# Patient Record
Sex: FEMALE | Race: WHITE | NOT HISPANIC OR LATINO | Employment: OTHER | URBAN - METROPOLITAN AREA
[De-identification: names, ages, dates, MRNs, and addresses within clinical notes are randomized per-mention and may not be internally consistent; named-entity substitution may affect disease eponyms.]

---

## 2017-01-06 ENCOUNTER — HOSPITAL ENCOUNTER (OUTPATIENT)
Dept: OTHER | Facility: HOSPITAL | Age: 72
Discharge: HOME | End: 2017-01-06
Attending: NURSE PRACTITIONER | Admitting: NURSE PRACTITIONER

## 2017-01-12 ENCOUNTER — HOSPITAL ENCOUNTER (OUTPATIENT)
Dept: OTHER | Facility: HOSPITAL | Age: 72
Discharge: HOME | End: 2017-01-12
Attending: NURSE PRACTITIONER | Admitting: NURSE PRACTITIONER

## 2017-03-30 ENCOUNTER — CONVERSION ENCOUNTER (OUTPATIENT)
Dept: FAMILY MEDICINE CLINIC | Facility: CLINIC | Age: 72
End: 2017-03-30

## 2017-06-12 ENCOUNTER — HOSPITAL ENCOUNTER (OUTPATIENT)
Dept: OTHER | Facility: HOSPITAL | Age: 72
Discharge: HOME | End: 2017-06-12
Attending: NURSE PRACTITIONER | Admitting: NURSE PRACTITIONER

## 2017-06-22 ENCOUNTER — HOSPITAL ENCOUNTER (OUTPATIENT)
Dept: MAMMOGRAPHY | Facility: HOSPITAL | Age: 72
Discharge: HOME | End: 2017-06-22
Attending: NURSE PRACTITIONER | Admitting: NURSE PRACTITIONER

## 2017-07-20 ENCOUNTER — ON CAMPUS - OUTPATIENT (AMBULATORY)
Dept: URBAN - METROPOLITAN AREA HOSPITAL 2 | Facility: HOSPITAL | Age: 72
End: 2017-07-20
Payer: COMMERCIAL

## 2017-07-20 VITALS
SYSTOLIC BLOOD PRESSURE: 117 MMHG | HEART RATE: 71 BPM | HEART RATE: 73 BPM | HEART RATE: 78 BPM | HEART RATE: 70 BPM | DIASTOLIC BLOOD PRESSURE: 86 MMHG | RESPIRATION RATE: 16 BRPM | RESPIRATION RATE: 18 BRPM | SYSTOLIC BLOOD PRESSURE: 125 MMHG | SYSTOLIC BLOOD PRESSURE: 134 MMHG | HEIGHT: 64 IN | SYSTOLIC BLOOD PRESSURE: 113 MMHG | SYSTOLIC BLOOD PRESSURE: 132 MMHG | DIASTOLIC BLOOD PRESSURE: 90 MMHG | DIASTOLIC BLOOD PRESSURE: 76 MMHG | OXYGEN SATURATION: 100 % | HEART RATE: 76 BPM | SYSTOLIC BLOOD PRESSURE: 127 MMHG | DIASTOLIC BLOOD PRESSURE: 69 MMHG | DIASTOLIC BLOOD PRESSURE: 65 MMHG | TEMPERATURE: 96.9 F | DIASTOLIC BLOOD PRESSURE: 62 MMHG | HEART RATE: 77 BPM | DIASTOLIC BLOOD PRESSURE: 68 MMHG | WEIGHT: 160 LBS | SYSTOLIC BLOOD PRESSURE: 173 MMHG | SYSTOLIC BLOOD PRESSURE: 171 MMHG | SYSTOLIC BLOOD PRESSURE: 133 MMHG

## 2017-07-20 DIAGNOSIS — D64.9 ANEMIA, UNSPECIFIED: ICD-10-CM

## 2017-07-20 DIAGNOSIS — K44.9 DIAPHRAGMATIC HERNIA WITHOUT OBSTRUCTION OR GANGRENE: ICD-10-CM

## 2017-07-20 DIAGNOSIS — K57.30 DIVERTICULOSIS OF LARGE INTESTINE WITHOUT PERFORATION OR ABS: ICD-10-CM

## 2017-07-20 PROCEDURE — 43235 EGD DIAGNOSTIC BRUSH WASH: CPT | Performed by: INTERNAL MEDICINE

## 2017-07-20 PROCEDURE — 45378 DIAGNOSTIC COLONOSCOPY: CPT | Performed by: INTERNAL MEDICINE

## 2017-07-20 RX ADMIN — PROPOFOL: 10 INJECTION, EMULSION INTRAVENOUS at 14:05

## 2017-07-20 NOTE — SERVICEHPINOTES
Jocelyn Erickson  is a    72  female   who is being seen  for anemia c hgb 11 and fe sat 23%BR\2008 colonoscopy AJ diverticulosis

## 2017-08-07 ENCOUNTER — HOSPITAL ENCOUNTER (OUTPATIENT)
Dept: OTHER | Facility: HOSPITAL | Age: 72
Discharge: HOME | End: 2017-08-07
Attending: NURSE PRACTITIONER | Admitting: NURSE PRACTITIONER

## 2017-12-12 ENCOUNTER — HOSPITAL ENCOUNTER (OUTPATIENT)
Dept: OTHER | Facility: HOSPITAL | Age: 72
Discharge: HOME | End: 2017-12-12
Attending: NURSE PRACTITIONER | Admitting: NURSE PRACTITIONER

## 2018-06-05 ENCOUNTER — HOSPITAL ENCOUNTER (OUTPATIENT)
Dept: GENERAL RADIOLOGY | Facility: HOSPITAL | Age: 73
Discharge: HOME | End: 2018-06-05
Attending: INTERNAL MEDICINE | Admitting: INTERNAL MEDICINE

## 2018-06-14 ENCOUNTER — HOSPITAL ENCOUNTER (OUTPATIENT)
Dept: GENERAL RADIOLOGY | Facility: HOSPITAL | Age: 73
Discharge: HOME | End: 2018-06-14
Attending: NURSE PRACTITIONER | Admitting: NURSE PRACTITIONER

## 2018-11-06 ENCOUNTER — HOSPITAL ENCOUNTER (OUTPATIENT)
Dept: OTHER | Facility: HOSPITAL | Age: 73
Discharge: HOME | End: 2018-11-06
Attending: NURSE PRACTITIONER | Admitting: NURSE PRACTITIONER

## 2019-01-08 ENCOUNTER — HOSPITAL ENCOUNTER (OUTPATIENT)
Dept: CT IMAGING | Facility: HOSPITAL | Age: 74
Discharge: HOME | End: 2019-01-08
Attending: NURSE PRACTITIONER | Admitting: NURSE PRACTITIONER

## 2019-06-04 VITALS — SYSTOLIC BLOOD PRESSURE: 130 MMHG | DIASTOLIC BLOOD PRESSURE: 76 MMHG

## 2019-06-24 RX ORDER — FAMCICLOVIR 500 MG/1
TABLET ORAL
Qty: 3 TABLET | Refills: 0 | Status: SHIPPED | OUTPATIENT
Start: 2019-06-24 | End: 2020-02-05

## 2019-06-27 RX ORDER — LOSARTAN POTASSIUM 50 MG/1
TABLET ORAL
Qty: 90 TABLET | Refills: 0 | Status: SHIPPED | OUTPATIENT
Start: 2019-06-27 | End: 2019-07-09 | Stop reason: SDUPTHER

## 2019-07-07 RX ORDER — LOSARTAN POTASSIUM 50 MG/1
TABLET ORAL
Qty: 90 TABLET | Refills: 0 | OUTPATIENT
Start: 2019-07-07

## 2019-07-09 RX ORDER — LOSARTAN POTASSIUM 50 MG/1
50 TABLET ORAL DAILY
Qty: 90 TABLET | Refills: 0 | Status: SHIPPED | OUTPATIENT
Start: 2019-07-09 | End: 2019-08-08

## 2019-07-09 NOTE — TELEPHONE ENCOUNTER
Patient is scheduled for 08/15/2019, wanting to know if you can refill medication until appointment.

## 2019-07-17 ENCOUNTER — OFFICE VISIT (OUTPATIENT)
Dept: FAMILY MEDICINE CLINIC | Facility: CLINIC | Age: 74
End: 2019-07-17

## 2019-07-17 ENCOUNTER — HOSPITAL ENCOUNTER (OUTPATIENT)
Dept: GENERAL RADIOLOGY | Facility: HOSPITAL | Age: 74
Discharge: HOME | End: 2019-07-17
Admitting: NURSE PRACTITIONER

## 2019-07-17 ENCOUNTER — HOSPITAL ENCOUNTER (OUTPATIENT)
Dept: GENERAL RADIOLOGY | Facility: HOSPITAL | Age: 74
Discharge: HOME | End: 2019-07-17

## 2019-07-17 VITALS
OXYGEN SATURATION: 99 % | SYSTOLIC BLOOD PRESSURE: 157 MMHG | BODY MASS INDEX: 30.88 KG/M2 | WEIGHT: 167.8 LBS | HEART RATE: 85 BPM | DIASTOLIC BLOOD PRESSURE: 78 MMHG | HEIGHT: 62 IN | RESPIRATION RATE: 16 BRPM | TEMPERATURE: 97.6 F

## 2019-07-17 DIAGNOSIS — M79.605 LEFT LEG PAIN: Primary | ICD-10-CM

## 2019-07-17 DIAGNOSIS — S80.02XA CONTUSION, KNEE AND LOWER LEG, LEFT, INITIAL ENCOUNTER: ICD-10-CM

## 2019-07-17 DIAGNOSIS — M79.605 LEFT LEG PAIN: ICD-10-CM

## 2019-07-17 DIAGNOSIS — S80.12XA CONTUSION, KNEE AND LOWER LEG, LEFT, INITIAL ENCOUNTER: ICD-10-CM

## 2019-07-17 PROBLEM — I87.2 VENOUS INSUFFICIENCY: Status: ACTIVE | Noted: 2018-09-12

## 2019-07-17 PROBLEM — M48.02 SPINAL STENOSIS OF CERVICAL REGION: Status: ACTIVE | Noted: 2017-08-07

## 2019-07-17 PROBLEM — M19.90 OSTEOARTHRITIS: Status: ACTIVE | Noted: 2019-07-17

## 2019-07-17 PROBLEM — R89.9 ABNORMAL LABORATORY TEST RESULT: Status: ACTIVE | Noted: 2017-01-06

## 2019-07-17 PROBLEM — I34.0 MITRAL VALVE INSUFFICIENCY: Status: ACTIVE | Noted: 2017-01-16

## 2019-07-17 PROBLEM — H69.91 EUSTACHIAN TUBE DISORDER, RIGHT: Status: ACTIVE | Noted: 2019-02-18

## 2019-07-17 PROBLEM — J98.4 DISORDER OF LUNG: Status: ACTIVE | Noted: 2017-05-04

## 2019-07-17 PROCEDURE — 99214 OFFICE O/P EST MOD 30 MIN: CPT | Performed by: NURSE PRACTITIONER

## 2019-07-17 PROCEDURE — 73590 X-RAY EXAM OF LOWER LEG: CPT

## 2019-07-17 PROCEDURE — 73560 X-RAY EXAM OF KNEE 1 OR 2: CPT

## 2019-07-17 NOTE — PROGRESS NOTES
Chief Complaint   Patient presents with   • Knee Pain     left, fell a week ago. Swelling.         Subjective     Yancy Mcdonnell  has a past medical history of Acute right otitis media (02/18/2019), Allergic rhinitis (06/02/2015), Body mass index 29.0-29.9, adult (09/12/2018), Bronchitis (11/06/2018), Cervical spinal stenosis (08/07/2017), Cholelithiasis, Cough (01/05/2017), DDD (degenerative disc disease), cervical, DDD (degenerative disc disease), lumbar, DDD (degenerative disc disease), thoracic, Diverticulosis, Dyslipidemia (12/05/2012), Elevated brain natriuretic peptide (BNP) level (01/06/2017), Eustachian tube disorder, right (02/18/2019), Fatigue (06/02/2015), Female cystocele (06/02/2015), Hiatal hernia with gastroesophageal reflux (01/06/2017), History of DVT (deep vein thrombosis) (06/02/2015), Hyperlipidemia, Hypertension (06/10/2016), Left rib fracture (05/2017), Migraine headache (06/02/2015), Mitral insufficiency (01/16/2017), Osteoarthritis (05/20/2014), Osteopenia (12/05/2012), Over weight (06/14/2018), Peripheral edema (01/05/2017), PFO (patent foramen ovale) (01/16/2017), Pulmonary nodule (05/04/2017), RHA (rheumatoid arthritis) (CMS/[facility]) (12/05/2012), Scleritis (06/02/2015), Scoliosis, Tricuspid insufficiency, Varicose veins of other specified sites, and Venous insufficiency (09/12/2018).    HPI   Tripped a week ago. Landed on left knee. Left leg is swollen. Pain radiates down into lower leg. Has scab over the left knee. She has a history of DVT in the right leg when she had fractured that leg several years ago. No fever or chills. No warmth in the leg.      Allergies   Allergen Reactions   • Demerol [Meperidine] Unknown (See Comments)     Abstracted from [facility].   • Other Unknown (See Comments)     Sulfa (sulfadiazine)    • Lisinopril Cough       Current Outpatient Medications:   •  albuterol sulfate HFA (VENTOLIN HFA) 108 (90 Base) MCG/ACT inhaler, Two inhalations every 4-6 hours as needed  for SOA., Disp: , Rfl:   •  alendronate (FOSAMAX) 70 MG tablet, Take 70 mg by mouth Every 7 (Seven) Days. TAKE 1 TABLET BY MOUTH ONCE PER WEEK, FIRST THING IN AM ON AN EMPTY STOMACH WITH 8 OUNCES OF WATER STAY UPRIGHT, Disp: , Rfl:   •  Calcium Carbonate-Vitamin D (CALCIUM 500 + D PO), Take 2 tablets by mouth Daily., Disp: , Rfl:   •  cefdinir (OMNICEF) 300 MG capsule, Take 300 mg by mouth 2 (Two) Times a Day. one capsule twice for 10 days, Disp: , Rfl:   •  CHELATED MAGNESIUM PO, Take 1 tablet by mouth Daily., Disp: , Rfl:   •  famciclovir (FAMVIR) 500 MG tablet, TAKE 3 TABLETS BY MOUTH AS A SINGLE DOSE AT FIRST SIGN OF COLD SORE, Disp: 3 tablet, Rfl: 0  •  fluticasone (FLONASE) 50 MCG/ACT nasal spray, 2 sprays into the nostril(s) as directed by provider Daily. INTO EACH NOSTRIL, Disp: , Rfl:   •  Glucosamine-Chondroitin (CIDAFLEX PO), Take 2 tablets by mouth Daily., Disp: , Rfl:   •  LORATADINE PO, Take 1 tablet by mouth Daily., Disp: , Rfl:   •  losartan (COZAAR) 50 MG tablet, Take 1 tablet by mouth Daily for 30 days., Disp: 90 tablet, Rfl: 0  •  lysine ([employer] LYSINE) 500 MG tablet, Take 1 tablet by mouth Daily As Needed., Disp: , Rfl:   •  Multiple Vitamin (MULTI-VITAMIN DAILY) tablet, Take 1 tablet by mouth Daily., Disp: , Rfl:   •  Naproxen Sodium (ALEVE) 220 MG capsule, Take 2 capsules by mouth Daily., Disp: , Rfl:   •  omeprazole (priLOSEC) 20 MG capsule, Take 20 mg by mouth Daily. TAKE ONE CAPSULE BY MOUTH EVERY AM ON EMPTY STOMACH, 30 MINUTES BEFORE EATING OR  TAKING OTHER MEDICAITON, Disp: , Rfl:   •  Vitamin D, Cholecalciferol, 1000 units capsule, Take 1 capsule by mouth Daily., Disp: , Rfl:   •  Cranberry (AZO CRANBERRY GUMMIES) 500 MG chewable tablet, Chew 1 tablet Daily., Disp: , Rfl:   Past Medical History:   Diagnosis Date   • Acute right otitis media 02/18/2019   • Allergic rhinitis 06/02/2015   • Body mass index 29.0-29.9, adult 09/12/2018   • Bronchitis 11/06/2018   • Cervical spinal stenosis  08/07/2017   • Cholelithiasis    • Cough 01/05/2017   • DDD (degenerative disc disease), cervical    • DDD (degenerative disc disease), lumbar    • DDD (degenerative disc disease), thoracic    • Diverticulosis    • Dyslipidemia 12/05/2012   • Elevated brain natriuretic peptide (BNP) level 01/06/2017   • Eustachian tube disorder, right 02/18/2019   • Fatigue 06/02/2015   • Female cystocele 06/02/2015   • Hiatal hernia with gastroesophageal reflux 01/06/2017   • History of DVT (deep vein thrombosis) 06/02/2015   • Hyperlipidemia    • Hypertension 06/10/2016   • Left rib fracture 05/2017    11TH RIB    • Migraine headache 06/02/2015   • Mitral insufficiency 01/16/2017    MILD   • Osteoarthritis 05/20/2014   • Osteopenia 12/05/2012   • Over weight 06/14/2018   • Peripheral edema 01/05/2017   • PFO (patent foramen ovale) 01/16/2017   • Pulmonary nodule 05/04/2017    DR CHUN FOLLOWS   • RHA (rheumatoid arthritis) (CMS/HCC) 12/05/2012   • Scleritis 06/02/2015   • Scoliosis    • Tricuspid insufficiency     MILD   • Varicose veins of other specified sites    • Venous insufficiency 09/12/2018     Past Surgical History:   Procedure Laterality Date   • COLONOSCOPY  07/20/2017    EGD/ COLONSCOPY LARGE HIATAL HERNIA. MILD DIVERTICULOSIS. OTHERWISE NORMAL.   • INGUINAL HERNIA REPAIR Right    • TOTAL ABDOMINAL HYSTERECTOMY  1985    W/BSO WITH A/P REPAIR 1985 BENIGN REASONS DR. DRAKE     Social History     Socioeconomic History   • Marital status:      Spouse name: Not on file   • Number of children: Not on file   • Years of education: Not on file   • Highest education level: Not on file   Tobacco Use   • Smoking status: Never Smoker   • Smokeless tobacco: Never Used   Substance and Sexual Activity   • Alcohol use: No     Frequency: Never   • Drug use: No     Family History   Problem Relation Age of Onset   • Osteoporosis Mother    • Stroke Mother 80   • Dementia Mother    • Heart disease Father    • Hypertension Father    •  "Heart disease Sister    • Cancer Daughter         BREAST AND MELANOMA   • Cancer Son         MELANOMA   • Cancer Grandson         MELANOMA       Family history, surgical history, past medical history, Allergies and med's reviewed with patient today and updated in [facility] EMR.     ROS:  Review of Systems   Constitutional: Negative for activity change, appetite change, diaphoresis, fatigue, unexpected weight gain and unexpected weight loss.   Eyes: Negative for blurred vision and visual disturbance.   Respiratory: Negative for apnea, cough, shortness of breath and wheezing.    Cardiovascular: Positive for leg swelling. Negative for chest pain and palpitations.   Gastrointestinal: Negative for abdominal pain, constipation, diarrhea, nausea and vomiting.   Endocrine: Negative for cold intolerance and heat intolerance.   Genitourinary: Negative for frequency.   Musculoskeletal: Positive for arthralgias and gait problem.   Skin: Negative for color change.   Neurological: Negative for dizziness, syncope and headache.   Psychiatric/Behavioral: Negative for depressed mood.       OBJECTIVE:  Vitals:    07/17/19 1541   BP: 157/78   BP Location: Right arm   Patient Position: Sitting   Cuff Size: Adult   Pulse: 85   Resp: 16   Temp: 97.6 °F (36.4 °C)   TempSrc: Oral   SpO2: 99%   Weight: 76.1 kg (167 lb 12.8 oz)   Height: 157.5 cm (62\")         07/17/19  1541   Weight: 76.1 kg (167 lb 12.8 oz)     Body mass index is 30.69 kg/m².    Physical Exam   Constitutional: She is oriented to person, place, and time. She appears well-developed and well-nourished.   Neck: Trachea normal and normal range of motion. Neck supple. Carotid bruit is not present. No thyromegaly present.   Cardiovascular: Normal rate, regular rhythm, normal heart sounds and intact distal pulses. Exam reveals no gallop and no friction rub.   No murmur heard.  Pulses:       Dorsalis pedis pulses are 2+ on the right side, and 2+ on the left side.        Posterior tibial " pulses are 2+ on the right side, and 2+ on the left side.   Pulmonary/Chest: Effort normal and breath sounds normal. She has no wheezes. She has no rales.   Abdominal: Soft. Bowel sounds are normal. There is no hepatosplenomegaly. No hernia.   Musculoskeletal: She exhibits no edema.        Left knee: She exhibits decreased range of motion and swelling. She exhibits no ecchymosis. Tenderness found.        Legs:  Lymphadenopathy:     She has no cervical adenopathy.   Neurological: She is alert and oriented to person, place, and time.   Skin: Skin is warm and dry.   Psychiatric: She has a normal mood and affect. Her behavior is normal. Judgment and thought content normal.       No visits with results within 30 Day(s) from this visit.   Latest known visit with results is:   Abstract on 04/22/2019   Component Date Value Ref Range Status   •  Mammogram 01/15/2014 normal screening mammogram. recommend routine mammographic follow-up in one year   Final   •  Colonoscopy 07/20/2017 mild diverticulosis of the sigmoid colon otherwise normal colonoscopy to the terminal ileum    Final   •  Dexa Scan 06/14/2018 osteopenia    Final       ASSESSMENT/ PLAN:    Diagnoses and all orders for this visit:    1. Left leg pain (Primary)  Comments:  Given her swelling and hx of DVT will get u/s to rule out another DVT.   Orders:  -     XR Knee 1 or 2 View Left; Future  -     XR Tibia Fibula 2 View Left (In Office)  -     US Venous Doppler Lower Extremity Left (duplex); Future    2. Contusion, knee and lower leg, left, initial encounter  Comments:  X-ray without obvious fracture - awaiting Radiologist's official report.   Ice, ACE, elevate.         Orders Placed Today:     No orders of the defined types were placed in this encounter.       Management Plan:     An After Visit Summary was printed and given to the patient at discharge.    Follow-up: No Follow-up on file.    FELIPE Vázquez 7/17/2019 5:17 PM  This note was  electronically signed.

## 2019-07-22 ENCOUNTER — HOSPITAL ENCOUNTER (OUTPATIENT)
Dept: CARDIOLOGY | Facility: HOSPITAL | Age: 74
Discharge: HOME | End: 2019-07-22
Admitting: NURSE PRACTITIONER

## 2019-07-22 DIAGNOSIS — S80.02XA CONTUSION, KNEE AND LOWER LEG, LEFT, INITIAL ENCOUNTER: ICD-10-CM

## 2019-07-22 DIAGNOSIS — M79.605 LEFT LEG PAIN: ICD-10-CM

## 2019-07-22 DIAGNOSIS — S80.12XA CONTUSION, KNEE AND LOWER LEG, LEFT, INITIAL ENCOUNTER: ICD-10-CM

## 2019-07-22 LAB
BH CV LOW VAS LEFT GREATER SAPH BK VESSEL: 1
BH CV LOWER VASCULAR LEFT COMMON FEMORAL AUGMENT: NORMAL
BH CV LOWER VASCULAR LEFT COMMON FEMORAL COMPETENT: NORMAL
BH CV LOWER VASCULAR LEFT COMMON FEMORAL COMPRESS: NORMAL
BH CV LOWER VASCULAR LEFT COMMON FEMORAL PHASIC: NORMAL
BH CV LOWER VASCULAR LEFT COMMON FEMORAL SPONT: NORMAL
BH CV LOWER VASCULAR LEFT DISTAL FEMORAL COMPRESS: NORMAL
BH CV LOWER VASCULAR LEFT GASTRONEMIUS COMPRESS: NORMAL
BH CV LOWER VASCULAR LEFT GREATER SAPH AK COMPRESS: NORMAL
BH CV LOWER VASCULAR LEFT GREATER SAPH BK COMPRESS: NORMAL
BH CV LOWER VASCULAR LEFT GREATER SAPH BK THROMBUS: NORMAL
BH CV LOWER VASCULAR LEFT LESSER SAPH COMPRESS: NORMAL
BH CV LOWER VASCULAR LEFT MID FEMORAL AUGMENT: NORMAL
BH CV LOWER VASCULAR LEFT MID FEMORAL COMPETENT: NORMAL
BH CV LOWER VASCULAR LEFT MID FEMORAL COMPRESS: NORMAL
BH CV LOWER VASCULAR LEFT MID FEMORAL PHASIC: NORMAL
BH CV LOWER VASCULAR LEFT MID FEMORAL SPONT: NORMAL
BH CV LOWER VASCULAR LEFT PERONEAL COMPRESS: NORMAL
BH CV LOWER VASCULAR LEFT POPLITEAL AUGMENT: NORMAL
BH CV LOWER VASCULAR LEFT POPLITEAL COMPETENT: NORMAL
BH CV LOWER VASCULAR LEFT POPLITEAL COMPRESS: NORMAL
BH CV LOWER VASCULAR LEFT POPLITEAL PHASIC: NORMAL
BH CV LOWER VASCULAR LEFT POPLITEAL SPONT: NORMAL
BH CV LOWER VASCULAR LEFT POSTERIOR TIBIAL COMPRESS: NORMAL
BH CV LOWER VASCULAR LEFT PROXIMAL FEMORAL COMPRESS: NORMAL
BH CV LOWER VASCULAR LEFT SAPHENOFEMORAL JUNCTION COMPRESS: NORMAL
BH CV LOWER VASCULAR RIGHT COMMON FEMORAL AUGMENT: NORMAL
BH CV LOWER VASCULAR RIGHT COMMON FEMORAL COMPETENT: NORMAL
BH CV LOWER VASCULAR RIGHT COMMON FEMORAL COMPRESS: NORMAL
BH CV LOWER VASCULAR RIGHT COMMON FEMORAL PHASIC: NORMAL
BH CV LOWER VASCULAR RIGHT COMMON FEMORAL SPONT: NORMAL

## 2019-07-22 PROCEDURE — 93971 EXTREMITY STUDY: CPT

## 2019-07-24 RX ORDER — ALENDRONATE SODIUM 70 MG/1
TABLET ORAL
Qty: 4 TABLET | Refills: 0 | Status: SHIPPED | OUTPATIENT
Start: 2019-07-24 | End: 2019-07-24 | Stop reason: SDUPTHER

## 2019-07-24 RX ORDER — ALENDRONATE SODIUM 70 MG/1
TABLET ORAL
Qty: 12 TABLET | Refills: 0 | Status: SHIPPED | OUTPATIENT
Start: 2019-07-24 | End: 2019-10-08 | Stop reason: SDUPTHER

## 2019-08-15 ENCOUNTER — OFFICE VISIT (OUTPATIENT)
Dept: FAMILY MEDICINE CLINIC | Facility: CLINIC | Age: 74
End: 2019-08-15

## 2019-08-15 VITALS
RESPIRATION RATE: 16 BRPM | WEIGHT: 166 LBS | DIASTOLIC BLOOD PRESSURE: 77 MMHG | HEIGHT: 63 IN | HEART RATE: 69 BPM | SYSTOLIC BLOOD PRESSURE: 155 MMHG | TEMPERATURE: 98 F | BODY MASS INDEX: 29.41 KG/M2 | OXYGEN SATURATION: 99 %

## 2019-08-15 DIAGNOSIS — Z00.01 ENCOUNTER FOR GENERAL ADULT MEDICAL EXAMINATION WITH ABNORMAL FINDINGS: Primary | ICD-10-CM

## 2019-08-15 DIAGNOSIS — Z12.39 SCREENING FOR BREAST CANCER: ICD-10-CM

## 2019-08-15 DIAGNOSIS — I34.0 NON-RHEUMATIC MITRAL REGURGITATION: ICD-10-CM

## 2019-08-15 DIAGNOSIS — E78.5 DYSLIPIDEMIA: ICD-10-CM

## 2019-08-15 DIAGNOSIS — M06.9 RHEUMATOID ARTHRITIS, INVOLVING UNSPECIFIED SITE, UNSPECIFIED RHEUMATOID FACTOR PRESENCE: ICD-10-CM

## 2019-08-15 DIAGNOSIS — K21.9 GASTROESOPHAGEAL REFLUX DISEASE WITHOUT ESOPHAGITIS: ICD-10-CM

## 2019-08-15 DIAGNOSIS — J30.1 SEASONAL ALLERGIC RHINITIS DUE TO POLLEN: ICD-10-CM

## 2019-08-15 DIAGNOSIS — R91.1 LUNG NODULE: ICD-10-CM

## 2019-08-15 DIAGNOSIS — M85.89 OSTEOPENIA OF MULTIPLE SITES: ICD-10-CM

## 2019-08-15 DIAGNOSIS — M25.562 ACUTE PAIN OF LEFT KNEE: ICD-10-CM

## 2019-08-15 DIAGNOSIS — I87.2 VENOUS INSUFFICIENCY: ICD-10-CM

## 2019-08-15 DIAGNOSIS — Z86.718 HISTORY OF THROMBOSIS: ICD-10-CM

## 2019-08-15 DIAGNOSIS — I80.3 THROMBOPHLEBITIS LEG: ICD-10-CM

## 2019-08-15 DIAGNOSIS — I10 HYPERTENSION, UNSPECIFIED TYPE: ICD-10-CM

## 2019-08-15 DIAGNOSIS — Z12.72 SCREENING FOR VAGINAL CANCER: ICD-10-CM

## 2019-08-15 LAB
BILIRUB BLD-MCNC: NEGATIVE MG/DL
CLARITY, POC: CLEAR
COLOR UR: YELLOW
GLUCOSE UR STRIP-MCNC: NEGATIVE MG/DL
KETONES UR QL: NEGATIVE
LEUKOCYTE EST, POC: ABNORMAL
NITRITE UR-MCNC: NEGATIVE MG/ML
PH UR: 7 [PH] (ref 5–8)
PROT UR STRIP-MCNC: NEGATIVE MG/DL
RBC # UR STRIP: NEGATIVE /UL
SP GR UR: 1.01 (ref 1–1.03)
UROBILINOGEN UR QL: NORMAL

## 2019-08-15 PROCEDURE — G0101 CA SCREEN;PELVIC/BREAST EXAM: HCPCS | Performed by: NURSE PRACTITIONER

## 2019-08-15 PROCEDURE — 81003 URINALYSIS AUTO W/O SCOPE: CPT | Performed by: NURSE PRACTITIONER

## 2019-08-15 PROCEDURE — G0439 PPPS, SUBSEQ VISIT: HCPCS | Performed by: NURSE PRACTITIONER

## 2019-08-15 PROCEDURE — 99214 OFFICE O/P EST MOD 30 MIN: CPT | Performed by: NURSE PRACTITIONER

## 2019-08-15 RX ORDER — LOSARTAN POTASSIUM 50 MG/1
50 TABLET ORAL 2 TIMES DAILY
COMMUNITY
End: 2019-09-24 | Stop reason: DRUGHIGH

## 2019-08-16 LAB
ALBUMIN SERPL-MCNC: 4 G/DL (ref 3.5–4.8)
ALBUMIN/GLOB SERPL: 1.5 {RATIO} (ref 1.2–2.2)
ALP SERPL-CCNC: 110 IU/L (ref 39–117)
ALT SERPL-CCNC: 16 IU/L (ref 0–32)
AST SERPL-CCNC: 17 IU/L (ref 0–40)
BASOPHILS # BLD AUTO: 0 X10E3/UL (ref 0–0.2)
BASOPHILS NFR BLD AUTO: 0 %
BILIRUB SERPL-MCNC: 0.6 MG/DL (ref 0–1.2)
BUN SERPL-MCNC: 15 MG/DL (ref 8–27)
BUN/CREAT SERPL: 19 (ref 12–28)
CALCIUM SERPL-MCNC: 9.2 MG/DL (ref 8.7–10.3)
CHLORIDE SERPL-SCNC: 107 MMOL/L (ref 96–106)
CO2 SERPL-SCNC: 23 MMOL/L (ref 20–29)
CREAT SERPL-MCNC: 0.79 MG/DL (ref 0.57–1)
EOSINOPHIL # BLD AUTO: 0.1 X10E3/UL (ref 0–0.4)
EOSINOPHIL NFR BLD AUTO: 2 %
ERYTHROCYTE [DISTWIDTH] IN BLOOD BY AUTOMATED COUNT: 15.1 % (ref 12.3–15.4)
GLOBULIN SER CALC-MCNC: 2.6 G/DL (ref 1.5–4.5)
GLUCOSE SERPL-MCNC: 86 MG/DL (ref 65–99)
HCT VFR BLD AUTO: 35.5 % (ref 34–46.6)
HGB BLD-MCNC: 11 G/DL (ref 11.1–15.9)
IMM GRANULOCYTES # BLD AUTO: 0 X10E3/UL (ref 0–0.1)
IMM GRANULOCYTES NFR BLD AUTO: 0 %
LYMPHOCYTES # BLD AUTO: 1.5 X10E3/UL (ref 0.7–3.1)
LYMPHOCYTES NFR BLD AUTO: 29 %
MCH RBC QN AUTO: 27.6 PG (ref 26.6–33)
MCHC RBC AUTO-ENTMCNC: 31 G/DL (ref 31.5–35.7)
MCV RBC AUTO: 89 FL (ref 79–97)
MONOCYTES # BLD AUTO: 0.5 X10E3/UL (ref 0.1–0.9)
MONOCYTES NFR BLD AUTO: 10 %
NEUTROPHILS # BLD AUTO: 2.9 X10E3/UL (ref 1.4–7)
NEUTROPHILS NFR BLD AUTO: 59 %
PLATELET # BLD AUTO: 402 X10E3/UL (ref 150–450)
POTASSIUM SERPL-SCNC: 5 MMOL/L (ref 3.5–5.2)
PROT SERPL-MCNC: 6.6 G/DL (ref 6–8.5)
RBC # BLD AUTO: 3.99 X10E6/UL (ref 3.77–5.28)
SODIUM SERPL-SCNC: 143 MMOL/L (ref 134–144)
WBC # BLD AUTO: 5 X10E3/UL (ref 3.4–10.8)

## 2019-08-18 ENCOUNTER — TELEPHONE (OUTPATIENT)
Dept: FAMILY MEDICINE CLINIC | Facility: CLINIC | Age: 74
End: 2019-08-18

## 2019-08-18 PROBLEM — R89.9 ABNORMAL LABORATORY TEST RESULT: Status: RESOLVED | Noted: 2017-01-06 | Resolved: 2019-08-18

## 2019-08-18 PROBLEM — H69.91 EUSTACHIAN TUBE DISORDER, RIGHT: Status: RESOLVED | Noted: 2019-02-18 | Resolved: 2019-08-18

## 2019-08-18 PROBLEM — K21.9 GASTROESOPHAGEAL REFLUX DISEASE WITHOUT ESOPHAGITIS: Status: ACTIVE | Noted: 2019-08-18

## 2019-08-18 PROBLEM — R91.1 LUNG NODULE: Status: ACTIVE | Noted: 2017-05-04

## 2019-08-18 NOTE — TELEPHONE ENCOUNTER
Please have patient go downstairs and get an x-ray of her left knee. I've already placed an order in the chart. I want to be sure she did fracture her knee cap when she fell last month.

## 2019-08-21 ENCOUNTER — HOSPITAL ENCOUNTER (OUTPATIENT)
Dept: GENERAL RADIOLOGY | Facility: HOSPITAL | Age: 74
Discharge: HOME | End: 2019-08-21
Admitting: NURSE PRACTITIONER

## 2019-08-21 DIAGNOSIS — M25.562 ACUTE PAIN OF LEFT KNEE: ICD-10-CM

## 2019-08-21 PROCEDURE — 73560 X-RAY EXAM OF KNEE 1 OR 2: CPT

## 2019-08-28 ENCOUNTER — HOSPITAL ENCOUNTER (OUTPATIENT)
Dept: MAMMOGRAPHY | Facility: HOSPITAL | Age: 74
Discharge: HOME | End: 2019-08-28
Admitting: NURSE PRACTITIONER

## 2019-08-28 DIAGNOSIS — Z12.39 SCREENING FOR BREAST CANCER: ICD-10-CM

## 2019-08-28 PROCEDURE — 77067 SCR MAMMO BI INCL CAD: CPT

## 2019-08-28 PROCEDURE — 77063 BREAST TOMOSYNTHESIS BI: CPT

## 2019-09-16 ENCOUNTER — FLU SHOT (OUTPATIENT)
Dept: FAMILY MEDICINE CLINIC | Facility: CLINIC | Age: 74
End: 2019-09-16

## 2019-09-16 DIAGNOSIS — Z23 IMMUNIZATION, PNEUMOCOCCUS AND INFLUENZA: ICD-10-CM

## 2019-09-16 PROCEDURE — G0008 ADMIN INFLUENZA VIRUS VAC: HCPCS | Performed by: NURSE PRACTITIONER

## 2019-09-16 PROCEDURE — 90653 IIV ADJUVANT VACCINE IM: CPT | Performed by: NURSE PRACTITIONER

## 2019-09-24 ENCOUNTER — OFFICE VISIT (OUTPATIENT)
Dept: FAMILY MEDICINE CLINIC | Facility: CLINIC | Age: 74
End: 2019-09-24

## 2019-09-24 VITALS
HEIGHT: 63 IN | HEART RATE: 82 BPM | OXYGEN SATURATION: 98 % | SYSTOLIC BLOOD PRESSURE: 156 MMHG | TEMPERATURE: 97.7 F | DIASTOLIC BLOOD PRESSURE: 86 MMHG | RESPIRATION RATE: 18 BRPM | WEIGHT: 164.6 LBS | BODY MASS INDEX: 29.16 KG/M2

## 2019-09-24 DIAGNOSIS — I10 ESSENTIAL HYPERTENSION: Primary | ICD-10-CM

## 2019-09-24 PROCEDURE — 99212 OFFICE O/P EST SF 10 MIN: CPT | Performed by: NURSE PRACTITIONER

## 2019-09-24 RX ORDER — LOSARTAN POTASSIUM 100 MG/1
100 TABLET ORAL DAILY
Start: 2019-09-24 | End: 2019-10-08 | Stop reason: SDUPTHER

## 2019-09-24 NOTE — PROGRESS NOTES
Chief Complaint   Patient presents with   • Hypertension     BP was running high, she increased her Losartan to two tablets daily about a week ago.         Caitie Mcdonnell  has a past medical history of Acute right otitis media (02/18/2019), Allergic rhinitis (06/02/2015), Body mass index 29.0-29.9, adult (09/12/2018), Bronchitis (11/06/2018), Cervical spinal stenosis (08/07/2017), Cholelithiasis, Cough (01/05/2017), DDD (degenerative disc disease), cervical, DDD (degenerative disc disease), lumbar, DDD (degenerative disc disease), thoracic, Diverticulosis, Dyslipidemia (12/05/2012), Elevated brain natriuretic peptide (BNP) level (01/06/2017), Eustachian tube disorder, right (02/18/2019), Fatigue (06/02/2015), Female cystocele (06/02/2015), Hiatal hernia with gastroesophageal reflux (01/06/2017), History of DVT (deep vein thrombosis) (06/02/2015), Hyperlipidemia, Hypertension (06/10/2016), Left rib fracture (05/2017), Migraine headache (06/02/2015), Mitral insufficiency (01/16/2017), Osteoarthritis (05/20/2014), Osteopenia (12/05/2012), Over weight (06/14/2018), Peripheral edema (01/05/2017), PFO (patent foramen ovale) (01/16/2017), Pulmonary nodule (05/04/2017), RHA (rheumatoid arthritis) (CMS/[facility]) (12/05/2012), Scleritis (06/02/2015), Scoliosis, Tricuspid insufficiency, Varicose veins of other specified sites, and Venous insufficiency (09/12/2018).    Hypertension   This is a chronic problem. The current episode started more than 1 year ago. The problem has been gradually worsening since onset. The problem is uncontrolled. Associated symptoms include peripheral edema. Pertinent negatives include no blurred vision, chest pain, headaches, orthopnea, palpitations, PND or shortness of breath. Current antihypertension treatment includes angiotensin blockers. The current treatment provides moderate improvement. There are no compliance problems.    BP has been running higher at her other doctor's  appointments. She increased her Losartan on her home from 50 mg to 75 mg to 100 mg daily. Has been taking 100 mg daily for about a week.       Allergies   Allergen Reactions   • Demerol [Meperidine] Unknown (See Comments)     [facility]   • Other Unknown (See Comments)     Sulfa (sulfadiazine)    • Lisinopril Cough         Current Outpatient Medications:   •  albuterol sulfate HFA (VENTOLIN HFA) 108 (90 Base) MCG/ACT inhaler, Two inhalations every 4-6 hours as needed for SOA., Disp: , Rfl:   •  alendronate (FOSAMAX) 70 MG tablet, TAKE 1 TABLET BY MOUTH ONCE A WEEK. TAKE IN THE MORNING ON AN EMPTY STOMACH WITH 8 OUNCES OF WATER. STAY UPRIGHT, Disp: 12 tablet, Rfl: 0  •  Calcium Carbonate-Vitamin D (CALCIUM 500 + D PO), Take 2 tablets by mouth Daily., Disp: , Rfl:   •  CHELATED MAGNESIUM PO, Take 1 tablet by mouth Daily., Disp: , Rfl:   •  famciclovir (FAMVIR) 500 MG tablet, TAKE 3 TABLETS BY MOUTH AS A SINGLE DOSE AT FIRST SIGN OF COLD SORE, Disp: 3 tablet, Rfl: 0  •  fluticasone (FLONASE) 50 MCG/ACT nasal spray, 2 sprays into the nostril(s) as directed by provider Daily. INTO EACH NOSTRIL, Disp: , Rfl:   •  Glucosamine-Chondroitin (CIDAFLEX PO), Take 2 tablets by mouth Daily., Disp: , Rfl:   •  LORATADINE PO, Take 1 tablet by mouth Daily., Disp: , Rfl:   •  lysine ([employer] LYSINE) 500 MG tablet, Take 1 tablet by mouth Daily As Needed., Disp: , Rfl:   •  Multiple Vitamin (MULTI-VITAMIN DAILY) tablet, Take 1 tablet by mouth Daily., Disp: , Rfl:   •  Naproxen Sodium (ALEVE) 220 MG capsule, Take 2 capsules by mouth Daily., Disp: , Rfl:   •  omeprazole (priLOSEC) 20 MG capsule, Take 20 mg by mouth Daily. TAKE ONE CAPSULE BY MOUTH EVERY AM ON EMPTY STOMACH, 30 MINUTES BEFORE EATING OR  TAKING OTHER MEDICAITON, Disp: , Rfl:   •  Vitamin D, Cholecalciferol, 1000 units capsule, Take 1 capsule by mouth Daily., Disp: , Rfl:   •  losartan (COZAAR) 100 MG tablet, Take 1 tablet by mouth Daily., Disp: , Rfl:     Past  Medical History:   Diagnosis Date   • Acute right otitis media 02/18/2019   • Allergic rhinitis 06/02/2015   • Body mass index 29.0-29.9, adult 09/12/2018   • Bronchitis 11/06/2018   • Cervical spinal stenosis 08/07/2017   • Cholelithiasis    • Cough 01/05/2017   • DDD (degenerative disc disease), cervical    • DDD (degenerative disc disease), lumbar    • DDD (degenerative disc disease), thoracic    • Diverticulosis    • Dyslipidemia 12/05/2012   • Elevated brain natriuretic peptide (BNP) level 01/06/2017   • Eustachian tube disorder, right 02/18/2019   • Fatigue 06/02/2015   • Female cystocele 06/02/2015   • Hiatal hernia with gastroesophageal reflux 01/06/2017   • History of DVT (deep vein thrombosis) 06/02/2015   • Hyperlipidemia    • Hypertension 06/10/2016   • Left rib fracture 05/2017    11TH RIB    • Migraine headache 06/02/2015   • Mitral insufficiency 01/16/2017    MILD   • Osteoarthritis 05/20/2014   • Osteopenia 12/05/2012   • Over weight 06/14/2018   • Peripheral edema 01/05/2017   • PFO (patent foramen ovale) 01/16/2017   • Pulmonary nodule 05/04/2017    DR CHUN FOLLOWS   • RHA (rheumatoid arthritis) (CMS/[facility]) 12/05/2012   • Scleritis 06/02/2015   • Scoliosis    • Tricuspid insufficiency     MILD   • Varicose veins of other specified sites    • Venous insufficiency 09/12/2018       Past Surgical History:   Procedure Laterality Date   • COLONOSCOPY  07/20/2017    EGD/ COLONSCOPY LARGE HIATAL HERNIA. MILD DIVERTICULOSIS. OTHERWISE NORMAL.   • INGUINAL HERNIA REPAIR Right    • TOTAL ABDOMINAL HYSTERECTOMY  1985    W/BSO WITH A/P REPAIR 1985 BENIGN REASONS DR. DRAKE       Social History     Socioeconomic History   • Marital status:      Spouse name: Not on file   • Number of children: Not on file   • Years of education: Not on file   • Highest education level: Not on file   Tobacco Use   • Smoking status: Never Smoker   • Smokeless tobacco: Never Used   Substance and Sexual Activity   • Alcohol use:  "No     Frequency: Never   • Drug use: No       Family History   Problem Relation Age of Onset   • Osteoporosis Mother    • Stroke Mother 80   • Dementia Mother    • Heart disease Father    • Hypertension Father    • Heart disease Sister    • Cancer Daughter         BREAST AND MELANOMA   • Breast cancer Daughter 52   • Cancer Son         MELANOMA   • Cancer Grandson         MELANOMA       Family history, surgical history, past medical history, Allergies and med's reviewed with patient today and updated in [facility] EMR.     ROS:  Review of Systems   Constitutional: Negative for activity change, appetite change, diaphoresis, fatigue, unexpected weight gain and unexpected weight loss.   Eyes: Negative for blurred vision and visual disturbance.   Respiratory: Negative for apnea, cough, shortness of breath and wheezing.    Cardiovascular: Positive for leg swelling. Negative for chest pain, palpitations, orthopnea and PND.        Getting better   Gastrointestinal: Negative for abdominal pain, constipation, diarrhea, nausea and vomiting.   Endocrine: Negative for cold intolerance and heat intolerance.   Genitourinary: Negative for frequency.   Musculoskeletal: Negative for myalgias.   Neurological: Negative for dizziness, syncope and headache.   Psychiatric/Behavioral: Negative for depressed mood.       OBJECTIVE:  Vitals:    09/24/19 0755 09/24/19 0828   BP: 163/88 156/86   BP Location: Right arm    Patient Position: Sitting    Cuff Size: Adult    Pulse: 82    Resp: 18    Temp: 97.7 °F (36.5 °C)    TempSrc: Oral    SpO2: 98%    Weight: 74.7 kg (164 lb 9.6 oz)    Height: 160 cm (63\")      Body mass index is 29.16 kg/m².    Physical Exam   Constitutional: She is oriented to person, place, and time. She appears well-developed and well-nourished.   Neck: Trachea normal and normal range of motion. Neck supple. Carotid bruit is not present. No thyromegaly present.   Cardiovascular: Normal rate, regular rhythm, normal heart sounds " and intact distal pulses. Exam reveals no gallop and no friction rub.   No murmur heard.  Pulmonary/Chest: Effort normal and breath sounds normal. She has no wheezes. She has no rales.   Abdominal: Soft. Bowel sounds are normal. There is no hepatosplenomegaly. No hernia.   Musculoskeletal: Normal range of motion. She exhibits no edema.   Lymphadenopathy:     She has no cervical adenopathy.   Neurological: She is alert and oriented to person, place, and time.   Skin: Skin is warm and dry.   Psychiatric: She has a normal mood and affect. Her behavior is normal. Judgment and thought content normal.       No visits with results within 30 Day(s) from this visit.   Latest known visit with results is:   Office Visit on 08/15/2019   Component Date Value Ref Range Status   • Color 08/15/2019 Yellow  Yellow, Straw, Dark Yellow, Pallavi Final   • Clarity, UA 08/15/2019 Clear  Clear Final   • Specific Gravity  08/15/2019 1.015  1.005 - 1.030 Final   • pH, Urine 08/15/2019 7.0  5.0 - 8.0 Final   • Leukocytes 08/15/2019 Trace* Negative Final   • Nitrite, UA 08/15/2019 Negative  Negative Final   • Protein, POC 08/15/2019 Negative  Negative mg/dL Final   • Glucose, UA 08/15/2019 Negative  Negative, 1000 mg/dL (3+) mg/dL Final   • Ketones, UA 08/15/2019 Negative  Negative Final   • Urobilinogen, UA 08/15/2019 Normal  Normal Final   • Bilirubin 08/15/2019 Negative  Negative Final   • Blood, UA 08/15/2019 Negative  Negative Final   • WBC 08/15/2019 5.0  3.4 - 10.8 x10E3/uL Final   • RBC 08/15/2019 3.99  3.77 - 5.28 x10E6/uL Final   • Hemoglobin 08/15/2019 11.0* 11.1 - 15.9 g/dL Final   • Hematocrit 08/15/2019 35.5  34.0 - 46.6 % Final   • MCV 08/15/2019 89  79 - 97 fL Final   • MCH 08/15/2019 27.6  26.6 - 33.0 pg Final   • MCHC 08/15/2019 31.0* 31.5 - 35.7 g/dL Final   • RDW 08/15/2019 15.1  12.3 - 15.4 % Final   • Platelets 08/15/2019 402  150 - 450 x10E3/uL Final   • Neutrophil Rel % 08/15/2019 59  Not Estab. % Final   • Lymphocyte  Rel % 08/15/2019 29  Not Estab. % Final   • Monocyte Rel % 08/15/2019 10  Not Estab. % Final   • Eosinophil Rel % 08/15/2019 2  Not Estab. % Final   • Basophil Rel % 08/15/2019 0  Not Estab. % Final   • Neutrophils Absolute 08/15/2019 2.9  1.4 - 7.0 x10E3/uL Final   • Lymphocytes Absolute 08/15/2019 1.5  0.7 - 3.1 x10E3/uL Final   • Monocytes Absolute 08/15/2019 0.5  0.1 - 0.9 x10E3/uL Final   • Eosinophils Absolute 08/15/2019 0.1  0.0 - 0.4 x10E3/uL Final   • Basophils Absolute 08/15/2019 0.0  0.0 - 0.2 x10E3/uL Final   • Immature Granulocyte Rel % 08/15/2019 0  Not Estab. % Final   • Immature Grans Absolute 08/15/2019 0.0  0.0 - 0.1 x10E3/uL Final   • Glucose 08/15/2019 86  65 - 99 mg/dL Final   • BUN 08/15/2019 15  8 - 27 mg/dL Final   • Creatinine 08/15/2019 0.79  0.57 - 1.00 mg/dL Final   • eGFR Non  Am 08/15/2019 73  >59 mL/min/1.73 Final   • eGFR African Am 08/15/2019 85  >59 mL/min/1.73 Final   • BUN/Creatinine Ratio 08/15/2019 19  12 - 28 Final   • Sodium 08/15/2019 143  134 - 144 mmol/L Final   • Potassium 08/15/2019 5.0  3.5 - 5.2 mmol/L Final   • Chloride 08/15/2019 107* 96 - 106 mmol/L Final   • Total CO2 08/15/2019 23  20 - 29 mmol/L Final   • Calcium 08/15/2019 9.2  8.7 - 10.3 mg/dL Final   • Total Protein 08/15/2019 6.6  6.0 - 8.5 g/dL Final   • Albumin 08/15/2019 4.0  3.5 - 4.8 g/dL Final   • Globulin 08/15/2019 2.6  1.5 - 4.5 g/dL Final   • A/G Ratio 08/15/2019 1.5  1.2 - 2.2 Final   • Total Bilirubin 08/15/2019 0.6  0.0 - 1.2 mg/dL Final   • Alkaline Phosphatase 08/15/2019 110  39 - 117 IU/L Final   • AST (SGOT) 08/15/2019 17  0 - 40 IU/L Final   • ALT (SGPT) 08/15/2019 16  0 - 32 IU/L Final       ASSESSMENT/ PLAN:    Diagnoses and all orders for this visit:    1. Essential hypertension (Primary)  Comments:  Continue 100 mg Losartan daily.   Follow-up in 2 weeks.   If still elevated will add on another med.    Other orders  -     losartan (COZAAR) 100 MG tablet; Take 1 tablet by mouth  Daily.        Orders Placed Today:     New Medications Ordered This Visit   Medications   • losartan (COZAAR) 100 MG tablet     Sig: Take 1 tablet by mouth Daily.        Management Plan:     An After Visit Summary was printed and given to the patient at discharge.    Follow-up: Return in about 2 weeks (around 10/8/2019) for Recheck hypertension.    FELIPE Vázquez 9/24/2019 8:35 AM  This note was electronically signed.

## 2019-10-06 RX ORDER — OMEPRAZOLE 20 MG/1
CAPSULE, DELAYED RELEASE ORAL
Qty: 90 CAPSULE | Refills: 1 | Status: SHIPPED | OUTPATIENT
Start: 2019-10-06 | End: 2020-05-01

## 2019-10-08 ENCOUNTER — OFFICE VISIT (OUTPATIENT)
Dept: FAMILY MEDICINE CLINIC | Facility: CLINIC | Age: 74
End: 2019-10-08

## 2019-10-08 VITALS
SYSTOLIC BLOOD PRESSURE: 148 MMHG | WEIGHT: 163 LBS | HEART RATE: 77 BPM | HEIGHT: 63 IN | TEMPERATURE: 97.6 F | RESPIRATION RATE: 18 BRPM | OXYGEN SATURATION: 98 % | DIASTOLIC BLOOD PRESSURE: 80 MMHG | BODY MASS INDEX: 28.88 KG/M2

## 2019-10-08 DIAGNOSIS — I10 ESSENTIAL HYPERTENSION: ICD-10-CM

## 2019-10-08 DIAGNOSIS — I10 ESSENTIAL HYPERTENSION: Primary | ICD-10-CM

## 2019-10-08 PROCEDURE — 99213 OFFICE O/P EST LOW 20 MIN: CPT | Performed by: NURSE PRACTITIONER

## 2019-10-08 RX ORDER — LOSARTAN POTASSIUM 100 MG/1
100 TABLET ORAL DAILY
Qty: 90 TABLET | Refills: 0
Start: 2019-10-08 | End: 2019-10-08 | Stop reason: SDUPTHER

## 2019-10-08 RX ORDER — HYDROCHLOROTHIAZIDE 12.5 MG/1
12.5 TABLET ORAL DAILY
Qty: 90 TABLET | Refills: 0 | Status: SHIPPED | OUTPATIENT
Start: 2019-10-08 | End: 2019-11-26 | Stop reason: SDUPTHER

## 2019-10-08 RX ORDER — ALENDRONATE SODIUM 70 MG/1
TABLET ORAL
Qty: 12 TABLET | Refills: 0 | Status: SHIPPED | OUTPATIENT
Start: 2019-10-08 | End: 2019-11-18 | Stop reason: SDUPTHER

## 2019-10-08 RX ORDER — LOSARTAN POTASSIUM 100 MG/1
100 TABLET ORAL DAILY
Qty: 90 TABLET | Refills: 0
Start: 2019-10-08 | End: 2019-11-18 | Stop reason: SDUPTHER

## 2019-10-08 NOTE — PROGRESS NOTES
Chief Complaint   Patient presents with   • Hypertension        Subjective     Yancy Mcdonnell  has a past medical history of Acute right otitis media (02/18/2019), Allergic rhinitis (06/02/2015), Body mass index 29.0-29.9, adult (09/12/2018), Bronchitis (11/06/2018), Cervical spinal stenosis (08/07/2017), Cholelithiasis, Cough (01/05/2017), DDD (degenerative disc disease), cervical, DDD (degenerative disc disease), lumbar, DDD (degenerative disc disease), thoracic, Diverticulosis, Dyslipidemia (12/05/2012), Elevated brain natriuretic peptide (BNP) level (01/06/2017), Eustachian tube disorder, right (02/18/2019), Fatigue (06/02/2015), Female cystocele (06/02/2015), Hiatal hernia with gastroesophageal reflux (01/06/2017), History of DVT (deep vein thrombosis) (06/02/2015), Hyperlipidemia, Hypertension (06/10/2016), Left rib fracture (05/2017), Migraine headache (06/02/2015), Mitral insufficiency (01/16/2017), Osteoarthritis (05/20/2014), Osteopenia (12/05/2012), Over weight (06/14/2018), Peripheral edema (01/05/2017), PFO (patent foramen ovale) (01/16/2017), Pulmonary nodule (05/04/2017), RHA (rheumatoid arthritis) (CMS/[facility]) (12/05/2012), Scleritis (06/02/2015), Scoliosis, Tricuspid insufficiency, Varicose veins of other specified sites, and Venous insufficiency (09/12/2018).    Hypertension   This is a chronic problem. The current episode started more than 1 year ago. The problem is unchanged. The problem is uncontrolled. Pertinent negatives include no blurred vision, chest pain, palpitations or shortness of breath. Past treatments include ACE inhibitors and alpha 1 blockers. Current antihypertension treatment includes angiotensin blockers. The current treatment provides moderate improvement. There are no compliance problems.            Allergies   Allergen Reactions   • Demerol [Meperidine] Unknown (See Comments)     Abstracted from centricity.   • Other Unknown (See Comments)     Sulfa (sulfadiazine)    •  Lisinopril Cough         Current Outpatient Medications:   •  albuterol sulfate HFA (VENTOLIN HFA) 108 (90 Base) MCG/ACT inhaler, Two inhalations every 4-6 hours as needed for SOA., Disp: , Rfl:   •  alendronate (FOSAMAX) 70 MG tablet, TAKE 1 TABLET BY MOUTH ONCE A WEEK. TAKE IN THE MORNING ON AN EMPTY STOMACH WITH 8 OUNCES OF WATER. STAY UPRIGHT, Disp: 12 tablet, Rfl: 0  •  Calcium Carbonate-Vitamin D (CALCIUM 500 + D PO), Take 2 tablets by mouth Daily., Disp: , Rfl:   •  CHELATED MAGNESIUM PO, Take 1 tablet by mouth Daily., Disp: , Rfl:   •  famciclovir (FAMVIR) 500 MG tablet, TAKE 3 TABLETS BY MOUTH AS A SINGLE DOSE AT FIRST SIGN OF COLD SORE, Disp: 3 tablet, Rfl: 0  •  fluticasone (FLONASE) 50 MCG/ACT nasal spray, 2 sprays into the nostril(s) as directed by provider Daily. INTO EACH NOSTRIL, Disp: , Rfl:   •  Glucosamine-Chondroitin (CIDAFLEX PO), Take 2 tablets by mouth Daily., Disp: , Rfl:   •  LORATADINE PO, Take 1 tablet by mouth Daily., Disp: , Rfl:   •  losartan (COZAAR) 100 MG tablet, Take 1 tablet by mouth Daily., Disp: 90 tablet, Rfl: 0  •  lysine ([facility] LYSINE) 500 MG tablet, Take 1 tablet by mouth Daily As Needed., Disp: , Rfl:   •  Multiple Vitamin (MULTI-VITAMIN DAILY) tablet, Take 1 tablet by mouth Daily., Disp: , Rfl:   •  Naproxen Sodium (ALEVE) 220 MG capsule, Take 2 capsules by mouth Daily., Disp: , Rfl:   •  omeprazole (priLOSEC) 20 MG capsule, TAKE ONE CAPSULE BY MOUTH EVERY MORNING ON AN EMPTY STOMACH, 30 MINUTES BEFORE EATING OR OTHER MEDICATION, Disp: 90 capsule, Rfl: 1  •  Vitamin D, Cholecalciferol, 1000 units capsule, Take 1 capsule by mouth Daily., Disp: , Rfl:   •  hydroCHLOROthiazide (HYDRODIURIL) 12.5 MG tablet, Take 1 tablet by mouth Daily., Disp: 90 tablet, Rfl: 0    Past Medical History:   Diagnosis Date   • Acute right otitis media 02/18/2019   • Allergic rhinitis 06/02/2015   • Body mass index 29.0-29.9, adult 09/12/2018   • Bronchitis 11/06/2018   • Cervical spinal stenosis  08/07/2017   • Cholelithiasis    • Cough 01/05/2017   • DDD (degenerative disc disease), cervical    • DDD (degenerative disc disease), lumbar    • DDD (degenerative disc disease), thoracic    • Diverticulosis    • Dyslipidemia 12/05/2012   • Elevated brain natriuretic peptide (BNP) level 01/06/2017   • Eustachian tube disorder, right 02/18/2019   • Fatigue 06/02/2015   • Female cystocele 06/02/2015   • Hiatal hernia with gastroesophageal reflux 01/06/2017   • History of DVT (deep vein thrombosis) 06/02/2015   • Hyperlipidemia    • Hypertension 06/10/2016   • Left rib fracture 05/2017    11TH RIB    • Migraine headache 06/02/2015   • Mitral insufficiency 01/16/2017    MILD   • Osteoarthritis 05/20/2014   • Osteopenia 12/05/2012   • Over weight 06/14/2018   • Peripheral edema 01/05/2017   • PFO (patent foramen ovale) 01/16/2017   • Pulmonary nodule 05/04/2017    DR CHUN FOLLOWS   • RHA (rheumatoid arthritis) (CMS/HCC) 12/05/2012   • Scleritis 06/02/2015   • Scoliosis    • Tricuspid insufficiency     MILD   • Varicose veins of other specified sites    • Venous insufficiency 09/12/2018       Past Surgical History:   Procedure Laterality Date   • COLONOSCOPY  07/20/2017    EGD/ COLONSCOPY LARGE HIATAL HERNIA. MILD DIVERTICULOSIS. OTHERWISE NORMAL.   • INGUINAL HERNIA REPAIR Right    • TOTAL ABDOMINAL HYSTERECTOMY  1985    W/BSO WITH A/P REPAIR 1985 BENIGN REASONS DR. DRAKE       Social History     Socioeconomic History   • Marital status:      Spouse name: Not on file   • Number of children: Not on file   • Years of education: Not on file   • Highest education level: Not on file   Tobacco Use   • Smoking status: Never Smoker   • Smokeless tobacco: Never Used   Substance and Sexual Activity   • Alcohol use: No     Frequency: Never   • Drug use: No       Family History   Problem Relation Age of Onset   • Osteoporosis Mother    • Stroke Mother 80   • Dementia Mother    • Heart disease Father    • Hypertension  "Father    • Heart disease Sister    • Cancer Daughter         BREAST AND MELANOMA   • Breast cancer Daughter 52   • Cancer Son         MELANOMA   • Cancer Grandson         MELANOMA       Family history, surgical history, past medical history, Allergies and med's reviewed with patient today and updated in [facility] EMR.     ROS:  Review of Systems   Constitutional: Negative for activity change, appetite change, diaphoresis, fatigue, unexpected weight gain and unexpected weight loss.   Eyes: Negative for blurred vision and visual disturbance.   Respiratory: Negative for apnea, cough, shortness of breath and wheezing.    Cardiovascular: Negative for chest pain, palpitations and leg swelling.   Gastrointestinal: Negative for abdominal pain, constipation, diarrhea, nausea and vomiting.   Endocrine: Negative for cold intolerance and heat intolerance.   Genitourinary: Negative for frequency.   Musculoskeletal: Negative for myalgias.   Neurological: Negative for dizziness, syncope and headache.   Psychiatric/Behavioral: Negative for depressed mood.       OBJECTIVE:  Vitals:    10/08/19 0931   BP: 148/80   BP Location: Left arm   Patient Position: Sitting   Cuff Size: Adult   Pulse: 77   Resp: 18   Temp: 97.6 °F (36.4 °C)   TempSrc: Oral   SpO2: 98%   Weight: 73.9 kg (163 lb)   Height: 160 cm (63\")     Body mass index is 28.87 kg/m².    Physical Exam   Constitutional: She is oriented to person, place, and time. She appears well-developed and well-nourished.   Neck: Trachea normal and normal range of motion. Neck supple. Carotid bruit is not present. No thyromegaly present.   Cardiovascular: Normal rate, regular rhythm, normal heart sounds and intact distal pulses. Exam reveals no gallop and no friction rub.   No murmur heard.  Pulmonary/Chest: Effort normal and breath sounds normal. She has no wheezes. She has no rales.   Abdominal: Soft. Bowel sounds are normal. There is no hepatosplenomegaly. No hernia.   Musculoskeletal: " Normal range of motion. She exhibits no edema.   Lymphadenopathy:     She has no cervical adenopathy.   Neurological: She is alert and oriented to person, place, and time.   Skin: Skin is warm and dry.   Psychiatric: She has a normal mood and affect. Her behavior is normal. Judgment and thought content normal.       No visits with results within 30 Day(s) from this visit.   Latest known visit with results is:   Office Visit on 08/15/2019   Component Date Value Ref Range Status   • Color 08/15/2019 Yellow  Yellow, Straw, Dark Yellow, Pallavi Final   • Clarity, UA 08/15/2019 Clear  Clear Final   • Specific Gravity  08/15/2019 1.015  1.005 - 1.030 Final   • pH, Urine 08/15/2019 7.0  5.0 - 8.0 Final   • Leukocytes 08/15/2019 Trace* Negative Final   • Nitrite, UA 08/15/2019 Negative  Negative Final   • Protein, POC 08/15/2019 Negative  Negative mg/dL Final   • Glucose, UA 08/15/2019 Negative  Negative, 1000 mg/dL (3+) mg/dL Final   • Ketones, UA 08/15/2019 Negative  Negative Final   • Urobilinogen, UA 08/15/2019 Normal  Normal Final   • Bilirubin 08/15/2019 Negative  Negative Final   • Blood, UA 08/15/2019 Negative  Negative Final   • WBC 08/15/2019 5.0  3.4 - 10.8 x10E3/uL Final   • RBC 08/15/2019 3.99  3.77 - 5.28 x10E6/uL Final   • Hemoglobin 08/15/2019 11.0* 11.1 - 15.9 g/dL Final   • Hematocrit 08/15/2019 35.5  34.0 - 46.6 % Final   • MCV 08/15/2019 89  79 - 97 fL Final   • MCH 08/15/2019 27.6  26.6 - 33.0 pg Final   • MCHC 08/15/2019 31.0* 31.5 - 35.7 g/dL Final   • RDW 08/15/2019 15.1  12.3 - 15.4 % Final   • Platelets 08/15/2019 402  150 - 450 x10E3/uL Final   • Neutrophil Rel % 08/15/2019 59  Not Estab. % Final   • Lymphocyte Rel % 08/15/2019 29  Not Estab. % Final   • Monocyte Rel % 08/15/2019 10  Not Estab. % Final   • Eosinophil Rel % 08/15/2019 2  Not Estab. % Final   • Basophil Rel % 08/15/2019 0  Not Estab. % Final   • Neutrophils Absolute 08/15/2019 2.9  1.4 - 7.0 x10E3/uL Final   • Lymphocytes Absolute  08/15/2019 1.5  0.7 - 3.1 x10E3/uL Final   • Monocytes Absolute 08/15/2019 0.5  0.1 - 0.9 x10E3/uL Final   • Eosinophils Absolute 08/15/2019 0.1  0.0 - 0.4 x10E3/uL Final   • Basophils Absolute 08/15/2019 0.0  0.0 - 0.2 x10E3/uL Final   • Immature Granulocyte Rel % 08/15/2019 0  Not Estab. % Final   • Immature Grans Absolute 08/15/2019 0.0  0.0 - 0.1 x10E3/uL Final   • Glucose 08/15/2019 86  65 - 99 mg/dL Final   • BUN 08/15/2019 15  8 - 27 mg/dL Final   • Creatinine 08/15/2019 0.79  0.57 - 1.00 mg/dL Final   • eGFR Non  Am 08/15/2019 73  >59 mL/min/1.73 Final   • eGFR African Am 08/15/2019 85  >59 mL/min/1.73 Final   • BUN/Creatinine Ratio 08/15/2019 19  12 - 28 Final   • Sodium 08/15/2019 143  134 - 144 mmol/L Final   • Potassium 08/15/2019 5.0  3.5 - 5.2 mmol/L Final   • Chloride 08/15/2019 107* 96 - 106 mmol/L Final   • Total CO2 08/15/2019 23  20 - 29 mmol/L Final   • Calcium 08/15/2019 9.2  8.7 - 10.3 mg/dL Final   • Total Protein 08/15/2019 6.6  6.0 - 8.5 g/dL Final   • Albumin 08/15/2019 4.0  3.5 - 4.8 g/dL Final   • Globulin 08/15/2019 2.6  1.5 - 4.5 g/dL Final   • A/G Ratio 08/15/2019 1.5  1.2 - 2.2 Final   • Total Bilirubin 08/15/2019 0.6  0.0 - 1.2 mg/dL Final   • Alkaline Phosphatase 08/15/2019 110  39 - 117 IU/L Final   • AST (SGOT) 08/15/2019 17  0 - 40 IU/L Final   • ALT (SGPT) 08/15/2019 16  0 - 32 IU/L Final       ASSESSMENT/ PLAN:    Diagnoses and all orders for this visit:    1. Essential hypertension (Primary)  Comments:  still mildly elevated. Restart HCTZ.  Follow-up in 4-6 weeks, sooner for problems.   Orders:  -     hydroCHLOROthiazide (HYDRODIURIL) 12.5 MG tablet; Take 1 tablet by mouth Daily.  Dispense: 90 tablet; Refill: 0  -     losartan (COZAAR) 100 MG tablet; Take 1 tablet by mouth Daily.  Dispense: 90 tablet; Refill: 0        Orders Placed Today:     New Medications Ordered This Visit   Medications   • hydroCHLOROthiazide (HYDRODIURIL) 12.5 MG tablet     Sig: Take 1 tablet by  mouth Daily.     Dispense:  90 tablet     Refill:  0   • losartan (COZAAR) 100 MG tablet     Sig: Take 1 tablet by mouth Daily.     Dispense:  90 tablet     Refill:  0        Management Plan:     An After Visit Summary was printed and given to the patient at discharge.    Follow-up: Return in about 1 month (around 11/8/2019) for Recheck hypertension.    FELIPE Vázquez 10/8/2019 9:57 AM  This note was electronically signed.

## 2019-10-17 DIAGNOSIS — D64.9 ANEMIA, UNSPECIFIED TYPE: Primary | ICD-10-CM

## 2019-10-22 LAB
BASOPHILS # BLD AUTO: 0 X10E3/UL (ref 0–0.2)
BASOPHILS NFR BLD AUTO: 0 %
EOSINOPHIL # BLD AUTO: 0.2 X10E3/UL (ref 0–0.4)
EOSINOPHIL NFR BLD AUTO: 2 %
ERYTHROCYTE [DISTWIDTH] IN BLOOD BY AUTOMATED COUNT: 14.2 % (ref 12.3–15.4)
HCT VFR BLD AUTO: 38 % (ref 34–46.6)
HGB BLD-MCNC: 12.2 G/DL (ref 11.1–15.9)
IMM GRANULOCYTES # BLD AUTO: 0 X10E3/UL (ref 0–0.1)
IMM GRANULOCYTES NFR BLD AUTO: 0 %
LYMPHOCYTES # BLD AUTO: 1.4 X10E3/UL (ref 0.7–3.1)
LYMPHOCYTES NFR BLD AUTO: 17 %
MCH RBC QN AUTO: 27.8 PG (ref 26.6–33)
MCHC RBC AUTO-ENTMCNC: 32.1 G/DL (ref 31.5–35.7)
MCV RBC AUTO: 87 FL (ref 79–97)
MONOCYTES # BLD AUTO: 0.9 X10E3/UL (ref 0.1–0.9)
MONOCYTES NFR BLD AUTO: 11 %
NEUTROPHILS # BLD AUTO: 5.8 X10E3/UL (ref 1.4–7)
NEUTROPHILS NFR BLD AUTO: 70 %
PLATELET # BLD AUTO: 403 X10E3/UL (ref 150–450)
RBC # BLD AUTO: 4.39 X10E6/UL (ref 3.77–5.28)
WBC # BLD AUTO: 8.3 X10E3/UL (ref 3.4–10.8)

## 2019-11-18 DIAGNOSIS — I10 ESSENTIAL HYPERTENSION: ICD-10-CM

## 2019-11-18 RX ORDER — LOSARTAN POTASSIUM 50 MG/1
TABLET ORAL
Qty: 90 TABLET | Refills: 0 | OUTPATIENT
Start: 2019-11-18

## 2019-11-18 RX ORDER — LOSARTAN POTASSIUM 100 MG/1
100 TABLET ORAL DAILY
Qty: 90 TABLET | Refills: 0
Start: 2019-11-18 | End: 2019-11-26 | Stop reason: SDUPTHER

## 2019-11-18 RX ORDER — ALENDRONATE SODIUM 70 MG/1
70 TABLET ORAL
Qty: 12 TABLET | Refills: 0 | Status: SHIPPED | OUTPATIENT
Start: 2019-11-18 | End: 2020-03-15

## 2019-11-18 RX ORDER — ALENDRONATE SODIUM 70 MG/1
TABLET ORAL
Qty: 12 TABLET | Refills: 0 | OUTPATIENT
Start: 2019-11-18

## 2019-11-26 ENCOUNTER — OFFICE VISIT (OUTPATIENT)
Dept: FAMILY MEDICINE CLINIC | Facility: CLINIC | Age: 74
End: 2019-11-26

## 2019-11-26 VITALS
HEIGHT: 63 IN | TEMPERATURE: 97.9 F | SYSTOLIC BLOOD PRESSURE: 126 MMHG | WEIGHT: 163.8 LBS | BODY MASS INDEX: 29.02 KG/M2 | RESPIRATION RATE: 18 BRPM | HEART RATE: 92 BPM | DIASTOLIC BLOOD PRESSURE: 72 MMHG | OXYGEN SATURATION: 98 %

## 2019-11-26 DIAGNOSIS — I10 ESSENTIAL HYPERTENSION: ICD-10-CM

## 2019-11-26 DIAGNOSIS — I10 ESSENTIAL HYPERTENSION: Primary | ICD-10-CM

## 2019-11-26 PROCEDURE — 99213 OFFICE O/P EST LOW 20 MIN: CPT | Performed by: NURSE PRACTITIONER

## 2019-11-26 RX ORDER — LOSARTAN POTASSIUM 100 MG/1
100 TABLET ORAL DAILY
Qty: 90 TABLET | Refills: 0 | Status: SHIPPED | OUTPATIENT
Start: 2019-11-26 | End: 2020-02-18

## 2019-11-26 RX ORDER — HYDROCHLOROTHIAZIDE 12.5 MG/1
12.5 TABLET ORAL DAILY
Qty: 90 TABLET | Refills: 0 | Status: SHIPPED | OUTPATIENT
Start: 2019-11-26 | End: 2020-03-15

## 2019-11-26 RX ORDER — LOSARTAN POTASSIUM 100 MG/1
100 TABLET ORAL DAILY
Qty: 90 TABLET | Refills: 0
Start: 2019-11-26 | End: 2019-11-26 | Stop reason: SDUPTHER

## 2019-11-26 NOTE — TELEPHONE ENCOUNTER
"Please resent this. It was set to \"No print\" instead of \"normal\" this is why the pharmacy isnt getting it.   "

## 2019-11-26 NOTE — PROGRESS NOTES
Chief Complaint   Patient presents with   • Hypertension        Subjective     Yancy Mcdonnell  has a past medical history of Allergic rhinitis (06/02/2015), Body mass index 29.0-29.9, adult (09/12/2018), Cervical spinal stenosis (08/07/2017), Cholelithiasis, DDD (degenerative disc disease), cervical, DDD (degenerative disc disease), lumbar, DDD (degenerative disc disease), thoracic, Diverticulosis, Dyslipidemia (12/05/2012), Elevated brain natriuretic peptide (BNP) level (01/06/2017), Eustachian tube disorder, right (02/18/2019), Fatigue (06/02/2015), Female cystocele (06/02/2015), Hiatal hernia with gastroesophageal reflux (01/06/2017), History of DVT (deep vein thrombosis) (06/02/2015), Hyperlipidemia, Hypertension (06/10/2016), Left rib fracture (05/2017), Migraine headache (06/02/2015), Mitral insufficiency (01/16/2017), Osteoarthritis (05/20/2014), Osteopenia (12/05/2012), Over weight (06/14/2018), Peripheral edema (01/05/2017), PFO (patent foramen ovale) (01/16/2017), Pulmonary nodule (05/04/2017), RHA (rheumatoid arthritis) (CMS/[facility]) (12/05/2012), Scleritis (06/02/2015), Scoliosis, Tricuspid insufficiency, Varicose veins of other specified sites, and Venous insufficiency (09/12/2018).    Hypertension   This is a chronic problem. The current episode started more than 1 year ago. The problem is unchanged. The problem is uncontrolled. Pertinent negatives include no blurred vision, chest pain, palpitations or shortness of breath. Past treatments include ACE inhibitors and alpha 1 blockers. Current antihypertension treatment includes angiotensin blockers. The current treatment provides moderate improvement. There are no compliance problems.    HCTZ was restarted on 10/8/19.      Allergies   Allergen Reactions   • Demerol [Meperidine] Unknown (See Comments)     Abstracted from centricity.   • Other Unknown (See Comments)     Sulfa (sulfadiazine)    • Sulfa Antibiotics Unknown - High Severity   • Lisinopril Cough          Current Outpatient Medications:   •  alendronate (FOSAMAX) 70 MG tablet, Take 1 tablet by mouth Every 7 (Seven) Days., Disp: 12 tablet, Rfl: 0  •  Calcium Carbonate-Vitamin D (CALCIUM 500 + D PO), Take 2 tablets by mouth Daily., Disp: , Rfl:   •  CHELATED MAGNESIUM PO, Take 1 tablet by mouth Daily., Disp: , Rfl:   •  famciclovir (FAMVIR) 500 MG tablet, TAKE 3 TABLETS BY MOUTH AS A SINGLE DOSE AT FIRST SIGN OF COLD SORE, Disp: 3 tablet, Rfl: 0  •  fluticasone (FLONASE) 50 MCG/ACT nasal spray, 2 sprays into the nostril(s) as directed by provider Daily. INTO EACH NOSTRIL, Disp: , Rfl:   •  Glucosamine-Chondroitin (CIDAFLEX PO), Take 2 tablets by mouth Daily., Disp: , Rfl:   •  hydroCHLOROthiazide (HYDRODIURIL) 12.5 MG tablet, Take 1 tablet by mouth Daily., Disp: 90 tablet, Rfl: 0  •  losartan (COZAAR) 100 MG tablet, Take 1 tablet by mouth Daily., Disp: 90 tablet, Rfl: 0  •  lysine ([facility] LYSINE) 500 MG tablet, Take 1 tablet by mouth Daily As Needed., Disp: , Rfl:   •  Multiple Vitamin (MULTI-VITAMIN DAILY) tablet, Take 1 tablet by mouth Daily., Disp: , Rfl:   •  Naproxen Sodium (ALEVE) 220 MG capsule, Take 2 capsules by mouth Daily., Disp: , Rfl:   •  albuterol sulfate HFA (VENTOLIN HFA) 108 (90 Base) MCG/ACT inhaler, Two inhalations every 4-6 hours as needed for SOA., Disp: , Rfl:   •  LORATADINE PO, Take 1 tablet by mouth Daily., Disp: , Rfl:   •  omeprazole (priLOSEC) 20 MG capsule, TAKE ONE CAPSULE BY MOUTH EVERY MORNING ON AN EMPTY STOMACH, 30 MINUTES BEFORE EATING OR OTHER MEDICATION, Disp: 90 capsule, Rfl: 1  •  Vitamin D, Cholecalciferol, 1000 units capsule, Take 1 capsule by mouth Daily., Disp: , Rfl:     Past Medical History:   Diagnosis Date   • Allergic rhinitis 06/02/2015   • Body mass index 29.0-29.9, adult 09/12/2018   • Cervical spinal stenosis 08/07/2017   • Cholelithiasis    • DDD (degenerative disc disease), cervical    • DDD (degenerative disc disease), lumbar    • DDD (degenerative disc  disease), thoracic    • Diverticulosis    • Dyslipidemia 12/05/2012   • Elevated brain natriuretic peptide (BNP) level 01/06/2017   • Eustachian tube disorder, right 02/18/2019   • Fatigue 06/02/2015   • Female cystocele 06/02/2015   • Hiatal hernia with gastroesophageal reflux 01/06/2017   • History of DVT (deep vein thrombosis) 06/02/2015   • Hyperlipidemia    • Hypertension 06/10/2016   • Left rib fracture 05/2017    11TH RIB    • Migraine headache 06/02/2015   • Mitral insufficiency 01/16/2017    MILD   • Osteoarthritis 05/20/2014   • Osteopenia 12/05/2012   • Over weight 06/14/2018   • Peripheral edema 01/05/2017   • PFO (patent foramen ovale) 01/16/2017   • Pulmonary nodule 05/04/2017    DR CHUN FOLLOWS   • RHA (rheumatoid arthritis) (CMS/HCC) 12/05/2012   • Scleritis 06/02/2015   • Scoliosis    • Tricuspid insufficiency     MILD   • Varicose veins of other specified sites    • Venous insufficiency 09/12/2018       Past Surgical History:   Procedure Laterality Date   • COLONOSCOPY  07/20/2017    EGD/ COLONSCOPY LARGE HIATAL HERNIA. MILD DIVERTICULOSIS. OTHERWISE NORMAL.   • INGUINAL HERNIA REPAIR Right    • TOTAL ABDOMINAL HYSTERECTOMY  1985    W/BSO WITH A/P REPAIR 1985 BENIGN REASONS DR. DRAKE       Social History     Socioeconomic History   • Marital status:      Spouse name: Not on file   • Number of children: Not on file   • Years of education: Not on file   • Highest education level: Not on file   Tobacco Use   • Smoking status: Never Smoker   • Smokeless tobacco: Never Used   Substance and Sexual Activity   • Alcohol use: No     Frequency: Never   • Drug use: No       Family History   Problem Relation Age of Onset   • Osteoporosis Mother    • Stroke Mother 80   • Dementia Mother    • Heart disease Father    • Hypertension Father    • Heart disease Sister    • Cancer Daughter         BREAST AND MELANOMA   • Breast cancer Daughter 52   • Cancer Son         MELANOMA   • Cancer Grandson          "MELANOMA       Family history, surgical history, past medical history, Allergies and med's reviewed with patient today and updated in [facility] EMR.     ROS:  Review of Systems   Constitutional: Negative for activity change, appetite change, diaphoresis, fatigue, unexpected weight gain and unexpected weight loss.   Eyes: Negative for blurred vision and visual disturbance.   Respiratory: Negative for apnea, cough, shortness of breath and wheezing.    Cardiovascular: Negative for chest pain, palpitations and leg swelling.   Gastrointestinal: Negative for abdominal pain, constipation, diarrhea, nausea and vomiting.   Endocrine: Negative for cold intolerance and heat intolerance.   Genitourinary: Negative for frequency.   Musculoskeletal: Negative for myalgias.   Neurological: Negative for dizziness, syncope and headache.   Psychiatric/Behavioral: Negative for depressed mood.       OBJECTIVE:  Vitals:    11/26/19 1104 11/26/19 1113   BP: 153/81 126/72   BP Location: Right arm    Patient Position: Sitting    Cuff Size: Adult    Pulse: 92    Resp: 18    Temp: 97.9 °F (36.6 °C)    TempSrc: Oral    SpO2: 98%    Weight: 74.3 kg (163 lb 12.8 oz)    Height: 160 cm (63\")      Body mass index is 29.02 kg/m².    Physical Exam   Constitutional: She is oriented to person, place, and time. She appears well-developed and well-nourished.   Neck: Trachea normal and normal range of motion. Neck supple. Carotid bruit is not present. No thyromegaly present.   Cardiovascular: Normal rate, regular rhythm, normal heart sounds and intact distal pulses. Exam reveals no gallop and no friction rub.   No murmur heard.  Pulmonary/Chest: Effort normal and breath sounds normal. She has no wheezes. She has no rales.   Abdominal: Soft. Bowel sounds are normal. There is no hepatosplenomegaly. There is no tenderness. No hernia.   Musculoskeletal: Normal range of motion. She exhibits no edema.   Lymphadenopathy:     She has no cervical adenopathy. "   Neurological: She is alert and oriented to person, place, and time.   Skin: Skin is warm and dry.   Psychiatric: She has a normal mood and affect. Her behavior is normal. Judgment and thought content normal.       ASSESSMENT/ PLAN:    Diagnoses and all orders for this visit:    1. Essential hypertension (Primary)  Assessment & Plan:  Blood pressure is much better controlled with the addition of hydrochlorothiazide.  Follow-up in 3 months.  We will check BMP at that visit.    Orders:  -     losartan (COZAAR) 100 MG tablet; Take 1 tablet by mouth Daily.  Dispense: 90 tablet; Refill: 0  -     hydroCHLOROthiazide (HYDRODIURIL) 12.5 MG tablet; Take 1 tablet by mouth Daily.  Dispense: 90 tablet; Refill: 0      Orders Placed Today:     New Medications Ordered This Visit   Medications   • losartan (COZAAR) 100 MG tablet     Sig: Take 1 tablet by mouth Daily.     Dispense:  90 tablet     Refill:  0   • hydroCHLOROthiazide (HYDRODIURIL) 12.5 MG tablet     Sig: Take 1 tablet by mouth Daily.     Dispense:  90 tablet     Refill:  0        Management Plan:     An After Visit Summary was printed and given to the patient at discharge.    Follow-up: Return in about 3 months (around 2/26/2020) for Follow-Up hypertension.    FELIPE Vázquez 11/26/2019 11:18 AM  This note was electronically signed.

## 2019-11-26 NOTE — ASSESSMENT & PLAN NOTE
Blood pressure is much better controlled with the addition of hydrochlorothiazide.  Follow-up in 3 months.  We will check BMP at that visit.

## 2020-01-27 ENCOUNTER — OFFICE VISIT (OUTPATIENT)
Dept: FAMILY MEDICINE CLINIC | Facility: CLINIC | Age: 75
End: 2020-01-27

## 2020-01-27 VITALS
BODY MASS INDEX: 29.23 KG/M2 | DIASTOLIC BLOOD PRESSURE: 81 MMHG | WEIGHT: 165 LBS | RESPIRATION RATE: 18 BRPM | TEMPERATURE: 97.3 F | OXYGEN SATURATION: 99 % | SYSTOLIC BLOOD PRESSURE: 153 MMHG | HEART RATE: 84 BPM | HEIGHT: 63 IN

## 2020-01-27 DIAGNOSIS — R30.0 DYSURIA: Primary | ICD-10-CM

## 2020-01-27 DIAGNOSIS — R82.90 ABNORMAL URINALYSIS: ICD-10-CM

## 2020-01-27 LAB
BILIRUB BLD-MCNC: NEGATIVE MG/DL
CLARITY, POC: CLEAR
COLOR UR: YELLOW
GLUCOSE UR STRIP-MCNC: NEGATIVE MG/DL
KETONES UR QL: NEGATIVE
LEUKOCYTE EST, POC: ABNORMAL
NITRITE UR-MCNC: NEGATIVE MG/ML
PH UR: 6 [PH] (ref 5–8)
PROT UR STRIP-MCNC: NEGATIVE MG/DL
RBC # UR STRIP: ABNORMAL /UL
SP GR UR: 1.02 (ref 1–1.03)
UROBILINOGEN UR QL: NORMAL

## 2020-01-27 PROCEDURE — 81003 URINALYSIS AUTO W/O SCOPE: CPT | Performed by: NURSE PRACTITIONER

## 2020-01-27 PROCEDURE — 99213 OFFICE O/P EST LOW 20 MIN: CPT | Performed by: NURSE PRACTITIONER

## 2020-01-27 RX ORDER — CIPROFLOXACIN 500 MG/1
500 TABLET, FILM COATED ORAL 2 TIMES DAILY
Qty: 14 TABLET | Refills: 0 | Status: SHIPPED | OUTPATIENT
Start: 2020-01-27 | End: 2020-02-03

## 2020-01-27 NOTE — PROGRESS NOTES
Chief Complaint   Patient presents with   • Difficulty Urinating        Subjective   Yancy Mcdonnell is a 74 y.o. female who presents today for dysuria and frequency.    HPI: Patient has had a week and a half of frequency and dysuria.  She has been taking Azo.  She is finished an entire box.  She has no fever nausea vomiting or abdominal pain.  She just has a symptoms of constant pressure and frequency.  Is been several years since she is had a UTI.    Yancy Mcdonnell  has a past medical history of Allergic rhinitis (06/02/2015), Body mass index 29.0-29.9, adult (09/12/2018), Cervical spinal stenosis (08/07/2017), Cholelithiasis, DDD (degenerative disc disease), cervical, DDD (degenerative disc disease), lumbar, DDD (degenerative disc disease), thoracic, Diverticulosis, Dyslipidemia (12/05/2012), Elevated brain natriuretic peptide (BNP) level (01/06/2017), Eustachian tube disorder, right (02/18/2019), Fatigue (06/02/2015), Female cystocele (06/02/2015), Hiatal hernia with gastroesophageal reflux (01/06/2017), History of DVT (deep vein thrombosis) (06/02/2015), Hyperlipidemia, Hypertension (06/10/2016), Left rib fracture (05/2017), Migraine headache (06/02/2015), Mitral insufficiency (01/16/2017), Osteoarthritis (05/20/2014), Osteopenia (12/05/2012), Over weight (06/14/2018), Peripheral edema (01/05/2017), PFO (patent foramen ovale) (01/16/2017), Pulmonary nodule (05/04/2017), RHA (rheumatoid arthritis) (CMS/[facility]) (12/05/2012), Scleritis (06/02/2015), Scoliosis, Tricuspid insufficiency, Varicose veins of other specified sites, and Venous insufficiency (09/12/2018).    Allergies   Allergen Reactions   • Demerol [Meperidine] Unknown (See Comments)     Abstracted from [facility].   • Other Unknown (See Comments)     Sulfa (sulfadiazine)    • Sulfa Antibiotics Unknown - High Severity   • Lisinopril Cough       Current Outpatient Medications:   •  alendronate (FOSAMAX) 70 MG tablet, Take 1 tablet by mouth Every 7 (Seven)  Days., Disp: 12 tablet, Rfl: 0  •  Calcium Carbonate-Vitamin D (CALCIUM 500 + D PO), Take 2 tablets by mouth Daily., Disp: , Rfl:   •  CHELATED MAGNESIUM PO, Take 1 tablet by mouth Daily., Disp: , Rfl:   •  Glucosamine-Chondroitin (CIDAFLEX PO), Take 2 tablets by mouth Daily., Disp: , Rfl:   •  hydroCHLOROthiazide (HYDRODIURIL) 12.5 MG tablet, Take 1 tablet by mouth Daily., Disp: 90 tablet, Rfl: 0  •  losartan (COZAAR) 100 MG tablet, Take 1 tablet by mouth Daily., Disp: 90 tablet, Rfl: 0  •  lysine ([facility] LYSINE) 500 MG tablet, Take 1 tablet by mouth Daily As Needed., Disp: , Rfl:   •  Multiple Vitamin (MULTI-VITAMIN DAILY) tablet, Take 1 tablet by mouth Daily., Disp: , Rfl:   •  Naproxen Sodium (ALEVE) 220 MG capsule, Take 2 capsules by mouth Daily., Disp: , Rfl:   •  omeprazole (priLOSEC) 20 MG capsule, TAKE ONE CAPSULE BY MOUTH EVERY MORNING ON AN EMPTY STOMACH, 30 MINUTES BEFORE EATING OR OTHER MEDICATION, Disp: 90 capsule, Rfl: 1  •  albuterol sulfate HFA (VENTOLIN HFA) 108 (90 Base) MCG/ACT inhaler, Two inhalations every 4-6 hours as needed for SOA., Disp: , Rfl:   •  ciprofloxacin (CIPRO) 500 MG tablet, Take 1 tablet by mouth 2 (Two) Times a Day for 7 days., Disp: 14 tablet, Rfl: 0  •  famciclovir (FAMVIR) 500 MG tablet, TAKE 3 TABLETS BY MOUTH AS A SINGLE DOSE AT FIRST SIGN OF COLD SORE, Disp: 3 tablet, Rfl: 0  •  fluticasone (FLONASE) 50 MCG/ACT nasal spray, 2 sprays into the nostril(s) as directed by provider Daily. INTO EACH NOSTRIL, Disp: , Rfl:   •  LORATADINE PO, Take 1 tablet by mouth Daily., Disp: , Rfl:   •  Vitamin D, Cholecalciferol, 1000 units capsule, Take 1 capsule by mouth Daily., Disp: , Rfl:   Past Medical History:   Diagnosis Date   • Allergic rhinitis 06/02/2015   • Body mass index 29.0-29.9, adult 09/12/2018   • Cervical spinal stenosis 08/07/2017   • Cholelithiasis    • DDD (degenerative disc disease), cervical    • DDD (degenerative disc disease), lumbar    • DDD (degenerative disc  disease), thoracic    • Diverticulosis    • Dyslipidemia 12/05/2012   • Elevated brain natriuretic peptide (BNP) level 01/06/2017   • Eustachian tube disorder, right 02/18/2019   • Fatigue 06/02/2015   • Female cystocele 06/02/2015   • Hiatal hernia with gastroesophageal reflux 01/06/2017   • History of DVT (deep vein thrombosis) 06/02/2015   • Hyperlipidemia    • Hypertension 06/10/2016   • Left rib fracture 05/2017    11TH RIB    • Migraine headache 06/02/2015   • Mitral insufficiency 01/16/2017    MILD   • Osteoarthritis 05/20/2014   • Osteopenia 12/05/2012   • Over weight 06/14/2018   • Peripheral edema 01/05/2017   • PFO (patent foramen ovale) 01/16/2017   • Pulmonary nodule 05/04/2017    DR CHUN FOLLOWS   • RHA (rheumatoid arthritis) (CMS/HCC) 12/05/2012   • Scleritis 06/02/2015   • Scoliosis    • Tricuspid insufficiency     MILD   • Varicose veins of other specified sites    • Venous insufficiency 09/12/2018     Past Surgical History:   Procedure Laterality Date   • COLONOSCOPY  07/20/2017    EGD/ COLONSCOPY LARGE HIATAL HERNIA. MILD DIVERTICULOSIS. OTHERWISE NORMAL.   • INGUINAL HERNIA REPAIR Right    • TOTAL ABDOMINAL HYSTERECTOMY  1985    W/BSO WITH A/P REPAIR 1985 BENIGN REASONS DR. DRAKE     Social History     Socioeconomic History   • Marital status:      Spouse name: Not on file   • Number of children: Not on file   • Years of education: Not on file   • Highest education level: Not on file   Tobacco Use   • Smoking status: Never Smoker   • Smokeless tobacco: Never Used   Substance and Sexual Activity   • Alcohol use: No     Frequency: Never   • Drug use: No     Family History   Problem Relation Age of Onset   • Osteoporosis Mother    • Stroke Mother 80   • Dementia Mother    • Heart disease Father    • Hypertension Father    • Heart disease Sister    • Cancer Daughter         BREAST AND MELANOMA   • Breast cancer Daughter 52   • Cancer Son         MELANOMA   • Cancer Grandson         MELANOMA  "      Family history, surgical history, past medical history, Allergies and med's reviewed with patient today and updated in [employer] EMR.   PHQ-2 Depression Screening  Little interest or pleasure in doing things?     Feeling down, depressed, or hopeless?     PHQ-2 Total Score     PHQ-9 Depression Screening  Little interest or pleasure in doing things?     Feeling down, depressed, or hopeless?     Trouble falling or staying asleep, or sleeping too much?     Feeling tired or having little energy?     Poor appetite or overeating?     Feeling bad about yourself - or that you are a failure or have let yourself or your family down?     Trouble concentrating on things, such as reading the newspaper or watching television?     Moving or speaking so slowly that other people could have noticed? Or the opposite - being so fidgety or restless that you have been moving around a lot more than usual?     Thoughts that you would be better off dead, or of hurting yourself in some way?     PHQ-9 Total Score     If you checked off any problems, how difficult have these problems made it for you to do your work, take care of things at home, or get along with other people?       ROS:  Review of Systems   Genitourinary: Positive for dysuria and frequency.       OBJECTIVE:  Vitals:    01/27/20 1458   BP: 153/81   BP Location: Left arm   Patient Position: Sitting   Cuff Size: Adult   Pulse: 84   Resp: 18   Temp: 97.3 °F (36.3 °C)   TempSrc: Oral   SpO2: 99%   Weight: 74.8 kg (165 lb)   Height: 160 cm (63\")     Physical Exam   Constitutional: She appears well-developed and well-nourished.   Cardiovascular: Normal rate, regular rhythm and normal heart sounds. Exam reveals no gallop and no friction rub.   No murmur heard.  Pulmonary/Chest: Effort normal and breath sounds normal. She has no wheezes. She has no rales.   Abdominal: Soft. Bowel sounds are normal. There is no tenderness.   Neurological: She is alert.   Skin: Skin is warm and dry. "   Nursing note and vitals reviewed.      ASSESSMENT/ PLAN:    Yancy was seen today for difficulty urinating.    Diagnoses and all orders for this visit:    Dysuria  Comments:  Started Cipro today.  Will call patient with culture.  If recurrent to return.  Orders:  -     POC Urinalysis Dipstick, Automated  -     Urine Culture - Urine, Urine, Clean Catch    Abnormal urinalysis  -     Urine Culture - Urine, Urine, Clean Catch    Other orders  -     ciprofloxacin (CIPRO) 500 MG tablet; Take 1 tablet by mouth 2 (Two) Times a Day for 7 days.        Orders Placed Today:     New Medications Ordered This Visit   Medications   • ciprofloxacin (CIPRO) 500 MG tablet     Sig: Take 1 tablet by mouth 2 (Two) Times a Day for 7 days.     Dispense:  14 tablet     Refill:  0        Management Plan:     An After Visit Summary was printed and given to the patient at discharge.    Follow-up: Return if symptoms worsen or fail to improve.    Ratna Phillip, FELIPE 1/27/2020 4:26 PM  This note was electronically signed.

## 2020-01-30 LAB
BACTERIA UR CULT: ABNORMAL
BACTERIA UR CULT: ABNORMAL
OTHER ANTIBIOTIC SUSC ISLT: ABNORMAL

## 2020-02-05 RX ORDER — LORATADINE 10 MG/1
TABLET ORAL
Qty: 90 TABLET | Refills: 1 | Status: SHIPPED | OUTPATIENT
Start: 2020-02-05 | End: 2020-07-30

## 2020-02-05 RX ORDER — FAMCICLOVIR 500 MG/1
TABLET ORAL
Qty: 3 TABLET | Refills: 5 | Status: SHIPPED | OUTPATIENT
Start: 2020-02-05 | End: 2021-05-13

## 2020-02-18 DIAGNOSIS — I10 ESSENTIAL HYPERTENSION: ICD-10-CM

## 2020-02-18 RX ORDER — LOSARTAN POTASSIUM 100 MG/1
100 TABLET ORAL DAILY
Qty: 90 TABLET | Refills: 0 | Status: SHIPPED | OUTPATIENT
Start: 2020-02-18 | End: 2020-05-01

## 2020-02-26 ENCOUNTER — OFFICE VISIT (OUTPATIENT)
Dept: FAMILY MEDICINE CLINIC | Facility: CLINIC | Age: 75
End: 2020-02-26

## 2020-02-26 VITALS
BODY MASS INDEX: 30.12 KG/M2 | HEIGHT: 63 IN | WEIGHT: 170 LBS | RESPIRATION RATE: 18 BRPM | DIASTOLIC BLOOD PRESSURE: 92 MMHG | OXYGEN SATURATION: 99 % | TEMPERATURE: 97.7 F | HEART RATE: 76 BPM | SYSTOLIC BLOOD PRESSURE: 162 MMHG

## 2020-02-26 DIAGNOSIS — I10 ESSENTIAL HYPERTENSION: Primary | ICD-10-CM

## 2020-02-26 DIAGNOSIS — M25.562 ACUTE PAIN OF LEFT KNEE: ICD-10-CM

## 2020-02-26 DIAGNOSIS — R31.9 URINARY TRACT INFECTION WITH HEMATURIA, SITE UNSPECIFIED: ICD-10-CM

## 2020-02-26 DIAGNOSIS — N39.0 URINARY TRACT INFECTION WITH HEMATURIA, SITE UNSPECIFIED: ICD-10-CM

## 2020-02-26 LAB
BILIRUB BLD-MCNC: NEGATIVE MG/DL
CLARITY, POC: CLEAR
COLOR UR: YELLOW
GLUCOSE UR STRIP-MCNC: NEGATIVE MG/DL
KETONES UR QL: NEGATIVE
LEUKOCYTE EST, POC: NEGATIVE
NITRITE UR-MCNC: NEGATIVE MG/ML
PH UR: 7 [PH] (ref 5–8)
PROT UR STRIP-MCNC: NEGATIVE MG/DL
RBC # UR STRIP: NEGATIVE /UL
SP GR UR: 1.02 (ref 1–1.03)
UROBILINOGEN UR QL: NORMAL

## 2020-02-26 PROCEDURE — 99214 OFFICE O/P EST MOD 30 MIN: CPT | Performed by: NURSE PRACTITIONER

## 2020-02-26 PROCEDURE — 81003 URINALYSIS AUTO W/O SCOPE: CPT | Performed by: NURSE PRACTITIONER

## 2020-02-26 NOTE — PROGRESS NOTES
Chief Complaint   Patient presents with   • Hypertension   • Urinary Tract Infection     f/u   • Knee Pain        Subjective     Yancy Mcdonnell  has a past medical history of Allergic rhinitis (06/02/2015), Body mass index 29.0-29.9, adult (09/12/2018), Cervical spinal stenosis (08/07/2017), Cholelithiasis, DDD (degenerative disc disease), cervical, DDD (degenerative disc disease), lumbar, DDD (degenerative disc disease), thoracic, Diverticulosis, Dyslipidemia (12/05/2012), Elevated brain natriuretic peptide (BNP) level (01/06/2017), Eustachian tube disorder, right (02/18/2019), Fatigue (06/02/2015), Female cystocele (06/02/2015), Hiatal hernia with gastroesophageal reflux (01/06/2017), History of DVT (deep vein thrombosis) (06/02/2015), Hyperlipidemia, Hypertension (06/10/2016), Left rib fracture (05/2017), Migraine headache (06/02/2015), Mitral insufficiency (01/16/2017), Osteoarthritis (05/20/2014), Osteopenia (12/05/2012), Over weight (06/14/2018), Peripheral edema (01/05/2017), PFO (patent foramen ovale) (01/16/2017), Pulmonary nodule (05/04/2017), RHA (rheumatoid arthritis) (CMS/[facility]) (12/05/2012), Scleritis (06/02/2015), Scoliosis, Tricuspid insufficiency, Varicose veins of other specified sites, and Venous insufficiency (09/12/2018).    Hypertension   This is a chronic problem. The current episode started more than 1 year ago. The problem is unchanged. The problem is uncontrolled. Pertinent negatives include no blurred vision, chest pain, palpitations or shortness of breath. Past treatments include ACE inhibitors and alpha 1 blockers. Current antihypertension treatment includes angiotensin blockers and diuretics. The current treatment provides moderate improvement. There are no compliance problems.    Urinary Tract Infection    This is a new problem. The current episode started more than 1 month ago. The problem has been resolved. The patient is experiencing no pain. There has been no fever. There is no  history of pyelonephritis. Pertinent negatives include no frequency, nausea or vomiting. Treatments tried: antibiotic. The treatment provided significant relief.   Knee Pain    The incident occurred 6 to 12 hours ago. There was no injury mechanism. The pain is present in the left knee. The quality of the pain is described as aching. The pain is at a severity of 5/10. The pain is moderate. The pain has been constant since onset. Pertinent negatives include no inability to bear weight, loss of motion, loss of sensation, muscle weakness, numbness or tingling. The symptoms are aggravated by weight bearing. Treatments tried: horse linament. The treatment provided mild relief.       Allergies   Allergen Reactions   • Demerol [Meperidine] Unknown (See Comments)     [facility]   • Other Unknown (See Comments)     Sulfa (sulfadiazine)    • Sulfa Antibiotics Unknown - High Severity   • Lisinopril Cough         Current Outpatient Medications:   •  albuterol sulfate HFA (VENTOLIN HFA) 108 (90 Base) MCG/ACT inhaler, Two inhalations every 4-6 hours as needed for SOA., Disp: , Rfl:   •  alendronate (FOSAMAX) 70 MG tablet, Take 1 tablet by mouth Every 7 (Seven) Days., Disp: 12 tablet, Rfl: 0  •  Calcium Carbonate-Vitamin D (CALCIUM 500 + D PO), Take 2 tablets by mouth Daily., Disp: , Rfl:   •  CHELATED MAGNESIUM PO, Take 1 tablet by mouth Daily., Disp: , Rfl:   •  famciclovir (FAMVIR) 500 MG tablet, TAKE 3 TABLETS BY MOUTH AS A SINGLE DOSE AT FIRST SIGN OF COLD SORE, Disp: 3 tablet, Rfl: 5  •  Glucosamine-Chondroitin (CIDAFLEX PO), Take 2 tablets by mouth Daily., Disp: , Rfl:   •  hydroCHLOROthiazide (HYDRODIURIL) 12.5 MG tablet, Take 1 tablet by mouth Daily., Disp: 90 tablet, Rfl: 0  •  losartan (COZAAR) 100 MG tablet, TAKE 1 TABLET BY MOUTH DAILY, Disp: 90 tablet, Rfl: 0  •  lysine ([facility] LYSINE) 500 MG tablet, Take 1 tablet by mouth Daily As Needed., Disp: , Rfl:   •  Multiple Vitamin (MULTI-VITAMIN DAILY) tablet,  Take 1 tablet by mouth Daily., Disp: , Rfl:   •  omeprazole (priLOSEC) 20 MG capsule, TAKE ONE CAPSULE BY MOUTH EVERY MORNING ON AN EMPTY STOMACH, 30 MINUTES BEFORE EATING OR OTHER MEDICATION, Disp: 90 capsule, Rfl: 1  •  Diclofenac Sodium (PENNSAID) 2 % solution, Place 40 mg on the skin as directed by provider 2 (Two) Times a Day., Disp: 112 g, Rfl: 0  •  fluticasone (FLONASE) 50 MCG/ACT nasal spray, 2 sprays into the nostril(s) as directed by provider Daily. INTO EACH NOSTRIL, Disp: , Rfl:   •  loratadine (CLARITIN) 10 MG tablet, TAKE 1 TABLET BY MOUTH EVERY DAY, Disp: 90 tablet, Rfl: 1  •  Vitamin D, Cholecalciferol, 1000 units capsule, Take 1 capsule by mouth Daily., Disp: , Rfl:     Patient Active Problem List   Diagnosis   • Allergic rhinitis   • Lung nodule   • Dyslipidemia   • History of thrombosis   • Hypertension   • Migraine headache   • Mitral valve insufficiency   • Osteoarthritis   • Osteopenia of multiple sites   • Rheumatoid arthritis (CMS/HCC)   • Spinal stenosis of cervical region   • Venous insufficiency   • Gastroesophageal reflux disease without esophagitis       Past Surgical History:   Procedure Laterality Date   • COLONOSCOPY  07/20/2017    EGD/ COLONSCOPY LARGE HIATAL HERNIA. MILD DIVERTICULOSIS. OTHERWISE NORMAL.   • INGUINAL HERNIA REPAIR Right    • TOTAL ABDOMINAL HYSTERECTOMY  1985    W/BSO WITH A/P REPAIR 1985 BENIGN REASONS DR. DRAKE       Social History     Socioeconomic History   • Marital status:      Spouse name: Not on file   • Number of children: Not on file   • Years of education: Not on file   • Highest education level: Not on file   Tobacco Use   • Smoking status: Never Smoker   • Smokeless tobacco: Never Used   Substance and Sexual Activity   • Alcohol use: No     Frequency: Never   • Drug use: No       Family History   Problem Relation Age of Onset   • Osteoporosis Mother    • Stroke Mother 80   • Dementia Mother    • Heart disease Father    • Hypertension Father    •  "Heart disease Sister    • Cancer Daughter         BREAST AND MELANOMA   • Breast cancer Daughter 52   • Cancer Son         MELANOMA   • Cancer Grandson         MELANOMA       Family history, surgical history, past medical history, Allergies and med's reviewed with patient today and updated in [facility] EMR.     ROS:  Review of Systems   Constitutional: Negative for activity change, appetite change, diaphoresis, fatigue, unexpected weight gain and unexpected weight loss.   Eyes: Negative for blurred vision and visual disturbance.   Respiratory: Negative for apnea, cough, shortness of breath and wheezing.    Cardiovascular: Negative for chest pain, palpitations and leg swelling.   Gastrointestinal: Negative for abdominal pain, constipation, diarrhea, nausea and vomiting.   Endocrine: Negative for cold intolerance and heat intolerance.   Genitourinary: Negative for frequency.   Musculoskeletal: Positive for arthralgias. Negative for joint swelling and myalgias.        Left knee pain   Neurological: Negative for dizziness, tingling, syncope, numbness and headache.   Psychiatric/Behavioral: Negative for depressed mood.        OBJECTIVE:  Vitals:    02/26/20 1445 02/26/20 1513   BP: 150/94 162/92   BP Location: Right arm    Patient Position: Sitting    Cuff Size: Large Adult    Pulse: 76    Resp: 18    Temp: 97.7 °F (36.5 °C)    TempSrc: Oral    SpO2: 99%    Weight: 77.1 kg (170 lb)    Height: 160 cm (63\")      Body mass index is 30.11 kg/m².    Physical Exam   Constitutional: She is oriented to person, place, and time. She appears well-developed and well-nourished.   Neck: Trachea normal and normal range of motion. Neck supple. Carotid bruit is not present. No thyromegaly present.   Cardiovascular: Normal rate, regular rhythm, normal heart sounds and intact distal pulses. Exam reveals no gallop and no friction rub.   No murmur heard.  Pulmonary/Chest: Effort normal and breath sounds normal. She has no wheezes. She has no " rales.   Abdominal: Soft. Bowel sounds are normal. There is no hepatosplenomegaly. There is no tenderness. No hernia.   Musculoskeletal: Normal range of motion. She exhibits no edema.        Left knee: She exhibits normal range of motion, no swelling, no deformity and no erythema. Tenderness found. Lateral joint line tenderness noted.   Lymphadenopathy:     She has no cervical adenopathy.   Neurological: She is alert and oriented to person, place, and time.   Skin: Skin is warm and dry.   Psychiatric: She has a normal mood and affect. Her behavior is normal. Judgment and thought content normal.       Office Visit on 02/26/2020   Component Date Value Ref Range Status   • Color 02/26/2020 Yellow  Yellow, Straw, Dark Yellow, Pallavi Final   • Clarity, UA 02/26/2020 Clear  Clear Final   • Specific Gravity  02/26/2020 1.020  1.005 - 1.030 Final   • pH, Urine 02/26/2020 7.0  5.0 - 8.0 Final   • Leukocytes 02/26/2020 Negative  Negative Final   • Nitrite, UA 02/26/2020 Negative  Negative Final   • Protein, POC 02/26/2020 Negative  Negative mg/dL Final   • Glucose, UA 02/26/2020 Negative  Negative, 1000 mg/dL (3+) mg/dL Final   • Ketones, UA 02/26/2020 Negative  Negative Final   • Urobilinogen, UA 02/26/2020 Normal  Normal Final   • Bilirubin 02/26/2020 Negative  Negative Final   • Blood, UA 02/26/2020 Negative  Negative Final       ASSESSMENT/ PLAN:    Diagnoses and all orders for this visit:    1. Essential hypertension (Primary)  Assessment & Plan:  Elevated today.  Her knee pain may be playing a role.  Checking BMP today, if creatinine is normal will increase hydrochlorothiazide to 25 mg daily.  Follow-up in 1 month.    Orders:  -     Basic Metabolic Panel    2. Acute pain of left knee  Comments:  Discussed possible causes with patient.  Trial of topical anti-inflammatory.  Return if not improving over the next 1 to 2 weeks.    3. Urinary tract infection with hematuria, site unspecified  Comments:  UTI symptoms have  resolved and UA is normal today.  Orders:  -     POC Urinalysis Dipstick, Automated    Other orders  -     Diclofenac Sodium (PENNSAID) 2 % solution; Place 40 mg on the skin as directed by provider 2 (Two) Times a Day.  Dispense: 112 g; Refill: 0      Orders Placed Today:     New Medications Ordered This Visit   Medications   • Diclofenac Sodium (PENNSAID) 2 % solution     Sig: Place 40 mg on the skin as directed by provider 2 (Two) Times a Day.     Dispense:  112 g     Refill:  0        Management Plan:     An After Visit Summary was printed and given to the patient at discharge.    Follow-up: Return in about 1 month (around 3/26/2020) for Follow-Up hypertension.    FELIPE Vázquez 2/26/2020 3:25 PM  This note was electronically signed.

## 2020-02-26 NOTE — ASSESSMENT & PLAN NOTE
Elevated today.  Her knee pain may be playing a role.  Checking BMP today, if creatinine is normal will increase hydrochlorothiazide to 25 mg daily.  Follow-up in 1 month.

## 2020-02-27 LAB
BUN SERPL-MCNC: 16 MG/DL (ref 8–27)
BUN/CREAT SERPL: 20 (ref 12–28)
CALCIUM SERPL-MCNC: 9.7 MG/DL (ref 8.7–10.3)
CHLORIDE SERPL-SCNC: 102 MMOL/L (ref 96–106)
CO2 SERPL-SCNC: 24 MMOL/L (ref 20–29)
CREAT SERPL-MCNC: 0.8 MG/DL (ref 0.57–1)
GLUCOSE SERPL-MCNC: 86 MG/DL (ref 65–99)
POTASSIUM SERPL-SCNC: 4.2 MMOL/L (ref 3.5–5.2)
SODIUM SERPL-SCNC: 141 MMOL/L (ref 134–144)

## 2020-03-14 DIAGNOSIS — I10 ESSENTIAL HYPERTENSION: ICD-10-CM

## 2020-03-15 RX ORDER — ALENDRONATE SODIUM 70 MG/1
TABLET ORAL
Qty: 12 TABLET | Refills: 1 | Status: SHIPPED | OUTPATIENT
Start: 2020-03-15 | End: 2020-10-14

## 2020-03-15 RX ORDER — HYDROCHLOROTHIAZIDE 12.5 MG/1
12.5 TABLET ORAL DAILY
Qty: 90 TABLET | Refills: 1 | Status: SHIPPED | OUTPATIENT
Start: 2020-03-15 | End: 2020-04-28 | Stop reason: SDUPTHER

## 2020-03-30 ENCOUNTER — TELEPHONE (OUTPATIENT)
Dept: FAMILY MEDICINE CLINIC | Facility: CLINIC | Age: 75
End: 2020-03-30

## 2020-04-27 RX ORDER — FLUTICASONE PROPIONATE 50 MCG
SPRAY, SUSPENSION (ML) NASAL
Qty: 16 G | Refills: 11 | Status: SHIPPED | OUTPATIENT
Start: 2020-04-27 | End: 2020-04-28

## 2020-04-28 ENCOUNTER — TELEMEDICINE (OUTPATIENT)
Dept: FAMILY MEDICINE CLINIC | Facility: CLINIC | Age: 75
End: 2020-04-28

## 2020-04-28 VITALS — DIASTOLIC BLOOD PRESSURE: 85 MMHG | SYSTOLIC BLOOD PRESSURE: 145 MMHG

## 2020-04-28 DIAGNOSIS — I10 ESSENTIAL HYPERTENSION: ICD-10-CM

## 2020-04-28 PROCEDURE — 99213 OFFICE O/P EST LOW 20 MIN: CPT | Performed by: NURSE PRACTITIONER

## 2020-04-28 RX ORDER — HYDROCHLOROTHIAZIDE 25 MG/1
25 TABLET ORAL DAILY
Qty: 90 TABLET | Refills: 1 | Status: SHIPPED | OUTPATIENT
Start: 2020-04-28 | End: 2020-11-03

## 2020-04-28 RX ORDER — CIPROFLOXACIN 500 MG/1
500 TABLET, FILM COATED ORAL 2 TIMES DAILY
Qty: 14 TABLET | Refills: 0 | Status: SHIPPED | OUTPATIENT
Start: 2020-04-28 | End: 2020-05-05

## 2020-04-28 RX ORDER — FLUTICASONE PROPIONATE 50 MCG
SPRAY, SUSPENSION (ML) NASAL
Qty: 48 G | Refills: 11 | Status: SHIPPED | OUTPATIENT
Start: 2020-04-28 | End: 2021-07-26

## 2020-04-28 NOTE — PROGRESS NOTES
Chief Complaint   Patient presents with   • Hypertension   • Urinary Frequency   You have chosen to receive care through a telehealth visit.  Do you consent to use a video/audio connection for your medical care today? Yes     Subjective   Yancy Mcdonnell is a 74 y.o. female who presents today for urinary frequency and refill on hydrochlorothiazide for blood pressure.    HPI: Patient is in a video visit today secondary to pandemic conditions.  She has a increase in urinary frequency over the last couple of days.  She feels like she had a UTI like she did back in January.  She is had no fever no vomiting no nausea are no hematuria.  She has use a home dip strip for urine and detected nitrites.  Previously she was given Cipro and had no problems with that.  And the UTI resolved without problem.    Patient had an increase in blood pressure medicine of hydrochlorothiazide 12.5  2 tablets daily.  She was to recheck but secondary to cancellations she was not able to be seen.  She is now out of that medication and would like to go ahead and switch to the 25 mg tablets.  She is recording blood pressures in the low 140s over 80s on a regular basis as is recorded in her chart today.  She has no other symptoms or complaints.    Yancy Mcdonnell  has a past medical history of Allergic rhinitis (06/02/2015), Body mass index 29.0-29.9, adult (09/12/2018), Cervical spinal stenosis (08/07/2017), Cholelithiasis, DDD (degenerative disc disease), cervical, DDD (degenerative disc disease), lumbar, DDD (degenerative disc disease), thoracic, Diverticulosis, Dyslipidemia (12/05/2012), Elevated brain natriuretic peptide (BNP) level (01/06/2017), Eustachian tube disorder, right (02/18/2019), Fatigue (06/02/2015), Female cystocele (06/02/2015), Hiatal hernia with gastroesophageal reflux (01/06/2017), History of DVT (deep vein thrombosis) (06/02/2015), Hyperlipidemia, Hypertension (06/10/2016), Left rib fracture (05/2017), Migraine headache  (06/02/2015), Mitral insufficiency (01/16/2017), Osteoarthritis (05/20/2014), Osteopenia (12/05/2012), Over weight (06/14/2018), Peripheral edema (01/05/2017), PFO (patent foramen ovale) (01/16/2017), Pulmonary nodule (05/04/2017), RHA (rheumatoid arthritis) (CMS/HCC) (12/05/2012), Scleritis (06/02/2015), Scoliosis, Tricuspid insufficiency, Varicose veins of other specified sites, and Venous insufficiency (09/12/2018).    Allergies   Allergen Reactions   • Demerol [Meperidine] Unknown (See Comments)     Abstracted from [facility].   • Other Unknown (See Comments)     Sulfa (sulfadiazine)    • Sulfa Antibiotics Unknown - High Severity   • Lisinopril Cough       Current Outpatient Medications:   •  alendronate (FOSAMAX) 70 MG tablet, TAKE 1 TABLET BY MOUTH EVERY 7 DAYS, Disp: 12 tablet, Rfl: 1  •  famciclovir (FAMVIR) 500 MG tablet, TAKE 3 TABLETS BY MOUTH AS A SINGLE DOSE AT FIRST SIGN OF COLD SORE, Disp: 3 tablet, Rfl: 5  •  fluticasone (FLONASE) 50 MCG/ACT nasal spray, INSTILL 2 SPRAYS INTO EACH NOSTIL EVERY DAY, Disp: 16 g, Rfl: 11  •  hydroCHLOROthiazide (HYDRODIURIL) 25 MG tablet, Take 1 tablet by mouth Daily., Disp: 90 tablet, Rfl: 1  •  loratadine (CLARITIN) 10 MG tablet, TAKE 1 TABLET BY MOUTH EVERY DAY, Disp: 90 tablet, Rfl: 1  •  losartan (COZAAR) 100 MG tablet, TAKE 1 TABLET BY MOUTH DAILY, Disp: 90 tablet, Rfl: 0  •  lysine ([facility] LYSINE) 500 MG tablet, Take 1 tablet by mouth Daily As Needed., Disp: , Rfl:   •  Multiple Vitamin (MULTI-VITAMIN DAILY) tablet, Take 1 tablet by mouth Daily., Disp: , Rfl:   •  omeprazole (priLOSEC) 20 MG capsule, TAKE ONE CAPSULE BY MOUTH EVERY MORNING ON AN EMPTY STOMACH, 30 MINUTES BEFORE EATING OR OTHER MEDICATION, Disp: 90 capsule, Rfl: 1  •  albuterol sulfate HFA (VENTOLIN HFA) 108 (90 Base) MCG/ACT inhaler, Two inhalations every 4-6 hours as needed for SOA., Disp: , Rfl:   •  Calcium Carbonate-Vitamin D (CALCIUM 500 + D PO), Take 2 tablets by mouth Daily., Disp: , Rfl:   •   CHELATED MAGNESIUM PO, Take 1 tablet by mouth Daily., Disp: , Rfl:   •  ciprofloxacin (Cipro) 500 MG tablet, Take 1 tablet by mouth 2 (Two) Times a Day for 7 days., Disp: 14 tablet, Rfl: 0  •  Diclofenac Sodium 1.5 % solution, 40 drops to each knee up to QID PRN. Apply 10 drops at a time; spread the 10 drops around the knee, and repeat this until the entire 40 drops have been applied, Disp: 150 mL, Rfl: 1  •  Glucosamine-Chondroitin (CIDAFLEX PO), Take 2 tablets by mouth Daily., Disp: , Rfl:   •  Vitamin D, Cholecalciferol, 1000 units capsule, Take 1 capsule by mouth Daily., Disp: , Rfl:   Past Medical History:   Diagnosis Date   • Allergic rhinitis 06/02/2015   • Body mass index 29.0-29.9, adult 09/12/2018   • Cervical spinal stenosis 08/07/2017   • Cholelithiasis    • DDD (degenerative disc disease), cervical    • DDD (degenerative disc disease), lumbar    • DDD (degenerative disc disease), thoracic    • Diverticulosis    • Dyslipidemia 12/05/2012   • Elevated brain natriuretic peptide (BNP) level 01/06/2017   • Eustachian tube disorder, right 02/18/2019   • Fatigue 06/02/2015   • Female cystocele 06/02/2015   • Hiatal hernia with gastroesophageal reflux 01/06/2017   • History of DVT (deep vein thrombosis) 06/02/2015   • Hyperlipidemia    • Hypertension 06/10/2016   • Left rib fracture 05/2017    11TH RIB    • Migraine headache 06/02/2015   • Mitral insufficiency 01/16/2017    MILD   • Osteoarthritis 05/20/2014   • Osteopenia 12/05/2012   • Over weight 06/14/2018   • Peripheral edema 01/05/2017   • PFO (patent foramen ovale) 01/16/2017   • Pulmonary nodule 05/04/2017    DR CHUN FOLLOWS   • RHA (rheumatoid arthritis) (CMS/HCC) 12/05/2012   • Scleritis 06/02/2015   • Scoliosis    • Tricuspid insufficiency     MILD   • Varicose veins of other specified sites    • Venous insufficiency 09/12/2018     Past Surgical History:   Procedure Laterality Date   • COLONOSCOPY  07/20/2017    EGD/ COLONSCOPY LARGE HIATAL HERNIA.  MILD DIVERTICULOSIS. OTHERWISE NORMAL.   • INGUINAL HERNIA REPAIR Right    • TOTAL ABDOMINAL HYSTERECTOMY  1985    W/BSO WITH A/P REPAIR 1985 BENIGN REASONS DR. DRAKE     Social History     Socioeconomic History   • Marital status:      Spouse name: Not on file   • Number of children: Not on file   • Years of education: Not on file   • Highest education level: Not on file   Tobacco Use   • Smoking status: Never Smoker   • Smokeless tobacco: Never Used   Substance and Sexual Activity   • Alcohol use: No     Frequency: Never   • Drug use: No     Family History   Problem Relation Age of Onset   • Osteoporosis Mother    • Stroke Mother 80   • Dementia Mother    • Heart disease Father    • Hypertension Father    • Heart disease Sister    • Cancer Daughter         BREAST AND MELANOMA   • Breast cancer Daughter 52   • Cancer Son         MELANOMA   • Cancer Grandson         MELANOMA       Family history, surgical history, past medical history, Allergies and med's reviewed with patient today and updated in [employer] EMR.   PHQ-2 Depression Screening  Little interest or pleasure in doing things?     Feeling down, depressed, or hopeless?     PHQ-2 Total Score     PHQ-9 Depression Screening  Little interest or pleasure in doing things?     Feeling down, depressed, or hopeless?     Trouble falling or staying asleep, or sleeping too much?     Feeling tired or having little energy?     Poor appetite or overeating?     Feeling bad about yourself - or that you are a failure or have let yourself or your family down?     Trouble concentrating on things, such as reading the newspaper or watching television?     Moving or speaking so slowly that other people could have noticed? Or the opposite - being so fidgety or restless that you have been moving around a lot more than usual?     Thoughts that you would be better off dead, or of hurting yourself in some way?     PHQ-9 Total Score     If you checked off any problems, how difficult  have these problems made it for you to do your work, take care of things at home, or get along with other people?       ROS:  Review of Systems   Constitutional: Negative for fever.   Cardiovascular: Negative for chest pain.   Gastrointestinal: Negative for abdominal pain, diarrhea and nausea.   Genitourinary: Positive for dysuria and frequency.       OBJECTIVE:  Vitals:    04/28/20 0917 04/28/20 0918   BP: 145/83 145/85     Physical Exam   Constitutional: She appears well-developed and well-nourished.   Eyes: Conjunctivae are normal.   Pulmonary/Chest: Effort normal.   Skin: No pallor.     Total time with patient face-to-face addressing UTI and blood pressure approximately 8 minutes  ASSESSMENT/ PLAN:    Yancy was seen today for hypertension and urinary frequency.    Diagnoses and all orders for this visit:    Essential hypertension  -     hydroCHLOROthiazide (HYDRODIURIL) 25 MG tablet; Take 1 tablet by mouth Daily.    Other orders  -     ciprofloxacin (Cipro) 500 MG tablet; Take 1 tablet by mouth 2 (Two) Times a Day for 7 days.        Orders Placed Today:     New Medications Ordered This Visit   Medications   • ciprofloxacin (Cipro) 500 MG tablet     Sig: Take 1 tablet by mouth 2 (Two) Times a Day for 7 days.     Dispense:  14 tablet     Refill:  0   • hydroCHLOROthiazide (HYDRODIURIL) 25 MG tablet     Sig: Take 1 tablet by mouth Daily.     Dispense:  90 tablet     Refill:  1        Management Plan:     An After Visit Summary was printed and given to the patient at discharge.    Follow-up: No follow-ups on file.    FELIPE Mays 4/28/2020 09:24  This note was electronically signed.

## 2020-05-01 DIAGNOSIS — I10 ESSENTIAL HYPERTENSION: ICD-10-CM

## 2020-05-01 RX ORDER — LOSARTAN POTASSIUM 100 MG/1
100 TABLET ORAL DAILY
Qty: 90 TABLET | Refills: 0 | Status: SHIPPED | OUTPATIENT
Start: 2020-05-01 | End: 2020-07-30

## 2020-05-01 RX ORDER — OMEPRAZOLE 20 MG/1
CAPSULE, DELAYED RELEASE ORAL
Qty: 90 CAPSULE | Refills: 1 | Status: SHIPPED | OUTPATIENT
Start: 2020-05-01 | End: 2021-01-15

## 2020-07-23 ENCOUNTER — HOSPITAL ENCOUNTER (EMERGENCY)
Facility: HOSPITAL | Age: 75
Discharge: HOME | End: 2020-07-23
Attending: EMERGENCY MEDICINE | Admitting: EMERGENCY MEDICINE

## 2020-07-23 ENCOUNTER — APPOINTMENT (OUTPATIENT)
Dept: GENERAL RADIOLOGY | Facility: HOSPITAL | Age: 75
End: 2020-07-23

## 2020-07-23 VITALS
BODY MASS INDEX: 29.84 KG/M2 | WEIGHT: 168.43 LBS | SYSTOLIC BLOOD PRESSURE: 140 MMHG | HEART RATE: 81 BPM | DIASTOLIC BLOOD PRESSURE: 71 MMHG | TEMPERATURE: 98.3 F | HEIGHT: 63 IN | RESPIRATION RATE: 15 BRPM | OXYGEN SATURATION: 99 %

## 2020-07-23 DIAGNOSIS — K59.00 CONSTIPATION, UNSPECIFIED CONSTIPATION TYPE: Primary | ICD-10-CM

## 2020-07-23 LAB
ANION GAP SERPL CALCULATED.3IONS-SCNC: 12 MMOL/L (ref 5–15)
BASOPHILS # BLD AUTO: 0 10*3/MM3 (ref 0–0.2)
BASOPHILS NFR BLD AUTO: 0.5 % (ref 0–1.5)
BUN SERPL-MCNC: 5 MG/DL (ref 8–23)
BUN SERPL-MCNC: ABNORMAL MG/DL
BUN/CREAT SERPL: ABNORMAL
CALCIUM SPEC-SCNC: 8.8 MG/DL (ref 8.6–10.5)
CHLORIDE SERPL-SCNC: 103 MMOL/L (ref 98–107)
CO2 SERPL-SCNC: 27 MMOL/L (ref 22–29)
CREAT SERPL-MCNC: 0.64 MG/DL (ref 0.57–1)
DEPRECATED RDW RBC AUTO: 42.4 FL (ref 37–54)
EOSINOPHIL # BLD AUTO: 0.1 10*3/MM3 (ref 0–0.4)
EOSINOPHIL NFR BLD AUTO: 1 % (ref 0.3–6.2)
ERYTHROCYTE [DISTWIDTH] IN BLOOD BY AUTOMATED COUNT: 14.1 % (ref 12.3–15.4)
GFR SERPL CREATININE-BSD FRML MDRD: 90 ML/MIN/1.73
GLUCOSE SERPL-MCNC: 101 MG/DL (ref 65–99)
HCT VFR BLD AUTO: 33.2 % (ref 34–46.6)
HGB BLD-MCNC: 11 G/DL (ref 12–15.9)
LYMPHOCYTES # BLD AUTO: 1.2 10*3/MM3 (ref 0.7–3.1)
LYMPHOCYTES NFR BLD AUTO: 14.4 % (ref 19.6–45.3)
MCH RBC QN AUTO: 28 PG (ref 26.6–33)
MCHC RBC AUTO-ENTMCNC: 33.1 G/DL (ref 31.5–35.7)
MCV RBC AUTO: 84.7 FL (ref 79–97)
MONOCYTES # BLD AUTO: 0.9 10*3/MM3 (ref 0.1–0.9)
MONOCYTES NFR BLD AUTO: 11 % (ref 5–12)
NEUTROPHILS NFR BLD AUTO: 6 10*3/MM3 (ref 1.7–7)
NEUTROPHILS NFR BLD AUTO: 73.1 % (ref 42.7–76)
NRBC BLD AUTO-RTO: 0 /100 WBC (ref 0–0.2)
PLATELET # BLD AUTO: 439 10*3/MM3 (ref 140–450)
PMV BLD AUTO: 6.7 FL (ref 6–12)
POTASSIUM SERPL-SCNC: 3.2 MMOL/L (ref 3.5–5.2)
RBC # BLD AUTO: 3.92 10*6/MM3 (ref 3.77–5.28)
SODIUM SERPL-SCNC: 142 MMOL/L (ref 136–145)
WBC # BLD AUTO: 8.2 10*3/MM3 (ref 3.4–10.8)

## 2020-07-23 PROCEDURE — 85025 COMPLETE CBC W/AUTO DIFF WBC: CPT | Performed by: EMERGENCY MEDICINE

## 2020-07-23 PROCEDURE — 74022 RADEX COMPL AQT ABD SERIES: CPT

## 2020-07-23 PROCEDURE — 99283 EMERGENCY DEPT VISIT LOW MDM: CPT

## 2020-07-23 PROCEDURE — 80048 BASIC METABOLIC PNL TOTAL CA: CPT | Performed by: EMERGENCY MEDICINE

## 2020-07-23 RX ORDER — SODIUM CHLORIDE 0.9 % (FLUSH) 0.9 %
10 SYRINGE (ML) INJECTION AS NEEDED
Status: DISCONTINUED | OUTPATIENT
Start: 2020-07-23 | End: 2020-07-23 | Stop reason: HOSPADM

## 2020-07-23 NOTE — ED NOTES
Admitted Saturday at [facility] dx with colitis, states they discharged her Monday after she had a BM after given suppository. Pt states that since she has been unable to pass stool, states that she has been unable to eat, pt has taken multiple otc medication and no BM.      Carol Thorpe RN  07/23/20 [id number]

## 2020-07-23 NOTE — ED NOTES
Spoke to Arina at [facility] medical records, I asked her to send us a DC summary, labs, and imaging.      Cyndy Scott  07/23/20 [id number]

## 2020-07-23 NOTE — ED PROVIDER NOTES
Subjective   Patient is a 75-year-old female complaint of constipation for the past several days.  She states she was admitted to an outlying hospital possibly 5 days ago for colitis.  She states that she is got home 3 days ago she has had no bowel movement and has had intermittent urge to go.  She denies cough fever vomiting diarrhea dysuria or other complaint          Review of Systems  For cough fever chest pain shortness of breath abdominal pain vomiting diarrhea dysuria achiness weight loss or other complaint  Past Medical History:   Diagnosis Date   • Allergic rhinitis 06/02/2015   • Body mass index 29.0-29.9, adult 09/12/2018   • Cervical spinal stenosis 08/07/2017   • Cholelithiasis    • DDD (degenerative disc disease), cervical    • DDD (degenerative disc disease), lumbar    • DDD (degenerative disc disease), thoracic    • Diverticulosis    • Dyslipidemia 12/05/2012   • Elevated brain natriuretic peptide (BNP) level 01/06/2017   • Eustachian tube disorder, right 02/18/2019   • Fatigue 06/02/2015   • Female cystocele 06/02/2015   • Hiatal hernia with gastroesophageal reflux 01/06/2017   • History of DVT (deep vein thrombosis) 06/02/2015   • Hyperlipidemia    • Hypertension 06/10/2016   • Left rib fracture 05/2017    11TH RIB    • Migraine headache 06/02/2015   • Mitral insufficiency 01/16/2017    MILD   • Osteoarthritis 05/20/2014   • Osteopenia 12/05/2012   • Over weight 06/14/2018   • Peripheral edema 01/05/2017   • PFO (patent foramen ovale) 01/16/2017   • Pulmonary nodule 05/04/2017    DR CHUN FOLLOWS   • RHA (rheumatoid arthritis) (CMS/[facility]) 12/05/2012   • Scleritis 06/02/2015   • Scoliosis    • Tricuspid insufficiency     MILD   • Varicose veins of other specified sites    • Venous insufficiency 09/12/2018       Allergies   Allergen Reactions   • Demerol [Meperidine] Unknown (See Comments)     Abstracted from [employer].   • Other Unknown (See Comments)     Sulfa (sulfadiazine)    • Sulfa Antibiotics  Unknown - High Severity   • Lisinopril Cough       Past Surgical History:   Procedure Laterality Date   • COLONOSCOPY  07/20/2017    EGD/ COLONSCOPY LARGE HIATAL HERNIA. MILD DIVERTICULOSIS. OTHERWISE NORMAL.   • INGUINAL HERNIA REPAIR Right    • TOTAL ABDOMINAL HYSTERECTOMY  1985    W/BSO WITH A/P REPAIR 1985 BENIGN REASONS DR. DRAKE       Family History   Problem Relation Age of Onset   • Osteoporosis Mother    • Stroke Mother 80   • Dementia Mother    • Heart disease Father    • Hypertension Father    • Heart disease Sister    • Cancer Daughter         BREAST AND MELANOMA   • Breast cancer Daughter 52   • Cancer Son         MELANOMA   • Cancer Grandson         MELANOMA       Social History     Socioeconomic History   • Marital status:      Spouse name: Not on file   • Number of children: Not on file   • Years of education: Not on file   • Highest education level: Not on file   Tobacco Use   • Smoking status: Never Smoker   • Smokeless tobacco: Never Used   Substance and Sexual Activity   • Alcohol use: No     Frequency: Never   • Drug use: No           Objective   Physical Exam  HEENT exam shows TMs to be clear but oropharynx comers.  Neck has no adenopathy.  Lungs are clear.  Heart has regular rhythm.  Chest is nontender.  Abdomen is soft with no tenderness.  Patient has normal bowel sounds without rebound or guarding.  Back has no CVA tenderness.  Procedures           ED Course      Results for orders placed or performed during the hospital encounter of 07/23/20   Basic Metabolic Panel   Result Value Ref Range    Glucose 101 (H) 65 - 99 mg/dL    BUN      Creatinine 0.64 0.57 - 1.00 mg/dL    Sodium 142 136 - 145 mmol/L    Potassium 3.2 (L) 3.5 - 5.2 mmol/L    Chloride 103 98 - 107 mmol/L    CO2 27.0 22.0 - 29.0 mmol/L    Calcium 8.8 8.6 - 10.5 mg/dL    eGFR Non African Amer 90 >60 mL/min/1.73    BUN/Creatinine Ratio      Anion Gap 12.0 5.0 - 15.0 mmol/L   CBC Auto Differential   Result Value Ref Range     WBC 8.20 3.40 - 10.80 10*3/mm3    RBC 3.92 3.77 - 5.28 10*6/mm3    Hemoglobin 11.0 (L) 12.0 - 15.9 g/dL    Hematocrit 33.2 (L) 34.0 - 46.6 %    MCV 84.7 79.0 - 97.0 fL    MCH 28.0 26.6 - 33.0 pg    MCHC 33.1 31.5 - 35.7 g/dL    RDW 14.1 12.3 - 15.4 %    RDW-SD 42.4 37.0 - 54.0 fl    MPV 6.7 6.0 - 12.0 fL    Platelets 439 140 - 450 10*3/mm3    Neutrophil % 73.1 42.7 - 76.0 %    Lymphocyte % 14.4 (L) 19.6 - 45.3 %    Monocyte % 11.0 5.0 - 12.0 %    Eosinophil % 1.0 0.3 - 6.2 %    Basophil % 0.5 0.0 - 1.5 %    Neutrophils, Absolute 6.00 1.70 - 7.00 10*3/mm3    Lymphocytes, Absolute 1.20 0.70 - 3.10 10*3/mm3    Monocytes, Absolute 0.90 0.10 - 0.90 10*3/mm3    Eosinophils, Absolute 0.10 0.00 - 0.40 10*3/mm3    Basophils, Absolute 0.00 0.00 - 0.20 10*3/mm3    nRBC 0.0 0.0 - 0.2 /100 WBC   BUN   Result Value Ref Range    BUN 5 (L) 8 - 23 mg/dL     Xr Abdomen 2+ Vw With Chest 1 Vw    Result Date: 7/23/2020  Moderate to large colonic stool burden particularly in the transverse colon, in keeping with a history of constipation. No indication of high-grade bowel obstruction or acute abdominal abnormality.  No acute chest findings. Chronic right basilar scarring.  Hiatal hernia.  Electronically Signed By-Dr. Ratna Limon MD On:7/23/2020 9:33 AM This report was finalized on [id number] by Dr. Ratna Limon MD.                                         MDM  Number of Diagnoses or Management Options  Diagnosis management comments: Patient has a benign physical exam.  She has findings consistent with a large amount of colonic stool on CT scan.  I did review her chart from the [facility] which shows admission for diverticulitis.  Patient has no evidence of infectious process at this time patient will be discharged with a prescription for magnesium citrate.  She will follow with MD for recheck.    Risk of Complications, Morbidity, and/or Mortality  Presenting problems: moderate  Diagnostic procedures: moderate  Management  options: moderate    Patient Progress  Patient progress: stable      Final diagnoses:   Constipation, unspecified constipation type            Steve Castaneda MD  07/23/20 [id number]

## 2020-07-25 ENCOUNTER — ON CAMPUS - OUTPATIENT (AMBULATORY)
Dept: URBAN - METROPOLITAN AREA HOSPITAL 85 | Facility: HOSPITAL | Age: 75
End: 2020-07-25
Payer: MEDICARE

## 2020-07-25 ENCOUNTER — HOSPITAL ENCOUNTER (OUTPATIENT)
Facility: HOSPITAL | Age: 75
Setting detail: OBSERVATION
Discharge: HOME | End: 2020-07-26
Attending: EMERGENCY MEDICINE | Admitting: HOSPITALIST

## 2020-07-25 ENCOUNTER — APPOINTMENT (OUTPATIENT)
Dept: CT IMAGING | Facility: HOSPITAL | Age: 75
End: 2020-07-25

## 2020-07-25 DIAGNOSIS — R10.9 ABDOMINAL PAIN, UNSPECIFIED ABDOMINAL LOCATION: Primary | ICD-10-CM

## 2020-07-25 DIAGNOSIS — R93.3 ABNORMAL FINDINGS ON DIAGNOSTIC IMAGING OF OTHER PARTS OF DI: ICD-10-CM

## 2020-07-25 DIAGNOSIS — K52.89 OTHER SPECIFIED NONINFECTIVE GASTROENTERITIS AND COLITIS: ICD-10-CM

## 2020-07-25 DIAGNOSIS — K52.9 COLITIS: ICD-10-CM

## 2020-07-25 PROBLEM — Z86.19 HISTORY OF HERPES SIMPLEX INFECTION: Chronic | Status: ACTIVE | Noted: 2020-07-25

## 2020-07-25 PROBLEM — K21.9 GASTROESOPHAGEAL REFLUX DISEASE WITHOUT ESOPHAGITIS: Chronic | Status: ACTIVE | Noted: 2019-08-18

## 2020-07-25 PROBLEM — E83.51 HYPOCALCEMIA: Status: ACTIVE | Noted: 2020-07-25

## 2020-07-25 LAB
ALBUMIN SERPL-MCNC: 3.5 G/DL (ref 3.5–5.2)
ALBUMIN/GLOB SERPL: 1.3 G/DL
ALP SERPL-CCNC: 99 U/L (ref 39–117)
ALT SERPL W P-5'-P-CCNC: 19 U/L (ref 1–33)
ANION GAP SERPL CALCULATED.3IONS-SCNC: 11 MMOL/L (ref 5–15)
AST SERPL-CCNC: 21 U/L (ref 1–32)
BACTERIA UR QL AUTO: ABNORMAL /HPF
BASOPHILS # BLD AUTO: 0 10*3/MM3 (ref 0–0.2)
BASOPHILS NFR BLD AUTO: 0.5 % (ref 0–1.5)
BILIRUB SERPL-MCNC: 0.4 MG/DL (ref 0–1.2)
BILIRUB UR QL STRIP: ABNORMAL
BUN SERPL-MCNC: 8 MG/DL (ref 8–23)
BUN SERPL-MCNC: ABNORMAL MG/DL
BUN/CREAT SERPL: ABNORMAL
CA-I SERPL ISE-MCNC: 1.13 MMOL/L (ref 1.2–1.3)
CALCIUM SPEC-SCNC: 8.5 MG/DL (ref 8.6–10.5)
CHLORIDE SERPL-SCNC: 103 MMOL/L (ref 98–107)
CLARITY UR: CLEAR
CO2 SERPL-SCNC: 27 MMOL/L (ref 22–29)
COLOR UR: ABNORMAL
CREAT SERPL-MCNC: 0.6 MG/DL (ref 0.57–1)
DEPRECATED RDW RBC AUTO: 42.4 FL (ref 37–54)
EOSINOPHIL # BLD AUTO: 0.1 10*3/MM3 (ref 0–0.4)
EOSINOPHIL NFR BLD AUTO: 1.5 % (ref 0.3–6.2)
ERYTHROCYTE [DISTWIDTH] IN BLOOD BY AUTOMATED COUNT: 14.1 % (ref 12.3–15.4)
GFR SERPL CREATININE-BSD FRML MDRD: 97 ML/MIN/1.73
GLOBULIN UR ELPH-MCNC: 2.7 GM/DL
GLUCOSE SERPL-MCNC: 113 MG/DL (ref 65–99)
GLUCOSE UR STRIP-MCNC: NEGATIVE MG/DL
HCT VFR BLD AUTO: 34.4 % (ref 34–46.6)
HGB BLD-MCNC: 11.1 G/DL (ref 12–15.9)
HGB UR QL STRIP.AUTO: ABNORMAL
HOLD SPECIMEN: NORMAL
HYALINE CASTS UR QL AUTO: ABNORMAL /LPF
KETONES UR QL STRIP: NEGATIVE
LEUKOCYTE ESTERASE UR QL STRIP.AUTO: ABNORMAL
LIPASE SERPL-CCNC: 12 U/L (ref 13–60)
LYMPHOCYTES # BLD AUTO: 1.1 10*3/MM3 (ref 0.7–3.1)
LYMPHOCYTES NFR BLD AUTO: 14 % (ref 19.6–45.3)
MCH RBC QN AUTO: 27.6 PG (ref 26.6–33)
MCHC RBC AUTO-ENTMCNC: 32.3 G/DL (ref 31.5–35.7)
MCV RBC AUTO: 85.4 FL (ref 79–97)
MONOCYTES # BLD AUTO: 1.1 10*3/MM3 (ref 0.1–0.9)
MONOCYTES NFR BLD AUTO: 14.2 % (ref 5–12)
NEUTROPHILS NFR BLD AUTO: 5.5 10*3/MM3 (ref 1.7–7)
NEUTROPHILS NFR BLD AUTO: 69.8 % (ref 42.7–76)
NITRITE UR QL STRIP: NEGATIVE
NRBC BLD AUTO-RTO: 0 /100 WBC (ref 0–0.2)
PH UR STRIP.AUTO: 7 [PH] (ref 5–8)
PLATELET # BLD AUTO: 514 10*3/MM3 (ref 140–450)
PMV BLD AUTO: 6.6 FL (ref 6–12)
POTASSIUM SERPL-SCNC: 3.3 MMOL/L (ref 3.5–5.2)
POTASSIUM SERPL-SCNC: 3.4 MMOL/L (ref 3.5–5.2)
PROT SERPL-MCNC: 6.2 G/DL (ref 6–8.5)
PROT UR QL STRIP: NEGATIVE
RBC # BLD AUTO: 4.02 10*6/MM3 (ref 3.77–5.28)
RBC # UR: ABNORMAL /HPF
REF LAB TEST METHOD: ABNORMAL
SODIUM SERPL-SCNC: 141 MMOL/L (ref 136–145)
SP GR UR STRIP: 1.02 (ref 1–1.03)
SQUAMOUS #/AREA URNS HPF: ABNORMAL /HPF
UROBILINOGEN UR QL STRIP: ABNORMAL
WBC # BLD AUTO: 7.9 10*3/MM3 (ref 3.4–10.8)
WBC UR QL AUTO: ABNORMAL /HPF

## 2020-07-25 PROCEDURE — 25010000002 PIPERACILLIN SOD-TAZOBACTAM PER 1 G: Performed by: EMERGENCY MEDICINE

## 2020-07-25 PROCEDURE — G0378 HOSPITAL OBSERVATION PER HR: HCPCS

## 2020-07-25 PROCEDURE — 96365 THER/PROPH/DIAG IV INF INIT: CPT

## 2020-07-25 PROCEDURE — 96372 THER/PROPH/DIAG INJ SC/IM: CPT

## 2020-07-25 PROCEDURE — 82330 ASSAY OF CALCIUM: CPT | Performed by: STUDENT IN AN ORGANIZED HEALTH CARE EDUCATION/TRAINING PROGRAM

## 2020-07-25 PROCEDURE — 25010000002 PIPERACILLIN SOD-TAZOBACTAM PER 1 G: Performed by: STUDENT IN AN ORGANIZED HEALTH CARE EDUCATION/TRAINING PROGRAM

## 2020-07-25 PROCEDURE — 94799 UNLISTED PULMONARY SVC/PX: CPT

## 2020-07-25 PROCEDURE — 83690 ASSAY OF LIPASE: CPT | Performed by: EMERGENCY MEDICINE

## 2020-07-25 PROCEDURE — 25010000002 METHYLNALTREXONE 12 MG/0.6ML SOLUTION: Performed by: NURSE PRACTITIONER

## 2020-07-25 PROCEDURE — 99212 OFFICE O/P EST SF 10 MIN: CPT | Performed by: NURSE PRACTITIONER

## 2020-07-25 PROCEDURE — 25010000002 ONDANSETRON PER 1 MG: Performed by: EMERGENCY MEDICINE

## 2020-07-25 PROCEDURE — 99222 1ST HOSP IP/OBS MODERATE 55: CPT | Performed by: NURSE PRACTITIONER

## 2020-07-25 PROCEDURE — 96367 TX/PROPH/DG ADDL SEQ IV INF: CPT

## 2020-07-25 PROCEDURE — 94760 N-INVAS EAR/PLS OXIMETRY 1: CPT

## 2020-07-25 PROCEDURE — 99219 PR INITIAL OBSERVATION CARE/DAY 50 MINUTES: CPT | Performed by: STUDENT IN AN ORGANIZED HEALTH CARE EDUCATION/TRAINING PROGRAM

## 2020-07-25 PROCEDURE — 99284 EMERGENCY DEPT VISIT MOD MDM: CPT

## 2020-07-25 PROCEDURE — 25010000002 HYDROMORPHONE PER 4 MG: Performed by: EMERGENCY MEDICINE

## 2020-07-25 PROCEDURE — 74176 CT ABD & PELVIS W/O CONTRAST: CPT

## 2020-07-25 PROCEDURE — 80053 COMPREHEN METABOLIC PANEL: CPT | Performed by: EMERGENCY MEDICINE

## 2020-07-25 PROCEDURE — 96361 HYDRATE IV INFUSION ADD-ON: CPT

## 2020-07-25 PROCEDURE — 25010000002 CALCIUM GLUCONATE-NACL 1-0.675 GM/50ML-% SOLUTION: Performed by: HOSPITALIST

## 2020-07-25 PROCEDURE — 96375 TX/PRO/DX INJ NEW DRUG ADDON: CPT

## 2020-07-25 PROCEDURE — 85025 COMPLETE CBC W/AUTO DIFF WBC: CPT | Performed by: EMERGENCY MEDICINE

## 2020-07-25 PROCEDURE — 96366 THER/PROPH/DIAG IV INF ADDON: CPT

## 2020-07-25 PROCEDURE — 81001 URINALYSIS AUTO W/SCOPE: CPT | Performed by: EMERGENCY MEDICINE

## 2020-07-25 PROCEDURE — 84132 ASSAY OF SERUM POTASSIUM: CPT | Performed by: STUDENT IN AN ORGANIZED HEALTH CARE EDUCATION/TRAINING PROGRAM

## 2020-07-25 RX ORDER — SODIUM CHLORIDE 0.9 % (FLUSH) 0.9 %
10 SYRINGE (ML) INJECTION AS NEEDED
Status: DISCONTINUED | OUTPATIENT
Start: 2020-07-25 | End: 2020-07-26 | Stop reason: HOSPADM

## 2020-07-25 RX ORDER — HYDROCHLOROTHIAZIDE 25 MG/1
25 TABLET ORAL DAILY
Status: DISCONTINUED | OUTPATIENT
Start: 2020-07-25 | End: 2020-07-26 | Stop reason: HOSPADM

## 2020-07-25 RX ORDER — ACETAMINOPHEN 650 MG/1
650 SUPPOSITORY RECTAL EVERY 4 HOURS PRN
Status: DISCONTINUED | OUTPATIENT
Start: 2020-07-25 | End: 2020-07-26 | Stop reason: HOSPADM

## 2020-07-25 RX ORDER — SORBITOL SOLUTION 70 %
50 SOLUTION, ORAL MISCELLANEOUS DAILY
Status: DISCONTINUED | OUTPATIENT
Start: 2020-07-25 | End: 2020-07-26

## 2020-07-25 RX ORDER — LOSARTAN POTASSIUM 50 MG/1
100 TABLET ORAL DAILY
Status: DISCONTINUED | OUTPATIENT
Start: 2020-07-25 | End: 2020-07-26 | Stop reason: HOSPADM

## 2020-07-25 RX ORDER — ONDANSETRON 2 MG/ML
4 INJECTION INTRAMUSCULAR; INTRAVENOUS ONCE
Status: COMPLETED | OUTPATIENT
Start: 2020-07-25 | End: 2020-07-25

## 2020-07-25 RX ORDER — ALBUTEROL SULFATE 2.5 MG/3ML
2.5 SOLUTION RESPIRATORY (INHALATION)
Status: DISCONTINUED | OUTPATIENT
Start: 2020-07-25 | End: 2020-07-26 | Stop reason: HOSPADM

## 2020-07-25 RX ORDER — POTASSIUM CHLORIDE 7.45 MG/ML
10 INJECTION INTRAVENOUS
Status: DISCONTINUED | OUTPATIENT
Start: 2020-07-25 | End: 2020-07-26 | Stop reason: HOSPADM

## 2020-07-25 RX ORDER — MAGNESIUM SULFATE HEPTAHYDRATE 40 MG/ML
2 INJECTION, SOLUTION INTRAVENOUS AS NEEDED
Status: DISCONTINUED | OUTPATIENT
Start: 2020-07-25 | End: 2020-07-26 | Stop reason: HOSPADM

## 2020-07-25 RX ORDER — ONDANSETRON 4 MG/1
4 TABLET, FILM COATED ORAL EVERY 6 HOURS PRN
Status: DISCONTINUED | OUTPATIENT
Start: 2020-07-25 | End: 2020-07-26 | Stop reason: HOSPADM

## 2020-07-25 RX ORDER — DOCUSATE SODIUM 100 MG/1
100 CAPSULE, LIQUID FILLED ORAL 2 TIMES DAILY PRN
Status: DISCONTINUED | OUTPATIENT
Start: 2020-07-25 | End: 2020-07-26 | Stop reason: HOSPADM

## 2020-07-25 RX ORDER — ACETAMINOPHEN 160 MG/5ML
650 SOLUTION ORAL EVERY 4 HOURS PRN
Status: DISCONTINUED | OUTPATIENT
Start: 2020-07-25 | End: 2020-07-26 | Stop reason: HOSPADM

## 2020-07-25 RX ORDER — MAGNESIUM SULFATE 1 G/100ML
1 INJECTION INTRAVENOUS AS NEEDED
Status: DISCONTINUED | OUTPATIENT
Start: 2020-07-25 | End: 2020-07-26 | Stop reason: HOSPADM

## 2020-07-25 RX ORDER — POTASSIUM CHLORIDE 1.5 G/1.77G
40 POWDER, FOR SOLUTION ORAL AS NEEDED
Status: DISCONTINUED | OUTPATIENT
Start: 2020-07-25 | End: 2020-07-26 | Stop reason: HOSPADM

## 2020-07-25 RX ORDER — ACETAMINOPHEN 325 MG/1
650 TABLET ORAL EVERY 4 HOURS PRN
Status: DISCONTINUED | OUTPATIENT
Start: 2020-07-25 | End: 2020-07-26 | Stop reason: HOSPADM

## 2020-07-25 RX ORDER — CHOLECALCIFEROL (VITAMIN D3) 125 MCG
5 CAPSULE ORAL NIGHTLY PRN
Status: DISCONTINUED | OUTPATIENT
Start: 2020-07-25 | End: 2020-07-26 | Stop reason: HOSPADM

## 2020-07-25 RX ORDER — HYDROMORPHONE HCL 110MG/55ML
0.5 PATIENT CONTROLLED ANALGESIA SYRINGE INTRAVENOUS ONCE
Status: COMPLETED | OUTPATIENT
Start: 2020-07-25 | End: 2020-07-25

## 2020-07-25 RX ORDER — BISACODYL 10 MG
10 SUPPOSITORY, RECTAL RECTAL DAILY PRN
Status: DISCONTINUED | OUTPATIENT
Start: 2020-07-25 | End: 2020-07-26 | Stop reason: HOSPADM

## 2020-07-25 RX ORDER — CETIRIZINE HYDROCHLORIDE 10 MG/1
10 TABLET ORAL DAILY
Status: DISCONTINUED | OUTPATIENT
Start: 2020-07-25 | End: 2020-07-26 | Stop reason: HOSPADM

## 2020-07-25 RX ORDER — SODIUM CHLORIDE 9 MG/ML
125 INJECTION, SOLUTION INTRAVENOUS CONTINUOUS
Status: DISCONTINUED | OUTPATIENT
Start: 2020-07-25 | End: 2020-07-26 | Stop reason: HOSPADM

## 2020-07-25 RX ORDER — POTASSIUM CHLORIDE 20 MEQ/1
40 TABLET, EXTENDED RELEASE ORAL AS NEEDED
Status: DISCONTINUED | OUTPATIENT
Start: 2020-07-25 | End: 2020-07-26 | Stop reason: HOSPADM

## 2020-07-25 RX ORDER — ONDANSETRON 2 MG/ML
4 INJECTION INTRAMUSCULAR; INTRAVENOUS EVERY 6 HOURS PRN
Status: DISCONTINUED | OUTPATIENT
Start: 2020-07-25 | End: 2020-07-26 | Stop reason: HOSPADM

## 2020-07-25 RX ORDER — POLYETHYLENE GLYCOL 3350 17 G/17G
17 POWDER, FOR SOLUTION ORAL DAILY
Status: DISCONTINUED | OUTPATIENT
Start: 2020-07-25 | End: 2020-07-26 | Stop reason: HOSPADM

## 2020-07-25 RX ORDER — PANTOPRAZOLE SODIUM 40 MG/1
40 TABLET, DELAYED RELEASE ORAL EVERY MORNING
Status: DISCONTINUED | OUTPATIENT
Start: 2020-07-25 | End: 2020-07-26 | Stop reason: HOSPADM

## 2020-07-25 RX ORDER — CALCIUM GLUCONATE 20 MG/ML
1 INJECTION, SOLUTION INTRAVENOUS ONCE
Status: COMPLETED | OUTPATIENT
Start: 2020-07-25 | End: 2020-07-25

## 2020-07-25 RX ORDER — SODIUM CHLORIDE 0.9 % (FLUSH) 0.9 %
10 SYRINGE (ML) INJECTION EVERY 12 HOURS SCHEDULED
Status: DISCONTINUED | OUTPATIENT
Start: 2020-07-25 | End: 2020-07-26 | Stop reason: HOSPADM

## 2020-07-25 RX ADMIN — METHYLNALTREXONE BROMIDE 12 MG: 12 INJECTION, SOLUTION SUBCUTANEOUS at 11:21

## 2020-07-25 RX ADMIN — ALBUTEROL SULFATE 2.5 MG: 2.5 SOLUTION RESPIRATORY (INHALATION) at 18:44

## 2020-07-25 RX ADMIN — SODIUM CHLORIDE 125 ML/HR: 900 INJECTION, SOLUTION INTRAVENOUS at 17:10

## 2020-07-25 RX ADMIN — SODIUM CHLORIDE 500 ML: 900 INJECTION, SOLUTION INTRAVENOUS at 02:57

## 2020-07-25 RX ADMIN — ALBUTEROL SULFATE 2.5 MG: 2.5 SOLUTION RESPIRATORY (INHALATION) at 23:20

## 2020-07-25 RX ADMIN — ACETAMINOPHEN 650 MG: 325 TABLET, FILM COATED ORAL at 10:17

## 2020-07-25 RX ADMIN — POLYETHYLENE GLYCOL 3350 17 G: 17 POWDER, FOR SOLUTION ORAL at 11:21

## 2020-07-25 RX ADMIN — HYDROCHLOROTHIAZIDE 25 MG: 25 TABLET ORAL at 10:09

## 2020-07-25 RX ADMIN — ONDANSETRON 4 MG: 2 INJECTION INTRAMUSCULAR; INTRAVENOUS at 02:56

## 2020-07-25 RX ADMIN — LOSARTAN POTASSIUM 100 MG: 50 TABLET, FILM COATED ORAL at 10:09

## 2020-07-25 RX ADMIN — CETIRIZINE HYDROCHLORIDE 10 MG: 10 TABLET, FILM COATED ORAL at 10:10

## 2020-07-25 RX ADMIN — SORBITOL SOLUTION (BULK) 50 ML: 70 SOLUTION at 17:05

## 2020-07-25 RX ADMIN — HYDROMORPHONE HYDROCHLORIDE 0.5 MG: 2 INJECTION, SOLUTION INTRAMUSCULAR; INTRAVENOUS; SUBCUTANEOUS at 02:56

## 2020-07-25 RX ADMIN — ALBUTEROL SULFATE 2.5 MG: 2.5 SOLUTION RESPIRATORY (INHALATION) at 11:44

## 2020-07-25 RX ADMIN — ALBUTEROL SULFATE 2.5 MG: 2.5 SOLUTION RESPIRATORY (INHALATION) at 15:12

## 2020-07-25 RX ADMIN — POTASSIUM CHLORIDE 40 MEQ: 1500 TABLET, EXTENDED RELEASE ORAL at 05:46

## 2020-07-25 RX ADMIN — PANTOPRAZOLE SODIUM 40 MG: 40 TABLET, DELAYED RELEASE ORAL at 10:09

## 2020-07-25 RX ADMIN — CALCIUM GLUCONATE 1 G: 20 INJECTION, SOLUTION INTRAVENOUS at 10:17

## 2020-07-25 RX ADMIN — PIPERACILLIN AND TAZOBACTAM 3.38 G: 3; .375 INJECTION, POWDER, FOR SOLUTION INTRAVENOUS at 11:21

## 2020-07-25 RX ADMIN — POTASSIUM CHLORIDE 40 MEQ: 1500 TABLET, EXTENDED RELEASE ORAL at 20:26

## 2020-07-25 RX ADMIN — PIPERACILLIN AND TAZOBACTAM 3.38 G: 3; .375 INJECTION, POWDER, FOR SOLUTION INTRAVENOUS at 18:55

## 2020-07-25 RX ADMIN — Medication 10 ML: at 10:11

## 2020-07-25 RX ADMIN — SODIUM CHLORIDE 100 ML/HR: 900 INJECTION, SOLUTION INTRAVENOUS at 05:46

## 2020-07-25 RX ADMIN — PIPERACILLIN AND TAZOBACTAM 3.38 G: 3; .375 INJECTION, POWDER, FOR SOLUTION INTRAVENOUS at 04:51

## 2020-07-25 NOTE — PROGRESS NOTES
"    Chief complaint abdominal pain    Subjective     Seen and examined pain controlled no significant overnight events no mild nausea no emesis no blood per any orifice  Objective     Vital Signs  Visit Vitals  /70   Pulse 87   Temp 98.7 °F (37.1 °C)   Resp 16   Ht 160 cm (63\")   Wt 75.8 kg (167 lb 1.7 oz)   SpO2 100%   Breastfeeding No   BMI 29.60 kg/m²       Physical Exam:  Physical Exam   Constitutional:  oriented to person, place, and time. No distress.   HENT:   Head: Normocephalic and atraumatic.   Eyes: Conjunctivae and EOM are normal. Pupils are equal, round, and reactive to light.   Neck: No JVD present. No thyromegaly present.   Cardiovascular: Normal rate, regular rhythm, normal heart sounds and intact distal pulses. Exam reveals no gallop and no friction rub.   No murmur heard.  Pulmonary/Chest: Effort normal and breath sounds normal. No stridor. No respiratory distress.  has no wheezes.  has no rales.  exhibits no tenderness.   Abdominal: Soft. Bowel sounds are normal.  no distension and no mass.  Tender left lower quadrant . There is no rebound and no guarding. No hernia.   Musculoskeletal: Normal range of motion.   Lymphadenopathy:     no cervical adenopathy.   Neurological:  alert and oriented to person, place, and time. No cranial nerve deficit or sensory deficit. exhibits normal muscle tone.   Skin: No rash noted.  not diaphoretic.   Psychiatric:  normal mood and affect.   Vitals reviewed.    Physical Exam          Results Review:    CMP:  Lab Results   Component Value Date    GLU 86 02/26/2020    BUN  07/25/2020      Comment:      Testing performed by alternate method    BUN 8 07/25/2020    CREATININE 0.60 07/25/2020    EGFRIFNONA 97 07/25/2020    EGFRIFAFRI 83 02/26/2020     07/25/2020    K 3.4 (L) 07/25/2020     07/25/2020    CALCIUM 8.5 (L) 07/25/2020    PROTENTOTREF 6.6 08/15/2019    ALBUMIN 3.50 07/25/2020    LABGLOBREF 2.6 08/15/2019    BILITOT 0.4 07/25/2020    ALKPHOS 99 " 07/25/2020    AST 21 07/25/2020    ALT 19 07/25/2020     CBC:  Lab Results   Component Value Date    WBC 7.90 07/25/2020    RBC 4.02 07/25/2020    HGB 11.1 (L) 07/25/2020    HCT 34.4 07/25/2020    MCV 85.4 07/25/2020    MCH 27.6 07/25/2020    MCHC 32.3 07/25/2020    RDW 14.1 07/25/2020     (H) 07/25/2020         Medication Review:     Scheduled Meds:  albuterol 2.5 mg Nebulization Q4H - RT   cetirizine 10 mg Oral Daily   hydroCHLOROthiazide 25 mg Oral Daily   losartan 100 mg Oral Daily   pantoprazole 40 mg Oral QAM   piperacillin-tazobactam 3.375 g Intravenous Q8H   sodium chloride 10 mL Intravenous Q12H     Continuous Infusions:  Pharmacy to Dose Zosyn     sodium chloride 100 mL/hr Last Rate: 100 mL/hr (07/25/20 0831)     PRN Meds:.•  acetaminophen **OR** acetaminophen **OR** acetaminophen  •  bisacodyl  •  docusate sodium  •  magnesium sulfate **OR** magnesium sulfate in D5W 1g/100mL (PREMIX)  •  melatonin  •  ondansetron **OR** ondansetron  •  Pharmacy to Dose Zosyn  •  potassium chloride **OR** potassium chloride **OR** potassium chloride  •  [COMPLETED] Insert peripheral IV **AND** sodium chloride  •  sodium chloride    Assessment/Plan   Abdominal pain  Colitis versus diverticulitis, stercoral colitis?-Constipated for past couple weeks rule out malignancy versus other  Continue antibiotics  CT abdomen reviewed  IV fluids  Pain management  Consulted with GI    Hypertension  Continue hydrochlorothiazide and losartan    GERD  Continue PPI    Osteopenia on Fosamax    Herpes simplex 1  On as needed antivirals at home    Hypocalcemia  Replace    Rhinitis seasonal  Flonase and Claritin at home    DVT PUD prophylaxis    Plan as above                Janet Hicks MD  07/25/20  09:29

## 2020-07-25 NOTE — CONSULTS
GI CONSULT  NOTE:    Referring Provider:    Dr Hicks    Chief complaint:   Colitis     Subjective    Left lower quadrant abdominal pain    History of present illness:    Patient is a 75-year-old female with a history of GERD, DVT, hypertension, and iron deficiency anemia who presented to the ER on 7/23/2020 with a complaint of abdominal pain stating that she had been admitted to [facility] for colitis and diverticulitis recently.  Was found to have some constipation and discharge.  Patient was evidently discharged on Cipro and Flagyl on Monday, July 20, 2020.  Patient returned to the ER on 7/25/2020 with a complaint of left lower quadrant abdominal pain and decreased stool output in the previous 1 week.  Patient underwent CT of the abdomen and pelvis without contrast that showed some thickened proximal sigmoid colon with possible luminal narrowing upstream bowel wall thickening inflammation and diverticulosis.  The wall thickening had improved from previous exams.   Patient was placed on IV Zosyn pain medications, nausea medications and admitted to the hospital.  Patient states she is having left lower quadrant abdominal pain that is stabbing.  Does not radiate.  Pain is constant with waxing and waning.  Patient states she normally has a bowel movement every day.  But has recently been experiencing constipation.  Patient states she did get a Dulcolax suppository prior to discharge from [facility] and only had small kaci.  No bowel movement since.  Denies any melena, hematochezia or bright red blood per rectum.  Pain is worsened with activity.  Is having some gas pains also.  No alleviating factors besides narcotics.  Patient states she did drink three fourths of a bottle of magnesium citrate on 7/23/2020 without any results.  Patient states she does have a history of GERD and only takes omeprazole as needed.  Patient states she did have nausea with dry heaves prior to being admitted to  [facility].     Endo History:  7/20/2017 EGD/colonoscopy (Dr. Davila) sliding large hiatal hernia.  Mild sigmoid diverticulosis. No recall    Past Medical History:  Past Medical History:   Diagnosis Date   • Allergic rhinitis 06/02/2015   • Body mass index 29.0-29.9, adult 09/12/2018   • Cervical spinal stenosis 08/07/2017   • Cholelithiasis    • DDD (degenerative disc disease), cervical    • DDD (degenerative disc disease), lumbar    • DDD (degenerative disc disease), thoracic    • Diverticulosis    • Dyslipidemia 12/05/2012   • Elevated brain natriuretic peptide (BNP) level 01/06/2017   • Eustachian tube disorder, right 02/18/2019   • Fatigue 06/02/2015   • Female cystocele 06/02/2015   • Hiatal hernia with gastroesophageal reflux 01/06/2017   • History of DVT (deep vein thrombosis) 06/02/2015   • History of herpes simplex infection 7/25/2020   • Hyperlipidemia    • Hypertension 06/10/2016   • Left rib fracture 05/2017    11TH RIB    • Migraine headache 06/02/2015   • Mitral insufficiency 01/16/2017    MILD   • Osteoarthritis 05/20/2014   • Osteopenia 12/05/2012   • Over weight 06/14/2018   • Peripheral edema 01/05/2017   • PFO (patent foramen ovale) 01/16/2017   • Pulmonary nodule 05/04/2017    DR CHUN FOLLOWS   • RHA (rheumatoid arthritis) (CMS/[facility]ly [facility]) 12/05/2012   • Scleritis 06/02/2015   • Scoliosis    • Tricuspid insufficiency     MILD   • Varicose veins of other specified sites    • Venous insufficiency 09/12/2018       Past Surgical History:  Past Surgical History:   Procedure Laterality Date   • COLONOSCOPY  07/20/2017    EGD/ COLONSCOPY LARGE HIATAL HERNIA. MILD DIVERTICULOSIS. OTHERWISE NORMAL.   • INGUINAL HERNIA REPAIR Right    • TOTAL ABDOMINAL HYSTERECTOMY  1985    W/BSO WITH A/P REPAIR 1985 BENIGN REASONS DR. DRAKE       Social History:  Social History     Tobacco Use   • Smoking status: Never Smoker   • Smokeless tobacco: Never Used   Substance Use Topics   • Alcohol use: No      Frequency: Never   • Drug use: No       Family History:  Family History   Problem Relation Age of Onset   • Osteoporosis Mother    • Stroke Mother 80   • Dementia Mother    • Heart disease Father    • Hypertension Father    • Heart disease Sister    • Cancer Daughter         BREAST AND MELANOMA   • Breast cancer Daughter 52   • Cancer Son         MELANOMA   • Cancer Grandson         MELANOMA       Medications:  Medications Prior to Admission   Medication Sig Dispense Refill Last Dose   • albuterol sulfate HFA (VENTOLIN HFA) 108 (90 Base) MCG/ACT inhaler Two inhalations every 4-6 hours as needed for SOA.   Taking   • alendronate (FOSAMAX) 70 MG tablet TAKE 1 TABLET BY MOUTH EVERY 7 DAYS 12 tablet 1 Taking   • Calcium Carbonate-Vitamin D (CALCIUM 500 + D PO) Take 2 tablets by mouth Daily.   Taking   • CHELATED MAGNESIUM PO Take 1 tablet by mouth Daily.   Taking   • famciclovir (FAMVIR) 500 MG tablet TAKE 3 TABLETS BY MOUTH AS A SINGLE DOSE AT FIRST SIGN OF COLD SORE 3 tablet 5 Taking   • fluticasone (FLONASE) 50 MCG/ACT nasal spray INSTILL 2 SPRAYS IN EACH NOSTRIL DAILY 48 g 11    • Glucosamine-Chondroitin (CIDAFLEX PO) Take 2 tablets by mouth Daily.   Taking   • hydroCHLOROthiazide (HYDRODIURIL) 25 MG tablet Take 1 tablet by mouth Daily. 90 tablet 1    • loratadine (CLARITIN) 10 MG tablet TAKE 1 TABLET BY MOUTH EVERY DAY 90 tablet 1 Taking   • losartan (COZAAR) 100 MG tablet TAKE 1 TABLET BY MOUTH DAILY 90 tablet 0    • lysine ([facility] LYSINE) 500 MG tablet Take 1 tablet by mouth Daily As Needed.   Taking   • Multiple Vitamin (MULTI-VITAMIN DAILY) tablet Take 1 tablet by mouth Daily.   Taking   • omeprazole (priLOSEC) 20 MG capsule TAKE ONE CAPSULE BY MOUTH EVERY MORNING ON AN EMPTY STOMACH, 30 MINUTES BEFORE EATING OR OTHER MEDICATION 90 capsule 1        Scheduled Meds:  albuterol 2.5 mg Nebulization Q4H - RT   cetirizine 10 mg Oral Daily   hydroCHLOROthiazide 25 mg Oral Daily   losartan 100 mg Oral Daily   pantoprazole  40 mg Oral QAM   piperacillin-tazobactam 3.375 g Intravenous Q8H   sodium chloride 10 mL Intravenous Q12H     Continuous Infusions:  Pharmacy to Dose Zosyn     sodium chloride 100 mL/hr Last Rate: 100 mL/hr (07/25/20 0831)     PRN Meds:.•  acetaminophen **OR** acetaminophen **OR** acetaminophen  •  bisacodyl  •  docusate sodium  •  magnesium sulfate **OR** magnesium sulfate in D5W 1g/100mL (PREMIX)  •  melatonin  •  ondansetron **OR** ondansetron  •  Pharmacy to Dose Zosyn  •  potassium chloride **OR** potassium chloride **OR** potassium chloride  •  [COMPLETED] Insert peripheral IV **AND** sodium chloride  •  sodium chloride    ALLERGIES:  Demerol [meperidine]; Other; Sulfa antibiotics; and Lisinopril    ROS:  Review of Systems   Constitutional: Negative for chills, fever and unexpected weight change.   Gastrointestinal: Positive for abdominal pain, constipation and nausea. Negative for anal bleeding, blood in stool, diarrhea, rectal pain and vomiting.       The following systems were reviewed and negative;   Constitution:  No fevers, chills, no unintentional weight loss  Skin: no rash, no jaundice  Eyes:  No blurry vision, no eye pain  HENT:  No change in hearing or smell  Resp:  No dyspnea or cough  CV:  No chest pain or palpitations  :  No dysuria, hematuria  Musculoskeletal:  No leg cramps or arthralgias  Neuro:  No tremor, no numbness  Psych:  No depression or confsuion    Objective  Resting in hospital bed.  Occasionally wincing in pain.    Vital Signs:   Vitals:    07/25/20 0454 07/25/20 0531 07/25/20 0542 07/25/20 0616   BP: 128/60 126/49  136/70   BP Location: Right arm      Patient Position: Lying      Pulse: 87      Resp: 16      Temp:       SpO2: 97%  100%    Weight:       Height:           Physical Exam:   General Appearance:    Awake and alert, in no acute distress   Head:    Normocephalic, without obvious abnormality, atraumatic   Eyes:            Conjunctivae normal, anicteric sclera, pupils equal    Ears:    Ears appear intact with no abnormalities noted   Throat:   No oral lesions, no thrush, oral mucosa moist   Neck:   Supple, no JVD   Lungs:     Clear to auscultation bilaterally, respirations regular, even and unlabored    Heart:    Regular rhythm and normal rate, normal S1 and S2, no            Murmur appreciated   Chest Wall:    No abnormalities observed   Abdomen:     Normal bowel sounds, soft, left lower quadrant tenderness, no rebound or guarding, non-distended, no hepatosplenomegaly   Rectal:     Deferred   Extremities:   Moves all extremities, no edema, no cyanosis   Pulses:   Pulses palpable and equal bilaterally   Skin:   No rash, no jaundice, normal palpaion   Lymph nodes:   No cervical, supraclavicular or submandibular palpable adenopathy   Neurologic:   Cranial nerves 2 - 12 grossly intact, no asterixis       Results Review:   I reviewed the patient's labs and imaging.  Lab Results (last 24 hours)     Procedure Component Value Units Date/Time    Potassium [[id number]]  (Abnormal) Collected:  07/25/20 0730    Specimen:  Blood Updated:  07/25/20 0758     Potassium 3.4 mmol/L     Calcium, Ionized [[id number]]  (Abnormal) Collected:  07/25/20 0730    Specimen:  Blood Updated:  07/25/20 0751     Ionized Calcium 1.13 mmol/L     Extra Tubes [[id number]] Collected:  07/25/20 0254    Specimen:  Blood, Venous Line Updated:  07/25/20 0400    Narrative:       The following orders were created for panel order Extra Tubes.  Procedure                               Abnormality         Status                     ---------                               -----------         ------                     Gold Top - SST[[id number]]                                   Final result                 Please view results for these tests on the individual orders.    Gold Top - SST [[id number]] Collected:  07/25/20 0254    Specimen:  Blood Updated:  07/25/20 0400     Extra Tube Hold for add-ons.     Comment: Auto resulted.       BUN  [[id number]]  (Normal) Collected:  07/25/20 0254    Specimen:  Blood Updated:  07/25/20 0352     BUN 8 mg/dL     Comprehensive Metabolic Panel [[id number]]  (Abnormal) Collected:  07/25/20 0254    Specimen:  Blood Updated:  07/25/20 0333     Glucose 113 mg/dL      BUN --     Comment: Testing performed by alternate method        Creatinine 0.60 mg/dL      Sodium 141 mmol/L      Potassium 3.3 mmol/L      Chloride 103 mmol/L      CO2 27.0 mmol/L      Calcium 8.5 mg/dL      Total Protein 6.2 g/dL      Albumin 3.50 g/dL      ALT (SGPT) 19 U/L      AST (SGOT) 21 U/L      Alkaline Phosphatase 99 U/L      Total Bilirubin 0.4 mg/dL      eGFR Non African Amer 97 mL/min/1.73      Globulin 2.7 gm/dL      A/G Ratio 1.3 g/dL      BUN/Creatinine Ratio --     Comment: Testing not performed        Anion Gap 11.0 mmol/L     Narrative:       GFR Normal >60  Chronic Kidney Disease <60  Kidney Failure <15      Lipase [[id number]]  (Abnormal) Collected:  07/25/20 0254    Specimen:  Blood Updated:  07/25/20 0333     Lipase 12 U/L     Urinalysis With Culture If Indicated - Urine, Clean Catch [[id number]]  (Abnormal) Collected:  07/25/20 0254    Specimen:  Urine, Clean Catch Updated:  07/25/20 0310     Color, UA Red     Comment: Result checked Visual confirmation        Appearance, UA Clear     pH, UA 7.0     Specific Gravity, UA 1.017     Glucose, UA Negative     Ketones, UA Negative     Bilirubin, UA Small (1+)     Comment: Confirmation testing is unavailable.  A serum bilirubin is recommended for further assessment.        Blood, UA Trace     Protein, UA Negative     Leuk Esterase, UA Small (1+)     Nitrite, UA Negative     Urobilinogen, UA 0.2 E.U./dL    Urinalysis, Microscopic Only - Urine, Clean Catch [[id number]]  (Abnormal) Collected:  07/25/20 0254    Specimen:  Urine, Clean Catch Updated:  07/25/20 0310     RBC, UA 13-20 /HPF      WBC, UA 0-2 /HPF      Bacteria, UA None Seen /HPF      Squamous Epithelial Cells, UA 0-2 /HPF      Hyaline  Skylar UA 3-6 /LPF      Methodology Automated Microscopy    CBC & Differential [[id number]] Collected:  07/25/20 0254    Specimen:  Blood Updated:  07/25/20 0304    Narrative:       The following orders were created for panel order CBC & Differential.  Procedure                               Abnormality         Status                     ---------                               -----------         ------                     CBC Auto Differential[[id number]]        Abnormal            Final result                 Please view results for these tests on the individual orders.    CBC Auto Differential [[id number]]  (Abnormal) Collected:  07/25/20 0254    Specimen:  Blood Updated:  07/25/20 0304     WBC 7.90 10*3/mm3      RBC 4.02 10*6/mm3      Hemoglobin 11.1 g/dL      Hematocrit 34.4 %      MCV 85.4 fL      MCH 27.6 pg      MCHC 32.3 g/dL      RDW 14.1 %      RDW-SD 42.4 fl      MPV 6.6 fL      Platelets 514 10*3/mm3      Neutrophil % 69.8 %      Lymphocyte % 14.0 %      Monocyte % 14.2 %      Eosinophil % 1.5 %      Basophil % 0.5 %      Neutrophils, Absolute 5.50 10*3/mm3      Lymphocytes, Absolute 1.10 10*3/mm3      Monocytes, Absolute 1.10 10*3/mm3      Eosinophils, Absolute 0.10 10*3/mm3      Basophils, Absolute 0.00 10*3/mm3      nRBC 0.0 /100 WBC           Imaging Results (Last 24 Hours)     Procedure Component Value Units Date/Time    CT Abdomen Pelvis Without Contrast [[id number]] Collected:  07/25/20 0145     Updated:  07/25/20 0410    Narrative:       EXAM:  CT SCAN OF THE ABDOMEN AND PELVIS WITHOUT INTRAVENOUS CONTRAST  DATE: 7/25/2020 3:27 AM    HISTORY: Left flank pain  TECHNIQUE: CT examination of the abdomen and pelvis with sagittal and coronal reformations was performed without intravenous contrast.  CT dose lowering techniques were used, to include: automated exposure control, adjustment for patient size, and/or use   of iterative reconstruction. Note: The exam is limited because some types of pathology  may not be adequately demonstrated due to lack of contrast enhancement.  COMPARISON: 7/18/2020.       FINDINGS:    ABDOMEN/PELVIS:  Lower Chest: Left lower lung ovoid 4 mm nodule suggestive of a lymph node  Liver: Normal.    Gallbladder/Biliary: Cholelithiasis  Pancreas: Normal.   Spleen: Normal.    Adrenal Glands: Normal.   Kidneys/Ureters/Bladder: Normal kidneys. Bladder extends below the pelvic floor.  GI Tract: Large hiatus hernia. Mild thickening of the proximal sigmoid colon, possibly with luminal stricture (series 3/40).  Upstream colon is distended with stool, with mild wall thickening and adjacent inflammation, diverticulosis. Moderate stool   volume.   Mesentery/Peritoneum: Inflammation near the left colon.  Reproductive: Hysterectomy  Vasculature: Normal.     Lymph Nodes: Normal  Abdominal Wall: Normal.   Musculoskeletal: Multilevel degenerative disc, partial fusion of T12 and L1, with mild spinal curvature.      Impression:         1.  Thickened proximal sigmoid colon possibly with luminal narrowing, upstream bowel wall thickening, inflammation, diverticulosis. Differential includes colitis, diverticulitis, fixed narrowing due to infiltrative process, mass/malignancy is also   possible, with upstream constipation. Wall thickening has improved from prior. Consider eventual colonoscopy.    2.  Question cystocele.    3.  Large hiatus hernia.    Electronically signed by:  Shea Hoskins    7/25/2020 2:09 AM             ASSESSMENT AND PLAN:  Left lower quadrant abdominal pain consider unresolved diverticulitis versus colitis  Abnormal CT showing thickening proximal sigmoid colon questionable related to inflammation that is causing her constipation  Normocytic anemia  GERD  Large hiatal hernia  Hypertension  History of DVT  Constipation    PLAN:  Agree with IV antibiotics.  Continue PPI for history of GERD.  Consider endoscopy in 6 to 8 weeks after colitis/diverticulitis has resolved.  We will start clear  liquid diet.  Give patient a dose of subcu Relistor and start MiraLAX.  Supportive care.    I discussed the patients findings and my recommendations with the patient.  FELIPE Sarkar  07/25/20  09:16    Time:

## 2020-07-25 NOTE — PROGRESS NOTES
Continued Stay Note  MARIA ELENA Ryan     Patient Name: Yancy Mcdonnell  MRN: [id number]  Today's Date: 7/25/2020    Admit Date: 7/25/2020    Discharge Plan     Row Name 07/25/20 1929       Plan    Plan  Anticipate routine home with spouse.                   Leslie Orozco RN

## 2020-07-25 NOTE — ED NOTES
Called [facility] for records. Spoke to Edmund Sood, PCT  07/25/20 0222

## 2020-07-25 NOTE — H&P
[facility] Medicine Services      Patient Name: Yancy Mcdonnell  : 1945  MRN: [id number]  Primary Care Physician: Munira So APRN  Date of admission: 2020    Patient Care Team:  Munira So APRN as PCP - General (Nurse Practitioner)  Dr. AMITA Ross) (Ophthalmology)  Banet, Duane Edward, MD as Consulting Physician (Dermatology)  Edwin Banks MD as Consulting Physician (Otolaryngology)          Subjective   History Present Illness     Chief Complaint:   Chief Complaint   Patient presents with   • Abdominal Pain       Ms. Mcdonnell is a 75 y.o. female who presents to [facility] ED with a history of degenerative disc disease, hyperlipidemia, hypertension, and GERD complaining of abdominal pain.  Patient states for the last 2 weeks she has had left, lower, quadrant, abdominal pain that she describes as intermittent.  She states the pain is sharp and stabbing when she is active but a dull ache when she is still.  Patient was hospitalized at  for 2 days with the same symptoms with a diagnosis of colitis and diverticulitis that she was given ciprofloxacin and Flagyl and discharged home on those.  However patient states there have been no changes to her symptoms.  Patient denies nausea and vomiting but states it has been approximately 2 weeks since her last bowel movement.       In the ED, WBCs 7.90, lipase 12, hemoglobin 11.1, platelets 514, glucose 113, potassium 3.3, calcium 8.5.  UA shows trace blood, negative nitrites, 1+ leukocytes, 1+ bilirubin,, 13-20 red blood cells.  CT abdomen pelvis shows thickened proximal sigmoid colon possibly with luminal narrowing, upstream bowel wall thickening, inflammation, diverticulosis differential includes colitis, diverticulitis, fixed narrowing due to infiltrative process, mass malignancy is also possible with upstream constipation, wall thickening he has improved from prior exam; question  cystocele; large hiatus hernia.  Patient admitted for further evaluation and treatment.      Review of Systems   Constitution: Negative for chills and fever.   Cardiovascular: Positive for leg swelling.   Respiratory: Negative for shortness of breath.    Gastrointestinal: Positive for abdominal pain and constipation. Negative for nausea and vomiting.   Genitourinary: Negative for dysuria, frequency and urgency.   All other systems reviewed and are negative.        Personal History     Past Medical History:   Past Medical History:   Diagnosis Date   • Allergic rhinitis 06/02/2015   • Body mass index 29.0-29.9, adult 09/12/2018   • Cervical spinal stenosis 08/07/2017   • Cholelithiasis    • DDD (degenerative disc disease), cervical    • DDD (degenerative disc disease), lumbar    • DDD (degenerative disc disease), thoracic    • Diverticulosis    • Dyslipidemia 12/05/2012   • Elevated brain natriuretic peptide (BNP) level 01/06/2017   • Eustachian tube disorder, right 02/18/2019   • Fatigue 06/02/2015   • Female cystocele 06/02/2015   • Hiatal hernia with gastroesophageal reflux 01/06/2017   • History of DVT (deep vein thrombosis) 06/02/2015   • Hyperlipidemia    • Hypertension 06/10/2016   • Left rib fracture 05/2017    11TH RIB    • Migraine headache 06/02/2015   • Mitral insufficiency 01/16/2017    MILD   • Osteoarthritis 05/20/2014   • Osteopenia 12/05/2012   • Over weight 06/14/2018   • Peripheral edema 01/05/2017   • PFO (patent foramen ovale) 01/16/2017   • Pulmonary nodule 05/04/2017    DR CHUN FOLLOWS   • RHA (rheumatoid arthritis) (CMS/HCC) 12/05/2012   • Scleritis 06/02/2015   • Scoliosis    • Tricuspid insufficiency     MILD   • Varicose veins of other specified sites    • Venous insufficiency 09/12/2018       Surgical History:      Past Surgical History:   Procedure Laterality Date   • COLONOSCOPY  07/20/2017    EGD/ COLONSCOPY LARGE HIATAL HERNIA. MILD DIVERTICULOSIS. OTHERWISE NORMAL.   • INGUINAL HERNIA  REPAIR Right    • TOTAL ABDOMINAL HYSTERECTOMY  1985    W/BSO WITH A/P REPAIR 1985 BENIGN REASONS DR. DRAKE       Family History: family history includes Breast cancer (age of onset: 52) in her daughter; Cancer in her daughter, grandson, and son; Dementia in her mother; Heart disease in her father and sister; Hypertension in her father; Osteoporosis in her mother; Stroke (age of onset: 80) in her mother. Otherwise pertinent FHx was reviewed and unremarkable.     Social History:  reports that she has never smoked. She has never used smokeless tobacco. She reports that she does not drink alcohol or use drugs.      Medications:  Prior to Admission medications    Medication Sig Start Date End Date Taking? Authorizing Provider   albuterol sulfate HFA (VENTOLIN HFA) 108 (90 Base) MCG/ACT inhaler Two inhalations every 4-6 hours as needed for SOA. 11/6/18  Yes Ratna Phillip APRN   alendronate (FOSAMAX) 70 MG tablet TAKE 1 TABLET BY MOUTH EVERY 7 DAYS 3/15/20  Yes Munira So APRN   Calcium Carbonate-Vitamin D (CALCIUM 500 + D PO) Take 2 tablets by mouth Daily. 5/20/14  Yes Munira So APRN   CHELATED MAGNESIUM PO Take 1 tablet by mouth Daily. 5/20/14  Yes Munira So APRN   famciclovir (FAMVIR) 500 MG tablet TAKE 3 TABLETS BY MOUTH AS A SINGLE DOSE AT FIRST SIGN OF COLD SORE 2/5/20  Yes Munira So APRN   fluticasone (FLONASE) 50 MCG/ACT nasal spray INSTILL 2 SPRAYS IN EACH NOSTRIL DAILY 4/28/20  Yes Munira So APRN   Glucosamine-Chondroitin (CIDAFLEX PO) Take 2 tablets by mouth Daily. 5/20/14  Yes Munira So APRN   hydroCHLOROthiazide (HYDRODIURIL) 25 MG tablet Take 1 tablet by mouth Daily. 4/28/20  Yes Ratna Phillip APRN   loratadine (CLARITIN) 10 MG tablet TAKE 1 TABLET BY MOUTH EVERY DAY 2/5/20  Yes Munira So APRN   losartan (COZAAR) 100 MG tablet TAKE 1 TABLET BY MOUTH DAILY 5/1/20  Yes Lyell, Munira E., APRN   lysine ([facility] LYSINE) 500 MG tablet Take 1 tablet by mouth Daily As  Needed. 5/20/14  Yes Munira So APRN   Multiple Vitamin (MULTI-VITAMIN DAILY) tablet Take 1 tablet by mouth Daily. 6/2/15  Yes Munira So APRN   omeprazole (priLOSEC) 20 MG capsule TAKE ONE CAPSULE BY MOUTH EVERY MORNING ON AN EMPTY STOMACH, 30 MINUTES BEFORE EATING OR OTHER MEDICATION 5/1/20  Yes Munira So APRN   Diclofenac Sodium 1.5 % solution 40 drops to each knee up to QID PRN. Apply 10 drops at a time; spread the 10 drops around the knee, and repeat this until the entire 40 drops have been applied 2/26/20 7/25/20  Munira So APRN   Vitamin D, Cholecalciferol, 1000 units capsule Take 1 capsule by mouth Daily. 6/2/15 7/25/20  Munira So APRN       Allergies:    Allergies   Allergen Reactions   • Demerol [Meperidine] Unknown (See Comments)     [facility] from [facility]   • Other Unknown (See Comments)     Sulfa (sulfadiazine)    • Sulfa Antibiotics Unknown - High Severity   • Lisinopril Cough       Objective   Objective     Vital Signs  Temp:  [98.7 °F (37.1 °C)] 98.7 °F (37.1 °C)  Heart Rate:  [87-88] 87  Resp:  [16-18] 16  BP: (128-153)/(60-82) 128/60  SpO2:  [96 %-98 %] 97 %  on   ;      Body mass index is 29.6 kg/m².    Physical Exam   Constitutional: She is oriented to person, place, and time. She appears well-developed. She appears distressed.   HENT:   Head: Normocephalic and atraumatic.   Mouth/Throat: Oropharynx is clear and moist.   Eyes: Pupils are equal, round, and reactive to light. Conjunctivae and EOM are normal.   Neck: Normal range of motion. Neck supple.   Cardiovascular: Normal rate, regular rhythm, normal heart sounds and intact distal pulses.   Pulmonary/Chest: Effort normal and breath sounds normal. No respiratory distress.   Abdominal: Bowel sounds are normal. She exhibits distension. There is tenderness.   Musculoskeletal: Normal range of motion. She exhibits edema.   Bilateral lower extremities edematous and erythematous   Neurological: She is alert and  oriented to person, place, and time.   Skin: Skin is warm and dry. Capillary refill takes 2 to 3 seconds.   Psychiatric: She has a normal mood and affect. Her behavior is normal. Judgment and thought content normal.   Vitals reviewed.      Results Review:  I have personally reviewed most recent lab results, microbiology results and radiology images and interpretations and agree with findings, most notably: CT results.    Results from last 7 days   Lab Units 07/25/20  0254   WBC 10*3/mm3 7.90   HEMOGLOBIN g/dL 11.1*   HEMATOCRIT % 34.4   PLATELETS 10*3/mm3 514*     Results from last 7 days   Lab Units 07/25/20  0254   SODIUM mmol/L 141   POTASSIUM mmol/L 3.3*   CHLORIDE mmol/L 103   CO2 mmol/L 27.0   BUN  8   CREATININE mg/dL 0.60   GLUCOSE mg/dL 113*   CALCIUM mg/dL 8.5*   ALT (SGPT) U/L 19   AST (SGOT) U/L 21     Estimated Creatinine Clearance: 58.4 mL/min (by C-G formula based on SCr of 0.6 mg/dL).  Brief Urine Lab Results  (Last result in the past 365 days)      Color   Clarity   Blood   Leuk Est   Nitrite   Protein   CREAT   Urine HCG        07/25/20 0254 Red  Comment:  Result checked Visual confirmation Clear Trace Small (1+) Negative Negative               Microbiology Results (last 10 days)     ** No results found for the last 240 hours. **          ECG/EMG Results (most recent)     None          Results for orders placed during the hospital encounter of 07/22/19   Duplex Venous Lower Extremity - Left CAR    Narrative · Acute left lower extremity superficial thrombophlebitis noted in the   greater saphenous (below knee).  · All other left sided veins appeared normal.               Ct Abdomen Pelvis Without Contrast    Result Date: 7/25/2020  1.  Thickened proximal sigmoid colon possibly with luminal narrowing, upstream bowel wall thickening, inflammation, diverticulosis. Differential includes colitis, diverticulitis, fixed narrowing due to infiltrative process, mass/malignancy is also possible, with upstream  constipation. Wall thickening has improved from prior. Consider eventual colonoscopy. 2.  Question cystocele. 3.  Large hiatus hernia. Electronically signed by:  Shea Hoskins  7/25/2020 2:09 AM    Xr Abdomen 2+ Vw With Chest 1 Vw    Result Date: 7/23/2020  Moderate to large colonic stool burden particularly in the transverse colon, in keeping with a history of constipation. No indication of high-grade bowel obstruction or acute abdominal abnormality.  No acute chest findings. Chronic right basilar scarring.  Hiatal hernia.  Electronically Signed By-Dr. Ratna Limon MD On:7/23/2020 9:33 AM This report was finalized on [id number] by Dr. Ratna Limon MD.        Estimated Creatinine Clearance: 58.4 mL/min (by C-G formula based on SCr of 0.6 mg/dL).    Assessment/Plan   Assessment/Plan       Active Hospital Problems    Diagnosis  POA   • **Abdominal pain [R10.9]  Yes     Priority: High   • Hypocalcemia [E83.51]  Yes     Priority: Low   • History of herpes simplex infection [Z86.19]  Yes   • Gastroesophageal reflux disease without esophagitis [K21.9]  Yes   • Hypertension [I10]  Yes   • Allergic rhinitis [J30.9]  Yes   • Osteopenia of multiple sites [M85.89]  Yes      Resolved Hospital Problems   No resolved problems to display.     Abdominal Pain; ?colitis vs Diverticulitis  -WBC 7.90  -Lipase 12, repeat in am  -Ct Abdomen Pelvis reviewed  -UA reviewed  -Continue Zosyn    -tried Cipro and flagyl previously  -NPO  -Tylenol PRN pain  -Zofran PRN nausea  -Normal saline at 100 cc an hour  -GI panel  -GI consult    Hypocalcemia, mild  -Serum Ca 8.5  -Ionized Ca   -BMP in am    Essential Hypertension, Chronic, Controlled  -Continue home hydrochlorothiazide, losartan  - Monitor with routine vital signs     GERD  -Continue omeprazole with hospital sub-    Osteopenia  - Hold Fosamax, calcium daily, magnesium, glucosamine, lysine    Herpes simplex 1, chronic  -Hold Famvir for now    Allergies  - Hold Flonase  -Continue  Claritin with hospital sub    Dietary supplementation  -Hold dietary supplements for now      VTE Prophylaxis -   Mechanical Order History:     SCDs      Pharmalogical Order History:     None          CODE STATUS:    Code Status and Medical Interventions:   Ordered at: 07/25/20 0510     Code Status:    CPR     Medical Interventions (Level of Support Prior to Arrest):    Full       This patient has been examined wearing appropriate Personal Protective Equipment . 07/25/20      I discussed the patient's findings and my recommendations with patient.        Electronically signed by FELIPE Cardenas, 07/25/20, 4:56 AM.  [facility] Hospitalist Team

## 2020-07-25 NOTE — ED PROVIDER NOTES
Subjective   History of Present Illness  Abdominal pain  75-year-old female complains left lower quadrant abdominal pain increasing in intensity and decreased to output over the last 1 week.  She was admitted to [facility] with colitis and diverticulitis.  She reports no nausea or vomiting or fevers or chills or trauma.  Review of Systems   Constitutional: Negative.    HENT: Negative.    Eyes: Negative.    Respiratory: Negative.    Cardiovascular: Negative.    Gastrointestinal: Positive for abdominal pain and constipation.   Genitourinary: Negative.    Musculoskeletal: Negative.    Skin: Negative.    Neurological: Negative.    Hematological: Negative.    Psychiatric/Behavioral: Negative.        Past Medical History:   Diagnosis Date   • Allergic rhinitis 06/02/2015   • Body mass index 29.0-29.9, adult 09/12/2018   • Cervical spinal stenosis 08/07/2017   • Cholelithiasis    • DDD (degenerative disc disease), cervical    • DDD (degenerative disc disease), lumbar    • DDD (degenerative disc disease), thoracic    • Diverticulosis    • Dyslipidemia 12/05/2012   • Elevated brain natriuretic peptide (BNP) level 01/06/2017   • Eustachian tube disorder, right 02/18/2019   • Fatigue 06/02/2015   • Female cystocele 06/02/2015   • Hiatal hernia with gastroesophageal reflux 01/06/2017   • History of DVT (deep vein thrombosis) 06/02/2015   • Hyperlipidemia    • Hypertension 06/10/2016   • Left rib fracture 05/2017    11TH RIB    • Migraine headache 06/02/2015   • Mitral insufficiency 01/16/2017    MILD   • Osteoarthritis 05/20/2014   • Osteopenia 12/05/2012   • Over weight 06/14/2018   • Peripheral edema 01/05/2017   • PFO (patent foramen ovale) 01/16/2017   • Pulmonary nodule 05/04/2017    DR CHUN FOLLOWS   • RHA (rheumatoid arthritis) (CMS/[facility]) 12/05/2012   • Scleritis 06/02/2015   • Scoliosis    • Tricuspid insufficiency     MILD   • Varicose veins of other specified sites    • Venous insufficiency 09/12/2018        Allergies   Allergen Reactions   • Demerol [Meperidine] Unknown (See Comments)     Abstracted from [facility].   • Other Unknown (See Comments)     Sulfa (sulfadiazine)    • Sulfa Antibiotics Unknown - High Severity   • Lisinopril Cough       Past Surgical History:   Procedure Laterality Date   • COLONOSCOPY  07/20/2017    EGD/ COLONSCOPY LARGE HIATAL HERNIA. MILD DIVERTICULOSIS. OTHERWISE NORMAL.   • INGUINAL HERNIA REPAIR Right    • TOTAL ABDOMINAL HYSTERECTOMY  1985    W/BSO WITH A/P REPAIR 1985 BENIGN REASONS DR. DRAKE       Family History   Problem Relation Age of Onset   • Osteoporosis Mother    • Stroke Mother 80   • Dementia Mother    • Heart disease Father    • Hypertension Father    • Heart disease Sister    • Cancer Daughter         BREAST AND MELANOMA   • Breast cancer Daughter 52   • Cancer Son         MELANOMA   • Cancer Grandson         MELANOMA       Social History     Socioeconomic History   • Marital status:      Spouse name: Not on file   • Number of children: Not on file   • Years of education: Not on file   • Highest education level: Not on file   Tobacco Use   • Smoking status: Never Smoker   • Smokeless tobacco: Never Used   Substance and Sexual Activity   • Alcohol use: No     Frequency: Never   • Drug use: No       Prior to Admission medications    Medication Sig Start Date End Date Taking? Authorizing Provider   albuterol sulfate HFA (VENTOLIN HFA) 108 (90 Base) MCG/ACT inhaler Two inhalations every 4-6 hours as needed for SOA. 11/6/18   Ratna Phillip APRN   alendronate (FOSAMAX) 70 MG tablet TAKE 1 TABLET BY MOUTH EVERY 7 DAYS 3/15/20   Munira So APRN   Calcium Carbonate-Vitamin D (CALCIUM 500 + D PO) Take 2 tablets by mouth Daily. 5/20/14   Munira So APRN   CHELATED MAGNESIUM PO Take 1 tablet by mouth Daily. 5/20/14   Munira So APRN   Diclofenac Sodium 1.5 % solution 40 drops to each knee up to QID PRN. Apply 10 drops at a time; spread the 10 drops  "around the knee, and repeat this until the entire 40 drops have been applied 2/26/20   Munira So APRN   famciclovir (FAMVIR) 500 MG tablet TAKE 3 TABLETS BY MOUTH AS A SINGLE DOSE AT FIRST SIGN OF COLD SORE 2/5/20   Munira So APRN   fluticasone (FLONASE) 50 MCG/ACT nasal spray INSTILL 2 SPRAYS IN EACH NOSTRIL DAILY 4/28/20   Munira So APRN   Glucosamine-Chondroitin (CIDAFLEX PO) Take 2 tablets by mouth Daily. 5/20/14   Munira So APRN   hydroCHLOROthiazide (HYDRODIURIL) 25 MG tablet Take 1 tablet by mouth Daily. 4/28/20   Ratna Phillip APRN   loratadine (CLARITIN) 10 MG tablet TAKE 1 TABLET BY MOUTH EVERY DAY 2/5/20   Munira So APRN   losartan (COZAAR) 100 MG tablet TAKE 1 TABLET BY MOUTH DAILY 5/1/20   Munira So APRN   lysine ([facility] LYSINE) 500 MG tablet Take 1 tablet by mouth Daily As Needed. 5/20/14   Munira So APRN   Multiple Vitamin (MULTI-VITAMIN DAILY) tablet Take 1 tablet by mouth Daily. 6/2/15   Munira So APRN   omeprazole (priLOSEC) 20 MG capsule TAKE ONE CAPSULE BY MOUTH EVERY MORNING ON AN EMPTY STOMACH, 30 MINUTES BEFORE EATING OR OTHER MEDICATION 5/1/20   Munira So APRN   Vitamin D, Cholecalciferol, 1000 units capsule Take 1 capsule by mouth Daily. 6/2/15   Munira So APRN     /67   Pulse 88   Temp 98.7 °F (37.1 °C)   Resp 18   Ht 160 cm (63\")   Wt 75.8 kg (167 lb 1.7 oz)   SpO2 98%   BMI 29.60 kg/m²   I examined the patient using the appropriate personal protective equipment.        Objective   Physical Exam  General: Well-developed well-appearing, no acute distress, alert and appropriate  Eyes: Pupils round, sclera nonicteric  HEENT: Mucous membranes moist, no mucosal swelling  Neck: Supple, no nuchal rigidity, no lymphadenopathy  Respirations: Respirations nonlabored, equal breath sounds bilaterally, clear lungs  Heart regular rate and rhythm, no murmurs rubs or gallops,   Abdomen soft tender palpation in the left lower " quadrant, no rebound or guarding, nondistended, no hepatosplenomegaly, no hernia, no mass, normal bowel sounds, no CVA tenderness, rectal exam performed with the nurse in the room reveals no impaction or palpable obstruction  Extremities no clubbing cyanosis or edema, calves are symmetric and nontender  Neuro cranial nerves grossly intact, no focal limb deficits  Psych oriented, pleasant affect  Skin no rash, brisk cap refill  Procedures           ED Course      Results for orders placed or performed during the hospital encounter of 07/25/20   Comprehensive Metabolic Panel   Result Value Ref Range    Glucose 113 (H) 65 - 99 mg/dL    BUN      Creatinine 0.60 0.57 - 1.00 mg/dL    Sodium 141 136 - 145 mmol/L    Potassium 3.3 (L) 3.5 - 5.2 mmol/L    Chloride 103 98 - 107 mmol/L    CO2 27.0 22.0 - 29.0 mmol/L    Calcium 8.5 (L) 8.6 - 10.5 mg/dL    Total Protein 6.2 6.0 - 8.5 g/dL    Albumin 3.50 3.50 - 5.20 g/dL    ALT (SGPT) 19 1 - 33 U/L    AST (SGOT) 21 1 - 32 U/L    Alkaline Phosphatase 99 39 - 117 U/L    Total Bilirubin 0.4 0.0 - 1.2 mg/dL    eGFR Non African Amer 97 >60 mL/min/1.73    Globulin 2.7 gm/dL    A/G Ratio 1.3 g/dL    BUN/Creatinine Ratio      Anion Gap 11.0 5.0 - 15.0 mmol/L   Lipase   Result Value Ref Range    Lipase 12 (L) 13 - 60 U/L   Urinalysis With Culture If Indicated - Urine, Clean Catch   Result Value Ref Range    Color, UA Red (A) Yellow, Straw    Appearance, UA Clear Clear    pH, UA 7.0 5.0 - 8.0    Specific Gravity, UA 1.017 1.005 - 1.030    Glucose, UA Negative Negative    Ketones, UA Negative Negative    Bilirubin, UA Small (1+) (A) Negative    Blood, UA Trace (A) Negative    Protein, UA Negative Negative    Leuk Esterase, UA Small (1+) (A) Negative    Nitrite, UA Negative Negative    Urobilinogen, UA 0.2 E.U./dL 0.2 - 1.0 E.U./dL   CBC Auto Differential   Result Value Ref Range    WBC 7.90 3.40 - 10.80 10*3/mm3    RBC 4.02 3.77 - 5.28 10*6/mm3    Hemoglobin 11.1 (L) 12.0 - 15.9 g/dL     Hematocrit 34.4 34.0 - 46.6 %    MCV 85.4 79.0 - 97.0 fL    MCH 27.6 26.6 - 33.0 pg    MCHC 32.3 31.5 - 35.7 g/dL    RDW 14.1 12.3 - 15.4 %    RDW-SD 42.4 37.0 - 54.0 fl    MPV 6.6 6.0 - 12.0 fL    Platelets 514 (H) 140 - 450 10*3/mm3    Neutrophil % 69.8 42.7 - 76.0 %    Lymphocyte % 14.0 (L) 19.6 - 45.3 %    Monocyte % 14.2 (H) 5.0 - 12.0 %    Eosinophil % 1.5 0.3 - 6.2 %    Basophil % 0.5 0.0 - 1.5 %    Neutrophils, Absolute 5.50 1.70 - 7.00 10*3/mm3    Lymphocytes, Absolute 1.10 0.70 - 3.10 10*3/mm3    Monocytes, Absolute 1.10 (H) 0.10 - 0.90 10*3/mm3    Eosinophils, Absolute 0.10 0.00 - 0.40 10*3/mm3    Basophils, Absolute 0.00 0.00 - 0.20 10*3/mm3    nRBC 0.0 0.0 - 0.2 /100 WBC   Urinalysis, Microscopic Only - Urine, Clean Catch   Result Value Ref Range    RBC, UA 13-20 (A) None Seen /HPF    WBC, UA 0-2 (A) None Seen /HPF    Bacteria, UA None Seen None Seen /HPF    Squamous Epithelial Cells, UA 0-2 None Seen, 0-2 /HPF    Hyaline Casts, UA 3-6 None Seen /LPF    Methodology Automated Microscopy    BUN   Result Value Ref Range    BUN 8 8 - 23 mg/dL   Gold Top - SST   Result Value Ref Range    Extra Tube Hold for add-ons.      Ct Abdomen Pelvis Without Contrast    Result Date: 7/25/2020  1.  Thickened proximal sigmoid colon possibly with luminal narrowing, upstream bowel wall thickening, inflammation, diverticulosis. Differential includes colitis, diverticulitis, fixed narrowing due to infiltrative process, mass/malignancy is also possible, with upstream constipation. Wall thickening has improved from prior. Consider eventual colonoscopy. 2.  Question cystocele. 3.  Large hiatus hernia. Electronically signed by:  Shea Hoskins  7/25/2020 2:09 AM    Xr Abdomen 2+ Vw With Chest 1 Vw    Result Date: 7/23/2020  Moderate to large colonic stool burden particularly in the transverse colon, in keeping with a history of constipation. No indication of high-grade bowel obstruction or acute abdominal abnormality.  No acute  chest findings. Chronic right basilar scarring.  Hiatal hernia.  Electronically Signed By-Dr. Ratna Limon MD On:7/23/2020 9:33 AM This report was finalized on [id number] by Dr. Ratna Limon MD.                                         MDM  Patient presents with ongoing abdominal pain failing outpatient management of colitis.  Has been on Cipro and Flagyl.  She describes ongoing pain.  She has some tenderness in the left side of her abdomen without rebound or guarding to suggest peritonitis or acute abdomen.  CT scan does show persistent colitis.  Patient was ordered IV Zosyn.  She was given pain nausea medication and remained stable throughout the emergency room course the hospital service was paged for hospital observation and further evaluation and management  Final diagnoses:   Abdominal pain, unspecified abdominal location   Colitis            Elvin Cheng MD  07/25/20 0427

## 2020-07-26 ENCOUNTER — READMISSION MANAGEMENT (OUTPATIENT)
Dept: CALL CENTER | Facility: HOSPITAL | Age: 75
End: 2020-07-26

## 2020-07-26 ENCOUNTER — ON CAMPUS - OUTPATIENT (AMBULATORY)
Dept: URBAN - METROPOLITAN AREA HOSPITAL 85 | Facility: HOSPITAL | Age: 75
End: 2020-07-26
Payer: COMMERCIAL

## 2020-07-26 VITALS
RESPIRATION RATE: 17 BRPM | WEIGHT: 164 LBS | HEIGHT: 63 IN | TEMPERATURE: 97.5 F | DIASTOLIC BLOOD PRESSURE: 76 MMHG | OXYGEN SATURATION: 98 % | HEART RATE: 89 BPM | SYSTOLIC BLOOD PRESSURE: 138 MMHG | BODY MASS INDEX: 29.06 KG/M2

## 2020-07-26 DIAGNOSIS — K52.89 OTHER SPECIFIED NONINFECTIVE GASTROENTERITIS AND COLITIS: ICD-10-CM

## 2020-07-26 DIAGNOSIS — R93.3 ABNORMAL FINDINGS ON DIAGNOSTIC IMAGING OF OTHER PARTS OF DI: ICD-10-CM

## 2020-07-26 LAB
ANION GAP SERPL CALCULATED.3IONS-SCNC: 10 MMOL/L (ref 5–15)
BASOPHILS # BLD AUTO: 0 10*3/MM3 (ref 0–0.2)
BASOPHILS NFR BLD AUTO: 0.4 % (ref 0–1.5)
BUN SERPL-MCNC: 6 MG/DL (ref 8–23)
BUN SERPL-MCNC: ABNORMAL MG/DL
BUN/CREAT SERPL: ABNORMAL
CALCIUM SPEC-SCNC: 7.9 MG/DL (ref 8.6–10.5)
CHLORIDE SERPL-SCNC: 108 MMOL/L (ref 98–107)
CO2 SERPL-SCNC: 22 MMOL/L (ref 22–29)
CREAT SERPL-MCNC: 0.64 MG/DL (ref 0.57–1)
DEPRECATED RDW RBC AUTO: 44.6 FL (ref 37–54)
EOSINOPHIL # BLD AUTO: 0.1 10*3/MM3 (ref 0–0.4)
EOSINOPHIL NFR BLD AUTO: 1.5 % (ref 0.3–6.2)
ERYTHROCYTE [DISTWIDTH] IN BLOOD BY AUTOMATED COUNT: 14.4 % (ref 12.3–15.4)
GFR SERPL CREATININE-BSD FRML MDRD: 90 ML/MIN/1.73
GLUCOSE SERPL-MCNC: 109 MG/DL (ref 65–99)
HCT VFR BLD AUTO: 30.3 % (ref 34–46.6)
HGB BLD-MCNC: 9.8 G/DL (ref 12–15.9)
LIPASE SERPL-CCNC: 10 U/L (ref 13–60)
LYMPHOCYTES # BLD AUTO: 1 10*3/MM3 (ref 0.7–3.1)
LYMPHOCYTES NFR BLD AUTO: 17.9 % (ref 19.6–45.3)
MAGNESIUM SERPL-MCNC: 2.1 MG/DL (ref 1.6–2.4)
MCH RBC QN AUTO: 27.8 PG (ref 26.6–33)
MCHC RBC AUTO-ENTMCNC: 32.4 G/DL (ref 31.5–35.7)
MCV RBC AUTO: 85.9 FL (ref 79–97)
MONOCYTES # BLD AUTO: 0.8 10*3/MM3 (ref 0.1–0.9)
MONOCYTES NFR BLD AUTO: 13.3 % (ref 5–12)
NEUTROPHILS NFR BLD AUTO: 3.8 10*3/MM3 (ref 1.7–7)
NEUTROPHILS NFR BLD AUTO: 66.9 % (ref 42.7–76)
NRBC BLD AUTO-RTO: 0.2 /100 WBC (ref 0–0.2)
PLATELET # BLD AUTO: 452 10*3/MM3 (ref 140–450)
PMV BLD AUTO: 6.6 FL (ref 6–12)
POTASSIUM SERPL-SCNC: 4.3 MMOL/L (ref 3.5–5.2)
RBC # BLD AUTO: 3.53 10*6/MM3 (ref 3.77–5.28)
SODIUM SERPL-SCNC: 140 MMOL/L (ref 136–145)
WBC # BLD AUTO: 5.7 10*3/MM3 (ref 3.4–10.8)

## 2020-07-26 PROCEDURE — 96361 HYDRATE IV INFUSION ADD-ON: CPT

## 2020-07-26 PROCEDURE — 94799 UNLISTED PULMONARY SVC/PX: CPT

## 2020-07-26 PROCEDURE — 83735 ASSAY OF MAGNESIUM: CPT | Performed by: STUDENT IN AN ORGANIZED HEALTH CARE EDUCATION/TRAINING PROGRAM

## 2020-07-26 PROCEDURE — 96366 THER/PROPH/DIAG IV INF ADDON: CPT

## 2020-07-26 PROCEDURE — 99211 OFF/OP EST MAY X REQ PHY/QHP: CPT | Performed by: NURSE PRACTITIONER

## 2020-07-26 PROCEDURE — G0378 HOSPITAL OBSERVATION PER HR: HCPCS

## 2020-07-26 PROCEDURE — 85025 COMPLETE CBC W/AUTO DIFF WBC: CPT | Performed by: STUDENT IN AN ORGANIZED HEALTH CARE EDUCATION/TRAINING PROGRAM

## 2020-07-26 PROCEDURE — 83690 ASSAY OF LIPASE: CPT | Performed by: STUDENT IN AN ORGANIZED HEALTH CARE EDUCATION/TRAINING PROGRAM

## 2020-07-26 PROCEDURE — 80048 BASIC METABOLIC PNL TOTAL CA: CPT | Performed by: STUDENT IN AN ORGANIZED HEALTH CARE EDUCATION/TRAINING PROGRAM

## 2020-07-26 PROCEDURE — 25010000002 PIPERACILLIN SOD-TAZOBACTAM PER 1 G: Performed by: STUDENT IN AN ORGANIZED HEALTH CARE EDUCATION/TRAINING PROGRAM

## 2020-07-26 PROCEDURE — 99217 PR OBSERVATION CARE DISCHARGE MANAGEMENT: CPT | Performed by: HOSPITALIST

## 2020-07-26 RX ORDER — POLYETHYLENE GLYCOL 3350 17 G/17G
17 POWDER, FOR SOLUTION ORAL DAILY PRN
Qty: 10 PACKET | Refills: 0 | Status: SHIPPED | OUTPATIENT
Start: 2020-07-26 | End: 2020-08-05

## 2020-07-26 RX ORDER — METRONIDAZOLE 500 MG/1
500 TABLET ORAL 3 TIMES DAILY
Qty: 21 TABLET | Refills: 0 | Status: SHIPPED | OUTPATIENT
Start: 2020-07-26 | End: 2020-08-02

## 2020-07-26 RX ORDER — CIPROFLOXACIN 500 MG/1
500 TABLET, FILM COATED ORAL 2 TIMES DAILY
Qty: 14 TABLET | Refills: 0 | Status: SHIPPED | OUTPATIENT
Start: 2020-07-26 | End: 2020-08-02

## 2020-07-26 RX ORDER — PSEUDOEPHEDRINE HCL 30 MG
100 TABLET ORAL 2 TIMES DAILY PRN
Qty: 1 EACH | Refills: 0 | Status: SHIPPED | OUTPATIENT
Start: 2020-07-26 | End: 2020-08-05

## 2020-07-26 RX ADMIN — PANTOPRAZOLE SODIUM 40 MG: 40 TABLET, DELAYED RELEASE ORAL at 05:16

## 2020-07-26 RX ADMIN — CETIRIZINE HYDROCHLORIDE 10 MG: 10 TABLET, FILM COATED ORAL at 09:16

## 2020-07-26 RX ADMIN — PIPERACILLIN AND TAZOBACTAM 3.38 G: 3; .375 INJECTION, POWDER, FOR SOLUTION INTRAVENOUS at 03:11

## 2020-07-26 RX ADMIN — ALBUTEROL SULFATE 2.5 MG: 2.5 SOLUTION RESPIRATORY (INHALATION) at 03:18

## 2020-07-26 RX ADMIN — SODIUM CHLORIDE 125 ML/HR: 900 INJECTION, SOLUTION INTRAVENOUS at 09:18

## 2020-07-26 RX ADMIN — Medication 10 ML: at 09:17

## 2020-07-26 RX ADMIN — POTASSIUM CHLORIDE 40 MEQ: 1500 TABLET, EXTENDED RELEASE ORAL at 00:22

## 2020-07-26 RX ADMIN — ALBUTEROL SULFATE 2.5 MG: 2.5 SOLUTION RESPIRATORY (INHALATION) at 11:10

## 2020-07-26 RX ADMIN — LOSARTAN POTASSIUM 100 MG: 50 TABLET, FILM COATED ORAL at 09:16

## 2020-07-26 RX ADMIN — HYDROCHLOROTHIAZIDE 25 MG: 25 TABLET ORAL at 09:16

## 2020-07-26 RX ADMIN — PIPERACILLIN AND TAZOBACTAM 3.38 G: 3; .375 INJECTION, POWDER, FOR SOLUTION INTRAVENOUS at 10:43

## 2020-07-26 NOTE — PLAN OF CARE
Patient admitted from the ER after episode of colitis. IV antibiotics given and GI consulted. No complaints of pain observed. Continue current plan of care.

## 2020-07-26 NOTE — PLAN OF CARE
Problem: Patient Care Overview  Goal: Plan of Care Review  Outcome: Ongoing (interventions implemented as appropriate)  Flowsheets (Taken 7/26/2020 0332)  Progress: no change  Plan of Care Reviewed With: patient  Outcome Summary: Patient has had multiple bowel movements overnight since receiving the Sorbitol yesterday. She did c/o some abdominal cramping at beginning of the shift, but it has since gotten better. Patient's potassium was low, so 2 rounds of PO Potassium was given. Will recheck level with morning labs. Patient rested on and off throughout the night. Will continue to monitor.

## 2020-07-26 NOTE — PROGRESS NOTES
" LOS: 0 days   Patient Care Team:  Munira So APRN as PCP - General (Nurse Practitioner)  Dr. AMITA Palma & Vandana) (Ophthalmology)  Banet, Duane Edward, MD as Consulting Physician (Dermatology)  Edwin Banks MD as Consulting Physician (Otolaryngology)      Subjective   \"shitting myself\"    Interval History:   Given relistor & sorbitol & has had good results.   Denies any abdominal pain       ROS:   Diarrhea  No chest pain, shortness of breath, or cough.        Medication Review:     Current Facility-Administered Medications:   •  acetaminophen (TYLENOL) tablet 650 mg, 650 mg, Oral, Q4H PRN, 650 mg at 07/25/20 1017 **OR** acetaminophen (TYLENOL) 160 MG/5ML solution 650 mg, 650 mg, Oral, Q4H PRN **OR** acetaminophen (TYLENOL) suppository 650 mg, 650 mg, Rectal, Q4H PRN, Neva Donato, APRN  •  albuterol (PROVENTIL) nebulizer solution 0.083% 2.5 mg/3mL, 2.5 mg, Nebulization, Q4H - RT, Tanmay, Neva, APRN, 2.5 mg at 07/26/20 0318  •  bisacodyl (DULCOLAX) suppository 10 mg, 10 mg, Rectal, Daily PRN, Neva Donato, APRN  •  cetirizine (zyrTEC) tablet 10 mg, 10 mg, Oral, Daily, Tanmay Neva, APRN, 10 mg at 07/25/20 1010  •  docusate sodium (COLACE) capsule 100 mg, 100 mg, Oral, BID PRN, Kimani Donatoa, APRN  •  hydroCHLOROthiazide (HYDRODIURIL) tablet 25 mg, 25 mg, Oral, Daily, Tanmay Neva, APRN, 25 mg at 07/25/20 1009  •  losartan (COZAAR) tablet 100 mg, 100 mg, Oral, Daily, Tanmay Neva, APRN, 100 mg at 07/25/20 1009  •  Magnesium Sulfate 2 gram infusion - Mg less than or equal to 1.5 mg/dL, 2 g, Intravenous, PRN **OR** Magnesium Sulfate 1 gram infusion - Mg 1.6-1.9 mg/dL, 1 g, Intravenous, PRN, Neva Donato APRN  •  melatonin tablet 5 mg, 5 mg, Oral, Nightly PRN, Neva Donato APRN  •  ondansetron (ZOFRAN) tablet 4 mg, 4 mg, Oral, Q6H PRN **OR** ondansetron (ZOFRAN) injection 4 mg, 4 mg, Intravenous, Q6H PRN, Neva Donato APRN  •  pantoprazole (PROTONIX) EC tablet 40 mg, 40 " mg, Oral, QAM, Neva Donato, APRN, 40 mg at 07/26/20 0516  •  Pharmacy to Dose Zosyn, , Does not apply, Continuous PRN, Neva Donato, APRN  •  piperacillin-tazobactam (ZOSYN) IVPB 3.375 g in 100 mL NS (CD), 3.375 g, Intravenous, Q8H, Kimani Donatoa, APRN, Stopped at 07/26/20 0720  •  polyethylene glycol (MIRALAX) packet 17 g, 17 g, Oral, Daily, Tram Chaudhry, APRN, 17 g at 07/25/20 1121  •  potassium chloride (K-DUR,KLOR-CON) CR tablet 40 mEq, 40 mEq, Oral, PRN, 40 mEq at 07/26/20 0022 **OR** potassium chloride (KLOR-CON) packet 40 mEq, 40 mEq, Oral, PRN **OR** potassium chloride 10 mEq in 100 mL IVPB, 10 mEq, Intravenous, Q1H PRN, Neva Donato, APRN  •  [COMPLETED] Insert peripheral IV, , , Once **AND** sodium chloride 0.9 % flush 10 mL, 10 mL, Intravenous, PRN, Elvin Cheng MD  •  sodium chloride 0.9 % flush 10 mL, 10 mL, Intravenous, Q12H, Neva Donato APRN, 10 mL at 07/25/20 1011  •  sodium chloride 0.9 % flush 10 mL, 10 mL, Intravenous, PRN, Kimani Donatoa, APRN  •  sodium chloride 0.9 % infusion, 125 mL/hr, Intravenous, Continuous, Janet Hicks MD, Last Rate: 125 mL/hr at 07/26/20 0820, 125 mL/hr at 07/26/20 0820  •  sorbitol solution 50 mL, 50 mL, Oral, Daily, Tram Chaudhry APRN, 50 mL at 07/25/20 1705      Objective  Up to bedside commode.     Vital Signs  Temp:  [97.6 °F (36.4 °C)-99 °F (37.2 °C)] 99 °F (37.2 °C)  Heart Rate:  [79-99] 91  Resp:  [12-20] 15  BP: (119-155)/(59-79) 119/59  Physical Exam:    General Appearance:    Awake and alert, in no acute distress   Head:    Normocephalic, without obvious abnormality   Eyes:          Conjunctivae normal, anicteric sclera   Ears:    Hearing intact   Throat:   No oral lesions, no thrush, oral mucosa moist   Neck:   No adenopathy, supple, no JVD   Lungs:     Clear to auscultation bilaterally, respirations regular, even and unlabored    Heart:    Regular rhythm and normal rate, normal S1 and S2, no            murmur, no  gallop, no rub   Abdomen:     Normal bowel sounds, soft, non-tender, no rebound or guarding, non-distended, no hepatosplenomegaly   Rectal:     Deferred   Extremities:   No edema, no cyanosis, no redness   Skin:   No bleeding, bruising or rash, no jaundice   Neurologic:   Cranial nerves 2 - 12 grossly intact, no asterixis, sensation   intact        Results Review:    Lab Results (last 24 hours)     Procedure Component Value Units Date/Time    BUN [[id number]]  (Abnormal) Collected:  07/26/20 0434    Specimen:  Blood Updated:  07/26/20 0748     BUN 6 mg/dL     Basic Metabolic Panel [[id number]]  (Abnormal) Collected:  07/26/20 0434    Specimen:  Blood Updated:  07/26/20 0545     Glucose 109 mg/dL      BUN --     Comment: Testing performed by alternate method        Creatinine 0.64 mg/dL      Sodium 140 mmol/L      Potassium 4.3 mmol/L      Comment: Result checked         Chloride 108 mmol/L      CO2 22.0 mmol/L      Calcium 7.9 mg/dL      eGFR Non African Amer 90 mL/min/1.73      BUN/Creatinine Ratio --     Comment: Testing not performed        Anion Gap 10.0 mmol/L     Narrative:       GFR Normal >60  Chronic Kidney Disease <60  Kidney Failure <15      Magnesium [[id number]]  (Normal) Collected:  07/26/20 0434    Specimen:  Blood Updated:  07/26/20 0527     Magnesium 2.1 mg/dL     Lipase [[id number]]  (Abnormal) Collected:  07/26/20 0434    Specimen:  Blood Updated:  07/26/20 0527     Lipase 10 U/L     CBC Auto Differential [[id number]]  (Abnormal) Collected:  07/26/20 0434    Specimen:  Blood Updated:  07/26/20 0506     WBC 5.70 10*3/mm3      RBC 3.53 10*6/mm3      Hemoglobin 9.8 g/dL      Hematocrit 30.3 %      MCV 85.9 fL      MCH 27.8 pg      MCHC 32.4 g/dL      RDW 14.4 %      RDW-SD 44.6 fl      MPV 6.6 fL      Platelets 452 10*3/mm3      Neutrophil % 66.9 %      Lymphocyte % 17.9 %      Monocyte % 13.3 %      Eosinophil % 1.5 %      Basophil % 0.4 %      Neutrophils, Absolute 3.80 10*3/mm3      Lymphocytes,  Absolute 1.00 10*3/mm3      Monocytes, Absolute 0.80 10*3/mm3      Eosinophils, Absolute 0.10 10*3/mm3      Basophils, Absolute 0.00 10*3/mm3      nRBC 0.2 /100 WBC           Imaging Results (Last 24 Hours)     ** No results found for the last 24 hours. **            Assessment/Plan   Left lower quadrant abdominal pain consider unresolved diverticulitis versus colitis  Abnormal CT showing thickening proximal sigmoid colon questionable related to inflammation that is causing her constipation PAIN RESOLVED  Normocytic anemia  GERD  Large hiatal hernia  Hypertension  History of DVT  Constipation     PLAN:  Had good bowel purge with Relistor & Sorbitol  Pain has resolved  Agree with IV antibiotics.  Continue PPI for history of GERD.  Consider endoscopy in 6 to 8 weeks after colitis/diverticulitis has resolved.  OK with low residue diet.   Continue MiraLAX daily to prevent constipation  Supportive care.        Tram Chaudhry, FELIPE  07/26/20  09:01

## 2020-07-26 NOTE — PLAN OF CARE
Patient reports she has no further pain. Continued loose bowel movements. Patient refused miralax related to loose stools.

## 2020-07-26 NOTE — DISCHARGE SUMMARY
Date of Admission: 7/25/2020    Date of Discharge:  7/26/2020    Length of stay:  LOS: 0 days   Hospital Course  Chief Complaint:       Chief Complaint   Patient presents with   • Abdominal Pain         Ms. Mcdonnell is a 75 y.o. female who presents to [facility] ED with a history of degenerative disc disease, hyperlipidemia, hypertension, and GERD complaining of abdominal pain.  Patient states for the last 2 weeks she has had left, lower, quadrant, abdominal pain that she describes as intermittent.  She states the pain is sharp and stabbing when she is active but a dull ache when she is still.  Patient was hospitalized at [facility] for 2 days with the same symptoms with a diagnosis of colitis and diverticulitis that she was given ciprofloxacin and Flagyl and discharged home on those.  However patient states there have been no changes to her symptoms.  Patient denies nausea and vomiting but states it has been approximately 2 weeks since her last bowel movement.        In the ED, WBCs 7.90, lipase 12, hemoglobin 11.1, platelets 514, glucose 113, potassium 3.3, calcium 8.5.  UA shows trace blood, negative nitrites, 1+ leukocytes, 1+ bilirubin,, 13-20 red blood cells.  CT abdomen pelvis shows thickened proximal sigmoid colon possibly with luminal narrowing, upstream bowel wall thickening, inflammation, diverticulosis differential includes colitis, diverticulitis, fixed narrowing due to infiltrative process, mass malignancy is also possible with upstream constipation, wall thickening he has improved from prior exam; question cystocele; large hiatus hernia.  Patient admitted for further evaluation and treatment.    Physical Exam   Constitutional:  oriented to person, place, and time. No distress.   HENT:   Head: Normocephalic and atraumatic.   Eyes: Conjunctivae and EOM are normal. Pupils are equal, round, and reactive to light.   Neck: No JVD present. No thyromegaly present.   Cardiovascular:  Normal rate, regular rhythm, normal heart sounds and intact distal pulses. Exam reveals no gallop and no friction rub.   No murmur heard.  Pulmonary/Chest: Effort normal and breath sounds normal. No stridor. No respiratory distress.  has no wheezes.  has no rales.  exhibits no tenderness.   Abdominal: Soft. Bowel sounds are normal.  no distension and no mass. There is no tenderness. There is no rebound and no guarding. No hernia.   Musculoskeletal: Normal range of motion.   Lymphadenopathy:     no cervical adenopathy.   Neurological:  alert and oriented to person, place, and time. No cranial nerve deficit or sensory deficit. exhibits normal muscle tone.   Skin: No rash noted.  not diaphoretic.   Psychiatric:  normal mood and affect.   Vitals reviewed.      Hospital course and problem list       Abdominal pain  Colitis versus diverticulitis, stercoral colitis?-Constipated for past couple weeks rule out malignancy versus other  Patient was placed on laxatives, has had multiple bowel movements no blood per any orifice  Her abdominal pain is completely resolved.  He was placed on IV Zosyn while here will discharge on p.o. ciprofloxacin and Flagyl if okay by GI  CT abdomen reviewed as per report attached to this note  IV fluids were provided  Pain management was provided  GI was on board  Will continue PRN laxatives     Hypertension  Continue hydrochlorothiazide and losartan     GERD  Continue PPI     Osteopenia on Fosamax     Herpes simplex 1  On as needed antivirals at home     Hypocalcemia  Replaced     Rhinitis seasonal  Flonase and Claritin at home     DVT PUD prophylaxis     Plan if cleared by GI we will plan on discharging the patient home with close follow-up outpatient with GI and PCP.      Pertinent Test Results:     Lab Results (last 48 hours)     Procedure Component Value Units Date/Time    BUN [[id number]]  (Abnormal) Collected:  07/26/20 0434    Specimen:  Blood Updated:  07/26/20 0748     BUN 6 mg/dL      Basic Metabolic Panel [[id number]]  (Abnormal) Collected:  07/26/20 0434    Specimen:  Blood Updated:  07/26/20 0545     Glucose 109 mg/dL      BUN --     Comment: Testing performed by alternate method        Creatinine 0.64 mg/dL      Sodium 140 mmol/L      Potassium 4.3 mmol/L      Comment: Result checked         Chloride 108 mmol/L      CO2 22.0 mmol/L      Calcium 7.9 mg/dL      eGFR Non African Amer 90 mL/min/1.73      BUN/Creatinine Ratio --     Comment: Testing not performed        Anion Gap 10.0 mmol/L     Narrative:       GFR Normal >60  Chronic Kidney Disease <60  Kidney Failure <15      Magnesium [[id number]]  (Normal) Collected:  07/26/20 0434    Specimen:  Blood Updated:  07/26/20 0527     Magnesium 2.1 mg/dL     Lipase [[id number]]  (Abnormal) Collected:  07/26/20 0434    Specimen:  Blood Updated:  07/26/20 0527     Lipase 10 U/L     CBC Auto Differential [[id number]]  (Abnormal) Collected:  07/26/20 0434    Specimen:  Blood Updated:  07/26/20 0506     WBC 5.70 10*3/mm3      RBC 3.53 10*6/mm3      Hemoglobin 9.8 g/dL      Hematocrit 30.3 %      MCV 85.9 fL      MCH 27.8 pg      MCHC 32.4 g/dL      RDW 14.4 %      RDW-SD 44.6 fl      MPV 6.6 fL      Platelets 452 10*3/mm3      Neutrophil % 66.9 %      Lymphocyte % 17.9 %      Monocyte % 13.3 %      Eosinophil % 1.5 %      Basophil % 0.4 %      Neutrophils, Absolute 3.80 10*3/mm3      Lymphocytes, Absolute 1.00 10*3/mm3      Monocytes, Absolute 0.80 10*3/mm3      Eosinophils, Absolute 0.10 10*3/mm3      Basophils, Absolute 0.00 10*3/mm3      nRBC 0.2 /100 WBC     Potassium [[id number]]  (Abnormal) Collected:  07/25/20 0730    Specimen:  Blood Updated:  07/25/20 0758     Potassium 3.4 mmol/L     Calcium, Ionized [[id number]]  (Abnormal) Collected:  07/25/20 0730    Specimen:  Blood Updated:  07/25/20 0751     Ionized Calcium 1.13 mmol/L     Extra Tubes [[id number]] Collected:  07/25/20 0254    Specimen:  Blood, Venous Line Updated:  07/25/20 0400    Narrative:        The following orders were created for panel order Extra Tubes.  Procedure                               Abnormality         Status                     ---------                               -----------         ------                     Gold Top - SST[[id number]]                                   Final result                 Please view results for these tests on the individual orders.    Gold Top - SST [[id number]] Collected:  07/25/20 0254    Specimen:  Blood Updated:  07/25/20 0400     Extra Tube Hold for add-ons.     Comment: Auto resulted.       BUN [[id number]]  (Normal) Collected:  07/25/20 0254    Specimen:  Blood Updated:  07/25/20 0352     BUN 8 mg/dL     Comprehensive Metabolic Panel [[id number]]  (Abnormal) Collected:  07/25/20 0254    Specimen:  Blood Updated:  07/25/20 0333     Glucose 113 mg/dL      BUN --     Comment: Testing performed by alternate method        Creatinine 0.60 mg/dL      Sodium 141 mmol/L      Potassium 3.3 mmol/L      Chloride 103 mmol/L      CO2 27.0 mmol/L      Calcium 8.5 mg/dL      Total Protein 6.2 g/dL      Albumin 3.50 g/dL      ALT (SGPT) 19 U/L      AST (SGOT) 21 U/L      Alkaline Phosphatase 99 U/L      Total Bilirubin 0.4 mg/dL      eGFR Non African Amer 97 mL/min/1.73      Globulin 2.7 gm/dL      A/G Ratio 1.3 g/dL      BUN/Creatinine Ratio --     Comment: Testing not performed        Anion Gap 11.0 mmol/L     Narrative:       GFR Normal >60  Chronic Kidney Disease <60  Kidney Failure <15      Lipase [[id number]]  (Abnormal) Collected:  07/25/20 0254    Specimen:  Blood Updated:  07/25/20 0333     Lipase 12 U/L     Urinalysis With Culture If Indicated - Urine, Clean Catch [[id number]]  (Abnormal) Collected:  07/25/20 0254    Specimen:  Urine, Clean Catch Updated:  07/25/20 0310     Color, UA Red     Comment: Result checked Visual confirmation        Appearance, UA Clear     pH, UA 7.0     Specific Gravity, UA 1.017     Glucose, UA Negative     Ketones, UA Negative      Bilirubin, UA Small (1+)     Comment: Confirmation testing is unavailable.  A serum bilirubin is recommended for further assessment.        Blood, UA Trace     Protein, UA Negative     Leuk Esterase, UA Small (1+)     Nitrite, UA Negative     Urobilinogen, UA 0.2 E.U./dL    Urinalysis, Microscopic Only - Urine, Clean Catch [[id number]]  (Abnormal) Collected:  07/25/20 0254    Specimen:  Urine, Clean Catch Updated:  07/25/20 0310     RBC, UA 13-20 /HPF      WBC, UA 0-2 /HPF      Bacteria, UA None Seen /HPF      Squamous Epithelial Cells, UA 0-2 /HPF      Hyaline Casts, UA 3-6 /LPF      Methodology Automated Microscopy    CBC & Differential [[id number]] Collected:  07/25/20 0254    Specimen:  Blood Updated:  07/25/20 0304    Narrative:       The following orders were created for panel order CBC & Differential.  Procedure                               Abnormality         Status                     ---------                               -----------         ------                     CBC Auto Differential[[id number]]        Abnormal            Final result                 Please view results for these tests on the individual orders.    CBC Auto Differential [[id number]]  (Abnormal) Collected:  07/25/20 0254    Specimen:  Blood Updated:  07/25/20 0304     WBC 7.90 10*3/mm3      RBC 4.02 10*6/mm3      Hemoglobin 11.1 g/dL      Hematocrit 34.4 %      MCV 85.4 fL      MCH 27.6 pg      MCHC 32.3 g/dL      RDW 14.1 %      RDW-SD 42.4 fl      MPV 6.6 fL      Platelets 514 10*3/mm3      Neutrophil % 69.8 %      Lymphocyte % 14.0 %      Monocyte % 14.2 %      Eosinophil % 1.5 %      Basophil % 0.5 %      Neutrophils, Absolute 5.50 10*3/mm3      Lymphocytes, Absolute 1.10 10*3/mm3      Monocytes, Absolute 1.10 10*3/mm3      Eosinophils, Absolute 0.10 10*3/mm3      Basophils, Absolute 0.00 10*3/mm3      nRBC 0.0 /100 WBC                  Imaging Results (All)     Procedure Component Value Units Date/Time    CT Abdomen Pelvis Without  Contrast [[id number]] Collected:  07/25/20 0145     Updated:  07/25/20 0410    Narrative:       EXAM:  CT SCAN OF THE ABDOMEN AND PELVIS WITHOUT INTRAVENOUS CONTRAST  DATE: 7/25/2020 3:27 AM    HISTORY: Left flank pain  TECHNIQUE: CT examination of the abdomen and pelvis with sagittal and coronal reformations was performed without intravenous contrast.  CT dose lowering techniques were used, to include: automated exposure control, adjustment for patient size, and/or use   of iterative reconstruction. Note: The exam is limited because some types of pathology may not be adequately demonstrated due to lack of contrast enhancement.  COMPARISON: 7/18/2020.       FINDINGS:    ABDOMEN/PELVIS:  Lower Chest: Left lower lung ovoid 4 mm nodule suggestive of a lymph node  Liver: Normal.    Gallbladder/Biliary: Cholelithiasis  Pancreas: Normal.   Spleen: Normal.    Adrenal Glands: Normal.   Kidneys/Ureters/Bladder: Normal kidneys. Bladder extends below the pelvic floor.  GI Tract: Large hiatus hernia. Mild thickening of the proximal sigmoid colon, possibly with luminal stricture (series 3/40).  Upstream colon is distended with stool, with mild wall thickening and adjacent inflammation, diverticulosis. Moderate stool   volume.   Mesentery/Peritoneum: Inflammation near the left colon.  Reproductive: Hysterectomy  Vasculature: Normal.     Lymph Nodes: Normal  Abdominal Wall: Normal.   Musculoskeletal: Multilevel degenerative disc, partial fusion of T12 and L1, with mild spinal curvature.      Impression:         1.  Thickened proximal sigmoid colon possibly with luminal narrowing, upstream bowel wall thickening, inflammation, diverticulosis. Differential includes colitis, diverticulitis, fixed narrowing due to infiltrative process, mass/malignancy is also   possible, with upstream constipation. Wall thickening has improved from prior. Consider eventual colonoscopy.    2.  Question cystocele.    3.  Large hiatus  "hernia.    Electronically signed by:  Shea Hoskins    7/25/2020 2:09 AM            Vital Signs  Visit Vitals  /78 (BP Location: Right arm, Patient Position: Lying)   Pulse 84   Temp 99 °F (37.2 °C) (Oral)   Resp 15   Ht 160 cm (63\")   Wt 74.4 kg (164 lb)   SpO2 94%   Breastfeeding No   BMI 29.05 kg/m²       Physical Exam:  Physical Exam      Discharge Medications     Discharge Medications      New Medications      Instructions Start Date   ciprofloxacin 500 MG tablet  Commonly known as:  Cipro   500 mg, Oral, 2 Times Daily      docusate sodium 100 MG capsule   100 mg, Oral, 2 Times Daily PRN      metroNIDAZOLE 500 MG tablet  Commonly known as:  Flagyl   500 mg, Oral, 3 Times Daily      polyethylene glycol 17 g packet  Commonly known as:  MIRALAX   17 g, Oral, Daily PRN         Continue These Medications      Instructions Start Date   alendronate 70 MG tablet  Commonly known as:  FOSAMAX   TAKE 1 TABLET BY MOUTH EVERY 7 DAYS      CIDAFLEX PO   2 tablets, Oral, Daily      [facility] Lysine 500 MG tablet  Generic drug:  lysine   1 tablet, Oral, Daily PRN      famciclovir 500 MG tablet  Commonly known as:  FAMVIR   TAKE 3 TABLETS BY MOUTH AS A SINGLE DOSE AT FIRST SIGN OF COLD SORE      fluticasone 50 MCG/ACT nasal spray  Commonly known as:  FLONASE   INSTILL 2 SPRAYS IN EACH NOSTRIL DAILY      hydroCHLOROthiazide 25 MG tablet  Commonly known as:  HYDRODIURIL   25 mg, Oral, Daily      loratadine 10 MG tablet  Commonly known as:  CLARITIN   TAKE 1 TABLET BY MOUTH EVERY DAY      losartan 100 MG tablet  Commonly known as:  COZAAR   100 mg, Oral, Daily      Multi-Vitamin Daily tablet   1 tablet, Oral, Daily      omeprazole 20 MG capsule  Commonly known as:  priLOSEC   TAKE ONE CAPSULE BY MOUTH EVERY MORNING ON AN EMPTY STOMACH, 30 MINUTES BEFORE EATING OR OTHER MEDICATION      Ventolin  (90 Base) MCG/ACT inhaler  Generic drug:  albuterol sulfate HFA   Two inhalations every 4-6 hours as needed for SOA.          Stop " These Medications    CALCIUM 500 + D PO     CHELATED MAGNESIUM PO            Discharge Diet:   Diet Instructions     Diet: Regular      Discharge Diet:  Regular          Activity at Discharge:   Activity Instructions     Activity as Tolerated            Follow-up Appointments  Future Appointments   Date Time Provider Department Center   7/29/2020  8:30 AM [facility] Phillip [facility]     Additional Instructions for the Follow-ups that You Need to Schedule     Discharge Follow-up with PCP   As directed       Currently Documented PCP:    Munira So APRN    PCP Phone Number:    [phone]     Follow Up Details:  2-3 days                   Janet Hicks MD  07/26/20  10:18    Time: Discharge 38 min

## 2020-07-27 ENCOUNTER — TRANSITIONAL CARE MANAGEMENT TELEPHONE ENCOUNTER (OUTPATIENT)
Dept: CALL CENTER | Facility: HOSPITAL | Age: 75
End: 2020-07-27

## 2020-07-27 NOTE — OUTREACH NOTE
Prep Survey      Responses   [facility]?  [facility]   Is LACE score < 7 ?  Yes   Eligibility  [facility]   Hospital  [facility]   Date of Admission  07/25/20   Date of Discharge  07/26/20   Discharge Disposition  Home or Self Care   Discharge diagnosis  abdominal pain, colitis vs diverticulitis, constipation   COVID-19 Test Status  Not tested   Does the patient have one of the following disease processes/diagnoses(primary or secondary)?  Other   Does the patient have Home health ordered?  No   Is there a DME ordered?  No   Prep survey completed?  Yes          Vivian Josue RN

## 2020-07-27 NOTE — OUTREACH NOTE
Call Center TCM Note      Responses   [facility] patient discharged from?  [facility]   COVID-19 Test Status  Not tested   Does the patient have one of the following disease processes/diagnoses(primary or secondary)?  Other   TCM attempt successful?  No   Unsuccessful attempts  Attempt 1          Yulia Bartlett LPN    7/27/2020, 09:58

## 2020-07-28 ENCOUNTER — TRANSITIONAL CARE MANAGEMENT TELEPHONE ENCOUNTER (OUTPATIENT)
Dept: CALL CENTER | Facility: HOSPITAL | Age: 75
End: 2020-07-28

## 2020-07-28 NOTE — OUTREACH NOTE
Call Center TCM Note      Responses   [facility] patient discharged from?  [facility]   COVID-19 Test Status  Not tested   Does the patient have one of the following disease processes/diagnoses(primary or secondary)?  Other   TCM attempt successful?  Yes   Call start time  1200   Call end time  1203   Discharge diagnosis  abdominal pain, colitis vs diverticulitis, constipation   Meds reviewed with patient/caregiver?  Yes   Is the patient having any side effects they believe may be caused by any medication additions or changes?  No   Does the patient have all medications ordered at discharge?  Yes   Is the patient taking all medications as directed (includes completed medication regime)?  Yes   Does the patient have a primary care provider?   Yes   Does the patient have an appointment with their PCP within 7 days of discharge?  Yes   Has the patient kept scheduled appointments due by today?  N/A   Has home health visited the patient within 72 hours of discharge?  N/A   Pulse Ox monitoring  None   Did the patient receive a copy of their discharge instructions?  Yes   Nursing interventions  Reviewed instructions with patient   What is the patient's perception of their health status since discharge?  Improving   Is the patient/caregiver able to teach back signs and symptoms related to disease process for when to call PCP?  Yes   Is the patient/caregiver able to teach back signs and symptoms related to disease process for when to call 911?  Yes   Is the patient/caregiver able to teach back the hierarchy of who to call/visit for symptoms/problems? PCP, Specialist, Home health nurse, Urgent Care, ED, 911  Yes   Additional teach back comments  States she is not having any abdominal pain but things haven't returned to regular.  She is taking medications as prescribed and has appt with Ratna WANG tomorrow    TCM call completed?  Yes   Wrap up additional comments  Denies questions or needs at this time          Jaylene  GEO Manzo    7/28/2020, 12:05

## 2020-07-29 ENCOUNTER — OFFICE VISIT (OUTPATIENT)
Dept: FAMILY MEDICINE CLINIC | Facility: CLINIC | Age: 75
End: 2020-07-29

## 2020-07-29 VITALS
RESPIRATION RATE: 16 BRPM | WEIGHT: 163 LBS | TEMPERATURE: 97.8 F | HEIGHT: 63 IN | OXYGEN SATURATION: 100 % | HEART RATE: 92 BPM | BODY MASS INDEX: 28.88 KG/M2 | SYSTOLIC BLOOD PRESSURE: 154 MMHG | DIASTOLIC BLOOD PRESSURE: 89 MMHG

## 2020-07-29 DIAGNOSIS — D64.9 ANEMIA, UNSPECIFIED TYPE: ICD-10-CM

## 2020-07-29 DIAGNOSIS — K57.92 DIVERTICULITIS: Primary | ICD-10-CM

## 2020-07-29 DIAGNOSIS — E87.6 HYPOKALEMIA: ICD-10-CM

## 2020-07-29 DIAGNOSIS — K59.09 OTHER CONSTIPATION: ICD-10-CM

## 2020-07-29 PROCEDURE — 99496 TRANSJ CARE MGMT HIGH F2F 7D: CPT | Performed by: NURSE PRACTITIONER

## 2020-07-29 RX ORDER — POTASSIUM CHLORIDE 20 MEQ/1
20 TABLET, EXTENDED RELEASE ORAL DAILY
Qty: 90 TABLET | Refills: 0 | Status: SHIPPED | OUTPATIENT
Start: 2020-07-29 | End: 2020-10-14

## 2020-07-29 RX ORDER — POTASSIUM CHLORIDE 20 MEQ/1
20 TABLET, EXTENDED RELEASE ORAL DAILY
Qty: 30 TABLET | Refills: 1 | Status: SHIPPED | OUTPATIENT
Start: 2020-07-29 | End: 2020-07-29

## 2020-07-29 NOTE — PROGRESS NOTES
Chief Complaint   Patient presents with   • Transitional Care Management     Seen at [facility] on 07/25/2020   • Constipation     Seen at [facility] on 07/23/2020   • GI Problem     Colitis at [facility] on 07/18/2020   • Abdominal Pain        Subjective   Yancy Mcdonnell is a 75 y.o. female who presents today for transitional care visit    HPI: Patient has had 2 weeks of abdominal pain and decreased stools.  She was an inpatient at [facility] for 2 days and diagnosed with diverticulitis.  She was sent home with Flagyl and Cipro.  She continued to have abdominal pain that was very sharp in the left lower quadrant she was admitted to [facility] on 7/25/2020 per 24 hours.  She had IV antibiotics as well as restarting the Cipro and Flagyl.  She had a great deal of constipation and was given a prescription for 7 more days of Cipro and Flagyl on discharge.  She is getting those filled today she had some remaining from her previous prescription from [facility].  She is taking Colace but has not gotten the MiraLAX.  She felt like that might take care of it.  After conversation today she will get the MiraLAX as well.  She did take some fiber yesterday not wanting to go out to the pharmacy.  She is not having regular stools she still having a lot of constipation and small amounts of stool.  Her pain is much better she has no fever no nausea no vomiting.  Patient does feel somewhat fatigued.  She has had potassium replacement at both hospitalizations.  She currently is not on normal potassium.  She also had some anemia in her visit at [facility].  Hemoglobin went from 9 up to 11 on second recheck.  We will recheck her CBC and CMP today.  Patient has no other complaints or concerns    Yancy Mcdonnell  has a past medical history of Allergic rhinitis (06/02/2015), Body mass index 29.0-29.9, adult (09/12/2018), Cervical spinal stenosis (08/07/2017), Cholelithiasis, DDD (degenerative disc disease), cervical, DDD  (degenerative disc disease), lumbar, DDD (degenerative disc disease), thoracic, Diverticulosis, Dyslipidemia (12/05/2012), Elevated brain natriuretic peptide (BNP) level (01/06/2017), Eustachian tube disorder, right (02/18/2019), Fatigue (06/02/2015), Female cystocele (06/02/2015), Hiatal hernia with gastroesophageal reflux (01/06/2017), History of DVT (deep vein thrombosis) (06/02/2015), History of herpes simplex infection (7/25/2020), Hyperlipidemia, Hypertension (06/10/2016), Left rib fracture (05/2017), Migraine headache (06/02/2015), Mitral insufficiency (01/16/2017), Osteoarthritis (05/20/2014), Osteopenia (12/05/2012), Over weight (06/14/2018), Peripheral edema (01/05/2017), PFO (patent foramen ovale) (01/16/2017), Pulmonary nodule (05/04/2017), RHA (rheumatoid arthritis) (CMS/[facility]) (12/05/2012), Scleritis (06/02/2015), Scoliosis, Tricuspid insufficiency, Varicose veins of other specified sites, and Venous insufficiency (09/12/2018).    Allergies   Allergen Reactions   • Demerol [Meperidine] Unknown (See Comments)     Abstracted from [facility].   • Other Unknown (See Comments)     Sulfa (sulfadiazine)    • Sulfa Antibiotics Unknown - High Severity   • Lisinopril Cough       Current Outpatient Medications:   •  albuterol sulfate HFA (VENTOLIN HFA) 108 (90 Base) MCG/ACT inhaler, Two inhalations every 4-6 hours as needed for SOA., Disp: , Rfl:   •  alendronate (FOSAMAX) 70 MG tablet, TAKE 1 TABLET BY MOUTH EVERY 7 DAYS, Disp: 12 tablet, Rfl: 1  •  ciprofloxacin (Cipro) 500 MG tablet, Take 1 tablet by mouth 2 (Two) Times a Day for 7 days., Disp: 14 tablet, Rfl: 0  •  docusate sodium 100 MG capsule, Take 100 mg by mouth 2 (Two) Times a Day As Needed for Constipation for up to 10 days., Disp: 1 each, Rfl: 0  •  famciclovir (FAMVIR) 500 MG tablet, TAKE 3 TABLETS BY MOUTH AS A SINGLE DOSE AT FIRST SIGN OF COLD SORE, Disp: 3 tablet, Rfl: 5  •  fluticasone (FLONASE) 50 MCG/ACT nasal spray, INSTILL 2 SPRAYS IN EACH  NOSTRIL DAILY, Disp: 48 g, Rfl: 11  •  Glucosamine-Chondroitin (CIDAFLEX PO), Take 2 tablets by mouth Daily., Disp: , Rfl:   •  hydroCHLOROthiazide (HYDRODIURIL) 25 MG tablet, Take 1 tablet by mouth Daily., Disp: 90 tablet, Rfl: 1  •  loratadine (CLARITIN) 10 MG tablet, TAKE 1 TABLET BY MOUTH EVERY DAY, Disp: 90 tablet, Rfl: 1  •  losartan (COZAAR) 100 MG tablet, TAKE 1 TABLET BY MOUTH DAILY, Disp: 90 tablet, Rfl: 0  •  lysine ([facility] LYSINE) 500 MG tablet, Take 1 tablet by mouth Daily As Needed., Disp: , Rfl:   •  metroNIDAZOLE (Flagyl) 500 MG tablet, Take 1 tablet by mouth 3 (Three) Times a Day for 7 days., Disp: 21 tablet, Rfl: 0  •  Multiple Vitamin (MULTI-VITAMIN DAILY) tablet, Take 1 tablet by mouth Daily., Disp: , Rfl:   •  omeprazole (priLOSEC) 20 MG capsule, TAKE ONE CAPSULE BY MOUTH EVERY MORNING ON AN EMPTY STOMACH, 30 MINUTES BEFORE EATING OR OTHER MEDICATION, Disp: 90 capsule, Rfl: 1  •  polyethylene glycol (MIRALAX) 17 g packet, Take 17 g by mouth Daily As Needed (constipation) for up to 10 days., Disp: 10 packet, Rfl: 0  •  potassium chloride (K-DUR,KLOR-CON) 20 MEQ CR tablet, Take 1 tablet by mouth Daily., Disp: 30 tablet, Rfl: 1  Past Medical History:   Diagnosis Date   • Allergic rhinitis 06/02/2015   • Body mass index 29.0-29.9, adult 09/12/2018   • Cervical spinal stenosis 08/07/2017   • Cholelithiasis    • DDD (degenerative disc disease), cervical    • DDD (degenerative disc disease), lumbar    • DDD (degenerative disc disease), thoracic    • Diverticulosis    • Dyslipidemia 12/05/2012   • Elevated brain natriuretic peptide (BNP) level 01/06/2017   • Eustachian tube disorder, right 02/18/2019   • Fatigue 06/02/2015   • Female cystocele 06/02/2015   • Hiatal hernia with gastroesophageal reflux 01/06/2017   • History of DVT (deep vein thrombosis) 06/02/2015   • History of herpes simplex infection 7/25/2020   • Hyperlipidemia    • Hypertension 06/10/2016   • Left rib fracture 05/2017    11TH RIB    •  Migraine headache 06/02/2015   • Mitral insufficiency 01/16/2017    MILD   • Osteoarthritis 05/20/2014   • Osteopenia 12/05/2012   • Over weight 06/14/2018   • Peripheral edema 01/05/2017   • PFO (patent foramen ovale) 01/16/2017   • Pulmonary nodule 05/04/2017    DR CHUN FOLLOWS   • RHA (rheumatoid arthritis) (CMS/HCC) 12/05/2012   • Scleritis 06/02/2015   • Scoliosis    • Tricuspid insufficiency     MILD   • Varicose veins of other specified sites    • Venous insufficiency 09/12/2018     Past Surgical History:   Procedure Laterality Date   • COLONOSCOPY  07/20/2017    EGD/ COLONSCOPY LARGE HIATAL HERNIA. MILD DIVERTICULOSIS. OTHERWISE NORMAL.   • INGUINAL HERNIA REPAIR Right    • TOTAL ABDOMINAL HYSTERECTOMY  1985    W/BSO WITH A/P REPAIR 1985 BENIGN REASONS DR. DRAKE     Social History     Socioeconomic History   • Marital status:      Spouse name: Not on file   • Number of children: Not on file   • Years of education: Not on file   • Highest education level: Not on file   Tobacco Use   • Smoking status: Never Smoker   • Smokeless tobacco: Never Used   Substance and Sexual Activity   • Alcohol use: No     Frequency: Never   • Drug use: No   • Sexual activity: Defer     Family History   Problem Relation Age of Onset   • Osteoporosis Mother    • Stroke Mother 80   • Dementia Mother    • Heart disease Father    • Hypertension Father    • Heart disease Sister    • Cancer Daughter         BREAST AND MELANOMA   • Breast cancer Daughter 52   • Cancer Son         MELANOMA   • Cancer Grandson         MELANOMA       Family history, surgical history, past medical history, Allergies and med's reviewed with patient today and updated in EPIC EMR.   PHQ-2 Depression Screening  Little interest or pleasure in doing things?     Feeling down, depressed, or hopeless?     PHQ-2 Total Score     PHQ-9 Depression Screening  Little interest or pleasure in doing things?     Feeling down, depressed, or hopeless?     Trouble falling  "or staying asleep, or sleeping too much?     Feeling tired or having little energy?     Poor appetite or overeating?     Feeling bad about yourself - or that you are a failure or have let yourself or your family down?     Trouble concentrating on things, such as reading the newspaper or watching television?     Moving or speaking so slowly that other people could have noticed? Or the opposite - being so fidgety or restless that you have been moving around a lot more than usual?     Thoughts that you would be better off dead, or of hurting yourself in some way?     PHQ-9 Total Score     If you checked off any problems, how difficult have these problems made it for you to do your work, take care of things at home, or get along with other people?       ROS:  Review of Systems   Constitutional: Negative for fatigue and fever.   HENT: Negative for ear pain, postnasal drip and sinus pressure.    Eyes: Negative for visual disturbance.   Respiratory: Negative for cough and shortness of breath.    Cardiovascular: Negative for chest pain and palpitations.   Gastrointestinal: Positive for abdominal pain and constipation.       OBJECTIVE:  Vitals:    07/29/20 0817   BP: 154/89   BP Location: Right arm   Patient Position: Sitting   Cuff Size: Adult   Pulse: 92   Resp: 16   Temp: 97.8 °F (36.6 °C)   TempSrc: Temporal   SpO2: 100%   Weight: 73.9 kg (163 lb)   Height: 160 cm (63\")     Physical Exam   Constitutional: She appears well-developed and well-nourished.   Cardiovascular: Normal rate, regular rhythm and normal heart sounds. Exam reveals no gallop and no friction rub.   No murmur heard.  Pulmonary/Chest: Effort normal and breath sounds normal. She has no wheezes. She has no rales.   Abdominal: Soft. Bowel sounds are normal. There is no tenderness.   Neurological: She is alert.   Skin: Skin is warm and dry.   Nursing note and vitals reviewed.      ASSESSMENT/ PLAN:    Yancy was seen today for transitional care management, " constipation, gi problem and abdominal pain.    Diagnoses and all orders for this visit:    Diverticulitis  Comments:  Finish antibiotic and GI consult will be scheduled.  Follow-up 2 weeks here.  To be sure symptoms are improving  Orders:  -     CBC & Differential  -     Ambulatory Referral to Gastroenterology    Hypokalemia  Comments:  Start potassium and repeat labs today  Orders:  -     CBC & Differential  -     Comprehensive Metabolic Panel    Anemia, unspecified type  Comments:  Lab repeat today  Orders:  -     CBC & Differential  -     Ambulatory Referral to Gastroenterology    Other constipation  Comments:  Highly encouraged to get MiraLAX and start it.  Discussed constipation in detail.    Other orders  -     potassium chloride (K-DUR,KLOR-CON) 20 MEQ CR tablet; Take 1 tablet by mouth Daily.        Orders Placed Today:     New Medications Ordered This Visit   Medications   • potassium chloride (K-DUR,KLOR-CON) 20 MEQ CR tablet     Sig: Take 1 tablet by mouth Daily.     Dispense:  30 tablet     Refill:  1        Management Plan:     An After Visit Summary was printed and given to the patient at discharge.    Follow-up: Return in about 2 weeks (around 8/12/2020) for Recheck diverticulitis.    FELIPE Mays 7/29/2020 09:11  This note was electronically signed.

## 2020-07-30 DIAGNOSIS — I10 ESSENTIAL HYPERTENSION: ICD-10-CM

## 2020-07-30 LAB
ALBUMIN SERPL-MCNC: 4 G/DL (ref 3.7–4.7)
ALBUMIN/GLOB SERPL: 1.7 {RATIO} (ref 1.2–2.2)
ALP SERPL-CCNC: 101 IU/L (ref 39–117)
ALT SERPL-CCNC: 17 IU/L (ref 0–32)
AST SERPL-CCNC: 14 IU/L (ref 0–40)
BASOPHILS # BLD AUTO: 0 X10E3/UL (ref 0–0.2)
BASOPHILS NFR BLD AUTO: 0 %
BILIRUB SERPL-MCNC: 0.5 MG/DL (ref 0–1.2)
BUN SERPL-MCNC: 7 MG/DL (ref 8–27)
BUN/CREAT SERPL: 10 (ref 12–28)
CALCIUM SERPL-MCNC: 9.4 MG/DL (ref 8.7–10.3)
CHLORIDE SERPL-SCNC: 102 MMOL/L (ref 96–106)
CO2 SERPL-SCNC: 26 MMOL/L (ref 20–29)
CREAT SERPL-MCNC: 0.7 MG/DL (ref 0.57–1)
EOSINOPHIL # BLD AUTO: 0.1 X10E3/UL (ref 0–0.4)
EOSINOPHIL NFR BLD AUTO: 1 %
ERYTHROCYTE [DISTWIDTH] IN BLOOD BY AUTOMATED COUNT: 13.4 % (ref 11.7–15.4)
GLOBULIN SER CALC-MCNC: 2.4 G/DL (ref 1.5–4.5)
GLUCOSE SERPL-MCNC: 95 MG/DL (ref 65–99)
HCT VFR BLD AUTO: 37.4 % (ref 34–46.6)
HGB BLD-MCNC: 11.9 G/DL (ref 11.1–15.9)
IMM GRANULOCYTES # BLD AUTO: 0 X10E3/UL (ref 0–0.1)
IMM GRANULOCYTES NFR BLD AUTO: 0 %
LYMPHOCYTES # BLD AUTO: 1.3 X10E3/UL (ref 0.7–3.1)
LYMPHOCYTES NFR BLD AUTO: 19 %
MCH RBC QN AUTO: 28.1 PG (ref 26.6–33)
MCHC RBC AUTO-ENTMCNC: 31.8 G/DL (ref 31.5–35.7)
MCV RBC AUTO: 88 FL (ref 79–97)
MONOCYTES # BLD AUTO: 0.8 X10E3/UL (ref 0.1–0.9)
MONOCYTES NFR BLD AUTO: 10 %
NEUTROPHILS # BLD AUTO: 5 X10E3/UL (ref 1.4–7)
NEUTROPHILS NFR BLD AUTO: 70 %
PLATELET # BLD AUTO: 584 X10E3/UL (ref 150–450)
POTASSIUM SERPL-SCNC: 3.9 MMOL/L (ref 3.5–5.2)
PROT SERPL-MCNC: 6.4 G/DL (ref 6–8.5)
RBC # BLD AUTO: 4.24 X10E6/UL (ref 3.77–5.28)
SODIUM SERPL-SCNC: 140 MMOL/L (ref 134–144)
WBC # BLD AUTO: 7.2 X10E3/UL (ref 3.4–10.8)

## 2020-07-30 RX ORDER — LOSARTAN POTASSIUM 100 MG/1
100 TABLET ORAL DAILY
Qty: 90 TABLET | Refills: 0 | Status: SHIPPED | OUTPATIENT
Start: 2020-07-30 | End: 2020-10-28

## 2020-07-30 RX ORDER — LORATADINE 10 MG/1
TABLET ORAL
Qty: 90 TABLET | Refills: 1 | Status: SHIPPED | OUTPATIENT
Start: 2020-07-30 | End: 2021-01-11

## 2020-08-12 ENCOUNTER — TRANSCRIBE ORDERS (OUTPATIENT)
Dept: ADMINISTRATIVE | Facility: HOSPITAL | Age: 75
End: 2020-08-12

## 2020-08-12 ENCOUNTER — OFFICE VISIT (OUTPATIENT)
Dept: FAMILY MEDICINE CLINIC | Facility: CLINIC | Age: 75
End: 2020-08-12

## 2020-08-12 VITALS
RESPIRATION RATE: 16 BRPM | DIASTOLIC BLOOD PRESSURE: 87 MMHG | BODY MASS INDEX: 28.35 KG/M2 | OXYGEN SATURATION: 99 % | TEMPERATURE: 97.8 F | HEART RATE: 63 BPM | WEIGHT: 160 LBS | HEIGHT: 63 IN | SYSTOLIC BLOOD PRESSURE: 153 MMHG

## 2020-08-12 DIAGNOSIS — Z23 NEED FOR ZOSTER VACCINE: ICD-10-CM

## 2020-08-12 DIAGNOSIS — E87.6 HYPOKALEMIA: ICD-10-CM

## 2020-08-12 DIAGNOSIS — Z12.31 VISIT FOR SCREENING MAMMOGRAM: Primary | ICD-10-CM

## 2020-08-12 DIAGNOSIS — D64.9 ANEMIA, UNSPECIFIED TYPE: ICD-10-CM

## 2020-08-12 DIAGNOSIS — K57.92 DIVERTICULITIS: Primary | ICD-10-CM

## 2020-08-12 PROCEDURE — 99213 OFFICE O/P EST LOW 20 MIN: CPT | Performed by: NURSE PRACTITIONER

## 2020-08-12 NOTE — PROGRESS NOTES
Chief Complaint   Patient presents with   • Diverticulitis        Subjective   Yancy Mcdonnell is a 75 y.o. female who presents today for follow-up and diverticulitis    HPI: Patient was hospitalized at [facility] after having a rather significant bout of diverticulitis.  Previously she had been seen the week before and treated at [facility].  But the symptoms returned evening after she had finished the antibiotics.  She was seen post hospital here approximately 2 weeks ago.  She is completed the antibiotics and the left lower quadrant pain is better.  She has elected not to take the Colace or MiraLAX feeling like that does not do any good for her.  She thinks she has good enough bowel movements with no assistance.  She has no fever no nausea or vomiting.  She is increasing her activity but still somewhat fatigued.    She needs a repeat on her electrolytes as well as her CBC today.  She had a hypokalemia during her diarrhea associated with the diverticulitis.  She has supplement of potassium.  Patient also takes hydrochlorothiazide.  Because she received a prescription that was 12.5 mg hydrochlorothiazide and  25 she took them both for last few days.  She did that reasoning that her legs were somewhat swollen and she thinks it has helped.  She does recognize that her dose was increased to 25 and that is all she should have been taking.    Patient is requesting a prescription for zoster vaccine 2 series.    Yancy Mcdonnell  has a past medical history of Allergic rhinitis (06/02/2015), Body mass index 29.0-29.9, adult (09/12/2018), Cervical spinal stenosis (08/07/2017), Cholelithiasis, DDD (degenerative disc disease), cervical, DDD (degenerative disc disease), lumbar, DDD (degenerative disc disease), thoracic, Diverticulosis, Dyslipidemia (12/05/2012), Elevated brain natriuretic peptide (BNP) level (01/06/2017), Eustachian tube disorder, right (02/18/2019), Fatigue (06/02/2015), Female  cystocele (06/02/2015), Hiatal hernia with gastroesophageal reflux (01/06/2017), History of DVT (deep vein thrombosis) (06/02/2015), History of herpes simplex infection (7/25/2020), Hyperlipidemia, Hypertension (06/10/2016), Left rib fracture (05/2017), Migraine headache (06/02/2015), Mitral insufficiency (01/16/2017), Osteoarthritis (05/20/2014), Osteopenia (12/05/2012), Over weight (06/14/2018), Peripheral edema (01/05/2017), PFO (patent foramen ovale) (01/16/2017), Pulmonary nodule (05/04/2017), RHA (rheumatoid arthritis) (CMS/[facility]) (12/05/2012), Scleritis (06/02/2015), Scoliosis, Tricuspid insufficiency, Varicose veins of other specified sites, and Venous insufficiency (09/12/2018).    Allergies   Allergen Reactions   • Demerol [Meperidine] Unknown (See Comments)     Abstracted from [facility].   • Other Unknown (See Comments)     Sulfa (sulfadiazine)    • Sulfa Antibiotics Unknown - High Severity   • Lisinopril Cough       Current Outpatient Medications:   •  fluticasone (FLONASE) 50 MCG/ACT nasal spray, INSTILL 2 SPRAYS IN EACH NOSTRIL DAILY, Disp: 48 g, Rfl: 11  •  hydroCHLOROthiazide (HYDRODIURIL) 25 MG tablet, Take 1 tablet by mouth Daily. (Patient taking differently: Take 75 mg by mouth Daily.), Disp: 90 tablet, Rfl: 1  •  loratadine (CLARITIN) 10 MG tablet, TAKE 1 TABLET BY MOUTH EVERY DAY, Disp: 90 tablet, Rfl: 1  •  losartan (COZAAR) 100 MG tablet, TAKE 1 TABLET BY MOUTH DAILY, Disp: 90 tablet, Rfl: 0  •  lysine ([facility]S LYSINE) 500 MG tablet, Take 1 tablet by mouth Daily As Needed., Disp: , Rfl:   •  Multiple Vitamin (MULTI-VITAMIN DAILY) tablet, Take 1 tablet by mouth Daily., Disp: , Rfl:   •  potassium chloride (K-DUR,KLOR-CON) 20 MEQ CR tablet, TAKE 1 TABLET BY MOUTH DAILY, Disp: 90 tablet, Rfl: 0  •  albuterol sulfate HFA (VENTOLIN HFA) 108 (90 Base) MCG/ACT inhaler, Two inhalations every 4-6 hours as needed for SOA., Disp: , Rfl:   •  alendronate (FOSAMAX) 70 MG tablet, TAKE 1 TABLET BY MOUTH EVERY 7  DAYS, Disp: 12 tablet, Rfl: 1  •  famciclovir (FAMVIR) 500 MG tablet, TAKE 3 TABLETS BY MOUTH AS A SINGLE DOSE AT FIRST SIGN OF COLD SORE, Disp: 3 tablet, Rfl: 5  •  Glucosamine-Chondroitin (CIDAFLEX PO), Take 2 tablets by mouth Daily., Disp: , Rfl:   •  omeprazole (priLOSEC) 20 MG capsule, TAKE ONE CAPSULE BY MOUTH EVERY MORNING ON AN EMPTY STOMACH, 30 MINUTES BEFORE EATING OR OTHER MEDICATION, Disp: 90 capsule, Rfl: 1  Past Medical History:   Diagnosis Date   • Allergic rhinitis 06/02/2015   • Body mass index 29.0-29.9, adult 09/12/2018   • Cervical spinal stenosis 08/07/2017   • Cholelithiasis    • DDD (degenerative disc disease), cervical    • DDD (degenerative disc disease), lumbar    • DDD (degenerative disc disease), thoracic    • Diverticulosis    • Dyslipidemia 12/05/2012   • Elevated brain natriuretic peptide (BNP) level 01/06/2017   • Eustachian tube disorder, right 02/18/2019   • Fatigue 06/02/2015   • Female cystocele 06/02/2015   • Hiatal hernia with gastroesophageal reflux 01/06/2017   • History of DVT (deep vein thrombosis) 06/02/2015   • History of herpes simplex infection 7/25/2020   • Hyperlipidemia    • Hypertension 06/10/2016   • Left rib fracture 05/2017    11TH RIB    • Migraine headache 06/02/2015   • Mitral insufficiency 01/16/2017    MILD   • Osteoarthritis 05/20/2014   • Osteopenia 12/05/2012   • Over weight 06/14/2018   • Peripheral edema 01/05/2017   • PFO (patent foramen ovale) 01/16/2017   • Pulmonary nodule 05/04/2017    DR CHUN FOLLOWS   • RHA (rheumatoid arthritis) (CMS/HCC) 12/05/2012   • Scleritis 06/02/2015   • Scoliosis    • Tricuspid insufficiency     MILD   • Varicose veins of other specified sites    • Venous insufficiency 09/12/2018     Past Surgical History:   Procedure Laterality Date   • COLONOSCOPY  07/20/2017    EGD/ COLONSCOPY LARGE HIATAL HERNIA. MILD DIVERTICULOSIS. OTHERWISE NORMAL.   • INGUINAL HERNIA REPAIR Right    • TOTAL ABDOMINAL HYSTERECTOMY  1985    W/BSO  WITH A/P REPAIR 1985 BENIGN REASONS DR. DRAKE     Social History     Socioeconomic History   • Marital status:      Spouse name: Not on file   • Number of children: Not on file   • Years of education: Not on file   • Highest education level: Not on file   Tobacco Use   • Smoking status: Never Smoker   • Smokeless tobacco: Never Used   Substance and Sexual Activity   • Alcohol use: No     Frequency: Never   • Drug use: No   • Sexual activity: Defer     Family History   Problem Relation Age of Onset   • Osteoporosis Mother    • Stroke Mother 80   • Dementia Mother    • Heart disease Father    • Hypertension Father    • Heart disease Sister    • Cancer Daughter         BREAST AND MELANOMA   • Breast cancer Daughter 52   • Cancer Son         MELANOMA   • Cancer Grandson         MELANOMA       Family history, surgical history, past medical history, Allergies and med's reviewed with patient today and updated in [facility] EMR.   PHQ-2 Depression Screening  Little interest or pleasure in doing things?     Feeling down, depressed, or hopeless?     PHQ-2 Total Score     PHQ-9 Depression Screening  Little interest or pleasure in doing things?     Feeling down, depressed, or hopeless?     Trouble falling or staying asleep, or sleeping too much?     Feeling tired or having little energy?     Poor appetite or overeating?     Feeling bad about yourself - or that you are a failure or have let yourself or your family down?     Trouble concentrating on things, such as reading the newspaper or watching television?     Moving or speaking so slowly that other people could have noticed? Or the opposite - being so fidgety or restless that you have been moving around a lot more than usual?     Thoughts that you would be better off dead, or of hurting yourself in some way?     PHQ-9 Total Score     If you checked off any problems, how difficult have these problems made it for you to do your work, take care of things at home, or get along  "with other people?       ROS:  Review of Systems   Constitutional: Negative for fatigue and fever.   HENT: Negative for ear pain, postnasal drip and sinus pressure.    Eyes: Negative for visual disturbance.   Respiratory: Negative for cough and shortness of breath.    Cardiovascular: Negative for chest pain and palpitations.   Gastrointestinal: Negative for abdominal pain and constipation.       OBJECTIVE:  Vitals:    08/12/20 0923   BP: 153/87   BP Location: Left arm   Patient Position: Sitting   Cuff Size: Adult   Pulse: 63   Resp: 16   Temp: 97.8 °F (36.6 °C)   TempSrc: Temporal   SpO2: 99%   Weight: 72.6 kg (160 lb)   Height: 160 cm (63\")     Physical Exam   Constitutional: She appears well-developed and well-nourished.   Cardiovascular: Normal rate, regular rhythm and normal heart sounds. Exam reveals no gallop and no friction rub.   No murmur heard.  Pulmonary/Chest: Effort normal and breath sounds normal. She has no wheezes. She has no rales.   Abdominal: Soft. Bowel sounds are normal. There is no tenderness.   Neurological: She is alert.   Skin: Skin is warm and dry.   Nursing note and vitals reviewed.      ASSESSMENT/ PLAN:    Yancy was seen today for diverticulitis.    Diagnoses and all orders for this visit:    Diverticulitis  Comments:  Appears to be resolving well.  Patient is to schedule with [facility] and she is provided the number and she is going to make her own appointment.    Hypokalemia  Comments:  Supplement of potassium added.  Patient advised not to take both hydrochlorothiazide doses.  Orders:  -     Comprehensive Metabolic Panel    Anemia, unspecified type  Comments:  Recheck of labs today.  Had stabilized on last visit.  Platelets elevated likely reactive  Orders:  -     CBC & Differential    Need for zoster vaccine  Comments:  Prescription written at patient request to get the pharmacy        Orders Placed Today:     No orders of the defined types were placed in this encounter.       Management " Plan:     An After Visit Summary was printed and given to the patient at discharge.    Follow-up: No follow-ups on file.    FELIPE Mays 8/12/2020 10:00  This note was electronically signed.

## 2020-08-13 LAB
ALBUMIN SERPL-MCNC: 4.2 G/DL (ref 3.7–4.7)
ALBUMIN/GLOB SERPL: 2 {RATIO} (ref 1.2–2.2)
ALP SERPL-CCNC: 99 IU/L (ref 39–117)
ALT SERPL-CCNC: 12 IU/L (ref 0–32)
AST SERPL-CCNC: 18 IU/L (ref 0–40)
BASOPHILS # BLD AUTO: 0 X10E3/UL (ref 0–0.2)
BASOPHILS NFR BLD AUTO: 0 %
BILIRUB SERPL-MCNC: 0.5 MG/DL (ref 0–1.2)
BUN SERPL-MCNC: 20 MG/DL (ref 8–27)
BUN/CREAT SERPL: 26 (ref 12–28)
CALCIUM SERPL-MCNC: 9.4 MG/DL (ref 8.7–10.3)
CHLORIDE SERPL-SCNC: 104 MMOL/L (ref 96–106)
CO2 SERPL-SCNC: 27 MMOL/L (ref 20–29)
CREAT SERPL-MCNC: 0.77 MG/DL (ref 0.57–1)
EOSINOPHIL # BLD AUTO: 0.2 X10E3/UL (ref 0–0.4)
EOSINOPHIL NFR BLD AUTO: 3 %
ERYTHROCYTE [DISTWIDTH] IN BLOOD BY AUTOMATED COUNT: 13 % (ref 11.7–15.4)
GLOBULIN SER CALC-MCNC: 2.1 G/DL (ref 1.5–4.5)
GLUCOSE SERPL-MCNC: 83 MG/DL (ref 65–99)
HCT VFR BLD AUTO: 34.5 % (ref 34–46.6)
HGB BLD-MCNC: 11.1 G/DL (ref 11.1–15.9)
IMM GRANULOCYTES # BLD AUTO: 0 X10E3/UL (ref 0–0.1)
IMM GRANULOCYTES NFR BLD AUTO: 0 %
LYMPHOCYTES # BLD AUTO: 1.4 X10E3/UL (ref 0.7–3.1)
LYMPHOCYTES NFR BLD AUTO: 20 %
MCH RBC QN AUTO: 27.4 PG (ref 26.6–33)
MCHC RBC AUTO-ENTMCNC: 32.2 G/DL (ref 31.5–35.7)
MCV RBC AUTO: 85 FL (ref 79–97)
MONOCYTES # BLD AUTO: 0.9 X10E3/UL (ref 0.1–0.9)
MONOCYTES NFR BLD AUTO: 13 %
NEUTROPHILS # BLD AUTO: 4.4 X10E3/UL (ref 1.4–7)
NEUTROPHILS NFR BLD AUTO: 64 %
PLATELET # BLD AUTO: 470 X10E3/UL (ref 150–450)
POTASSIUM SERPL-SCNC: 4.7 MMOL/L (ref 3.5–5.2)
PROT SERPL-MCNC: 6.3 G/DL (ref 6–8.5)
RBC # BLD AUTO: 4.05 X10E6/UL (ref 3.77–5.28)
SODIUM SERPL-SCNC: 140 MMOL/L (ref 134–144)
WBC # BLD AUTO: 7 X10E3/UL (ref 3.4–10.8)

## 2020-09-01 ENCOUNTER — HOSPITAL ENCOUNTER (OUTPATIENT)
Dept: MAMMOGRAPHY | Facility: HOSPITAL | Age: 75
Discharge: HOME | End: 2020-09-01
Admitting: NURSE PRACTITIONER

## 2020-09-01 DIAGNOSIS — Z12.31 VISIT FOR SCREENING MAMMOGRAM: ICD-10-CM

## 2020-09-01 PROCEDURE — 77063 BREAST TOMOSYNTHESIS BI: CPT

## 2020-09-01 PROCEDURE — 77067 SCR MAMMO BI INCL CAD: CPT

## 2020-09-14 ENCOUNTER — FLU SHOT (OUTPATIENT)
Dept: FAMILY MEDICINE CLINIC | Facility: CLINIC | Age: 75
End: 2020-09-14

## 2020-09-14 DIAGNOSIS — Z23 NEED FOR INFLUENZA VACCINATION: ICD-10-CM

## 2020-09-14 PROCEDURE — G0008 ADMIN INFLUENZA VIRUS VAC: HCPCS | Performed by: NURSE PRACTITIONER

## 2020-09-14 PROCEDURE — 90694 VACC AIIV4 NO PRSRV 0.5ML IM: CPT | Performed by: NURSE PRACTITIONER

## 2020-09-23 ENCOUNTER — OFFICE (AMBULATORY)
Dept: URBAN - METROPOLITAN AREA CLINIC 64 | Facility: CLINIC | Age: 75
End: 2020-09-23
Payer: COMMERCIAL

## 2020-09-23 VITALS
SYSTOLIC BLOOD PRESSURE: 145 MMHG | DIASTOLIC BLOOD PRESSURE: 85 MMHG | HEART RATE: 85 BPM | HEIGHT: 63 IN | WEIGHT: 163 LBS

## 2020-09-23 DIAGNOSIS — K57.92 DIVERTICULITIS OF INTESTINE, PART UNSPECIFIED, WITHOUT PERFO: ICD-10-CM

## 2020-09-23 PROCEDURE — 99213 OFFICE O/P EST LOW 20 MIN: CPT | Performed by: INTERNAL MEDICINE

## 2020-10-14 ENCOUNTER — OFFICE VISIT (OUTPATIENT)
Dept: FAMILY MEDICINE CLINIC | Facility: CLINIC | Age: 75
End: 2020-10-14

## 2020-10-14 VITALS
WEIGHT: 165.4 LBS | RESPIRATION RATE: 18 BRPM | DIASTOLIC BLOOD PRESSURE: 88 MMHG | HEART RATE: 76 BPM | SYSTOLIC BLOOD PRESSURE: 170 MMHG | HEIGHT: 62 IN | OXYGEN SATURATION: 97 % | BODY MASS INDEX: 30.44 KG/M2 | TEMPERATURE: 97.5 F

## 2020-10-14 DIAGNOSIS — E78.5 DYSLIPIDEMIA: ICD-10-CM

## 2020-10-14 DIAGNOSIS — K21.9 GASTROESOPHAGEAL REFLUX DISEASE WITHOUT ESOPHAGITIS: Chronic | ICD-10-CM

## 2020-10-14 DIAGNOSIS — E66.09 CLASS 1 OBESITY DUE TO EXCESS CALORIES WITH SERIOUS COMORBIDITY AND BODY MASS INDEX (BMI) OF 30.0 TO 30.9 IN ADULT: ICD-10-CM

## 2020-10-14 DIAGNOSIS — M85.89 OSTEOPENIA OF MULTIPLE SITES: Chronic | ICD-10-CM

## 2020-10-14 DIAGNOSIS — I10 ESSENTIAL HYPERTENSION: Chronic | ICD-10-CM

## 2020-10-14 DIAGNOSIS — R91.1 LUNG NODULE: ICD-10-CM

## 2020-10-14 DIAGNOSIS — R79.89 ELEVATED PLATELET COUNT: ICD-10-CM

## 2020-10-14 DIAGNOSIS — Z00.01 ENCOUNTER FOR GENERAL ADULT MEDICAL EXAMINATION WITH ABNORMAL FINDINGS: Primary | ICD-10-CM

## 2020-10-14 DIAGNOSIS — Z12.31 BREAST CANCER SCREENING BY MAMMOGRAM: ICD-10-CM

## 2020-10-14 PROBLEM — E83.51 HYPOCALCEMIA: Status: RESOLVED | Noted: 2020-07-25 | Resolved: 2020-10-14

## 2020-10-14 PROBLEM — E66.811 CLASS 1 OBESITY DUE TO EXCESS CALORIES WITH SERIOUS COMORBIDITY AND BODY MASS INDEX (BMI) OF 30.0 TO 30.9 IN ADULT: Status: ACTIVE | Noted: 2020-10-14

## 2020-10-14 PROBLEM — R10.9 ABDOMINAL PAIN: Status: RESOLVED | Noted: 2020-07-25 | Resolved: 2020-10-14

## 2020-10-14 PROCEDURE — G0439 PPPS, SUBSEQ VISIT: HCPCS | Performed by: NURSE PRACTITIONER

## 2020-10-14 PROCEDURE — 99213 OFFICE O/P EST LOW 20 MIN: CPT | Performed by: NURSE PRACTITIONER

## 2020-10-14 PROCEDURE — 81003 URINALYSIS AUTO W/O SCOPE: CPT | Performed by: NURSE PRACTITIONER

## 2020-10-14 RX ORDER — HYDROCHLOROTHIAZIDE 25 MG/1
25 TABLET ORAL DAILY
Qty: 90 TABLET | Refills: 1 | Status: CANCELLED | OUTPATIENT
Start: 2020-10-14

## 2020-10-14 RX ORDER — TRIAMCINOLONE ACETONIDE 1 MG/G
CREAM TOPICAL
COMMUNITY
Start: 2020-08-04 | End: 2020-10-14

## 2020-10-14 NOTE — PROGRESS NOTES
Medicare Annual Wellness Visit  Chief Complaint   Patient presents with   • Medicare Wellness-subsequent   • Hypertension       Subjective     Yancy Mcdonnell is a 75 y.o. female who presents for an Annual Wellness Visit. In addition, we addressed the following health issues:    Hypertension  This is a chronic problem. The current episode started more than 1 year ago. The problem is unchanged. The problem is uncontrolled. Pertinent negatives include no blurred vision, neck pain or palpitations. Agents associated with hypertension include NSAIDs. Past treatments include ACE inhibitors and alpha 1 blockers. Current antihypertension treatment includes angiotensin blockers and diuretics. The current treatment provides no improvement. There are no compliance problems.    Osteoporosis  This is a chronic problem. The current episode started more than 1 year ago. Associated symptoms include arthralgias. Pertinent negatives include no abdominal pain, chills, congestion, coughing, diaphoresis, fatigue, fever, joint swelling, nausea, neck pain, numbness, rash, sore throat, swollen glands, vomiting or weakness. Treatments tried: Oral bisphosphonate.   Heartburn  She reports no abdominal pain, no choking, no coughing, no nausea, no sore throat or no wheezing. This is a chronic problem. The current episode started more than 1 year ago. The problem occurs rarely. The problem has been gradually improving. The symptoms are aggravated by certain foods. Pertinent negatives include no fatigue or weight loss. She has tried a PPI for the symptoms. The treatment provided significant relief.   Patient had been on Fosamax in the past.  She stopped taking it because she was having trouble remembering to take it on an empty stomach along with her omeprazole.  She was not having any side effects from the medication.  She decided not to restart it.  She would like to see what her DEXA scan shows that she.    Medications    Current Outpatient  Medications:   •  albuterol sulfate HFA (VENTOLIN HFA) 108 (90 Base) MCG/ACT inhaler, Two inhalations every 4-6 hours as needed for SOA., Disp: , Rfl:   •  famciclovir (FAMVIR) 500 MG tablet, TAKE 3 TABLETS BY MOUTH AS A SINGLE DOSE AT FIRST SIGN OF COLD SORE, Disp: 3 tablet, Rfl: 5  •  fluticasone (FLONASE) 50 MCG/ACT nasal spray, INSTILL 2 SPRAYS IN EACH NOSTRIL DAILY, Disp: 48 g, Rfl: 11  •  Glucosamine-Chondroitin (CIDAFLEX PO), Take 2 tablets by mouth Daily., Disp: , Rfl:   •  hydroCHLOROthiazide (HYDRODIURIL) 25 MG tablet, Take 1 tablet by mouth Daily. (Patient taking differently: Take 75 mg by mouth Daily.), Disp: 90 tablet, Rfl: 1  •  loratadine (CLARITIN) 10 MG tablet, TAKE 1 TABLET BY MOUTH EVERY DAY, Disp: 90 tablet, Rfl: 1  •  losartan (COZAAR) 100 MG tablet, TAKE 1 TABLET BY MOUTH DAILY, Disp: 90 tablet, Rfl: 0  •  lysine ([facility] LYSINE) 500 MG tablet, Take 1 tablet by mouth Daily As Needed., Disp: , Rfl:   •  Multiple Vitamin (MULTI-VITAMIN DAILY) tablet, Take 1 tablet by mouth Daily., Disp: , Rfl:   •  omeprazole (priLOSEC) 20 MG capsule, TAKE ONE CAPSULE BY MOUTH EVERY MORNING ON AN EMPTY STOMACH, 30 MINUTES BEFORE EATING OR OTHER MEDICATION, Disp: 90 capsule, Rfl: 1     Problem List  Patient Active Problem List   Diagnosis   • Allergic rhinitis   • Lung nodule   • Dyslipidemia   • History of thrombosis   • Hypertension   • Migraine headache   • Mitral valve insufficiency   • Osteoarthritis   • Osteopenia of multiple sites   • Rheumatoid arthritis (CMS/HCC)   • Spinal stenosis of cervical region   • Venous insufficiency   • Gastroesophageal reflux disease without esophagitis   • History of herpes simplex infection   • Class 1 obesity due to excess calories with serious comorbidity and body mass index (BMI) of 30.0 to 30.9 in adult       Surgical History  Past Surgical History:   Procedure Laterality Date   • COLONOSCOPY  07/20/2017    EGD/ COLONSCOPY LARGE HIATAL HERNIA. MILD DIVERTICULOSIS. OTHERWISE  NORMAL.   • INGUINAL HERNIA REPAIR Right    • TOTAL ABDOMINAL HYSTERECTOMY  1985    W/BSO WITH A/P REPAIR 1985 BENIGN REASONS DR. DRAKE        Social History  Social History     Socioeconomic History   • Marital status:      Spouse name: Not on file   • Number of children: Not on file   • Years of education: Not on file   • Highest education level: Not on file   Tobacco Use   • Smoking status: Never Smoker   • Smokeless tobacco: Never Used   Substance and Sexual Activity   • Alcohol use: No     Frequency: Never   • Drug use: No   • Sexual activity: Defer        Family History  Family History   Problem Relation Age of Onset   • Osteoporosis Mother    • Stroke Mother 80   • Dementia Mother    • Heart disease Father    • Hypertension Father    • Heart disease Sister    • Cancer Daughter         BREAST AND MELANOMA   • Breast cancer Daughter 52   • Cancer Son         MELANOMA   • Cancer Grandson         MELANOMA        Health Risk Assessment:  The patient has completed a Health Risk Assessment and it has been reviewed.    Current Medical Providers:  Patient Care Team:  Munira So APRN as PCP - General (Nurse Practitioner)  Dr. AMITA Ross) (Ophthalmology)  Banet, Duane Edward, MD as Consulting Physician (Dermatology)  Edwin Banks MD as Consulting Physician (Otolaryngology)  Ruslan Davila MD as Consulting Physician (Gastroenterology)    Age-appropriate Screening Schedule:  Refer to the list below for future screening recommendations based on patient's age. Orders for these recommended tests are listed in the plan section. The patient has been provided with a written plan.    Health Maintenance   Topic Date Due   • ZOSTER VACCINE (1 of 2) 06/10/1994   • HEPATITIS C SCREENING  04/22/2019   • LIPID PANEL  04/23/2019   • DXA SCAN  06/15/2020   • ANNUAL WELLNESS VISIT  10/14/2021   • TDAP/TD VACCINES (2 - Td) 01/05/2027   • INFLUENZA VACCINE  Completed   • Pneumococcal Vaccine 65+  Completed        Depression Screen:   PHQ-2/PHQ-9 Depression Screening 10/14/2020   Little interest or pleasure in doing things 0   Feeling down, depressed, or hopeless 0   Total Score 0       Functional and Cognitive Screening:  Functional & Cognitive Status 10/14/2020   Do you have difficulty preparing food and eating? No   Do you have difficulty bathing yourself, getting dressed or grooming yourself? No   Do you have difficulty using the toilet? No   Do you have difficulty moving around from place to place? No   Do you have trouble with steps or getting out of a bed or a chair? No   Current Diet Well Balanced Diet   Dental Exam Up to date   Eye Exam Up to date   Exercise (times per week) 0 times per week   Current Exercise Activities Include None   Do you need help using the phone?  No   Are you deaf or do you have serious difficulty hearing?  No   Do you need help with transportation? No   Do you need help shopping? No   Do you need help preparing meals?  No   Do you need help with housework?  No   Do you need help with laundry? No   Do you need help taking your medications? No   Do you need help managing money? No   Do you ever drive or ride in a car without wearing a seat belt? No   Have you felt unusual stress, anger or loneliness in the last month? No   Who do you live with? Spouse   If you need help, do you have trouble finding someone available to you? No   Have you been bothered in the last four weeks by sexual problems? No   Do you have difficulty concentrating, remembering or making decisions? No       Does the patient have evidence of cognitive impairment? No    ATTENTION  What is the year: correct (10/14/20 1400)  What is the month of the year: correct (10/14/20 1400)  What is the day of the week?: correct (10/14/20 1400)  What is the date?: correct (10/14/20 1400)  MEMORY  Repeat address three times, only score third attempt: Bebeto Vincent [address] [place] [address]: 7 (10/14/20 1400)  HOW MANY ANIMALS  DID THE PATIENT NAME  Verbal Fluency -- Animal Names (0-25): 22+ (10/14/20 1400)  CLOCK DRAWING  Clock Drawing: All Correct (10/14/20 1400)  MEMORY RECALL  Tell me what you remember about that name and address we were repeating at the beginnin (10/14/20 1400)  ACE TOTAL SCORE  Total ACE Score - <25/30 strongly suggests cognitive impairment; <21/30 almost certainly shows dementia: 30 (10/14/20 1400)    Advanced Care Planning:  ACP discussion was held with the patient during this visit. Patient has an advance directive in EMR which is still valid.     Identification of Risk Factors:  Risk factors include: Cardiovascular risk  Immunizations Discussed/Encouraged (specific immunizations; Influenza and Shingrix )  Obesity/Overweight   Osteoprorosis Risk.    Review of Systems   Constitutional: Negative for appetite change, chills, diaphoresis, fatigue, fever, unexpected weight gain and unexpected weight loss.   HENT: Negative for congestion, ear discharge, ear pain, hearing loss, mouth sores, nosebleeds, postnasal drip, rhinorrhea, sinus pressure, sneezing, sore throat, swollen glands and trouble swallowing.    Eyes: Negative for blurred vision, double vision, pain, discharge, redness and itching.   Respiratory: Negative for apnea, cough, choking, wheezing and stridor.    Cardiovascular: Negative for palpitations and leg swelling.   Gastrointestinal: Negative for abdominal distention, abdominal pain, anal bleeding, blood in stool, constipation, diarrhea, nausea, rectal pain, vomiting, GERD and indigestion.   Endocrine: Negative for cold intolerance, heat intolerance, polydipsia, polyphagia and polyuria.   Genitourinary: Negative for breast discharge, breast lump, breast pain, difficulty urinating, dysuria, genital sores, pelvic pain, urinary incontinence, vaginal bleeding, vaginal discharge and vaginal pain.   Musculoskeletal: Positive for arthralgias and back pain. Negative for gait problem, joint swelling, neck pain  "and neck stiffness.        Sees chiropractor as needed and this is effective   Skin: Negative for color change, rash and skin lesions.   Neurological: Negative for dizziness, tremors, seizures, syncope, speech difficulty, weakness, light-headedness, numbness, headache, memory problem and confusion.   Hematological: Negative for adenopathy. Does not bruise/bleed easily.   Psychiatric/Behavioral: Negative for self-injury, sleep disturbance, suicidal ideas, depressed mood and stress. The patient is not nervous/anxious.          Objective     Vitals:    10/14/20 1400 10/14/20 1447   BP: 159/90 170/88   BP Location: Left arm    Patient Position: Sitting    Cuff Size: Adult    Pulse: 76    Resp: 18    Temp: 97.5 °F (36.4 °C)    TempSrc: Temporal    SpO2: 97%    Weight: 75 kg (165 lb 6.4 oz)    Height: 157.5 cm (62\")          10/14/20  1400   Weight: 75 kg (165 lb 6.4 oz)     Body mass index is 30.25 kg/m².    Physical Exam  Vitals signs reviewed.   Constitutional:       General: She is not in acute distress.     Appearance: She is well-developed.   HENT:      Head: Normocephalic and atraumatic.      Right Ear: Tympanic membrane, ear canal and external ear normal. Tympanic membrane is not injected, erythematous, retracted or bulging.      Left Ear: Tympanic membrane, ear canal and external ear normal. Tympanic membrane is not injected, erythematous, retracted or bulging.      Nose: Nose normal. No mucosal edema or rhinorrhea.      Right Sinus: No maxillary sinus tenderness or frontal sinus tenderness.      Left Sinus: No maxillary sinus tenderness or frontal sinus tenderness.      Mouth/Throat:      Mouth: No oral lesions.      Pharynx: Uvula midline. No oropharyngeal exudate or posterior oropharyngeal erythema.   Eyes:      General: Lids are normal.         Right eye: No discharge.         Left eye: No discharge.      Conjunctiva/sclera: Conjunctivae normal.      Pupils: Pupils are equal, round, and reactive to light. "   Neck:      Musculoskeletal: Normal range of motion and neck supple.      Thyroid: No thyroid mass or thyromegaly.      Vascular: No carotid bruit or JVD.      Trachea: No tracheal deviation.   Cardiovascular:      Rate and Rhythm: Normal rate and regular rhythm.      Heart sounds: Normal heart sounds. No murmur. No friction rub. No gallop.    Pulmonary:      Effort: Pulmonary effort is normal. No respiratory distress.      Breath sounds: Normal breath sounds. No wheezing or rales.   Chest:      Breasts: Breasts are symmetrical.         Right: No inverted nipple, mass, nipple discharge, skin change or tenderness.         Left: No inverted nipple, mass, nipple discharge, skin change or tenderness.   Abdominal:      General: Bowel sounds are normal. There is no distension.      Palpations: Abdomen is soft. There is no mass.      Tenderness: There is no abdominal tenderness.      Hernia: No hernia is present.   Genitourinary:     Comments: Patient declined examination  Musculoskeletal: Normal range of motion.         General: No deformity.   Lymphadenopathy:      Cervical: No cervical adenopathy.   Skin:     General: Skin is warm and dry.      Findings: No lesion or rash.   Neurological:      Mental Status: She is alert and oriented to person, place, and time.      Cranial Nerves: No cranial nerve deficit.      Sensory: No sensory deficit.      Gait: Gait normal.      Deep Tendon Reflexes: Reflexes are normal and symmetric.   Psychiatric:         Speech: Speech normal.         Behavior: Behavior normal.         Thought Content: Thought content normal.         Judgment: Judgment normal.         Office Visit on 10/14/2020   Component Date Value Ref Range Status   • Color 10/14/2020 Yellow  Yellow, Straw, Dark Yellow, Pallavi Final   • Clarity, UA 10/14/2020 Clear  Clear Final   • Specific Gravity  10/14/2020 1.020  1.005 - 1.030 Final   • pH, Urine 10/14/2020 7.0  5.0 - 8.0 Final   • Leukocytes 10/14/2020 Negative   Negative Final   • Nitrite, UA 10/14/2020 Negative  Negative Final   • Protein, POC 10/14/2020 Negative  Negative mg/dL Final   • Glucose, UA 10/14/2020 Negative  Negative, 1000 mg/dL (3+) mg/dL Final   • Ketones, UA 10/14/2020 Negative  Negative Final   • Urobilinogen, UA 10/14/2020 Normal  Normal Final   • Bilirubin 10/14/2020 Negative  Negative Final   • Blood, UA 10/14/2020 Negative  Negative Final     Lab Results   Component Value Date    CHOL 193 06/14/2017    TRIG 112 06/14/2017    HDL 74 06/14/2017    LDL 97 MG/DL (CALC) 06/14/2017     Lab Results   Component Value Date    TSH 3.24 06/14/2017     Lab Results   Component Value Date    WBC 7.0 08/12/2020    HGB 11.1 08/12/2020    HCT 34.5 08/12/2020    MCV 85 08/12/2020     (H) 08/12/2020     Lab Results   Component Value Date    GLUCOSE 109 (H) 07/26/2020    BUN 20 08/12/2020    CREATININE 0.77 08/12/2020    EGFRIFNONA 75 08/12/2020    EGFRIFAFRI 87 08/12/2020    BCR 26 08/12/2020    K 4.7 08/12/2020    CO2 27 08/12/2020    CALCIUM 9.4 08/12/2020    PROTENTOTREF 6.3 08/12/2020    ALBUMIN 4.2 08/12/2020    LABIL2 2.0 08/12/2020    AST 18 08/12/2020    ALT 12 08/12/2020         Assessment/Plan   Patient Self-Management and Personalized Health Advice  The patient has been provided with information about: diet, exercise, weight management and prevention of cardiac or vascular disease and preventive services including:   · Annual Wellness Visit (AWV)  · Bone Density Measurements  · Influenza Vaccine and Administration  · Screening Mammography .    Discussed the patient's BMI with her. The BMI is above average; BMI management plan is completed.    Diagnoses and all orders for this visit:    1. Encounter for general adult medical examination with abnormal findings (Primary)  Comments:  Discussed age-related health maintenance.  Shingrix vaccine is been recommended but it is cost prohibitive.    2. Essential hypertension  Assessment & Plan:  She continues to  have elevated readings in the office with reportedly normal readings at home.  She declines addition of another antihypertensive medication at this time.  She will return in 1 month and bring her home monitor with her to that appointment as well as a log of home blood pressure readings.    Orders:  -     POC Urinalysis Dipstick, Automated  -     Comprehensive Metabolic Panel    3. Lung nodule  Assessment & Plan:  First noted in 2017.  We will repeat chest CT 1 more time and if it has not changed we should be able to discontinue surveillance.    Orders:  -     CT Chest Without Contrast; Future    4. Osteopenia of multiple sites  Assessment & Plan:  Encouraged increased calcium intake and weightbearing exercise.  She declines restarting oral bisphosphonate at this time.  Will call with DEXA results and FRAX score.    Orders:  -     DEXA Bone Density Axial    5. Elevated platelet count  -     CBC & Differential    6. Gastroesophageal reflux disease without esophagitis  Assessment & Plan:  Controlled on PPI.  Risks/benefits of PPI reviewed.      7. Dyslipidemia  Assessment & Plan:  Normal lipid panel in 2017.  We will repeat in 2022.      8. Breast cancer screening by mammogram  Comments:  Normal screening mammogram on 9/1/2020.    9. Class 1 obesity due to excess calories with serious comorbidity and body mass index (BMI) of 30.0 to 30.9 in adult  Assessment & Plan:  Encouraged healthy diet and regular physical activity.  Will defer Medicare obesity counseling at this time due to global pandemic.      Other orders  -     Cancel: hydroCHLOROthiazide (HYDRODIURIL) 25 MG tablet; Take 1 tablet by mouth Daily.  Dispense: 90 tablet; Refill: 1      Orders:  Orders Placed This Encounter   Procedures   • DEXA Bone Density Axial   • CT Chest Without Contrast   • Comprehensive Metabolic Panel   • POC Urinalysis Dipstick, Automated   • CBC & Differential       Follow Up:  Return in about 2 weeks (around 10/28/2020) for Follow-Up  HTN.     An After Visit Summary and PPPS with all of these plans were given to the patient.

## 2020-10-14 NOTE — ASSESSMENT & PLAN NOTE
Encouraged increased calcium intake and weightbearing exercise.  She declines restarting oral bisphosphonate at this time.  Will call with DEXA results and FRAX score.

## 2020-10-14 NOTE — ASSESSMENT & PLAN NOTE
Encouraged healthy diet and regular physical activity.  Will defer Medicare obesity counseling at this time due to global pandemic.

## 2020-10-14 NOTE — ASSESSMENT & PLAN NOTE
She continues to have elevated readings in the office with reportedly normal readings at home.  She declines addition of another antihypertensive medication at this time.  She will return in 1 month and bring her home monitor with her to that appointment as well as a log of home blood pressure readings.

## 2020-10-18 LAB
ALBUMIN SERPL-MCNC: 4.2 G/DL (ref 3.7–4.7)
ALBUMIN/GLOB SERPL: 1.8 {RATIO} (ref 1.2–2.2)
ALP SERPL-CCNC: 141 IU/L (ref 39–117)
ALT SERPL-CCNC: 17 IU/L (ref 0–32)
AST SERPL-CCNC: 19 IU/L (ref 0–40)
BASOPHILS # BLD AUTO: 0 X10E3/UL (ref 0–0.2)
BASOPHILS NFR BLD AUTO: 0 %
BILIRUB SERPL-MCNC: 0.8 MG/DL (ref 0–1.2)
BUN SERPL-MCNC: 14 MG/DL (ref 8–27)
BUN/CREAT SERPL: 19 (ref 12–28)
CALCIUM SERPL-MCNC: 9.7 MG/DL (ref 8.7–10.3)
CHLORIDE SERPL-SCNC: 105 MMOL/L (ref 96–106)
CO2 SERPL-SCNC: 26 MMOL/L (ref 20–29)
CREAT SERPL-MCNC: 0.72 MG/DL (ref 0.57–1)
EOSINOPHIL # BLD AUTO: 0.1 X10E3/UL (ref 0–0.4)
EOSINOPHIL NFR BLD AUTO: 1 %
ERYTHROCYTE [DISTWIDTH] IN BLOOD BY AUTOMATED COUNT: 14.3 % (ref 11.7–15.4)
GLOBULIN SER CALC-MCNC: 2.4 G/DL (ref 1.5–4.5)
GLUCOSE SERPL-MCNC: 88 MG/DL (ref 65–99)
HCT VFR BLD AUTO: 38.8 % (ref 34–46.6)
HGB BLD-MCNC: 12.5 G/DL (ref 11.1–15.9)
IMM GRANULOCYTES # BLD AUTO: 0 X10E3/UL (ref 0–0.1)
IMM GRANULOCYTES NFR BLD AUTO: 0 %
LYMPHOCYTES # BLD AUTO: 1.5 X10E3/UL (ref 0.7–3.1)
LYMPHOCYTES NFR BLD AUTO: 30 %
MCH RBC QN AUTO: 27.2 PG (ref 26.6–33)
MCHC RBC AUTO-ENTMCNC: 32.2 G/DL (ref 31.5–35.7)
MCV RBC AUTO: 85 FL (ref 79–97)
MONOCYTES # BLD AUTO: 0.5 X10E3/UL (ref 0.1–0.9)
MONOCYTES NFR BLD AUTO: 10 %
NEUTROPHILS # BLD AUTO: 2.9 X10E3/UL (ref 1.4–7)
NEUTROPHILS NFR BLD AUTO: 59 %
PLATELET # BLD AUTO: 421 X10E3/UL (ref 150–450)
POTASSIUM SERPL-SCNC: 3.9 MMOL/L (ref 3.5–5.2)
PROT SERPL-MCNC: 6.6 G/DL (ref 6–8.5)
RBC # BLD AUTO: 4.59 X10E6/UL (ref 3.77–5.28)
SODIUM SERPL-SCNC: 141 MMOL/L (ref 134–144)
WBC # BLD AUTO: 5 X10E3/UL (ref 3.4–10.8)

## 2020-10-20 ENCOUNTER — APPOINTMENT (OUTPATIENT)
Dept: BONE DENSITY | Facility: HOSPITAL | Age: 75
End: 2020-10-20

## 2020-10-20 ENCOUNTER — HOSPITAL ENCOUNTER (OUTPATIENT)
Dept: CT IMAGING | Facility: HOSPITAL | Age: 75
Discharge: HOME | End: 2020-10-20
Admitting: NURSE PRACTITIONER

## 2020-10-20 DIAGNOSIS — R91.1 LUNG NODULE: ICD-10-CM

## 2020-10-20 PROCEDURE — 71250 CT THORAX DX C-: CPT

## 2020-10-28 ENCOUNTER — OFFICE VISIT (OUTPATIENT)
Dept: FAMILY MEDICINE CLINIC | Facility: CLINIC | Age: 75
End: 2020-10-28

## 2020-10-28 VITALS
OXYGEN SATURATION: 98 % | SYSTOLIC BLOOD PRESSURE: 158 MMHG | HEART RATE: 76 BPM | BODY MASS INDEX: 30.95 KG/M2 | TEMPERATURE: 97.1 F | DIASTOLIC BLOOD PRESSURE: 78 MMHG | WEIGHT: 168.2 LBS | RESPIRATION RATE: 16 BRPM | HEIGHT: 62 IN

## 2020-10-28 DIAGNOSIS — I10 ESSENTIAL HYPERTENSION: Primary | Chronic | ICD-10-CM

## 2020-10-28 DIAGNOSIS — I10 ESSENTIAL HYPERTENSION: ICD-10-CM

## 2020-10-28 PROCEDURE — 99212 OFFICE O/P EST SF 10 MIN: CPT | Performed by: NURSE PRACTITIONER

## 2020-10-28 RX ORDER — LOSARTAN POTASSIUM 100 MG/1
100 TABLET ORAL DAILY
Qty: 90 TABLET | Refills: 0 | Status: SHIPPED | OUTPATIENT
Start: 2020-10-28 | End: 2021-01-15

## 2020-10-28 RX ORDER — HYDROCHLOROTHIAZIDE 12.5 MG/1
12.5 TABLET ORAL DAILY
Qty: 90 TABLET | Refills: 0 | OUTPATIENT
Start: 2020-10-28

## 2020-10-28 NOTE — PROGRESS NOTES
Chief Complaint   Patient presents with   • Hypertension        Subjective     Yancy Mcdonnell  has a past medical history of Allergic rhinitis (06/02/2015), Body mass index 29.0-29.9, adult (09/12/2018), Cervical spinal stenosis (08/07/2017), Cholelithiasis, Class 1 obesity due to excess calories with serious comorbidity and body mass index (BMI) of 30.0 to 30.9 in adult (10/14/2020), DDD (degenerative disc disease), cervical, DDD (degenerative disc disease), lumbar, DDD (degenerative disc disease), thoracic, Diverticulosis, Dyslipidemia (12/05/2012), Elevated brain natriuretic peptide (BNP) level (01/06/2017), Eustachian tube disorder, right (02/18/2019), Fatigue (06/02/2015), Female cystocele (06/02/2015), Hiatal hernia with gastroesophageal reflux (01/06/2017), History of DVT (deep vein thrombosis) (06/02/2015), History of herpes simplex infection (7/25/2020), Hyperlipidemia, Hypertension (06/10/2016), Left rib fracture (05/2017), Migraine headache (06/02/2015), Mitral insufficiency (01/16/2017), Osteoarthritis (05/20/2014), Osteopenia (12/05/2012), Over weight (06/14/2018), Peripheral edema (01/05/2017), PFO (patent foramen ovale) (01/16/2017), Pulmonary nodule (05/04/2017), RHA (rheumatoid arthritis) (CMS/[facility]) (12/05/2012), Scleritis (06/02/2015), Scoliosis, Tricuspid insufficiency, Varicose veins of other specified sites, and Venous insufficiency (09/12/2018).    Hypertension  This is a chronic problem. The current episode started more than 1 year ago. The problem is unchanged. The problem is uncontrolled. Pertinent negatives include no blurred vision, palpitations or shortness of breath. Agents associated with hypertension include NSAIDs. Past treatments include ACE inhibitors and alpha 1 blockers. Current antihypertension treatment includes angiotensin blockers and diuretics. The current treatment provides no improvement. There are no compliance problems.    Home readings 110-140's/70-80/s mostly,  occasional high reading (140's).        Allergies   Allergen Reactions   • Demerol [Meperidine] Unknown (See Comments)     [facility]   • Other Unknown (See Comments)     Sulfa (sulfadiazine)    • Sulfa Antibiotics Unknown - High Severity   • Lisinopril Cough         Current Outpatient Medications:   •  albuterol sulfate HFA (VENTOLIN HFA) 108 (90 Base) MCG/ACT inhaler, Two inhalations every 4-6 hours as needed for SOA., Disp: , Rfl:   •  famciclovir (FAMVIR) 500 MG tablet, TAKE 3 TABLETS BY MOUTH AS A SINGLE DOSE AT FIRST SIGN OF COLD SORE, Disp: 3 tablet, Rfl: 5  •  fluticasone (FLONASE) 50 MCG/ACT nasal spray, INSTILL 2 SPRAYS IN EACH NOSTRIL DAILY, Disp: 48 g, Rfl: 11  •  Glucosamine-Chondroitin (CIDAFLEX PO), Take 2 tablets by mouth Daily., Disp: , Rfl:   •  hydroCHLOROthiazide (HYDRODIURIL) 25 MG tablet, Take 1 tablet by mouth Daily., Disp: 90 tablet, Rfl: 1  •  loratadine (CLARITIN) 10 MG tablet, TAKE 1 TABLET BY MOUTH EVERY DAY, Disp: 90 tablet, Rfl: 1  •  losartan (COZAAR) 100 MG tablet, TAKE 1 TABLET BY MOUTH DAILY, Disp: 90 tablet, Rfl: 0  •  lysine ([facility] LYSINE) 500 MG tablet, Take 1 tablet by mouth Daily As Needed., Disp: , Rfl:   •  Multiple Vitamin (MULTI-VITAMIN DAILY) tablet, Take 1 tablet by mouth Daily., Disp: , Rfl:   •  omeprazole (priLOSEC) 20 MG capsule, TAKE ONE CAPSULE BY MOUTH EVERY MORNING ON AN EMPTY STOMACH, 30 MINUTES BEFORE EATING OR OTHER MEDICATION, Disp: 90 capsule, Rfl: 1    Patient Active Problem List   Diagnosis   • Allergic rhinitis   • Lung nodule   • Dyslipidemia   • History of thrombosis   • Hypertension   • Migraine headache   • Mitral valve insufficiency   • Osteoarthritis   • Osteopenia of multiple sites   • Rheumatoid arthritis (CMS/HCC)   • Spinal stenosis of cervical region   • Venous insufficiency   • Gastroesophageal reflux disease without esophagitis   • History of herpes simplex infection   • Class 1 obesity due to excess calories with serious  comorbidity and body mass index (BMI) of 30.0 to 30.9 in adult        Past Surgical History:   Procedure Laterality Date   • COLONOSCOPY  07/20/2017    EGD/ COLONSCOPY LARGE HIATAL HERNIA. MILD DIVERTICULOSIS. OTHERWISE NORMAL.   • INGUINAL HERNIA REPAIR Right    • TOTAL ABDOMINAL HYSTERECTOMY  1985    W/BSO WITH A/P REPAIR 1985 BENIGN REASONS DR. DRAKE       Social History     Socioeconomic History   • Marital status:      Spouse name: Not on file   • Number of children: Not on file   • Years of education: Not on file   • Highest education level: Not on file   Tobacco Use   • Smoking status: Never Smoker   • Smokeless tobacco: Never Used   Substance and Sexual Activity   • Alcohol use: No     Frequency: Never   • Drug use: No   • Sexual activity: Defer       Family History   Problem Relation Age of Onset   • Osteoporosis Mother    • Stroke Mother 80   • Dementia Mother    • Heart disease Father    • Hypertension Father    • Heart disease Sister    • Cancer Daughter         BREAST AND MELANOMA   • Breast cancer Daughter 52   • Cancer Son         MELANOMA   • Cancer Grandson         MELANOMA       Family history, surgical history, past medical history, Allergies and med's reviewed with patient today and updated in [employer] EMR.     ROS:  Review of Systems   Constitutional: Negative for activity change, appetite change and unexpected weight gain.   Eyes: Negative for blurred vision and visual disturbance.   Respiratory: Negative for apnea and shortness of breath.    Cardiovascular: Negative for palpitations and leg swelling.   Gastrointestinal: Negative for constipation and diarrhea.   Endocrine: Negative for cold intolerance and heat intolerance.   Genitourinary: Negative for frequency.   Neurological: Negative for dizziness, syncope and headache.   Psychiatric/Behavioral: Negative for depressed mood.       OBJECTIVE:  Vitals:    10/28/20 1353   BP: 158/78   BP Location: Left arm   Patient Position: Sitting   Cuff  "Size: Adult   Pulse: 76   Resp: 16   Temp: 97.1 °F (36.2 °C)   TempSrc: Temporal   SpO2: 98%   Weight: 76.3 kg (168 lb 3.2 oz)   Height: 157.5 cm (62\")     Body mass index is 30.76 kg/m².    Physical Exam  Constitutional:       Appearance: She is well-developed.   Neck:      Musculoskeletal: Normal range of motion and neck supple.      Thyroid: No thyromegaly.      Vascular: No carotid bruit.      Trachea: Trachea normal.   Cardiovascular:      Rate and Rhythm: Normal rate and regular rhythm.      Heart sounds: Normal heart sounds. No murmur. No friction rub. No gallop.    Pulmonary:      Effort: Pulmonary effort is normal.      Breath sounds: Normal breath sounds. No wheezing or rales.   Abdominal:      General: Bowel sounds are normal.      Palpations: Abdomen is soft.      Tenderness: There is no abdominal tenderness.      Hernia: No hernia is present.   Musculoskeletal: Normal range of motion.   Lymphadenopathy:      Cervical: No cervical adenopathy.   Skin:     General: Skin is warm and dry.   Neurological:      Mental Status: She is alert and oriented to person, place, and time.   Psychiatric:         Behavior: Behavior normal.         Thought Content: Thought content normal.         Judgment: Judgment normal.         Office Visit on 10/14/2020   Component Date Value Ref Range Status   • Color 10/14/2020 Yellow  Yellow, Straw, Dark Yellow, Pallavi Final   • Clarity, UA 10/14/2020 Clear  Clear Final   • Specific Gravity  10/14/2020 1.020  1.005 - 1.030 Final   • pH, Urine 10/14/2020 7.0  5.0 - 8.0 Final   • Leukocytes 10/14/2020 Negative  Negative Final   • Nitrite, UA 10/14/2020 Negative  Negative Final   • Protein, POC 10/14/2020 Negative  Negative mg/dL Final   • Glucose, UA 10/14/2020 Negative  Negative, 1000 mg/dL (3+) mg/dL Final   • Ketones, UA 10/14/2020 Negative  Negative Final   • Urobilinogen, UA 10/14/2020 Normal  Normal Final   • Bilirubin 10/14/2020 Negative  Negative Final   • Blood, UA 10/14/2020 " Negative  Negative Final   • WBC 10/17/2020 5.0  3.4 - 10.8 x10E3/uL Final   • RBC 10/17/2020 4.59  3.77 - 5.28 x10E6/uL Final   • Hemoglobin 10/17/2020 12.5  11.1 - 15.9 g/dL Final   • Hematocrit 10/17/2020 38.8  34.0 - 46.6 % Final   • MCV 10/17/2020 85  79 - 97 fL Final   • MCH 10/17/2020 27.2  26.6 - 33.0 pg Final   • MCHC 10/17/2020 32.2  31.5 - 35.7 g/dL Final   • RDW 10/17/2020 14.3  11.7 - 15.4 % Final   • Platelets 10/17/2020 421  150 - 450 x10E3/uL Final   • Neutrophil Rel % 10/17/2020 59  Not Estab. % Final   • Lymphocyte Rel % 10/17/2020 30  Not Estab. % Final   • Monocyte Rel % 10/17/2020 10  Not Estab. % Final   • Eosinophil Rel % 10/17/2020 1  Not Estab. % Final   • Basophil Rel % 10/17/2020 0  Not Estab. % Final   • Neutrophils Absolute 10/17/2020 2.9  1.4 - 7.0 x10E3/uL Final   • Lymphocytes Absolute 10/17/2020 1.5  0.7 - 3.1 x10E3/uL Final   • Monocytes Absolute 10/17/2020 0.5  0.1 - 0.9 x10E3/uL Final   • Eosinophils Absolute 10/17/2020 0.1  0.0 - 0.4 x10E3/uL Final   • Basophils Absolute 10/17/2020 0.0  0.0 - 0.2 x10E3/uL Final   • Immature Granulocyte Rel % 10/17/2020 0  Not Estab. % Final   • Immature Grans Absolute 10/17/2020 0.0  0.0 - 0.1 x10E3/uL Final   • Glucose 10/17/2020 88  65 - 99 mg/dL Final   • BUN 10/17/2020 14  8 - 27 mg/dL Final   • Creatinine 10/17/2020 0.72  0.57 - 1.00 mg/dL Final   • eGFR Non African Am 10/17/2020 82  >59 mL/min/1.73 Final   • eGFR African Am 10/17/2020 94  >59 mL/min/1.73 Final   • BUN/Creatinine Ratio 10/17/2020 19  12 - 28 Final   • Sodium 10/17/2020 141  134 - 144 mmol/L Final   • Potassium 10/17/2020 3.9  3.5 - 5.2 mmol/L Final   • Chloride 10/17/2020 105  96 - 106 mmol/L Final   • Total CO2 10/17/2020 26  20 - 29 mmol/L Final   • Calcium 10/17/2020 9.7  8.7 - 10.3 mg/dL Final   • Total Protein 10/17/2020 6.6  6.0 - 8.5 g/dL Final   • Albumin 10/17/2020 4.2  3.7 - 4.7 g/dL Final   • Globulin 10/17/2020 2.4  1.5 - 4.5 g/dL Final   • A/G Ratio 10/17/2020  1.8  1.2 - 2.2 Final   • Total Bilirubin 10/17/2020 0.8  0.0 - 1.2 mg/dL Final   • Alkaline Phosphatase 10/17/2020 141* 39 - 117 IU/L Final   • AST (SGOT) 10/17/2020 19  0 - 40 IU/L Final   • ALT (SGPT) 10/17/2020 17  0 - 32 IU/L Final       ASSESSMENT/ PLAN:    Diagnoses and all orders for this visit:    1. Essential hypertension (Primary)  Assessment & Plan:  We will keep medications the same for now since her home readings have been in the normal range.  She will follow-up in April.  She will return sooner if her blood pressures are running greater than 140/90 more frequently.        Orders Placed Today:     No orders of the defined types were placed in this encounter.       Management Plan:     An After Visit Summary was printed and given to the patient at discharge.    Follow-up: Return in about 6 months (around 4/28/2021) for Follow-Up hypertension.    FELIPE Vázquez 10/28/2020 14:26 EDT  This note was electronically signed.

## 2020-10-28 NOTE — ASSESSMENT & PLAN NOTE
We will keep medications the same for now since her home readings have been in the normal range.  She will follow-up in April.  She will return sooner if her blood pressures are running greater than 140/90 more frequently.

## 2020-11-03 ENCOUNTER — APPOINTMENT (OUTPATIENT)
Dept: BONE DENSITY | Facility: HOSPITAL | Age: 75
End: 2020-11-03

## 2020-11-03 DIAGNOSIS — I10 ESSENTIAL HYPERTENSION: ICD-10-CM

## 2020-11-03 RX ORDER — HYDROCHLOROTHIAZIDE 25 MG/1
25 TABLET ORAL DAILY
Qty: 90 TABLET | Refills: 1 | Status: SHIPPED | OUTPATIENT
Start: 2020-11-03 | End: 2021-04-28 | Stop reason: SDUPTHER

## 2020-12-07 ENCOUNTER — OFFICE VISIT (OUTPATIENT)
Dept: FAMILY MEDICINE CLINIC | Facility: CLINIC | Age: 75
End: 2020-12-07

## 2020-12-07 VITALS
WEIGHT: 166 LBS | BODY MASS INDEX: 30.55 KG/M2 | OXYGEN SATURATION: 97 % | DIASTOLIC BLOOD PRESSURE: 90 MMHG | TEMPERATURE: 98.2 F | HEIGHT: 62 IN | RESPIRATION RATE: 16 BRPM | SYSTOLIC BLOOD PRESSURE: 185 MMHG | HEART RATE: 86 BPM

## 2020-12-07 DIAGNOSIS — N30.00 ACUTE CYSTITIS WITHOUT HEMATURIA: Primary | ICD-10-CM

## 2020-12-07 DIAGNOSIS — I10 ESSENTIAL HYPERTENSION: Chronic | ICD-10-CM

## 2020-12-07 PROBLEM — R30.0 DYSURIA: Status: ACTIVE | Noted: 2020-12-07

## 2020-12-07 LAB
BILIRUB BLD-MCNC: NEGATIVE MG/DL
CLARITY, POC: CLEAR
COLOR UR: YELLOW
GLUCOSE UR STRIP-MCNC: NEGATIVE MG/DL
KETONES UR QL: NEGATIVE
LEUKOCYTE EST, POC: ABNORMAL
NITRITE UR-MCNC: NEGATIVE MG/ML
PH UR: 7 [PH] (ref 5–8)
PROT UR STRIP-MCNC: NEGATIVE MG/DL
RBC # UR STRIP: ABNORMAL /UL
SP GR UR: 1.02 (ref 1–1.03)
UROBILINOGEN UR QL: NORMAL

## 2020-12-07 PROCEDURE — 81003 URINALYSIS AUTO W/O SCOPE: CPT | Performed by: FAMILY MEDICINE

## 2020-12-07 PROCEDURE — 99213 OFFICE O/P EST LOW 20 MIN: CPT | Performed by: FAMILY MEDICINE

## 2020-12-07 RX ORDER — FLAVOXATE HYDROCHLORIDE 100 MG/1
100 TABLET ORAL 3 TIMES DAILY PRN
Qty: 20 TABLET | Refills: 0 | Status: SHIPPED | OUTPATIENT
Start: 2020-12-07 | End: 2021-04-28

## 2020-12-07 RX ORDER — AMOXICILLIN 250 MG/1
250 CAPSULE ORAL 3 TIMES DAILY
Qty: 21 CAPSULE | Refills: 0 | Status: SHIPPED | OUTPATIENT
Start: 2020-12-07 | End: 2021-01-18

## 2020-12-07 NOTE — PROGRESS NOTES
Patient presents with a complaint of dysuria for the past 2 to 3 days that is mild in severity and associated with some urinary frequency and mild urgency.  She has not had fever, nausea, vomiting, or abdominal pain.    Past Medical History:   Diagnosis Date   • Allergic rhinitis 06/02/2015   • Body mass index 29.0-29.9, adult 09/12/2018   • Cervical spinal stenosis 08/07/2017   • Cholelithiasis    • Class 1 obesity due to excess calories with serious comorbidity and body mass index (BMI) of 30.0 to 30.9 in adult 10/14/2020   • DDD (degenerative disc disease), cervical    • DDD (degenerative disc disease), lumbar    • DDD (degenerative disc disease), thoracic    • Diverticulosis    • Dyslipidemia 12/05/2012   • Elevated brain natriuretic peptide (BNP) level 01/06/2017   • Eustachian tube disorder, right 02/18/2019   • Fatigue 06/02/2015   • Female cystocele 06/02/2015   • Hiatal hernia with gastroesophageal reflux 01/06/2017   • History of DVT (deep vein thrombosis) 06/02/2015   • History of herpes simplex infection 7/25/2020   • Hyperlipidemia    • Hypertension 06/10/2016   • Left rib fracture 05/2017   • Migraine headache 06/02/2015   • Mitral insufficiency 01/16/2017   • Osteoarthritis 05/20/2014   • Osteopenia 12/05/2012   • Over weight 06/14/2018   • Peripheral edema 01/05/2017   • PFO (patent foramen ovale) 01/16/2017   • Pulmonary nodule 05/04/2017   • RHA (rheumatoid arthritis) (CMS/HCC) 12/05/2012   • Scleritis 06/02/2015   • Scoliosis    • Tricuspid insufficiency    • Varicose veins of other specified sites    • Venous insufficiency 09/12/2018     Past Surgical History:   Procedure Laterality Date   • COLONOSCOPY  07/20/2017    EGD/ COLONSCOPY LARGE HIATAL HERNIA. MILD DIVERTICULOSIS. OTHERWISE NORMAL.   • INGUINAL HERNIA REPAIR Right    • TOTAL ABDOMINAL HYSTERECTOMY  1985    W/BSO WITH A/P REPAIR 1985 BENIGN REASONS DR. DRAKE     Family History   Problem Relation Age of Onset   • Osteoporosis Mother    •  "Stroke Mother 80   • Dementia Mother    • Heart disease Father    • Hypertension Father    • Heart disease Sister    • Cancer Daughter         BREAST AND MELANOMA   • Breast cancer Daughter 52   • Cancer Son         MELANOMA   • Cancer Grandson         MELANOMA     Social History     Tobacco Use   • Smoking status: Never Smoker   • Smokeless tobacco: Never Used   Substance Use Topics   • Alcohol use: No     Frequency: Never         Review of Systems   Constitutional: Negative for chills, fatigue and fever.   Respiratory: Negative for cough and shortness of breath.    Cardiovascular: Negative for chest pain and palpitations.   Genitourinary: Positive for dysuria and urgency.   Musculoskeletal: Negative for back pain and myalgias.   Neurological: Negative for dizziness and headache.   Psychiatric/Behavioral: Negative for depressed mood. The patient is not nervous/anxious.      Vitals:    12/07/20 1345   BP: (!) 185/90   BP Location: Left arm   Patient Position: Sitting   Cuff Size: Adult   Pulse: 86   Resp: 16   Temp: 98.2 °F (36.8 °C)   TempSrc: Infrared   SpO2: 97%   Weight: 75.3 kg (166 lb)   Height: 157.5 cm (62\")     Body mass index is 30.36 kg/m².  Physical Exam  Constitutional:       General: She is not in acute distress.     Appearance: She is well-developed.   Cardiovascular:      Rate and Rhythm: Normal rate and regular rhythm.      Heart sounds: Normal heart sounds. No murmur.   Pulmonary:      Effort: Pulmonary effort is normal.      Breath sounds: Normal breath sounds. No wheezing or rales.   Abdominal:      General: Bowel sounds are normal. There is no distension.      Palpations: Abdomen is soft.   Musculoskeletal: Normal range of motion.   Skin:     General: Skin is warm and dry.   Neurological:      Mental Status: She is alert and oriented to person, place, and time.   Psychiatric:         Behavior: Behavior normal.       Office Visit on 12/07/2020   Component Date Value Ref Range Status   • Color " 12/07/2020 Yellow  Yellow, Straw, Dark Yellow, Pallavi Final   • Clarity, UA 12/07/2020 Clear  Clear Final   • Specific Gravity  12/07/2020 1.020  1.005 - 1.030 Final   • pH, Urine 12/07/2020 7.0  5.0 - 8.0 Final   • Leukocytes 12/07/2020 Small (1+)* Negative Final   • Nitrite, UA 12/07/2020 Negative  Negative Final   • Protein, POC 12/07/2020 Negative  Negative mg/dL Final   • Glucose, UA 12/07/2020 Negative  Negative, 1000 mg/dL (3+) mg/dL Final   • Ketones, UA 12/07/2020 Negative  Negative Final   • Urobilinogen, UA 12/07/2020 Normal  Normal Final   • Bilirubin 12/07/2020 Negative  Negative Final   • Blood, UA 12/07/2020 Trace* Negative Final    intact       Diagnoses and all orders for this visit:    1. Acute cystitis without hematuria (Primary)  Assessment & Plan:  Patient is allergic to sulfa.  Will start amoxicillin and send urine for culture.  Will call with those results.  Also send in medication for bladder spasm.  Increase fluids and follow-up for worsening.    Orders:  -     POC Urinalysis Dipstick, Automated  -     Urine Culture - Urine, Urine, Clean Catch    2. Essential hypertension  Assessment & Plan:  Blood pressure was quite elevated today.  She has labile hypertension.  She had significant hypotension associated with lisinopril but tolerates losartan.  She is to stop in for recheck of blood pressure in the next week or so.      Other orders  -     amoxicillin (AMOXIL) 250 MG capsule; Take 1 capsule by mouth 3 (Three) Times a Day.  Dispense: 21 capsule; Refill: 0  -     flavoxATE (URISPAS) 100 MG tablet; Take 1 tablet by mouth 3 (Three) Times a Day As Needed for Bladder Spasms.  Dispense: 20 tablet; Refill: 0

## 2020-12-07 NOTE — ASSESSMENT & PLAN NOTE
Patient is allergic to sulfa.  Will start amoxicillin and send urine for culture.  Will call with those results.  Also send in medication for bladder spasm.  Increase fluids and follow-up for worsening.

## 2020-12-07 NOTE — ASSESSMENT & PLAN NOTE
Blood pressure was quite elevated today.  She has labile hypertension.  She had significant hypotension associated with lisinopril but tolerates losartan.  She is to stop in for recheck of blood pressure in the next week or so.

## 2020-12-09 LAB
BACTERIA UR CULT: NORMAL
BACTERIA UR CULT: NORMAL

## 2021-01-11 RX ORDER — LORATADINE 10 MG/1
TABLET ORAL
Qty: 90 TABLET | Refills: 2 | Status: SHIPPED | OUTPATIENT
Start: 2021-01-11 | End: 2021-12-20

## 2021-01-14 DIAGNOSIS — I10 ESSENTIAL HYPERTENSION: ICD-10-CM

## 2021-01-15 RX ORDER — OMEPRAZOLE 20 MG/1
CAPSULE, DELAYED RELEASE ORAL
Qty: 90 CAPSULE | Refills: 1 | Status: SHIPPED | OUTPATIENT
Start: 2021-01-15 | End: 2021-07-26

## 2021-01-15 RX ORDER — LOSARTAN POTASSIUM 100 MG/1
100 TABLET ORAL DAILY
Qty: 90 TABLET | Refills: 0 | Status: SHIPPED | OUTPATIENT
Start: 2021-01-15 | End: 2021-04-28 | Stop reason: SDUPTHER

## 2021-01-18 ENCOUNTER — OFFICE VISIT (OUTPATIENT)
Dept: FAMILY MEDICINE CLINIC | Facility: CLINIC | Age: 76
End: 2021-01-18

## 2021-01-18 VITALS
OXYGEN SATURATION: 97 % | RESPIRATION RATE: 20 BRPM | HEART RATE: 89 BPM | DIASTOLIC BLOOD PRESSURE: 82 MMHG | WEIGHT: 167.8 LBS | HEIGHT: 62 IN | TEMPERATURE: 97.8 F | BODY MASS INDEX: 30.88 KG/M2 | SYSTOLIC BLOOD PRESSURE: 173 MMHG

## 2021-01-18 DIAGNOSIS — R31.9 URINARY TRACT INFECTION WITH HEMATURIA, SITE UNSPECIFIED: Primary | ICD-10-CM

## 2021-01-18 DIAGNOSIS — N39.0 URINARY TRACT INFECTION WITH HEMATURIA, SITE UNSPECIFIED: Primary | ICD-10-CM

## 2021-01-18 DIAGNOSIS — R31.9 HEMATURIA, UNSPECIFIED TYPE: ICD-10-CM

## 2021-01-18 LAB
BILIRUB BLD-MCNC: NEGATIVE MG/DL
CLARITY, POC: ABNORMAL
COLOR UR: YELLOW
GLUCOSE UR STRIP-MCNC: NEGATIVE MG/DL
KETONES UR QL: NEGATIVE
LEUKOCYTE EST, POC: ABNORMAL
NITRITE UR-MCNC: NEGATIVE MG/ML
PH UR: 7.5 [PH] (ref 5–8)
PROT UR STRIP-MCNC: ABNORMAL MG/DL
RBC # UR STRIP: ABNORMAL /UL
SP GR UR: 1.02 (ref 1–1.03)
UROBILINOGEN UR QL: NORMAL

## 2021-01-18 PROCEDURE — 81003 URINALYSIS AUTO W/O SCOPE: CPT | Performed by: NURSE PRACTITIONER

## 2021-01-18 PROCEDURE — 99213 OFFICE O/P EST LOW 20 MIN: CPT | Performed by: NURSE PRACTITIONER

## 2021-01-18 RX ORDER — CIPROFLOXACIN 500 MG/1
500 TABLET, FILM COATED ORAL 2 TIMES DAILY
Qty: 14 TABLET | Refills: 0 | Status: SHIPPED | OUTPATIENT
Start: 2021-01-18 | End: 2021-01-25

## 2021-01-18 NOTE — PROGRESS NOTES
Chief Complaint  Urinary Tract Infection    Subjective          Yancy Mcdonnell presents to [facility] for UTI symptoms    History of Present Illness    Patient had dysuria a few weeks ago.  She was seen and negative culture.  She had a short course of amoxicillin.  She reports about 4 days ago she had an increase in symptoms of dysuria.  She has bladder pressure and frequency.  No fever or caden hematuria  Review of Systems   Constitutional: Negative for fatigue.   Genitourinary: Positive for dysuria and frequency.      Objective   Vital Signs:     Current Outpatient Medications:   •  albuterol sulfate HFA (VENTOLIN HFA) 108 (90 Base) MCG/ACT inhaler, Two inhalations every 4-6 hours as needed for SOA., Disp: , Rfl:   •  famciclovir (FAMVIR) 500 MG tablet, TAKE 3 TABLETS BY MOUTH AS A SINGLE DOSE AT FIRST SIGN OF COLD SORE, Disp: 3 tablet, Rfl: 5  •  fluticasone (FLONASE) 50 MCG/ACT nasal spray, INSTILL 2 SPRAYS IN EACH NOSTRIL DAILY, Disp: 48 g, Rfl: 11  •  Glucosamine-Chondroitin (CIDAFLEX PO), Take 2 tablets by mouth Daily., Disp: , Rfl:   •  hydroCHLOROthiazide (HYDRODIURIL) 25 MG tablet, TAKE 1 TABLET BY MOUTH DAILY, Disp: 90 tablet, Rfl: 1  •  loratadine (CLARITIN) 10 MG tablet, TAKE 1 TABLET BY MOUTH EVERY DAY, Disp: 90 tablet, Rfl: 2  •  losartan (COZAAR) 100 MG tablet, TAKE 1 TABLET BY MOUTH DAILY, Disp: 90 tablet, Rfl: 0  •  lysine ([facility] LYSINE) 500 MG tablet, Take 1 tablet by mouth Daily As Needed., Disp: , Rfl:   •  Multiple Vitamin (MULTI-VITAMIN DAILY) tablet, Take 1 tablet by mouth Daily., Disp: , Rfl:   •  omeprazole (priLOSEC) 20 MG capsule, TAKE ONE CAPSULE BY MOUTH EVERY MORNING ON AN EMPTY STOMACH, 30 MINUTES BEFORE EATING OR OTHER MEDICATION, Disp: 90 capsule, Rfl: 1  •  ciprofloxacin (Cipro) 500 MG tablet, Take 1 tablet by mouth 2 (Two) Times a Day for 7 days., Disp: 14 tablet, Rfl: 0  •  flavoxATE (URISPAS) 100 MG tablet, Take 1 tablet by mouth 3 (Three) Times  "a Day As Needed for Bladder Spasms., Disp: 20 tablet, Rfl: 0   /82 (BP Location: Left arm, Patient Position: Sitting, Cuff Size: Adult)   Pulse 89   Temp 97.8 °F (36.6 °C) (Infrared)   Resp 20   Ht 157.5 cm (62\")   Wt 76.1 kg (167 lb 12.8 oz)   SpO2 97%   BMI 30.69 kg/m²     Physical Exam  Vitals signs and nursing note reviewed.   Constitutional:       Appearance: She is well-developed.   Cardiovascular:      Rate and Rhythm: Normal rate and regular rhythm.      Heart sounds: Normal heart sounds. No murmur. No friction rub. No gallop.    Pulmonary:      Effort: Pulmonary effort is normal.      Breath sounds: Normal breath sounds. No wheezing or rales.   Abdominal:      General: Bowel sounds are normal.      Palpations: Abdomen is soft.      Tenderness: There is no abdominal tenderness.   Skin:     General: Skin is warm and dry.   Neurological:      Mental Status: She is alert.        Result Review :                 Assessment and Plan    Problem List Items Addressed This Visit     None      Visit Diagnoses     Urinary tract infection with hematuria, site unspecified    -  Primary    Cipro started pending culture and likely will follow-up with repeat urine    Relevant Medications    ciprofloxacin (Cipro) 500 MG tablet    Other Relevant Orders    POC Urinalysis Dipstick, Automated (Completed)    Urine Culture - Urine, Urine, Clean Catch    Hematuria, unspecified type              Follow Up   No follow-ups on file.  Patient was given instructions and counseling regarding her condition or for health maintenance advice. Please see specific information pulled into the AVS if appropriate.       "

## 2021-01-20 ENCOUNTER — HOSPITAL ENCOUNTER (OUTPATIENT)
Dept: BONE DENSITY | Facility: HOSPITAL | Age: 76
Discharge: HOME | End: 2021-01-20
Admitting: NURSE PRACTITIONER

## 2021-01-20 ENCOUNTER — TELEPHONE (OUTPATIENT)
Dept: FAMILY MEDICINE CLINIC | Facility: CLINIC | Age: 76
End: 2021-01-20

## 2021-01-20 DIAGNOSIS — R74.8 ELEVATED ALKALINE PHOSPHATASE LEVEL: Primary | ICD-10-CM

## 2021-01-20 PROCEDURE — 77080 DXA BONE DENSITY AXIAL: CPT

## 2021-01-21 LAB
ALBUMIN SERPL-MCNC: 4.1 G/DL (ref 3.7–4.7)
ALBUMIN/GLOB SERPL: 1.6 {RATIO} (ref 1.2–2.2)
ALP SERPL-CCNC: 124 IU/L (ref 39–117)
ALT SERPL-CCNC: 16 IU/L (ref 0–32)
AST SERPL-CCNC: 17 IU/L (ref 0–40)
BACTERIA UR CULT: ABNORMAL
BACTERIA UR CULT: ABNORMAL
BILIRUB SERPL-MCNC: 0.7 MG/DL (ref 0–1.2)
BUN SERPL-MCNC: 16 MG/DL (ref 8–27)
BUN/CREAT SERPL: 21 (ref 12–28)
CALCIUM SERPL-MCNC: 9.5 MG/DL (ref 8.7–10.3)
CHLORIDE SERPL-SCNC: 104 MMOL/L (ref 96–106)
CO2 SERPL-SCNC: 25 MMOL/L (ref 20–29)
CREAT SERPL-MCNC: 0.76 MG/DL (ref 0.57–1)
GLOBULIN SER CALC-MCNC: 2.5 G/DL (ref 1.5–4.5)
GLUCOSE SERPL-MCNC: 82 MG/DL (ref 65–99)
OTHER ANTIBIOTIC SUSC ISLT: ABNORMAL
POTASSIUM SERPL-SCNC: 4.1 MMOL/L (ref 3.5–5.2)
PROT SERPL-MCNC: 6.6 G/DL (ref 6–8.5)
SODIUM SERPL-SCNC: 142 MMOL/L (ref 134–144)

## 2021-04-16 ENCOUNTER — TELEPHONE (OUTPATIENT)
Dept: FAMILY MEDICINE CLINIC | Facility: CLINIC | Age: 76
End: 2021-04-16

## 2021-04-16 DIAGNOSIS — R74.8 ELEVATED ALKALINE PHOSPHATASE LEVEL: Primary | ICD-10-CM

## 2021-04-16 NOTE — TELEPHONE ENCOUNTER
----- Message from Blossom Vincent MA sent at 4/5/2021  4:42 PM EDT -----    ----- Message -----  From: Aileen Sexton MA  Sent: 4/5/2021  To: [facility]    Repeat CMP, GGT, Alkaline phosphatase isoenzymes

## 2021-04-21 LAB
ALBUMIN SERPL-MCNC: 4.2 G/DL (ref 3.7–4.7)
ALBUMIN/GLOB SERPL: 1.7 {RATIO} (ref 1.2–2.2)
ALP BONE CFR SERPL: 25 % (ref 14–68)
ALP INTEST CFR SERPL: 0 % (ref 0–18)
ALP LIVER CFR SERPL: 75 % (ref 18–85)
ALP SERPL-CCNC: 119 IU/L (ref 39–117)
ALT SERPL-CCNC: 17 IU/L (ref 0–32)
AST SERPL-CCNC: 17 IU/L (ref 0–40)
BILIRUB SERPL-MCNC: 0.9 MG/DL (ref 0–1.2)
BUN SERPL-MCNC: 15 MG/DL (ref 8–27)
BUN/CREAT SERPL: 20 (ref 12–28)
CALCIUM SERPL-MCNC: 9.4 MG/DL (ref 8.7–10.3)
CHLORIDE SERPL-SCNC: 105 MMOL/L (ref 96–106)
CO2 SERPL-SCNC: 26 MMOL/L (ref 20–29)
CREAT SERPL-MCNC: 0.76 MG/DL (ref 0.57–1)
GGT SERPL-CCNC: 16 IU/L (ref 0–60)
GLOBULIN SER CALC-MCNC: 2.5 G/DL (ref 1.5–4.5)
GLUCOSE SERPL-MCNC: 90 MG/DL (ref 65–99)
POTASSIUM SERPL-SCNC: 3.9 MMOL/L (ref 3.5–5.2)
PROT SERPL-MCNC: 6.7 G/DL (ref 6–8.5)
SODIUM SERPL-SCNC: 143 MMOL/L (ref 134–144)

## 2021-04-27 RX ORDER — CIPROFLOXACIN 500 MG/1
500 TABLET, FILM COATED ORAL 2 TIMES DAILY
COMMUNITY
Start: 2021-04-05 | End: 2021-04-28

## 2021-04-28 ENCOUNTER — OFFICE VISIT (OUTPATIENT)
Dept: FAMILY MEDICINE CLINIC | Facility: CLINIC | Age: 76
End: 2021-04-28

## 2021-04-28 VITALS
HEIGHT: 62 IN | DIASTOLIC BLOOD PRESSURE: 76 MMHG | SYSTOLIC BLOOD PRESSURE: 162 MMHG | TEMPERATURE: 97.2 F | WEIGHT: 167 LBS | HEART RATE: 82 BPM | RESPIRATION RATE: 18 BRPM | BODY MASS INDEX: 30.73 KG/M2 | OXYGEN SATURATION: 97 %

## 2021-04-28 DIAGNOSIS — I10 ESSENTIAL HYPERTENSION: Primary | Chronic | ICD-10-CM

## 2021-04-28 DIAGNOSIS — R74.8 ELEVATED ALKALINE PHOSPHATASE LEVEL: ICD-10-CM

## 2021-04-28 PROCEDURE — 99214 OFFICE O/P EST MOD 30 MIN: CPT | Performed by: NURSE PRACTITIONER

## 2021-04-28 RX ORDER — AMLODIPINE BESYLATE 2.5 MG/1
2.5 TABLET ORAL NIGHTLY
Qty: 30 TABLET | Refills: 1 | Status: SHIPPED | OUTPATIENT
Start: 2021-04-28 | End: 2021-06-18

## 2021-04-28 RX ORDER — AMLODIPINE BESYLATE 2.5 MG/1
2.5 TABLET ORAL NIGHTLY
Qty: 90 TABLET | OUTPATIENT
Start: 2021-04-28

## 2021-04-28 RX ORDER — HYDROCHLOROTHIAZIDE 25 MG/1
25 TABLET ORAL DAILY
Qty: 90 TABLET | Refills: 1 | Status: SHIPPED | OUTPATIENT
Start: 2021-04-28 | End: 2021-10-19 | Stop reason: SDUPTHER

## 2021-04-28 RX ORDER — LOSARTAN POTASSIUM 100 MG/1
100 TABLET ORAL DAILY
Qty: 90 TABLET | Refills: 1 | Status: SHIPPED | OUTPATIENT
Start: 2021-04-28 | End: 2021-06-16

## 2021-04-28 NOTE — ASSESSMENT & PLAN NOTE
Uncontrolled.   Add on low dose of amlodipine.   Continue all other medicines .  Follow-up in 4-6 weeks.

## 2021-04-28 NOTE — PROGRESS NOTES
Chief Complaint  Hypertension    Subjective          History of Present Illness  Patient is following up on hypertension and elevated alkaline phosphatase. She feels well and has no complaints.        Current Outpatient Medications:   •  albuterol sulfate HFA (VENTOLIN HFA) 108 (90 Base) MCG/ACT inhaler, Two inhalations every 4-6 hours as needed for SOA., Disp: , Rfl:   •  famciclovir (FAMVIR) 500 MG tablet, TAKE 3 TABLETS BY MOUTH AS A SINGLE DOSE AT FIRST SIGN OF COLD SORE, Disp: 3 tablet, Rfl: 5  •  fluticasone (FLONASE) 50 MCG/ACT nasal spray, INSTILL 2 SPRAYS IN EACH NOSTRIL DAILY, Disp: 48 g, Rfl: 11  •  Glucosamine-Chondroitin (CIDAFLEX PO), Take 2 tablets by mouth Daily., Disp: , Rfl:   •  hydroCHLOROthiazide (HYDRODIURIL) 25 MG tablet, Take 1 tablet by mouth Daily., Disp: 90 tablet, Rfl: 1  •  loratadine (CLARITIN) 10 MG tablet, TAKE 1 TABLET BY MOUTH EVERY DAY, Disp: 90 tablet, Rfl: 2  •  losartan (COZAAR) 100 MG tablet, Take 1 tablet by mouth Daily., Disp: 90 tablet, Rfl: 1  •  lysine ([facility] LYSINE) 500 MG tablet, Take 1 tablet by mouth Daily As Needed., Disp: , Rfl:   •  Multiple Vitamin (MULTI-VITAMIN DAILY) tablet, Take 1 tablet by mouth Daily., Disp: , Rfl:   •  omeprazole (priLOSEC) 20 MG capsule, TAKE ONE CAPSULE BY MOUTH EVERY MORNING ON AN EMPTY STOMACH, 30 MINUTES BEFORE EATING OR OTHER MEDICATION, Disp: 90 capsule, Rfl: 1  •  amLODIPine (NORVASC) 2.5 MG tablet, Take 1 tablet by mouth Every Night., Disp: 30 tablet, Rfl: 1     Review of Systems   Constitutional: Negative for activity change, appetite change, diaphoresis, fatigue, unexpected weight gain and unexpected weight loss.   Respiratory: Negative for cough, shortness of breath and wheezing.    Cardiovascular: Negative for chest pain, palpitations and leg swelling.   Gastrointestinal: Negative for abdominal pain, constipation, diarrhea, nausea and vomiting.   Musculoskeletal: Negative for myalgias.   Neurological: Negative for dizziness,  "syncope and headache.        Objective   Vital Signs:   Vitals:    04/28/21 0957 04/28/21 1016   BP: 166/90 162/76   Pulse: 82    Resp: 18    Temp: 97.2 °F (36.2 °C)    SpO2: 97%    Weight: 75.8 kg (167 lb)    Height: 157.5 cm (62\")      Body mass index is 30.54 kg/m².      Physical Exam  Constitutional:       Appearance: She is well-developed.   Neck:      Thyroid: No thyromegaly.      Vascular: No carotid bruit.      Trachea: Trachea normal.   Cardiovascular:      Rate and Rhythm: Normal rate and regular rhythm.      Heart sounds: Normal heart sounds. No murmur heard.   No friction rub. No gallop.    Pulmonary:      Effort: Pulmonary effort is normal.      Breath sounds: Normal breath sounds. No wheezing or rales.   Musculoskeletal:         General: Normal range of motion.      Cervical back: Normal range of motion and neck supple.   Lymphadenopathy:      Cervical: No cervical adenopathy.   Skin:     General: Skin is warm and dry.   Neurological:      Mental Status: She is alert and oriented to person, place, and time.   Psychiatric:         Behavior: Behavior normal.         Thought Content: Thought content normal.         Judgment: Judgment normal.          Result Review :   The following data was reviewed by: FELIPE Vázquze on 04/28/2021:  Common labs    Common Labsle 10/17/20 10/17/20 1/20/21 4/20/21    0936 0936     Glucose  88 82 90   BUN  14 16 15   Creatinine  0.72 0.76 0.76   eGFR Non  Am  82 76 76   eGFR  Am  94 88 88   Sodium  141 142 143   Potassium  3.9 4.1 3.9   Chloride  105 104 105   Calcium  9.7 9.5 9.4   Total Protein  6.6 6.6 6.7   Albumin  4.2 4.1 4.2   Total Bilirubin  0.8 0.7 0.9   Alkaline Phosphatase  141 (A) 124 (A) 119 (A)   AST (SGOT)  19 17 17   ALT (SGPT)  17 16 17   WBC 5.0      Hemoglobin 12.5      Hematocrit 38.8      Platelets 421      (A) Abnormal value          GGT normal  Alk Phos Isoenzymes normal    Data reviewed: Radiologic studies 7/2020 CT abd/pelvis and " Consultant notes 9/2020 GI consult note            Assessment and Plan    Diagnoses and all orders for this visit:    1. Essential hypertension (Primary)  Assessment & Plan:  Uncontrolled.   Add on low dose of amlodipine.   Continue all other medicines .  Follow-up in 4-6 weeks.     Orders:  -     hydroCHLOROthiazide (HYDRODIURIL) 25 MG tablet; Take 1 tablet by mouth Daily.  Dispense: 90 tablet; Refill: 1  -     losartan (COZAAR) 100 MG tablet; Take 1 tablet by mouth Daily.  Dispense: 90 tablet; Refill: 1    2. Elevated alkaline phosphatase level  Assessment & Plan:  Discussed possible causes.   Level is coming down, approaching normal. Negative GGT and isoenzymes.   Recheck in 3-6 months.       Other orders  -     amLODIPine (NORVASC) 2.5 MG tablet; Take 1 tablet by mouth Every Night.  Dispense: 30 tablet; Refill: 1          Follow Up   Return in about 6 weeks (around 6/9/2021) for Follow-Up hypertension.    Patient was given instructions and counseling regarding her condition and health maintenance advice. Please see specific information in the After Visit Summary.     FELIPE Vázquez 4/28/2021 10:27 EDT  This note was electronically signed.

## 2021-05-13 RX ORDER — FAMCICLOVIR 500 MG/1
TABLET ORAL
Qty: 3 TABLET | Refills: 5 | Status: SHIPPED | OUTPATIENT
Start: 2021-05-13 | End: 2022-11-28 | Stop reason: SDUPTHER

## 2021-06-15 PROBLEM — K44.9 HIATAL HERNIA: Status: ACTIVE | Noted: 2021-06-15

## 2021-06-16 ENCOUNTER — OFFICE VISIT (OUTPATIENT)
Dept: FAMILY MEDICINE CLINIC | Facility: CLINIC | Age: 76
End: 2021-06-16

## 2021-06-16 VITALS
OXYGEN SATURATION: 98 % | TEMPERATURE: 98.1 F | HEIGHT: 62 IN | BODY MASS INDEX: 30.62 KG/M2 | SYSTOLIC BLOOD PRESSURE: 137 MMHG | WEIGHT: 166.4 LBS | DIASTOLIC BLOOD PRESSURE: 84 MMHG | RESPIRATION RATE: 18 BRPM | HEART RATE: 90 BPM

## 2021-06-16 DIAGNOSIS — I10 ESSENTIAL HYPERTENSION: Chronic | ICD-10-CM

## 2021-06-16 PROCEDURE — 99213 OFFICE O/P EST LOW 20 MIN: CPT | Performed by: NURSE PRACTITIONER

## 2021-06-16 RX ORDER — LOSARTAN POTASSIUM 100 MG/1
50 TABLET ORAL DAILY
Start: 2021-06-16 | End: 2021-10-19

## 2021-06-16 NOTE — ASSESSMENT & PLAN NOTE
Better. Will continue current meds at the current dose (including only 50 mg of Losartan).   Follow-up in October, sooner for any problems.

## 2021-06-16 NOTE — PROGRESS NOTES
Chief Complaint  Hypertension    Subjective          History of Present Illness  Patient is following up on hypertension. Blood pressure was elevated at her April visit and amlodipine was added. She started to feel very tired after starting it and checked her blood pressure and it was running low (), so she cut her losartan dose in half. She has been taking amlodipine 2.5 mg, HCTZ 25 mg, and losartan 50 mg for weeks and reports normal BP readings at home and feels much better now.         Current Outpatient Medications:   •  albuterol sulfate HFA (VENTOLIN HFA) 108 (90 Base) MCG/ACT inhaler, Two inhalations every 4-6 hours as needed for SOA., Disp: , Rfl:   •  amLODIPine (NORVASC) 2.5 MG tablet, Take 1 tablet by mouth Every Night., Disp: 30 tablet, Rfl: 1  •  famciclovir (FAMVIR) 500 MG tablet, TAKE 3 TABLETS BY MOUTH AS A SINGLE DOSE AT FIRST SIGN OF COLD SORE, Disp: 3 tablet, Rfl: 5  •  fluticasone (FLONASE) 50 MCG/ACT nasal spray, INSTILL 2 SPRAYS IN EACH NOSTRIL DAILY, Disp: 48 g, Rfl: 11  •  Glucosamine-Chondroitin (CIDAFLEX PO), Take 2 tablets by mouth Daily., Disp: , Rfl:   •  hydroCHLOROthiazide (HYDRODIURIL) 25 MG tablet, Take 1 tablet by mouth Daily., Disp: 90 tablet, Rfl: 1  •  loratadine (CLARITIN) 10 MG tablet, TAKE 1 TABLET BY MOUTH EVERY DAY, Disp: 90 tablet, Rfl: 2  •  losartan (COZAAR) 100 MG tablet, Take 0.5 tablets by mouth Daily., Disp: , Rfl:   •  lysine ([facility] LYSINE) 500 MG tablet, Take 1 tablet by mouth Daily As Needed., Disp: , Rfl:   •  Multiple Vitamin (MULTI-VITAMIN DAILY) tablet, Take 1 tablet by mouth Daily., Disp: , Rfl:   •  omeprazole (priLOSEC) 20 MG capsule, TAKE ONE CAPSULE BY MOUTH EVERY MORNING ON AN EMPTY STOMACH, 30 MINUTES BEFORE EATING OR OTHER MEDICATION, Disp: 90 capsule, Rfl: 1     Review of Systems   Constitutional: Negative for activity change, appetite change, diaphoresis, fatigue, unexpected weight gain and unexpected weight loss.   Respiratory: Negative for  "cough, shortness of breath and wheezing.    Cardiovascular: Negative for chest pain, palpitations and leg swelling.   Gastrointestinal: Negative for abdominal pain, constipation, diarrhea, nausea and vomiting.   Musculoskeletal: Negative for myalgias.   Neurological: Negative for dizziness, syncope and headache.        Objective   Vital Signs:   Vitals:    06/16/21 1459   BP: 137/84   BP Location: Left arm   Patient Position: Sitting   Cuff Size: Adult   Pulse: 90   Resp: 18   Temp: 98.1 °F (36.7 °C)   TempSrc: Infrared   SpO2: 98%   Weight: 75.5 kg (166 lb 6.4 oz)   Height: 157.5 cm (62\")     Body mass index is 30.43 kg/m².    Physical Exam  Constitutional:       Appearance: She is well-developed.   Neck:      Thyroid: No thyromegaly.      Vascular: No carotid bruit.      Trachea: Trachea normal.   Cardiovascular:      Rate and Rhythm: Normal rate and regular rhythm.      Heart sounds: Normal heart sounds. No murmur heard.   No friction rub. No gallop.    Pulmonary:      Effort: Pulmonary effort is normal.      Breath sounds: Normal breath sounds. No wheezing or rales.   Musculoskeletal:         General: Normal range of motion.      Cervical back: Normal range of motion and neck supple.   Lymphadenopathy:      Cervical: No cervical adenopathy.   Skin:     General: Skin is warm and dry.   Neurological:      Mental Status: She is alert and oriented to person, place, and time.   Psychiatric:         Behavior: Behavior normal.         Thought Content: Thought content normal.         Judgment: Judgment normal.          Result Review :   The following data was reviewed by: FELIPE Vázquez on 06/16/2021:    No visits with results within 1 Day(s) from this visit.   Latest known visit with results is:   Telephone on 04/16/2021   Component Date Value Ref Range Status   • Glucose 04/20/2021 90  65 - 99 mg/dL Final   • BUN 04/20/2021 15  8 - 27 mg/dL Final   • Creatinine 04/20/2021 0.76  0.57 - 1.00 mg/dL Final   • eGFR " Non  Am 04/20/2021 76  >59 mL/min/1.73 Final   • eGFR African Am 04/20/2021 88  >59 mL/min/1.73 Final   • BUN/Creatinine Ratio 04/20/2021 20  12 - 28 Final   • Sodium 04/20/2021 143  134 - 144 mmol/L Final   • Potassium 04/20/2021 3.9  3.5 - 5.2 mmol/L Final   • Chloride 04/20/2021 105  96 - 106 mmol/L Final   • Total CO2 04/20/2021 26  20 - 29 mmol/L Final   • Calcium 04/20/2021 9.4  8.7 - 10.3 mg/dL Final   • Total Protein 04/20/2021 6.7  6.0 - 8.5 g/dL Final   • Albumin 04/20/2021 4.2  3.7 - 4.7 g/dL Final   • Globulin 04/20/2021 2.5  1.5 - 4.5 g/dL Final   • A/G Ratio 04/20/2021 1.7  1.2 - 2.2 Final   • Total Bilirubin 04/20/2021 0.9  0.0 - 1.2 mg/dL Final   • Alkaline Phosphatase 04/20/2021 119* 39 - 117 IU/L Final   • AST (SGOT) 04/20/2021 17  0 - 40 IU/L Final   • ALT (SGPT) 04/20/2021 17  0 - 32 IU/L Final   • GGT 04/20/2021 16  0 - 60 IU/L Final   • Liver Fraction: 04/20/2021 75  18 - 85 % Final   • Bone Fraction: 04/20/2021 25  14 - 68 % Final   • Intestinal Frac.: 04/20/2021 0  0 - 18 % Final                 Assessment and Plan    Diagnoses and all orders for this visit:    1. Essential hypertension  Assessment & Plan:  Better. Will continue current meds at the current dose (including only 50 mg of Losartan).   Follow-up in October, sooner for any problems.     Orders:  -     losartan (COZAAR) 100 MG tablet; Take 0.5 tablets by mouth Daily.          Follow Up   Return in about 4 months (around 10/19/2021) for Medicare Wellness.    Patient was given instructions and counseling regarding her condition and health maintenance advice. Please see specific information in the After Visit Summary.     Muniar So APRN 6/16/2021 15:19 EDT  This note was electronically signed.

## 2021-06-18 RX ORDER — AMLODIPINE BESYLATE 2.5 MG/1
2.5 TABLET ORAL NIGHTLY
Qty: 90 TABLET | Refills: 1 | Status: SHIPPED | OUTPATIENT
Start: 2021-06-18 | End: 2021-10-19 | Stop reason: SINTOL

## 2021-07-26 RX ORDER — FLUTICASONE PROPIONATE 50 MCG
SPRAY, SUSPENSION (ML) NASAL
Qty: 48 G | Refills: 1 | Status: SHIPPED | OUTPATIENT
Start: 2021-07-26 | End: 2022-04-21 | Stop reason: SDUPTHER

## 2021-07-26 RX ORDER — OMEPRAZOLE 20 MG/1
CAPSULE, DELAYED RELEASE ORAL
Qty: 90 CAPSULE | Refills: 1 | Status: SHIPPED | OUTPATIENT
Start: 2021-07-26 | End: 2022-07-29

## 2021-10-18 ENCOUNTER — HOSPITAL ENCOUNTER (OUTPATIENT)
Dept: MAMMOGRAPHY | Facility: HOSPITAL | Age: 76
Discharge: HOME | End: 2021-10-18
Admitting: NURSE PRACTITIONER

## 2021-10-18 DIAGNOSIS — Z12.31 SCREENING MAMMOGRAM, ENCOUNTER FOR: ICD-10-CM

## 2021-10-18 PROCEDURE — 77067 SCR MAMMO BI INCL CAD: CPT

## 2021-10-18 PROCEDURE — 77063 BREAST TOMOSYNTHESIS BI: CPT

## 2021-10-19 ENCOUNTER — OFFICE VISIT (OUTPATIENT)
Dept: FAMILY MEDICINE CLINIC | Facility: CLINIC | Age: 76
End: 2021-10-19

## 2021-10-19 VITALS
TEMPERATURE: 97.6 F | HEART RATE: 88 BPM | HEIGHT: 62 IN | BODY MASS INDEX: 30.55 KG/M2 | WEIGHT: 166 LBS | SYSTOLIC BLOOD PRESSURE: 145 MMHG | RESPIRATION RATE: 18 BRPM | OXYGEN SATURATION: 96 % | DIASTOLIC BLOOD PRESSURE: 81 MMHG

## 2021-10-19 DIAGNOSIS — Z12.31 BREAST CANCER SCREENING BY MAMMOGRAM: ICD-10-CM

## 2021-10-19 DIAGNOSIS — M15.9 PRIMARY OSTEOARTHRITIS INVOLVING MULTIPLE JOINTS: ICD-10-CM

## 2021-10-19 DIAGNOSIS — E78.5 DYSLIPIDEMIA: ICD-10-CM

## 2021-10-19 DIAGNOSIS — Z11.59 NEED FOR HEPATITIS C SCREENING TEST: ICD-10-CM

## 2021-10-19 DIAGNOSIS — G43.909 MIGRAINE WITHOUT STATUS MIGRAINOSUS, NOT INTRACTABLE, UNSPECIFIED MIGRAINE TYPE: ICD-10-CM

## 2021-10-19 DIAGNOSIS — I10 PRIMARY HYPERTENSION: ICD-10-CM

## 2021-10-19 DIAGNOSIS — Z86.19 HISTORY OF HERPES SIMPLEX INFECTION: Chronic | ICD-10-CM

## 2021-10-19 DIAGNOSIS — J30.1 SEASONAL ALLERGIC RHINITIS DUE TO POLLEN: Chronic | ICD-10-CM

## 2021-10-19 DIAGNOSIS — I10 ESSENTIAL HYPERTENSION: Chronic | ICD-10-CM

## 2021-10-19 DIAGNOSIS — I34.0 NONRHEUMATIC MITRAL VALVE REGURGITATION: ICD-10-CM

## 2021-10-19 DIAGNOSIS — K21.9 GASTROESOPHAGEAL REFLUX DISEASE WITHOUT ESOPHAGITIS: Chronic | ICD-10-CM

## 2021-10-19 DIAGNOSIS — R74.8 ELEVATED ALKALINE PHOSPHATASE LEVEL: ICD-10-CM

## 2021-10-19 DIAGNOSIS — I87.2 VENOUS INSUFFICIENCY: ICD-10-CM

## 2021-10-19 DIAGNOSIS — R91.1 LUNG NODULE: ICD-10-CM

## 2021-10-19 DIAGNOSIS — M06.9 RHEUMATOID ARTHRITIS, INVOLVING UNSPECIFIED SITE, UNSPECIFIED WHETHER RHEUMATOID FACTOR PRESENT (HCC): ICD-10-CM

## 2021-10-19 DIAGNOSIS — M85.89 OSTEOPENIA OF MULTIPLE SITES: Chronic | ICD-10-CM

## 2021-10-19 DIAGNOSIS — E66.09 CLASS 1 OBESITY DUE TO EXCESS CALORIES WITH SERIOUS COMORBIDITY AND BODY MASS INDEX (BMI) OF 30.0 TO 30.9 IN ADULT: ICD-10-CM

## 2021-10-19 DIAGNOSIS — Z00.01 ENCOUNTER FOR GENERAL ADULT MEDICAL EXAMINATION WITH ABNORMAL FINDINGS: Primary | ICD-10-CM

## 2021-10-19 PROBLEM — N30.00 ACUTE CYSTITIS WITHOUT HEMATURIA: Status: RESOLVED | Noted: 2020-12-07 | Resolved: 2021-10-19

## 2021-10-19 PROBLEM — R30.0 DYSURIA: Status: RESOLVED | Noted: 2020-12-07 | Resolved: 2021-10-19

## 2021-10-19 PROCEDURE — 99214 OFFICE O/P EST MOD 30 MIN: CPT | Performed by: NURSE PRACTITIONER

## 2021-10-19 PROCEDURE — 1126F AMNT PAIN NOTED NONE PRSNT: CPT | Performed by: NURSE PRACTITIONER

## 2021-10-19 PROCEDURE — G0439 PPPS, SUBSEQ VISIT: HCPCS | Performed by: NURSE PRACTITIONER

## 2021-10-19 PROCEDURE — 1159F MED LIST DOCD IN RCRD: CPT | Performed by: NURSE PRACTITIONER

## 2021-10-19 RX ORDER — HYDROCHLOROTHIAZIDE 25 MG/1
25 TABLET ORAL DAILY
Qty: 90 TABLET | Refills: 1 | Status: SHIPPED | OUTPATIENT
Start: 2021-10-19 | End: 2021-11-01

## 2021-10-19 RX ORDER — LOSARTAN POTASSIUM 100 MG/1
100 TABLET ORAL DAILY
Start: 2021-10-19 | End: 2021-10-19 | Stop reason: SDUPTHER

## 2021-10-19 RX ORDER — LOSARTAN POTASSIUM 100 MG/1
100 TABLET ORAL DAILY
Start: 2021-10-19 | End: 2021-11-26

## 2021-10-19 NOTE — ASSESSMENT & PLAN NOTE
Mild elevation today.  We will hold on making any changes to her medications at this time.  Follow-up in 3 months and if still elevated will consider addition of a beta-blocker.

## 2021-10-19 NOTE — PROGRESS NOTES
Subsequent Medicare Wellness Visit    Chief Complaint   Patient presents with   • Medicare Wellness-subsequent      Subjective    History of Present Illness:  Yancy Mcdonnell is a 76 y.o. female who presents for a Subsequent Medicare Wellness Visit.  Patient is also following up on multiple chronic medical conditions, including hypertension, dyslipidemia, arthritis, GERD and elevated alkaline phosphatase level.  Amlodipine was added at previous visit, however she discontinued its use due to pedal edema.  She did increase her losartan dose from 50 to 100 mg daily.  See review of systems below for further details.        The following portions of the patient's history were reviewed and   updated as appropriate: allergies, current medications, past family history, past medical history, past social history, past surgical history and problem list.    Compared to one year ago, the patient feels her physical   health is the same.    Compared to one year ago, the patient feels her mental   health is the same.    Recent Hospitalizations:  She was not admitted to the hospital during the last year.       Current Medical Providers:  Patient Care Team:  Munira So APRN as PCP - General (Nurse Practitioner)  Dr. AMITA Ross) (Ophthalmology)  Banet, Duane Edward, MD as Consulting Physician (Dermatology)  Edwin Banks MD as Consulting Physician (Otolaryngology)  Ruslan Davila MD as Consulting Physician (Gastroenterology)    Outpatient Medications Prior to Visit   Medication Sig Dispense Refill   • albuterol sulfate HFA (VENTOLIN HFA) 108 (90 Base) MCG/ACT inhaler Two inhalations every 4-6 hours as needed for SOA.     • famciclovir (FAMVIR) 500 MG tablet TAKE 3 TABLETS BY MOUTH AS A SINGLE DOSE AT FIRST SIGN OF COLD SORE 3 tablet 5   • fluticasone (FLONASE) 50 MCG/ACT nasal spray INSTILL 2 SPRAYS IN EACH NOSTRIL DAILY 48 g 1   • Glucosamine-Chondroitin (CIDAFLEX PO) Take 2 tablets by mouth Daily.     • loratadine  (CLARITIN) 10 MG tablet TAKE 1 TABLET BY MOUTH EVERY DAY 90 tablet 2   • lysine ([facility] LYSINE) 500 MG tablet Take 1 tablet by mouth Daily As Needed.     • Multiple Vitamin (MULTI-VITAMIN DAILY) tablet Take 1 tablet by mouth Daily.     • omeprazole (priLOSEC) 20 MG capsule TAKE ONE CAPSULE BY MOUTH EVERY MORNING ON AN EMPTY STOMACH, 30 MINUTES BEFORE EATING OR OTHER MEDICATION 90 capsule 1   • hydroCHLOROthiazide (HYDRODIURIL) 25 MG tablet Take 1 tablet by mouth Daily. 90 tablet 1   • losartan (COZAAR) 100 MG tablet Take 0.5 tablets by mouth Daily.     • amLODIPine (NORVASC) 2.5 MG tablet TAKE 1 TABLET BY MOUTH EVERY NIGHT 90 tablet 1     No facility-administered medications prior to visit.       No opioid medication identified on active medication list. I have reviewed chart for other potential  high risk medication/s and harmful drug interactions in the elderly.          Aspirin is not on active medication list.  Aspirin use is not indicated based on review of current medical condition/s. Risk of harm outweighs potential benefits.  .    Patient Active Problem List   Diagnosis   • Allergic rhinitis   • Lung nodule   • Dyslipidemia   • History of thrombosis   • Hypertension   • Migraine headache   • Mitral valve insufficiency   • Osteoarthritis   • Osteopenia of multiple sites   • Rheumatoid arthritis (HCC)   • Spinal stenosis of cervical region   • Venous insufficiency   • Gastroesophageal reflux disease without esophagitis   • History of herpes simplex infection   • Class 1 obesity due to excess calories with serious comorbidity and body mass index (BMI) of 30.0 to 30.9 in adult   • Elevated alkaline phosphatase level   • Hiatal hernia     Advance Care Planning  Advance Directive is on file.  ACP discussion was held with the patient during this visit. Patient has an advance directive in EMR which is still valid.     Review of Systems   Constitutional: Negative for appetite change, chills, diaphoresis, fatigue, fever,  "unexpected weight gain and unexpected weight loss.   HENT: Negative for congestion, ear discharge, ear pain, hearing loss, mouth sores, nosebleeds, postnasal drip, rhinorrhea, sinus pressure, sneezing, sore throat, swollen glands and trouble swallowing.    Eyes: Negative for blurred vision, double vision, pain, discharge, redness and itching.   Respiratory: Negative for cough, choking, shortness of breath, wheezing and stridor.    Cardiovascular: Negative for chest pain, palpitations and leg swelling.   Gastrointestinal: Negative for abdominal distention, abdominal pain, anal bleeding, blood in stool, constipation, diarrhea, nausea, rectal pain, vomiting, GERD and indigestion.   Endocrine: Negative for cold intolerance, heat intolerance, polydipsia, polyphagia and polyuria.   Genitourinary: Positive for urinary incontinence. Negative for breast discharge, breast lump, breast pain, difficulty urinating, dysuria, flank pain, frequency, genital sores, hematuria, pelvic pain, urgency, vaginal bleeding, vaginal discharge and vaginal pain.   Musculoskeletal: Positive for arthralgias. Negative for back pain, gait problem, joint swelling, myalgias, neck pain and neck stiffness.   Skin: Negative for color change, rash and skin lesions.   Neurological: Negative for dizziness, tremors, seizures, syncope, speech difficulty, weakness, light-headedness, numbness, headache, memory problem and confusion.   Hematological: Negative for adenopathy. Does not bruise/bleed easily.   Psychiatric/Behavioral: Negative for self-injury, sleep disturbance, suicidal ideas, depressed mood and stress. The patient is not nervous/anxious.           Objective    Vitals:    10/19/21 1342   BP: 145/81   BP Location: Right arm   Patient Position: Sitting   Cuff Size: Large Adult   Pulse: 88   Resp: 18   Temp: 97.6 °F (36.4 °C)   TempSrc: Infrared   SpO2: 96%   Weight: 75.3 kg (166 lb)   Height: 157.5 cm (62.01\")     BMI Readings from Last 1 Encounters: "   10/19/21 30.35 kg/m²   BMI is above normal parameters. Recommendations include: exercise counseling and nutrition counseling    Does the patient have evidence of cognitive impairment? No    Physical Exam  Vitals reviewed.   Constitutional:       General: She is not in acute distress.     Appearance: She is well-developed.   HENT:      Head: Normocephalic and atraumatic.      Right Ear: Tympanic membrane, ear canal and external ear normal. Tympanic membrane is not injected, erythematous, retracted or bulging.      Left Ear: Tympanic membrane, ear canal and external ear normal. Tympanic membrane is not injected, erythematous, retracted or bulging.      Nose: Nose normal. No mucosal edema or rhinorrhea.      Right Sinus: No maxillary sinus tenderness or frontal sinus tenderness.      Left Sinus: No maxillary sinus tenderness or frontal sinus tenderness.      Mouth/Throat:      Mouth: No oral lesions.      Pharynx: Uvula midline. No oropharyngeal exudate or posterior oropharyngeal erythema.   Eyes:      General: Lids are normal.         Right eye: No discharge.         Left eye: No discharge.      Conjunctiva/sclera: Conjunctivae normal.      Pupils: Pupils are equal, round, and reactive to light.   Neck:      Thyroid: No thyroid mass or thyromegaly.      Vascular: No carotid bruit or JVD.      Trachea: No tracheal deviation.   Cardiovascular:      Rate and Rhythm: Normal rate and regular rhythm.      Heart sounds: Normal heart sounds. No murmur heard.  No friction rub. No gallop.    Pulmonary:      Effort: Pulmonary effort is normal. No respiratory distress.      Breath sounds: Normal breath sounds. No wheezing or rales.   Chest:   Breasts: Breasts are symmetrical.      Right: No inverted nipple, mass, nipple discharge, skin change or tenderness.      Left: No inverted nipple, mass, nipple discharge, skin change or tenderness.       Abdominal:      General: Bowel sounds are normal. There is no distension.       Palpations: Abdomen is soft. There is no mass.      Tenderness: There is no abdominal tenderness.      Hernia: No hernia is present.   Genitourinary:     Comments: Patient declined examination  Musculoskeletal:      Cervical back: Neck supple.   Lymphadenopathy:      Cervical: No cervical adenopathy.   Skin:     General: Skin is warm and dry.      Findings: No lesion or rash.   Neurological:      Mental Status: She is alert and oriented to person, place, and time.      Cranial Nerves: No cranial nerve deficit.      Sensory: No sensory deficit.      Gait: Gait normal.      Deep Tendon Reflexes: Reflexes are normal and symmetric.   Psychiatric:         Speech: Speech normal.         Behavior: Behavior normal.         Thought Content: Thought content normal.         Judgment: Judgment normal.          Reviewed by FELIPE Toney    Lab Results   Component Value Date    WBC 5.0 10/17/2020    HGB 12.5 10/17/2020    HCT 38.8 10/17/2020    MCV 85 10/17/2020     10/17/2020     Lab Results   Component Value Date    GLUCOSE 109 (H) 07/26/2020    BUN 15 04/20/2021    CREATININE 0.76 04/20/2021    EGFRIFNONA 76 04/20/2021    EGFRIFAFRI 88 04/20/2021    BCR 20 04/20/2021    K 3.9 04/20/2021    CO2 26 04/20/2021    CALCIUM 9.4 04/20/2021    PROTENTOTREF 6.7 04/20/2021    ALBUMIN 4.2 04/20/2021    LABIL2 1.7 04/20/2021    AST 17 04/20/2021    ALT 17 04/20/2021     Lab Results   Component Value Date    TSH 3.24 06/14/2017     Lab Results   Component Value Date    CHOL 193 06/14/2017    TRIG 112 06/14/2017    HDL 74 06/14/2017    LDL 97 MG/DL (CALC) 06/14/2017               HEALTH RISK ASSESSMENT    Smoking Status:  Social History     Tobacco Use   Smoking Status Never Smoker   Smokeless Tobacco Never Used     Alcohol Consumption:  Social History     Substance and Sexual Activity   Alcohol Use No     Fall Risk Screen:    STEADI Fall Risk Assessment was completed, and patient is at LOW risk for falls.Assessment completed  on:10/19/2021    Depression Screening:  PHQ-2/PHQ-9 Depression Screening 10/19/2021   Little interest or pleasure in doing things 0   Feeling down, depressed, or hopeless 0   Trouble falling or staying asleep, or sleeping too much 0   Feeling tired or having little energy 0   Poor appetite or overeating 0   Feeling bad about yourself - or that you are a failure or have let yourself or your family down 0   Trouble concentrating on things, such as reading the newspaper or watching television 0   Moving or speaking so slowly that other people could have noticed. Or the opposite - being so fidgety or restless that you have been moving around a lot more than usual 0   Thoughts that you would be better off dead, or of hurting yourself in some way 0   Total Score 0   If you checked off any problems, how difficult have these problems made it for you to do your work, take care of things at home, or get along with other people? Not difficult at all       Health Habits and Functional and Cognitive Screening:  Functional & Cognitive Status 10/19/2021   Do you have difficulty preparing food and eating? No   Do you have difficulty bathing yourself, getting dressed or grooming yourself? No   Do you have difficulty using the toilet? No   Do you have difficulty moving around from place to place? No   Do you have trouble with steps or getting out of a bed or a chair? No   Current Diet Well Balanced Diet   Dental Exam Up to date   Eye Exam Up to date   Exercise (times per week) 1 times per week   Current Exercises Include No Regular Exercise   Current Exercise Activities Include -   Do you need help using the phone?  No   Are you deaf or do you have serious difficulty hearing?  No   Do you need help with transportation? No   Do you need help shopping? No   Do you need help preparing meals?  No   Do you need help with housework?  No   Do you need help with laundry? No   Do you need help taking your medications? No   Do you need help  managing money? No   Do you ever drive or ride in a car without wearing a seat belt? No   Have you felt unusual stress, anger or loneliness in the last month? No   Who do you live with? Spouse   If you need help, do you have trouble finding someone available to you? No   Have you been bothered in the last four weeks by sexual problems? No   Do you have difficulty concentrating, remembering or making decisions? No       Age-appropriate Screening Schedule:  Refer to the list below for future screening recommendations based on patient's age, sex and/or medical conditions. Orders for these recommended tests are listed in the plan section. The patient has been provided with a written plan.    Health Maintenance   Topic Date Due   • ZOSTER VACCINE (1 of 2) 06/10/1994   • LIPID PANEL  04/23/2019   • INFLUENZA VACCINE  08/01/2021   • DXA SCAN  01/20/2023   • TDAP/TD VACCINES (2 - Td or Tdap) 01/05/2027            Procedures    Assessment/Plan   CMS Preventative Services Quick Reference  Risk Factors Identified During Encounter  Cardiovascular Disease  Immunizations Discussed/Encouraged (specific Immunizations; Influenza and Shingrix  Obesity/Overweight   The above risks/problems have been discussed with the patient.  Follow up actions/plans if indicated are seen below in the Assessment/Plan Section.  Pertinent information has been shared with the patient in the After Visit Summary.    Diagnoses and all orders for this visit:    1. Encounter for general adult medical examination with abnormal findings (Primary)  Comments:  Discussed age-appropriate health maintenance.  She declines influenza vaccine.  She declines Covid booster vaccine.  She declines zoster vaccine due to cost.    2. Primary hypertension  Assessment & Plan:  Mild elevation today.  We will hold on making any changes to her medications at this time.  Follow-up in 3 months and if still elevated will consider addition of a beta-blocker.    Orders:  -     CBC &  Differential  -     Comprehensive Metabolic Panel    3. Essential hypertension  -     Discontinue: losartan (COZAAR) 100 MG tablet; Take 1 tablet by mouth Daily.  -     hydroCHLOROthiazide (HYDRODIURIL) 25 MG tablet; Take 1 tablet by mouth Daily.  Dispense: 90 tablet; Refill: 1  -     losartan (COZAAR) 100 MG tablet; Take 1 tablet by mouth Daily.    4. Dyslipidemia  Assessment & Plan:  Normal lipid panel in 2017.    Orders:  -     Comprehensive Metabolic Panel  -     Lipid Panel    5. Seasonal allergic rhinitis due to pollen  Assessment & Plan:  Mild, seasonal.      6. Venous insufficiency  Assessment & Plan:  Worsened by calcium channel blocker.  Improved now that she has discontinued its use.      7. Nonrheumatic mitral valve regurgitation  Assessment & Plan:  Mild on 2017 echo.  She is asymptomatic.      8. Gastroesophageal reflux disease without esophagitis  Assessment & Plan:  Controlled on PPI.  She has been unable to discontinue PPI use      9. History of herpes simplex infection  Assessment & Plan:  Rare outbreak.  She has antiviral medication on hand to use if needed.      10. Osteopenia of multiple sites  Assessment & Plan:  She declines medication.  Continue weightbearing exercise and increased calcium intake.      11. Rheumatoid arthritis, involving unspecified site, unspecified whether rheumatoid factor present (HCC)  Assessment & Plan:  She reports evaluation by rheumatology in the past and declines further evaluation.      12. Primary osteoarthritis involving multiple joints  Assessment & Plan:  No worsening.      13. Migraine without status migrainosus, not intractable, unspecified migraine type  Assessment & Plan:  No recent problems with headaches.        14. Lung nodule  Assessment & Plan:  No change during CT surveillance from 2017 to 2020.  Considered benign.      15. Elevated alkaline phosphatase level  Assessment & Plan:  Level has been decreasing.  She had negative GGT and alkaline  phosphatase isoenzymes.      16. Class 1 obesity due to excess calories with serious comorbidity and body mass index (BMI) of 30.0 to 30.9 in adult  Assessment & Plan:  Encouraged healthy diet and regular physical activity.  Will defer Medicare obesity counseling at this time due to global pandemic.      17. Need for hepatitis C screening test  -     Hepatitis C Antibody    18. Breast cancer screening by mammogram  Comments:  She had a negative screening mammogram yesterday.      Follow Up:   Return in about 3 months (around 1/19/2022) for Follow-Up hypertension.     An After Visit Summary and PPPS were made available to the patient.

## 2021-10-27 LAB
ALBUMIN SERPL-MCNC: 4.1 G/DL (ref 3.7–4.7)
ALBUMIN/GLOB SERPL: 1.9 {RATIO} (ref 1.2–2.2)
ALP SERPL-CCNC: 129 IU/L (ref 44–121)
ALT SERPL-CCNC: 16 IU/L (ref 0–32)
AST SERPL-CCNC: 18 IU/L (ref 0–40)
BASOPHILS # BLD AUTO: 0 X10E3/UL (ref 0–0.2)
BASOPHILS NFR BLD AUTO: 1 %
BILIRUB SERPL-MCNC: 0.7 MG/DL (ref 0–1.2)
BUN SERPL-MCNC: 16 MG/DL (ref 8–27)
BUN/CREAT SERPL: 23 (ref 12–28)
CALCIUM SERPL-MCNC: 9.4 MG/DL (ref 8.7–10.3)
CHLORIDE SERPL-SCNC: 105 MMOL/L (ref 96–106)
CHOLEST SERPL-MCNC: 183 MG/DL (ref 100–199)
CO2 SERPL-SCNC: 26 MMOL/L (ref 20–29)
CREAT SERPL-MCNC: 0.7 MG/DL (ref 0.57–1)
EOSINOPHIL # BLD AUTO: 0.1 X10E3/UL (ref 0–0.4)
EOSINOPHIL NFR BLD AUTO: 2 %
ERYTHROCYTE [DISTWIDTH] IN BLOOD BY AUTOMATED COUNT: 11.8 % (ref 11.7–15.4)
GLOBULIN SER CALC-MCNC: 2.2 G/DL (ref 1.5–4.5)
GLUCOSE SERPL-MCNC: 83 MG/DL (ref 65–99)
HCT VFR BLD AUTO: 37.2 % (ref 34–46.6)
HCV AB S/CO SERPL IA: 0.1 S/CO RATIO (ref 0–0.9)
HDLC SERPL-MCNC: 67 MG/DL
HGB BLD-MCNC: 12.3 G/DL (ref 11.1–15.9)
IMM GRANULOCYTES # BLD AUTO: 0 X10E3/UL (ref 0–0.1)
IMM GRANULOCYTES NFR BLD AUTO: 0 %
LDLC SERPL CALC-MCNC: 94 MG/DL (ref 0–99)
LYMPHOCYTES # BLD AUTO: 1.6 X10E3/UL (ref 0.7–3.1)
LYMPHOCYTES NFR BLD AUTO: 26 %
MCH RBC QN AUTO: 30.1 PG (ref 26.6–33)
MCHC RBC AUTO-ENTMCNC: 33.1 G/DL (ref 31.5–35.7)
MCV RBC AUTO: 91 FL (ref 79–97)
MONOCYTES # BLD AUTO: 0.5 X10E3/UL (ref 0.1–0.9)
MONOCYTES NFR BLD AUTO: 9 %
NEUTROPHILS # BLD AUTO: 3.8 X10E3/UL (ref 1.4–7)
NEUTROPHILS NFR BLD AUTO: 62 %
PLATELET # BLD AUTO: 384 X10E3/UL (ref 150–450)
POTASSIUM SERPL-SCNC: 4.1 MMOL/L (ref 3.5–5.2)
PROT SERPL-MCNC: 6.3 G/DL (ref 6–8.5)
RBC # BLD AUTO: 4.09 X10E6/UL (ref 3.77–5.28)
SODIUM SERPL-SCNC: 143 MMOL/L (ref 134–144)
TRIGL SERPL-MCNC: 128 MG/DL (ref 0–149)
VLDLC SERPL CALC-MCNC: 22 MG/DL (ref 5–40)
WBC # BLD AUTO: 6.2 X10E3/UL (ref 3.4–10.8)

## 2021-10-31 DIAGNOSIS — I10 ESSENTIAL HYPERTENSION: Chronic | ICD-10-CM

## 2021-11-01 RX ORDER — HYDROCHLOROTHIAZIDE 25 MG/1
25 TABLET ORAL DAILY
Qty: 90 TABLET | Refills: 1 | Status: SHIPPED | OUTPATIENT
Start: 2021-11-01 | End: 2022-04-09

## 2021-11-16 ENCOUNTER — TELEPHONE (OUTPATIENT)
Dept: FAMILY MEDICINE CLINIC | Facility: CLINIC | Age: 76
End: 2021-11-16

## 2021-11-16 NOTE — TELEPHONE ENCOUNTER
Provider: MARK EASTMAN  Caller: TREMAINE GOOD  Relationship to Patient: SELF  Reason for Call: PATIENT WANTED MARK EASTMAN TO KNOW SHE WENT TO [facility] AND RECEIVED HER FLU SHOT TODAY.

## 2021-11-25 DIAGNOSIS — I10 ESSENTIAL HYPERTENSION: Chronic | ICD-10-CM

## 2021-11-26 RX ORDER — LOSARTAN POTASSIUM 100 MG/1
100 TABLET ORAL DAILY
Qty: 90 TABLET | Refills: 0 | Status: SHIPPED | OUTPATIENT
Start: 2021-11-26 | End: 2022-03-31

## 2021-12-20 RX ORDER — LORATADINE 10 MG/1
TABLET ORAL
Qty: 90 TABLET | Refills: 2 | Status: SHIPPED | OUTPATIENT
Start: 2021-12-20 | End: 2022-04-21 | Stop reason: SDUPTHER

## 2021-12-27 RX ORDER — ALBUTEROL SULFATE 90 UG/1
2 AEROSOL, METERED RESPIRATORY (INHALATION) EVERY 4 HOURS PRN
Qty: 18 G | Refills: 0 | Status: SHIPPED | OUTPATIENT
Start: 2021-12-27

## 2021-12-27 NOTE — TELEPHONE ENCOUNTER
Caller: Yancy Mcdonnell    Relationship: Self    Best call back number: [phone]   Requested Prescriptions:   Requested Prescriptions     Pending Prescriptions Disp Refills   • albuterol sulfate HFA (Ventolin HFA) 108 (90 Base) MCG/ACT inhaler       Sig: Two inhalations every 4-6 hours as needed for SOA.        Pharmacy where request should be sent: [id number] [place] [address] [address] [facility] [address] [facility] - [phone] [facility] [phone] FX     Additional details provided by patient:     PATIENT IS TOTALLY OUT OF HER PREVIOUS INHALER      Does the patient have less than a 3 day supply:  [x] Yes  [] No    Cierra Blake Rep   12/27/21 10:22 EST

## 2022-01-19 ENCOUNTER — OFFICE VISIT (OUTPATIENT)
Dept: FAMILY MEDICINE CLINIC | Facility: CLINIC | Age: 77
End: 2022-01-19

## 2022-01-19 VITALS
HEIGHT: 62 IN | BODY MASS INDEX: 30.91 KG/M2 | DIASTOLIC BLOOD PRESSURE: 84 MMHG | RESPIRATION RATE: 16 BRPM | HEART RATE: 85 BPM | WEIGHT: 168 LBS | OXYGEN SATURATION: 98 % | TEMPERATURE: 97.5 F | SYSTOLIC BLOOD PRESSURE: 152 MMHG

## 2022-01-19 DIAGNOSIS — I10 PRIMARY HYPERTENSION: Primary | Chronic | ICD-10-CM

## 2022-01-19 PROCEDURE — 99214 OFFICE O/P EST MOD 30 MIN: CPT | Performed by: NURSE PRACTITIONER

## 2022-01-19 RX ORDER — METOPROLOL SUCCINATE 25 MG/1
25 TABLET, EXTENDED RELEASE ORAL DAILY
Qty: 90 TABLET | Refills: 0 | Status: SHIPPED | OUTPATIENT
Start: 2022-01-19 | End: 2022-04-07

## 2022-01-19 NOTE — PROGRESS NOTES
Chief Complaint  Hypertension    Subjective          History of Present Illness  Patient presents for follow-up on hypertension. She discontinued amlodipine several months ago due to pedal edema. In October her BP was mildly elevated (145/81). No changes were made at that time. She continues to take Losartan and HCTZ.  She has been getting elevated readings at home.         Current Outpatient Medications:   •  albuterol sulfate HFA (Ventolin HFA) 108 (90 Base) MCG/ACT inhaler, Inhale 2 puffs Every 4 (Four) Hours As Needed for Shortness of Air. Two inhalations every 4-6 hours as needed for SOA., Disp: 18 g, Rfl: 0  •  famciclovir (FAMVIR) 500 MG tablet, TAKE 3 TABLETS BY MOUTH AS A SINGLE DOSE AT FIRST SIGN OF COLD SORE, Disp: 3 tablet, Rfl: 5  •  fluticasone (FLONASE) 50 MCG/ACT nasal spray, INSTILL 2 SPRAYS IN EACH NOSTRIL DAILY, Disp: 48 g, Rfl: 1  •  Glucosamine-Chondroitin (CIDAFLEX PO), Take 2 tablets by mouth Daily., Disp: , Rfl:   •  hydroCHLOROthiazide (HYDRODIURIL) 25 MG tablet, TAKE 1 TABLET BY MOUTH DAILY, Disp: 90 tablet, Rfl: 1  •  loratadine (CLARITIN) 10 MG tablet, TAKE 1 TABLET BY MOUTH EVERY DAY, Disp: 90 tablet, Rfl: 2  •  losartan (COZAAR) 100 MG tablet, TAKE 1 TABLET BY MOUTH DAILY, Disp: 90 tablet, Rfl: 0  •  lysine ([facility] LYSINE) 500 MG tablet, Take 1 tablet by mouth Daily As Needed., Disp: , Rfl:   •  Multiple Vitamin (MULTI-VITAMIN DAILY) tablet, Take 1 tablet by mouth Daily., Disp: , Rfl:   •  omeprazole (priLOSEC) 20 MG capsule, TAKE ONE CAPSULE BY MOUTH EVERY MORNING ON AN EMPTY STOMACH, 30 MINUTES BEFORE EATING OR OTHER MEDICATION, Disp: 90 capsule, Rfl: 1  •  metoprolol succinate XL (Toprol XL) 25 MG 24 hr tablet, Take 1 tablet by mouth Daily., Disp: 90 tablet, Rfl: 0     Review of Systems   Constitutional: Positive for fatigue. Negative for activity change, appetite change, diaphoresis, unexpected weight gain and unexpected weight loss.   Respiratory: Negative for cough, shortness of  "breath and wheezing.    Cardiovascular: Negative for chest pain, palpitations and leg swelling.   Gastrointestinal: Negative for abdominal pain, constipation, diarrhea, nausea and vomiting.   Neurological: Negative for dizziness, syncope and headache.        Objective   Vital Signs:   Vitals:    01/19/22 0943 01/19/22 0950   BP: 172/77 152/84   BP Location: Right arm    Patient Position: Sitting    Cuff Size: Adult    Pulse: 85    Resp: 16    Temp: 97.5 °F (36.4 °C)    TempSrc: Infrared    SpO2: 98%    Weight: 76.2 kg (168 lb)    Height: 157.5 cm (62.01\")      Body mass index is 30.72 kg/m².    Physical Exam  Constitutional:       Appearance: She is well-developed.   Neck:      Thyroid: No thyromegaly.      Vascular: No carotid bruit.      Trachea: Trachea normal.   Cardiovascular:      Rate and Rhythm: Normal rate and regular rhythm.      Heart sounds: Normal heart sounds. No murmur heard.  No friction rub. No gallop.    Pulmonary:      Effort: Pulmonary effort is normal.      Breath sounds: Normal breath sounds. No wheezing or rales.   Musculoskeletal:         General: Normal range of motion.      Cervical back: Normal range of motion and neck supple.   Lymphadenopathy:      Cervical: No cervical adenopathy.   Skin:     General: Skin is warm and dry.   Neurological:      Mental Status: She is alert and oriented to person, place, and time.   Psychiatric:         Behavior: Behavior normal.         Thought Content: Thought content normal.         Judgment: Judgment normal.          Result Review :   The following data was reviewed by: FELIPE Vázquez on 01/19/2022:    No visits with results within 1 Day(s) from this visit.   Latest known visit with results is:   Office Visit on 10/19/2021   Component Date Value Ref Range Status   • WBC 10/26/2021 6.2  3.4 - 10.8 x10E3/uL Final   • RBC 10/26/2021 4.09  3.77 - 5.28 x10E6/uL Final   • Hemoglobin 10/26/2021 12.3  11.1 - 15.9 g/dL Final   • Hematocrit 10/26/2021 37.2  " 34.0 - 46.6 % Final   • MCV 10/26/2021 91  79 - 97 fL Final   • MCH 10/26/2021 30.1  26.6 - 33.0 pg Final   • MCHC 10/26/2021 33.1  31.5 - 35.7 g/dL Final   • RDW 10/26/2021 11.8  11.7 - 15.4 % Final   • Platelets 10/26/2021 384  150 - 450 x10E3/uL Final   • Neutrophil Rel % 10/26/2021 62  Not Estab. % Final   • Lymphocyte Rel % 10/26/2021 26  Not Estab. % Final   • Monocyte Rel % 10/26/2021 9  Not Estab. % Final   • Eosinophil Rel % 10/26/2021 2  Not Estab. % Final   • Basophil Rel % 10/26/2021 1  Not Estab. % Final   • Neutrophils Absolute 10/26/2021 3.8  1.4 - 7.0 x10E3/uL Final   • Lymphocytes Absolute 10/26/2021 1.6  0.7 - 3.1 x10E3/uL Final   • Monocytes Absolute 10/26/2021 0.5  0.1 - 0.9 x10E3/uL Final   • Eosinophils Absolute 10/26/2021 0.1  0.0 - 0.4 x10E3/uL Final   • Basophils Absolute 10/26/2021 0.0  0.0 - 0.2 x10E3/uL Final   • Immature Granulocyte Rel % 10/26/2021 0  Not Estab. % Final   • Immature Grans Absolute 10/26/2021 0.0  0.0 - 0.1 x10E3/uL Final   • Glucose 10/26/2021 83  65 - 99 mg/dL Final   • BUN 10/26/2021 16  8 - 27 mg/dL Final   • Creatinine 10/26/2021 0.70  0.57 - 1.00 mg/dL Final   • eGFR Non  Am 10/26/2021 84  >59 mL/min/1.73 Final   • eGFR African Am 10/26/2021 97  >59 mL/min/1.73 Final    Comment: **In accordance with recommendations from the NKF-ASN Task force,**    [facility] the [facility] eGFR calculation to the    2021 CKD-EPI creatinine equation that estimates kidney function    without a race variable.     • BUN/Creatinine Ratio 10/26/2021 23  12 - 28 Final   • Sodium 10/26/2021 143  134 - 144 mmol/L Final   • Potassium 10/26/2021 4.1  3.5 - 5.2 mmol/L Final   • Chloride 10/26/2021 105  96 - 106 mmol/L Final   • Total CO2 10/26/2021 26  20 - 29 mmol/L Final   • Calcium 10/26/2021 9.4  8.7 - 10.3 mg/dL Final   • Total Protein 10/26/2021 6.3  6.0 - 8.5 g/dL Final   • Albumin 10/26/2021 4.1  3.7 - 4.7 g/dL Final   • Globulin 10/26/2021 2.2  1.5 - 4.5 g/dL  Final   • A/G Ratio 10/26/2021 1.9  1.2 - 2.2 Final   • Total Bilirubin 10/26/2021 0.7  0.0 - 1.2 mg/dL Final   • Alkaline Phosphatase 10/26/2021 129* 44 - 121 IU/L Final                  **Please note reference interval change**   • AST (SGOT) 10/26/2021 18  0 - 40 IU/L Final   • ALT (SGPT) 10/26/2021 16  0 - 32 IU/L Final   • Total Cholesterol 10/26/2021 183  100 - 199 mg/dL Final   • Triglycerides 10/26/2021 128  0 - 149 mg/dL Final   • HDL Cholesterol 10/26/2021 67  >39 mg/dL Final   • VLDL Cholesterol Jared 10/26/2021 22  5 - 40 mg/dL Final   • LDL Chol Calc (NIH) 10/26/2021 94  0 - 99 mg/dL Final   • Hep C Virus Ab 10/26/2021 0.1  0.0 - 0.9 s/co ratio Final    Comment:                                   Negative:     < 0.8                               Indeterminate: 0.8 - 0.9                                    Positive:     > 0.9   The CDC recommends that a positive HCV antibody result   be followed up with a HCV Nucleic Acid Amplification   test (124205).                   Assessment and Plan    Diagnoses and all orders for this visit:    1. Primary hypertension (Primary)  Assessment & Plan:  Continued elevation.   Add on metoprolol.   Follow-up in 6 weeks.       Other orders  -     metoprolol succinate XL (Toprol XL) 25 MG 24 hr tablet; Take 1 tablet by mouth Daily.  Dispense: 90 tablet; Refill: 0          Follow Up   Return in about 6 weeks (around 3/2/2022) for Follow-Up hypertension.    Patient was given instructions and counseling regarding her condition and health maintenance advice. Please see specific information in the After Visit Summary.     Munira So, FELIPE 1/19/2022 09:59 EST  This note was electronically signed.

## 2022-02-10 ENCOUNTER — APPOINTMENT (OUTPATIENT)
Dept: GENERAL RADIOLOGY | Facility: HOSPITAL | Age: 77
End: 2022-02-10

## 2022-02-10 ENCOUNTER — HOSPITAL ENCOUNTER (EMERGENCY)
Facility: HOSPITAL | Age: 77
Discharge: HOME | End: 2022-02-10
Attending: EMERGENCY MEDICINE | Admitting: EMERGENCY MEDICINE

## 2022-02-10 VITALS
RESPIRATION RATE: 18 BRPM | HEIGHT: 63 IN | BODY MASS INDEX: 28.35 KG/M2 | WEIGHT: 160 LBS | OXYGEN SATURATION: 100 % | SYSTOLIC BLOOD PRESSURE: 183 MMHG | HEART RATE: 77 BPM | TEMPERATURE: 97.8 F | DIASTOLIC BLOOD PRESSURE: 92 MMHG

## 2022-02-10 DIAGNOSIS — S82.841A CLOSED BIMALLEOLAR FRACTURE OF RIGHT ANKLE, INITIAL ENCOUNTER: Primary | ICD-10-CM

## 2022-02-10 PROCEDURE — 99283 EMERGENCY DEPT VISIT LOW MDM: CPT

## 2022-02-10 PROCEDURE — 25010000002 HYDROMORPHONE 1 MG/ML SOLUTION: Performed by: EMERGENCY MEDICINE

## 2022-02-10 PROCEDURE — 73610 X-RAY EXAM OF ANKLE: CPT

## 2022-02-10 PROCEDURE — 96374 THER/PROPH/DIAG INJ IV PUSH: CPT

## 2022-02-10 RX ORDER — OXYCODONE HYDROCHLORIDE AND ACETAMINOPHEN 5; 325 MG/1; MG/1
1 TABLET ORAL EVERY 6 HOURS PRN
Qty: 15 TABLET | Refills: 0 | Status: SHIPPED | OUTPATIENT
Start: 2022-02-10 | End: 2022-03-02 | Stop reason: ALTCHOICE

## 2022-02-10 RX ADMIN — HYDROMORPHONE HYDROCHLORIDE 0.5 MG: 1 INJECTION, SOLUTION INTRAMUSCULAR; INTRAVENOUS; SUBCUTANEOUS at 13:21

## 2022-02-10 NOTE — ED NOTES
Pt arrives via EMS from home s/p slip and fall this am Neg LOC c/o pain in right ankle and foot     Henna Subramanian RN  02/10/22 [id number]

## 2022-02-10 NOTE — DISCHARGE INSTRUCTIONS
Elevate ankle, apply ice to ankle intermittently, follow-up with orthopedist surgeon next week call tomorrow if you have not heard from their office regarding appointment, return new or worsening symptoms

## 2022-02-10 NOTE — ED PROVIDER NOTES
Subjective   History of Present Illness  76-year-old female presents after slipped and fell on the way to the barn this morning when she stepped on the ice.  She presents with pain to right ankle.  She has no complaints of knee or hip pain no complaints of other injury.  She did receive fentanyl per EMS prior to arrival.  She reports pain has eased some.  Pain is still moderate constant in nature.  Review of Systems  For knee pain hip pain or other areas of injury.  Past Medical History:   Diagnosis Date   • Allergic rhinitis 06/02/2015   • Body mass index 29.0-29.9, adult 09/12/2018   • Cervical spinal stenosis 08/07/2017   • Cholelithiasis    • Class 1 obesity due to excess calories with serious comorbidity and body mass index (BMI) of 30.0 to 30.9 in adult 10/14/2020   • DDD (degenerative disc disease), cervical    • DDD (degenerative disc disease), lumbar    • DDD (degenerative disc disease), thoracic    • Diverticulosis    • Dyslipidemia 12/05/2012   • Elevated brain natriuretic peptide (BNP) level 01/06/2017   • Eustachian tube disorder, right 02/18/2019   • Fatigue 06/02/2015   • Female cystocele 06/02/2015   • Hiatal hernia with gastroesophageal reflux 01/06/2017   • History of DVT (deep vein thrombosis) 06/02/2015   • History of herpes simplex infection 7/25/2020   • Hyperlipidemia    • Hypertension 06/10/2016   • Left rib fracture 05/2017    11TH RIB    • Migraine headache 06/02/2015   • Mitral insufficiency 01/16/2017    MILD   • Osteoarthritis 05/20/2014   • Osteopenia 12/05/2012   • Over weight 06/14/2018   • Peripheral edema 01/05/2017   • PFO (patent foramen ovale) 01/16/2017   • Pulmonary nodule 05/04/2017    DR CHUN FOLLOWS   • R[facility] (rheumatoid arthritis) ([facility]) 12/05/2012   • Scleritis 06/02/2015   • Scoliosis    • Tricuspid insufficiency     MILD   • Varicose veins of other specified sites    • Venous insufficiency 09/12/2018       Allergies   Allergen Reactions   • Demerol [Meperidine] Unknown  (See Comments)     [facility]   • Other Unknown (See Comments)     Sulfa (sulfadiazine)    • Sulfa Antibiotics Unknown - High Severity   • Lisinopril Cough       Past Surgical History:   Procedure Laterality Date   • COLONOSCOPY  07/20/2017    EGD/ COLONSCOPY LARGE HIATAL HERNIA. MILD DIVERTICULOSIS. OTHERWISE NORMAL.   • INGUINAL HERNIA REPAIR Right    • TOTAL ABDOMINAL HYSTERECTOMY  1985    W/BSO WITH A/P REPAIR 1985 BENIGN REASONS DR. DRAKE       Family History   Problem Relation Age of Onset   • Osteoporosis Mother    • Stroke Mother 80   • Dementia Mother    • Heart disease Father    • Hypertension Father    • Heart disease Sister    • Cancer Daughter         BREAST AND MELANOMA   • Breast cancer Daughter 52   • Cancer Son         MELANOMA   • Cancer Grandson         MELANOMA       Social History     Socioeconomic History   • Marital status:    Tobacco Use   • Smoking status: Never Smoker   • Smokeless tobacco: Never Used   Vaping Use   • Vaping Use: Never used   Substance and Sexual Activity   • Alcohol use: No   • Drug use: No   • Sexual activity: Defer     Prior to Admission medications    Medication Sig Start Date End Date Taking? Authorizing Provider   albuterol sulfate HFA (Ventolin HFA) 108 (90 Base) MCG/ACT inhaler Inhale 2 puffs Every 4 (Four) Hours As Needed for Shortness of Air. Two inhalations every 4-6 hours as needed for SOA. 12/27/21   Munira So APRN   famciclovir (FAMVIR) 500 MG tablet TAKE 3 TABLETS BY MOUTH AS A SINGLE DOSE AT FIRST SIGN OF COLD SORE 5/13/21   Munira So APRN   fluticasone (FLONASE) 50 MCG/ACT nasal spray INSTILL 2 SPRAYS IN EACH NOSTRIL DAILY 7/26/21   Munira So APRN   Glucosamine-Chondroitin (CIDAFLEX PO) Take 2 tablets by mouth Daily. 5/20/14   Munira So APRN   hydroCHLOROthiazide (HYDRODIURIL) 25 MG tablet TAKE 1 TABLET BY MOUTH DAILY 11/1/21   Munira So APRN   loratadine (CLARITIN) 10 MG tablet TAKE 1 TABLET BY MOUTH  EVERY DAY 12/20/21   Munira So APRN   losartan (COZAAR) 100 MG tablet TAKE 1 TABLET BY MOUTH DAILY 11/26/21   Munira So APRN   lysine ([facility] LYSINE) 500 MG tablet Take 1 tablet by mouth Daily As Needed. 5/20/14   Munira So APRN   metoprolol succinate XL (Toprol XL) 25 MG 24 hr tablet Take 1 tablet by mouth Daily. 1/19/22   Munira So APRN   Multiple Vitamin (MULTI-VITAMIN DAILY) tablet Take 1 tablet by mouth Daily. 6/2/15   Munira So APRN   omeprazole (priLOSEC) 20 MG capsule TAKE ONE CAPSULE BY MOUTH EVERY MORNING ON AN EMPTY STOMACH, 30 MINUTES BEFORE EATING OR OTHER MEDICATION 7/26/21   Munira So APRN   oxyCODONE-acetaminophen (PERCOCET) 5-325 MG per tablet Take 1 tablet by mouth Every 6 (Six) Hours As Needed for Moderate Pain . 2/10/22   Ruslan Vargas MD           Objective   Physical Exam   76-year-old female awake alert.  Generally well-developed well-nourished.  No complaints of head neck chest abdomen or back pain no complaints of upper extremity pain or left leg pain.  Examination of right leg no hip pain.  She has no knee tenderness.  She has tenderness soft tissue swelling medial and lateral ankle.  She is neurovascular intact distally.    Splint - Cast - Strapping    Date/Time: 2/10/2022 2:46 PM  Performed by: Ruslan Vargas MD  Authorized by: Ruslan Vargas MD     Consent:     Consent obtained:  Verbal  Procedure details:     Laterality:  Right    Location:  Leg    Splint type:  Short leg and ankle stirrup    Supplies:  Plaster  Post-procedure details:     Pain:  Unchanged    Sensation:  Normal    Patient tolerance of procedure:  Tolerated well, no immediate complications               ED Course        XR Ankle 3+ View Right    Result Date: 2/10/2022  Fracture of the right ankle as discussed.    Electronically Signed By-Edmund Silva On:2/10/2022 11:40 AM This report was finalized on [id number] by  Edmund Silva, .    Medications   HYDROmorphone (DILAUDID)  "injection 0.5 mg (0.5 mg Intravenous Given 2/10/22 1321)     BP (!) 183/92 (BP Location: Right arm, Patient Position: Lying)   Pulse 77   Temp 97.8 °F (36.6 °C)   Resp 18   Ht 160 cm (63\")   Wt 72.6 kg (160 lb)   SpO2 100%   Breastfeeding No   BMI 28.34 kg/m²                                              MDM  Reviewed x-ray of right ankle.  There is bimalleolar fracture with minimal displacement.  Patient was discussed with Dr. Espinosa who reviewed x-rays.  She was placed in a posterior plaster splint with side stirrups.  She was neuro vas intact post splinting.  She was discharged given prescription for oxycodone after inspect report was reviewed.  Advised to elevate her leg ice ankle intermittently.  She is to follow Dr. Espinosa next week.  They should contact her from the office but she was given information to contact them if she does not hear from them tomorrow.  Final diagnoses:   Closed bimalleolar fracture of right ankle, initial encounter       ED Disposition  ED Disposition     ED Disposition Condition Comment    Discharge Stable           Hudson Espinosa MD  [address] [place] [address] [phone]    Schedule an appointment as soon as possible for a visit            Medication List      New Prescriptions    oxyCODONE-acetaminophen 5-325 MG per tablet  Commonly known as: PERCOCET  Take 1 tablet by mouth Every 6 (Six) Hours As Needed for Moderate Pain .           Where to Get Your Medications      These medications were sent to [id number] - [place], [place] - [address] [place] [address] AT [facility] OF  & [facility] - [phone] [place] [phone] [address], [place] [address]    Phone: [phone]   · oxyCODONE-acetaminophen 5-325 MG per tablet          Ruslan Vargas MD  02/10/22 7511       Ruslan Vargas MD  02/10/22 1441    "

## 2022-02-11 PROBLEM — S82.841A CLOSED BIMALLEOLAR FRACTURE OF RIGHT ANKLE: Status: ACTIVE | Noted: 2022-02-10

## 2022-02-14 ENCOUNTER — OFFICE VISIT (OUTPATIENT)
Dept: PODIATRY | Facility: CLINIC | Age: 77
End: 2022-02-14

## 2022-02-14 VITALS
SYSTOLIC BLOOD PRESSURE: 147 MMHG | HEART RATE: 77 BPM | WEIGHT: 160 LBS | DIASTOLIC BLOOD PRESSURE: 83 MMHG | BODY MASS INDEX: 28.35 KG/M2 | HEIGHT: 63 IN

## 2022-02-14 DIAGNOSIS — M25.571 ACUTE RIGHT ANKLE PAIN: Primary | ICD-10-CM

## 2022-02-14 DIAGNOSIS — S82.844A CLOSED NONDISPLACED BIMALLEOLAR FRACTURE OF RIGHT ANKLE, INITIAL ENCOUNTER: ICD-10-CM

## 2022-02-14 PROCEDURE — 99204 OFFICE O/P NEW MOD 45 MIN: CPT | Performed by: PODIATRIST

## 2022-02-15 RX ORDER — MULTIVITAMIN WITH IRON
TABLET ORAL
COMMUNITY

## 2022-02-15 NOTE — PROGRESS NOTES
02/14/2022  Foot and Ankle Surgery - New Patient   Provider: Dr. Carlos Tobias DPM  Location: [facility]    Subjective:  Yancy Mcdonnell is a 76 y.o. female.     Chief Complaint   Patient presents with   • Right Ankle - Pain   • Establish Care     last pcp appt with ESA So FELIPE 1/19/2022       HPI: Patient is an active 76-year-old female that presents with her son for evaluation of her right ankle.  Patient states that she slipped on some ice a few days ago causing injury.  She was picked up by EMS and brought to the ED for evaluation.  Imaging was obtained at that time showing nondisplaced bimalleolar ankle fracture.  She was placed into a posterior splint and referred to me for management.  Today, she continues to have pain and limitation.  She has been mostly off weightbearing with wheelchair assistance.  She denies any previous injuries involving the ankle.  No other issues or concerns today.    Allergies   Allergen Reactions   • Demerol [Meperidine] Unknown (See Comments)     Abstracted from centricity.   • Other Unknown (See Comments)     Sulfa (sulfadiazine)    • Sulfa Antibiotics Unknown - High Severity   • Lisinopril Cough       Past Medical History:   Diagnosis Date   • Allergic rhinitis 06/02/2015   • Body mass index 29.0-29.9, adult 09/12/2018   • Cervical spinal stenosis 08/07/2017   • Cholelithiasis    • Class 1 obesity due to excess calories with serious comorbidity and body mass index (BMI) of 30.0 to 30.9 in adult 10/14/2020   • Closed bimalleolar fracture of right ankle 2/11/2022   • DDD (degenerative disc disease), cervical    • DDD (degenerative disc disease), lumbar    • DDD (degenerative disc disease), thoracic    • Diverticulosis    • Dyslipidemia 12/05/2012   • Elevated brain natriuretic peptide (BNP) level 01/06/2017   • Eustachian tube disorder, right 02/18/2019   • Fatigue 06/02/2015   • Female cystocele 06/02/2015   • Hiatal hernia with gastroesophageal reflux 01/06/2017    • History of DVT (deep vein thrombosis) 06/02/2015   • History of herpes simplex infection 7/25/2020   • Hyperlipidemia    • Hypertension 06/10/2016   • Left rib fracture 05/2017    11TH RIB    • Migraine headache 06/02/2015   • Mitral insufficiency 01/16/2017    MILD   • Osteoarthritis 05/20/2014   • Osteopenia 12/05/2012   • Over weight 06/14/2018   • Peripheral edema 01/05/2017   • PFO (patent foramen ovale) 01/16/2017   • Pulmonary nodule 05/04/2017    DR CHUN FOLLOWS   • RHA (rheumatoid arthritis) (HCC) 12/05/2012   • Scleritis 06/02/2015   • Scoliosis    • Tricuspid insufficiency     MILD   • Varicose veins of other specified sites    • Venous insufficiency 09/12/2018       Past Surgical History:   Procedure Laterality Date   • COLONOSCOPY  07/20/2017    EGD/ COLONSCOPY LARGE HIATAL HERNIA. MILD DIVERTICULOSIS. OTHERWISE NORMAL.   • INGUINAL HERNIA REPAIR Right    • TOTAL ABDOMINAL HYSTERECTOMY  1985    W/BSO WITH A/P REPAIR 1985 BENIGN REASONS DR. DRAKE       Family History   Problem Relation Age of Onset   • Osteoporosis Mother    • Stroke Mother 80   • Dementia Mother    • Heart disease Father    • Hypertension Father    • Heart disease Sister    • Cancer Daughter         BREAST AND MELANOMA   • Breast cancer Daughter 52   • Cancer Son         MELANOMA   • Cancer Grandson         MELANOMA       Social History     Socioeconomic History   • Marital status:    Tobacco Use   • Smoking status: Never Smoker   • Smokeless tobacco: Never Used   Vaping Use   • Vaping Use: Never used   Substance and Sexual Activity   • Alcohol use: No   • Drug use: No   • Sexual activity: Defer        Current Outpatient Medications on File Prior to Visit   Medication Sig Dispense Refill   • albuterol sulfate HFA (Ventolin HFA) 108 (90 Base) MCG/ACT inhaler Inhale 2 puffs Every 4 (Four) Hours As Needed for Shortness of Air. Two inhalations every 4-6 hours as needed for SOA. 18 g 0   • famciclovir (FAMVIR) 500 MG tablet TAKE  3 TABLETS BY MOUTH AS A SINGLE DOSE AT FIRST SIGN OF COLD SORE 3 tablet 5   • fluticasone (FLONASE) 50 MCG/ACT nasal spray INSTILL 2 SPRAYS IN EACH NOSTRIL DAILY 48 g 1   • Glucosamine-Chondroitin (CIDAFLEX PO) Take 2 tablets by mouth Daily.     • hydroCHLOROthiazide (HYDRODIURIL) 25 MG tablet TAKE 1 TABLET BY MOUTH DAILY 90 tablet 1   • loratadine (CLARITIN) 10 MG tablet TAKE 1 TABLET BY MOUTH EVERY DAY 90 tablet 2   • losartan (COZAAR) 100 MG tablet TAKE 1 TABLET BY MOUTH DAILY 90 tablet 0   • lysine ([employer] LYSINE) 500 MG tablet Take 1 tablet by mouth Daily As Needed.     • metoprolol succinate XL (Toprol XL) 25 MG 24 hr tablet Take 1 tablet by mouth Daily. 90 tablet 0   • Multiple Vitamin (MULTI-VITAMIN DAILY) tablet Take 1 tablet by mouth Daily.     • omeprazole (priLOSEC) 20 MG capsule TAKE ONE CAPSULE BY MOUTH EVERY MORNING ON AN EMPTY STOMACH, 30 MINUTES BEFORE EATING OR OTHER MEDICATION 90 capsule 1   • oxyCODONE-acetaminophen (PERCOCET) 5-325 MG per tablet Take 1 tablet by mouth Every 6 (Six) Hours As Needed for Moderate Pain . 15 tablet 0     No current facility-administered medications on file prior to visit.       Review of Systems:  General: Denies fever, chills, fatigue, and weakness.  Eyes: Denies vision loss, blurry vision, and excessive redness.  ENT: Denies hearing issues and difficulty swallowing.  Cardiovascular: Denies palpitations, chest pain, or syncopal episodes.  Respiratory: Denies shortness of breath, wheezing, and coughing.  GI: Denies abdominal pain, nausea, and vomiting.   : Denies frequency, hematuria, and urgency.  Musculoskeletal: + Right ankle fracture  Derm: Denies rash, open wounds, or suspicious lesions.  Neuro: Denies headaches, numbness, loss of coordination, and tremors.  Psych: Denies anxiety and depression.  Endocrine: Denies temperature intolerance and changes in appetite.  Heme: Denies bleeding disorders or abnormal bruising.     Objective   /83   Pulse 77   Ht 160  "cm (63\")   Wt 72.6 kg (160 lb)   BMI 28.34 kg/m²     Foot/Ankle Exam:       General:   Appearance: appears stated age and healthy    Orientation: AAOx3    Affect: appropriate    Assistance: wheelchair      VASCULAR      Right Foot Vascularity   Normal vascular exam    Dorsalis pedis:  2+  Posterior tibial:  2+  Skin Temperature: warm    Edema Gradin+  CFT:  < 3 seconds  Pedal Hair Growth:  Present  Varicosities: spider veins        NEUROLOGIC     Right Foot Neurologic   Light touch sensation:  Normal  Hot/Cold sensation: normal    Achilles reflex:  2+     MUSCULOSKELETAL      Right Foot Musculoskeletal   Ecchymosis:  Ankle joint  Tenderness: medial malleolus, lateral malleolus and anterior tibiotalar joint line    Arch:  Normal     DERMATOLOGIC     Right Foot Dermatologic   Skin: skin intact        Right Foot Additional Comments Instability,  range of motion, and muscle strength testing deferred secondary to guarding.  No gross deformity.  No proximal fibular pain.  Neurovascular status is grossly intact to the digits.      Assessment/Plan   Diagnoses and all orders for this visit:    1. Acute right ankle pain (Primary)    2. Closed nondisplaced bimalleolar fracture of right ankle, initial encounter  -     COVID PRE-OP / PRE-PROCEDURE SCREENING ORDER (NO ISOLATION) - Swab, Nasopharynx; Future  -     CBC (No Diff); Future  -     Basic Metabolic Panel; Future  -     ECG 12 Lead; Future  -     XR Chest 2 View; Future  -     Case Request; Standing  -     Case Request    Other orders  -     Follow Anesthesia Guidelines / Standing Orders; Future  -     Obtain Informed Consent; Future  -     Provide NPO Instructions to Patient; Future  -     Chlorhexidine Skin Prep; Future      Patient presents with her son for evaluation of her right ankle a few days after injury.  The posterior splint was removed.  Imaging was independently reviewed and discussed with patient at length.  Findings are consistent with a nondisplaced " bimalleolar ankle fracture.  Given the fracture pattern, I do feel that the ankle is quite unstable and I do feel that the most appropriate option would be to proceed with open reduction and internal fixation.  I did review the procedure, risk, goals, and recovery with patient at length.  She understands that she would require nonweightbearing activity immediately after the surgery.  I do not feel that conservative care is appropriate given her level of activity and the overall stability of the fracture.  Patient understands and agrees and would like to proceed in the near future.  We will proceed with preoperative testing and I have asked that she call with any additional issues or concerns.  Greater than 45 minutes was spent before, during, and after evaluation for patient care.    Orders Placed This Encounter   Procedures   • COVID PRE-OP / PRE-PROCEDURE SCREENING ORDER (NO ISOLATION) - Swab, Nasopharynx     Standing Status:   Future     Standing Expiration Date:   2/15/2023     Order Specific Question:   Please select your location:     Answer:    [place]     Order Specific Question:   COVID Screening Order:     Answer:   In-House: EMERGENT/PREOP-CEPHEID, 3-4 HR TAT [[id number]]     Order Specific Question:   Previously tested for COVID-19?     Answer:   Yes     Order Specific Question:   Employed in healthcare setting?     Answer:   No     Order Specific Question:   Symptomatic for COVID-19 as defined by CDC?     Answer:   No     Order Specific Question:   Hospitalized for COVID-19?     Answer:   No     Order Specific Question:   Admitted to ICU for COVID-19?     Answer:   No     Order Specific Question:   Resident in a congregate (group) care setting?     Answer:   No     Order Specific Question:   Pregnant?     Answer:   No     Order Specific Question:   Release to patient     Answer:   Immediate   • XR Chest 2 View     Standing Status:   Future     Standing Expiration Date:   2/15/2023     Order Specific  Question:   Reason for Exam:     Answer:   Preop   • CBC (No Diff)     Standing Status:   Future     Standing Expiration Date:   2/15/2023     Order Specific Question:   Release to patient     Answer:   Immediate   • Basic Metabolic Panel     Standing Status:   Future     Standing Expiration Date:   2/15/2023     Order Specific Question:   Release to patient     Answer:   Immediate   • Follow Anesthesia Guidelines / Standing Orders     Standing Status:   Future   • Obtain Informed Consent     Standing Status:   Future     Order Specific Question:   Informed Consent Given For     Answer:   Open reduction internal fixation of right bimalleolar ankle fracture   • Provide NPO Instructions to Patient     Standing Status:   Future   • Chlorhexidine Skin Prep     Chlorhexidine Skin Prep and Instructions For All Patients Having A Procedure Requiring an Outward Incision if Not Allergic. If Allergic, Give Antibacterial Skin Wipes and Instructions. Do Not Use For Facial Cases or on Any Mucus Membranes.     Standing Status:   Future   • ECG 12 Lead     Standing Status:   Future     Standing Expiration Date:   2/15/2023     Order Specific Question:   Reason for Exam:     Answer:   Preop        Note is dictated utilizing voice recognition software. Unfortunately this leads to occasional typographical errors. I apologize in advance if the situation occurs. If questions occur please do not hesitate to call our office.

## 2022-02-16 ENCOUNTER — HOSPITAL ENCOUNTER (OUTPATIENT)
Dept: CARDIOLOGY | Facility: HOSPITAL | Age: 77
Discharge: HOME | End: 2022-02-16

## 2022-02-16 ENCOUNTER — LAB (OUTPATIENT)
Dept: LAB | Facility: HOSPITAL | Age: 77
End: 2022-02-16

## 2022-02-16 ENCOUNTER — HOSPITAL ENCOUNTER (OUTPATIENT)
Dept: GENERAL RADIOLOGY | Facility: HOSPITAL | Age: 77
Discharge: HOME | End: 2022-02-16

## 2022-02-16 DIAGNOSIS — S82.844A CLOSED NONDISPLACED BIMALLEOLAR FRACTURE OF RIGHT ANKLE, INITIAL ENCOUNTER: ICD-10-CM

## 2022-02-16 DIAGNOSIS — Z01.818 PREOP TESTING: Primary | ICD-10-CM

## 2022-02-16 LAB
ANION GAP SERPL CALCULATED.3IONS-SCNC: 7.6 MMOL/L (ref 5–15)
BUN SERPL-MCNC: 12 MG/DL (ref 8–23)
BUN/CREAT SERPL: 18.2 (ref 7–25)
CALCIUM SPEC-SCNC: 9.4 MG/DL (ref 8.6–10.5)
CHLORIDE SERPL-SCNC: 103 MMOL/L (ref 98–107)
CO2 SERPL-SCNC: 27.4 MMOL/L (ref 22–29)
CREAT SERPL-MCNC: 0.66 MG/DL (ref 0.57–1)
DEPRECATED RDW RBC AUTO: 37.9 FL (ref 37–54)
ERYTHROCYTE [DISTWIDTH] IN BLOOD BY AUTOMATED COUNT: 11.8 % (ref 12.3–15.4)
GFR SERPL CREATININE-BSD FRML MDRD: 87 ML/MIN/1.73
GLUCOSE SERPL-MCNC: 101 MG/DL (ref 65–99)
HCT VFR BLD AUTO: 37.7 % (ref 34–46.6)
HGB BLD-MCNC: 12.6 G/DL (ref 12–15.9)
MCH RBC QN AUTO: 29.6 PG (ref 26.6–33)
MCHC RBC AUTO-ENTMCNC: 33.4 G/DL (ref 31.5–35.7)
MCV RBC AUTO: 88.5 FL (ref 79–97)
PLATELET # BLD AUTO: 379 10*3/MM3 (ref 140–450)
PMV BLD AUTO: 9.8 FL (ref 6–12)
POTASSIUM SERPL-SCNC: 3.8 MMOL/L (ref 3.5–5.2)
RBC # BLD AUTO: 4.26 10*6/MM3 (ref 3.77–5.28)
SARS-COV-2 ORF1AB RESP QL NAA+PROBE: NOT DETECTED
SODIUM SERPL-SCNC: 138 MMOL/L (ref 136–145)
WBC NRBC COR # BLD: 7.29 10*3/MM3 (ref 3.4–10.8)

## 2022-02-16 PROCEDURE — 93005 ELECTROCARDIOGRAM TRACING: CPT | Performed by: PODIATRIST

## 2022-02-16 PROCEDURE — C9803 HOPD COVID-19 SPEC COLLECT: HCPCS

## 2022-02-16 PROCEDURE — 85027 COMPLETE CBC AUTOMATED: CPT

## 2022-02-16 PROCEDURE — U0004 COV-19 TEST NON-CDC HGH THRU: HCPCS

## 2022-02-16 PROCEDURE — 71046 X-RAY EXAM CHEST 2 VIEWS: CPT

## 2022-02-16 PROCEDURE — 80048 BASIC METABOLIC PNL TOTAL CA: CPT

## 2022-02-16 PROCEDURE — 36415 COLL VENOUS BLD VENIPUNCTURE: CPT

## 2022-02-16 PROCEDURE — 93010 ELECTROCARDIOGRAM REPORT: CPT | Performed by: INTERNAL MEDICINE

## 2022-02-18 ENCOUNTER — PATIENT ROUNDING (BHMG ONLY) (OUTPATIENT)
Dept: PODIATRY | Facility: CLINIC | Age: 77
End: 2022-02-18

## 2022-02-18 ENCOUNTER — APPOINTMENT (OUTPATIENT)
Dept: GENERAL RADIOLOGY | Facility: HOSPITAL | Age: 77
End: 2022-02-18

## 2022-02-18 ENCOUNTER — APPOINTMENT (OUTPATIENT)
Dept: CARDIOLOGY | Facility: HOSPITAL | Age: 77
End: 2022-02-18

## 2022-02-18 ENCOUNTER — ANESTHESIA (OUTPATIENT)
Dept: PERIOP | Facility: HOSPITAL | Age: 77
End: 2022-02-18

## 2022-02-18 ENCOUNTER — HOSPITAL ENCOUNTER (OUTPATIENT)
Facility: HOSPITAL | Age: 77
Setting detail: HOSPITAL OUTPATIENT SURGERY
Discharge: HOME | End: 2022-02-18
Attending: PODIATRIST | Admitting: PODIATRIST

## 2022-02-18 ENCOUNTER — ANESTHESIA EVENT (OUTPATIENT)
Dept: PERIOP | Facility: HOSPITAL | Age: 77
End: 2022-02-18

## 2022-02-18 VITALS
HEART RATE: 76 BPM | DIASTOLIC BLOOD PRESSURE: 71 MMHG | BODY MASS INDEX: 30.12 KG/M2 | WEIGHT: 170 LBS | SYSTOLIC BLOOD PRESSURE: 134 MMHG | RESPIRATION RATE: 15 BRPM | TEMPERATURE: 97.1 F | HEIGHT: 63 IN | OXYGEN SATURATION: 95 %

## 2022-02-18 DIAGNOSIS — S82.844A CLOSED NONDISPLACED BIMALLEOLAR FRACTURE OF RIGHT ANKLE, INITIAL ENCOUNTER: ICD-10-CM

## 2022-02-18 LAB
BH CV LOW VAS RIGHT GREATER SAPH BK VESSEL: 1
BH CV LOW VAS RIGHT VARICOSITY BK VESSEL: 1
BH CV LOWER VASCULAR LEFT COMMON FEMORAL AUGMENT: NORMAL
BH CV LOWER VASCULAR LEFT COMMON FEMORAL COMPETENT: NORMAL
BH CV LOWER VASCULAR LEFT COMMON FEMORAL COMPRESS: NORMAL
BH CV LOWER VASCULAR LEFT COMMON FEMORAL PHASIC: NORMAL
BH CV LOWER VASCULAR LEFT COMMON FEMORAL SPONT: NORMAL
BH CV LOWER VASCULAR RIGHT COMMON FEMORAL AUGMENT: NORMAL
BH CV LOWER VASCULAR RIGHT COMMON FEMORAL COMPETENT: NORMAL
BH CV LOWER VASCULAR RIGHT COMMON FEMORAL COMPRESS: NORMAL
BH CV LOWER VASCULAR RIGHT COMMON FEMORAL PHASIC: NORMAL
BH CV LOWER VASCULAR RIGHT COMMON FEMORAL SPONT: NORMAL
BH CV LOWER VASCULAR RIGHT DISTAL FEMORAL COMPRESS: NORMAL
BH CV LOWER VASCULAR RIGHT GASTRONEMIUS COMPRESS: NORMAL
BH CV LOWER VASCULAR RIGHT GREATER SAPH AK COMPRESS: NORMAL
BH CV LOWER VASCULAR RIGHT GREATER SAPH BK COMPRESS: NORMAL
BH CV LOWER VASCULAR RIGHT GREATER SAPH BK THROMBUS: NORMAL
BH CV LOWER VASCULAR RIGHT LESSER SAPH COMPRESS: NORMAL
BH CV LOWER VASCULAR RIGHT MID FEMORAL AUGMENT: NORMAL
BH CV LOWER VASCULAR RIGHT MID FEMORAL COMPETENT: NORMAL
BH CV LOWER VASCULAR RIGHT MID FEMORAL COMPRESS: NORMAL
BH CV LOWER VASCULAR RIGHT MID FEMORAL PHASIC: NORMAL
BH CV LOWER VASCULAR RIGHT MID FEMORAL SPONT: NORMAL
BH CV LOWER VASCULAR RIGHT PERONEAL COMPRESS: NORMAL
BH CV LOWER VASCULAR RIGHT POPLITEAL AUGMENT: NORMAL
BH CV LOWER VASCULAR RIGHT POPLITEAL COMPETENT: NORMAL
BH CV LOWER VASCULAR RIGHT POPLITEAL COMPRESS: NORMAL
BH CV LOWER VASCULAR RIGHT POPLITEAL PHASIC: NORMAL
BH CV LOWER VASCULAR RIGHT POPLITEAL SPONT: NORMAL
BH CV LOWER VASCULAR RIGHT POSTERIOR TIBIAL COMPRESS: NORMAL
BH CV LOWER VASCULAR RIGHT PROXIMAL FEMORAL COMPRESS: NORMAL
BH CV LOWER VASCULAR RIGHT SAPHENOFEMORAL JUNCTION COMPRESS: NORMAL
BH CV LOWER VASCULAR RIGHT VARICOSITY BK COMPRESS: NORMAL
BH CV LOWER VASCULAR RIGHT VARICOSITY BK THROMBUS: NORMAL
MAXIMAL PREDICTED HEART RATE: 143 BPM
QT INTERVAL: 387 MS
STRESS TARGET HR: 122 BPM

## 2022-02-18 PROCEDURE — 76942 ECHO GUIDE FOR BIOPSY: CPT | Performed by: PODIATRIST

## 2022-02-18 PROCEDURE — 27814 TREATMENT OF ANKLE FRACTURE: CPT | Performed by: PODIATRIST

## 2022-02-18 PROCEDURE — 25010000002 ONDANSETRON PER 1 MG: Performed by: ANESTHESIOLOGIST ASSISTANT

## 2022-02-18 PROCEDURE — 93971 EXTREMITY STUDY: CPT

## 2022-02-18 PROCEDURE — 76000 FLUOROSCOPY <1 HR PHYS/QHP: CPT

## 2022-02-18 PROCEDURE — C1713 ANCHOR/SCREW BN/BN,TIS/BN: HCPCS | Performed by: PODIATRIST

## 2022-02-18 PROCEDURE — 25010000002 FENTANYL CITRATE (PF) 50 MCG/ML SOLUTION

## 2022-02-18 PROCEDURE — 25010000002 FENTANYL CITRATE (PF) 50 MCG/ML SOLUTION: Performed by: ANESTHESIOLOGY

## 2022-02-18 PROCEDURE — 25010000002 HYDROMORPHONE PER 4 MG

## 2022-02-18 PROCEDURE — 73600 X-RAY EXAM OF ANKLE: CPT

## 2022-02-18 PROCEDURE — 25010000002 ROPIVACAINE PER 1 MG: Performed by: ANESTHESIOLOGY

## 2022-02-18 PROCEDURE — 25010000002 MIDAZOLAM PER 1 MG: Performed by: ANESTHESIOLOGY

## 2022-02-18 PROCEDURE — 25010000002 DEXAMETHASONE PER 1 MG: Performed by: ANESTHESIOLOGY

## 2022-02-18 PROCEDURE — 25010000002 PROPOFOL 200 MG/20ML EMULSION: Performed by: ANESTHESIOLOGIST ASSISTANT

## 2022-02-18 DEVICE — IMPLANTABLE DEVICE
Type: IMPLANTABLE DEVICE | Site: ANKLE | Status: FUNCTIONAL
Brand: DARCO

## 2022-02-18 DEVICE — IMPLANTABLE DEVICE
Type: IMPLANTABLE DEVICE | Site: ANKLE | Status: FUNCTIONAL
Brand: ORTHOLOC 3DI PLATING SYSTEM

## 2022-02-18 DEVICE — IMPLANTABLE DEVICE
Type: IMPLANTABLE DEVICE | Site: ANKLE | Status: FUNCTIONAL
Brand: ORTHOLOC 3DI

## 2022-02-18 DEVICE — IMPLANTABLE DEVICE
Type: IMPLANTABLE DEVICE | Site: ANKLE | Status: FUNCTIONAL
Brand: ORTHOLOC

## 2022-02-18 RX ORDER — HYDRALAZINE HYDROCHLORIDE 20 MG/ML
5 INJECTION INTRAMUSCULAR; INTRAVENOUS
Status: DISCONTINUED | OUTPATIENT
Start: 2022-02-18 | End: 2022-02-18 | Stop reason: HOSPADM

## 2022-02-18 RX ORDER — DEXAMETHASONE SODIUM PHOSPHATE 4 MG/ML
8 INJECTION, SOLUTION INTRA-ARTICULAR; INTRALESIONAL; INTRAMUSCULAR; INTRAVENOUS; SOFT TISSUE ONCE AS NEEDED
Status: DISCONTINUED | OUTPATIENT
Start: 2022-02-18 | End: 2022-02-18 | Stop reason: HOSPADM

## 2022-02-18 RX ORDER — PHENYLEPHRINE HCL IN 0.9% NACL 1 MG/10 ML
SYRINGE (ML) INTRAVENOUS AS NEEDED
Status: DISCONTINUED | OUTPATIENT
Start: 2022-02-18 | End: 2022-02-18 | Stop reason: SURG

## 2022-02-18 RX ORDER — SODIUM CHLORIDE 0.9 % (FLUSH) 0.9 %
10 SYRINGE (ML) INJECTION AS NEEDED
Status: DISCONTINUED | OUTPATIENT
Start: 2022-02-18 | End: 2022-02-18 | Stop reason: HOSPADM

## 2022-02-18 RX ORDER — SODIUM CHLORIDE, SODIUM LACTATE, POTASSIUM CHLORIDE, CALCIUM CHLORIDE 600; 310; 30; 20 MG/100ML; MG/100ML; MG/100ML; MG/100ML
100 INJECTION, SOLUTION INTRAVENOUS CONTINUOUS
Status: DISCONTINUED | OUTPATIENT
Start: 2022-02-18 | End: 2022-02-18 | Stop reason: HOSPADM

## 2022-02-18 RX ORDER — ONDANSETRON 2 MG/ML
4 INJECTION INTRAMUSCULAR; INTRAVENOUS ONCE AS NEEDED
Status: DISCONTINUED | OUTPATIENT
Start: 2022-02-18 | End: 2022-02-18 | Stop reason: HOSPADM

## 2022-02-18 RX ORDER — FENTANYL CITRATE 50 UG/ML
INJECTION, SOLUTION INTRAMUSCULAR; INTRAVENOUS
Status: COMPLETED | OUTPATIENT
Start: 2022-02-18 | End: 2022-02-18

## 2022-02-18 RX ORDER — DIPHENHYDRAMINE HYDROCHLORIDE 50 MG/ML
12.5 INJECTION INTRAMUSCULAR; INTRAVENOUS
Status: DISCONTINUED | OUTPATIENT
Start: 2022-02-18 | End: 2022-02-18 | Stop reason: HOSPADM

## 2022-02-18 RX ORDER — HYDROMORPHONE HCL 110MG/55ML
0.5 PATIENT CONTROLLED ANALGESIA SYRINGE INTRAVENOUS
Status: DISCONTINUED | OUTPATIENT
Start: 2022-02-18 | End: 2022-02-18 | Stop reason: HOSPADM

## 2022-02-18 RX ORDER — ACETAMINOPHEN 650 MG/1
650 SUPPOSITORY RECTAL ONCE AS NEEDED
Status: DISCONTINUED | OUTPATIENT
Start: 2022-02-18 | End: 2022-02-18 | Stop reason: HOSPADM

## 2022-02-18 RX ORDER — MIDAZOLAM HYDROCHLORIDE 1 MG/ML
INJECTION INTRAMUSCULAR; INTRAVENOUS
Status: COMPLETED | OUTPATIENT
Start: 2022-02-18 | End: 2022-02-18

## 2022-02-18 RX ORDER — FLUMAZENIL 0.1 MG/ML
0.1 INJECTION INTRAVENOUS AS NEEDED
Status: DISCONTINUED | OUTPATIENT
Start: 2022-02-18 | End: 2022-02-18 | Stop reason: HOSPADM

## 2022-02-18 RX ORDER — ACETAMINOPHEN 325 MG/1
650 TABLET ORAL ONCE AS NEEDED
Status: DISCONTINUED | OUTPATIENT
Start: 2022-02-18 | End: 2022-02-18 | Stop reason: HOSPADM

## 2022-02-18 RX ORDER — SODIUM CHLORIDE, SODIUM LACTATE, POTASSIUM CHLORIDE, CALCIUM CHLORIDE 600; 310; 30; 20 MG/100ML; MG/100ML; MG/100ML; MG/100ML
1000 INJECTION, SOLUTION INTRAVENOUS CONTINUOUS
Status: DISCONTINUED | OUTPATIENT
Start: 2022-02-18 | End: 2022-02-18 | Stop reason: HOSPADM

## 2022-02-18 RX ORDER — LABETALOL HYDROCHLORIDE 5 MG/ML
5 INJECTION, SOLUTION INTRAVENOUS
Status: DISCONTINUED | OUTPATIENT
Start: 2022-02-18 | End: 2022-02-18 | Stop reason: HOSPADM

## 2022-02-18 RX ORDER — HYDROCODONE BITARTRATE AND ACETAMINOPHEN 7.5; 325 MG/1; MG/1
1 TABLET ORAL ONCE
Status: COMPLETED | OUTPATIENT
Start: 2022-02-18 | End: 2022-02-18

## 2022-02-18 RX ORDER — HYDROCODONE BITARTRATE AND ACETAMINOPHEN 7.5; 325 MG/1; MG/1
1 TABLET ORAL EVERY 6 HOURS PRN
Qty: 28 TABLET | Refills: 0 | Status: SHIPPED | OUTPATIENT
Start: 2022-02-18 | End: 2022-04-07

## 2022-02-18 RX ORDER — ROPIVACAINE HYDROCHLORIDE 5 MG/ML
INJECTION, SOLUTION EPIDURAL; INFILTRATION; PERINEURAL
Status: COMPLETED | OUTPATIENT
Start: 2022-02-18 | End: 2022-02-18

## 2022-02-18 RX ORDER — NALOXONE HCL 0.4 MG/ML
0.4 VIAL (ML) INJECTION AS NEEDED
Status: DISCONTINUED | OUTPATIENT
Start: 2022-02-18 | End: 2022-02-18 | Stop reason: HOSPADM

## 2022-02-18 RX ORDER — PROMETHAZINE HYDROCHLORIDE 25 MG/1
25 TABLET ORAL ONCE AS NEEDED
Status: DISCONTINUED | OUTPATIENT
Start: 2022-02-18 | End: 2022-02-18 | Stop reason: HOSPADM

## 2022-02-18 RX ORDER — HYDROCODONE BITARTRATE AND ACETAMINOPHEN 7.5; 325 MG/1; MG/1
2 TABLET ORAL EVERY 4 HOURS PRN
Status: DISCONTINUED | OUTPATIENT
Start: 2022-02-18 | End: 2022-02-18 | Stop reason: HOSPADM

## 2022-02-18 RX ORDER — FLUMAZENIL 0.1 MG/ML
0.2 INJECTION INTRAVENOUS AS NEEDED
Status: DISCONTINUED | OUTPATIENT
Start: 2022-02-18 | End: 2022-02-18 | Stop reason: HOSPADM

## 2022-02-18 RX ORDER — IPRATROPIUM BROMIDE AND ALBUTEROL SULFATE 2.5; .5 MG/3ML; MG/3ML
3 SOLUTION RESPIRATORY (INHALATION) ONCE AS NEEDED
Status: DISCONTINUED | OUTPATIENT
Start: 2022-02-18 | End: 2022-02-18 | Stop reason: HOSPADM

## 2022-02-18 RX ORDER — PROPOFOL 10 MG/ML
INJECTION, EMULSION INTRAVENOUS AS NEEDED
Status: DISCONTINUED | OUTPATIENT
Start: 2022-02-18 | End: 2022-02-18 | Stop reason: SURG

## 2022-02-18 RX ORDER — DEXAMETHASONE SODIUM PHOSPHATE 4 MG/ML
INJECTION, SOLUTION INTRA-ARTICULAR; INTRALESIONAL; INTRAMUSCULAR; INTRAVENOUS; SOFT TISSUE
Status: COMPLETED | OUTPATIENT
Start: 2022-02-18 | End: 2022-02-18

## 2022-02-18 RX ORDER — FENTANYL CITRATE 50 UG/ML
25 INJECTION, SOLUTION INTRAMUSCULAR; INTRAVENOUS
Status: DISCONTINUED | OUTPATIENT
Start: 2022-02-18 | End: 2022-02-18 | Stop reason: HOSPADM

## 2022-02-18 RX ORDER — HYDROCODONE BITARTRATE AND ACETAMINOPHEN 5; 325 MG/1; MG/1
1 TABLET ORAL ONCE AS NEEDED
Status: DISCONTINUED | OUTPATIENT
Start: 2022-02-18 | End: 2022-02-18 | Stop reason: HOSPADM

## 2022-02-18 RX ORDER — ONDANSETRON 2 MG/ML
INJECTION INTRAMUSCULAR; INTRAVENOUS AS NEEDED
Status: DISCONTINUED | OUTPATIENT
Start: 2022-02-18 | End: 2022-02-18 | Stop reason: SURG

## 2022-02-18 RX ORDER — LIDOCAINE HYDROCHLORIDE 10 MG/ML
INJECTION, SOLUTION EPIDURAL; INFILTRATION; INTRACAUDAL; PERINEURAL AS NEEDED
Status: DISCONTINUED | OUTPATIENT
Start: 2022-02-18 | End: 2022-02-18 | Stop reason: SURG

## 2022-02-18 RX ORDER — FENTANYL CITRATE 50 UG/ML
50 INJECTION, SOLUTION INTRAMUSCULAR; INTRAVENOUS
Status: DISCONTINUED | OUTPATIENT
Start: 2022-02-18 | End: 2022-02-18 | Stop reason: HOSPADM

## 2022-02-18 RX ADMIN — Medication 150 MCG: at 13:01

## 2022-02-18 RX ADMIN — DEXAMETHASONE SODIUM PHOSPHATE 4 MG: 4 INJECTION, SOLUTION INTRAMUSCULAR; INTRAVENOUS at 10:50

## 2022-02-18 RX ADMIN — PROPOFOL 40 MG: 10 INJECTION, EMULSION INTRAVENOUS at 13:58

## 2022-02-18 RX ADMIN — FENTANYL CITRATE 50 MCG: 50 INJECTION, SOLUTION INTRAMUSCULAR; INTRAVENOUS at 12:47

## 2022-02-18 RX ADMIN — PROPOFOL 120 MG: 10 INJECTION, EMULSION INTRAVENOUS at 12:35

## 2022-02-18 RX ADMIN — ROPIVACAINE HYDROCHLORIDE 50 ML: 5 INJECTION EPIDURAL; INFILTRATION; PERINEURAL at 10:50

## 2022-02-18 RX ADMIN — FENTANYL CITRATE 50 MCG: 50 INJECTION, SOLUTION INTRAMUSCULAR; INTRAVENOUS at 13:56

## 2022-02-18 RX ADMIN — MIDAZOLAM 2 MG: 1 INJECTION INTRAMUSCULAR; INTRAVENOUS at 10:50

## 2022-02-18 RX ADMIN — FENTANYL CITRATE 25 MCG: 50 INJECTION, SOLUTION INTRAMUSCULAR; INTRAVENOUS at 14:52

## 2022-02-18 RX ADMIN — Medication 150 MCG: at 13:18

## 2022-02-18 RX ADMIN — FENTANYL CITRATE 100 MCG: 50 INJECTION, SOLUTION INTRAMUSCULAR; INTRAVENOUS at 10:50

## 2022-02-18 RX ADMIN — HYDROMORPHONE HYDROCHLORIDE 0.25 MG: 2 INJECTION, SOLUTION INTRAMUSCULAR; INTRAVENOUS; SUBCUTANEOUS at 15:20

## 2022-02-18 RX ADMIN — Medication 150 MCG: at 13:12

## 2022-02-18 RX ADMIN — DEXAMETHASONE SODIUM PHOSPHATE 4 MG: 4 INJECTION, SOLUTION INTRAMUSCULAR; INTRAVENOUS at 12:52

## 2022-02-18 RX ADMIN — ONDANSETRON 4 MG: 2 INJECTION INTRAMUSCULAR; INTRAVENOUS at 13:42

## 2022-02-18 RX ADMIN — HYDROCODONE BITARTRATE AND ACETAMINOPHEN 1 TABLET: 7.5; 325 TABLET ORAL at 15:44

## 2022-02-18 RX ADMIN — Medication 150 MCG: at 13:31

## 2022-02-18 RX ADMIN — FENTANYL CITRATE 25 MCG: 50 INJECTION, SOLUTION INTRAMUSCULAR; INTRAVENOUS at 15:05

## 2022-02-18 RX ADMIN — CEFAZOLIN SODIUM 2 G: 1 INJECTION, POWDER, FOR SOLUTION INTRAMUSCULAR; INTRAVENOUS at 12:42

## 2022-02-18 RX ADMIN — SODIUM CHLORIDE, POTASSIUM CHLORIDE, SODIUM LACTATE AND CALCIUM CHLORIDE 1000 ML: 600; 310; 30; 20 INJECTION, SOLUTION INTRAVENOUS at 09:41

## 2022-02-18 RX ADMIN — LIDOCAINE HYDROCHLORIDE 50 MG: 10 INJECTION, SOLUTION EPIDURAL; INFILTRATION; INTRACAUDAL; PERINEURAL at 12:35

## 2022-02-18 NOTE — ANESTHESIA PREPROCEDURE EVALUATION
Anesthesia Evaluation     Patient summary reviewed and Nursing notes reviewed   NPO Solid Status: > 8 hours  NPO Liquid Status: > 8 hours           Airway   Mallampati: II  TM distance: >3 FB  Neck ROM: full  No difficulty expected  Dental - normal exam     Pulmonary - negative pulmonary ROS and normal exam   Cardiovascular - normal exam    ECG reviewed    (+) hypertension, valvular problems/murmurs MR, hyperlipidemia,       Neuro/Psych- negative ROS  GI/Hepatic/Renal/Endo    (+) obesity,  hiatal hernia, GERD,      Musculoskeletal     Abdominal  - normal exam    Bowel sounds: normal.   Substance History - negative use     OB/GYN negative ob/gyn ROS         Other   arthritis,                    Anesthesia Plan    ASA 2     general with block     intravenous induction     Anesthetic plan, all risks, benefits, and alternatives have been provided, discussed and informed consent has been obtained with: patient.    Plan discussed with CRNA and CAA.        CODE STATUS:

## 2022-02-18 NOTE — ANESTHESIA POSTPROCEDURE EVALUATION
Patient: Yancy Mcdonnell    Procedure Summary     Date: 02/18/22 Room / Location: [facility] OR 09 / [facility] MAIN OR    Anesthesia Start: 1232 Anesthesia Stop: 1414    Procedure: ANKLE OPEN REDUCTION INTERNAL FIXATION (Right Ankle) Diagnosis:       Closed nondisplaced bimalleolar fracture of right ankle, initial encounter      (Closed nondisplaced bimalleolar fracture of right ankle, initial encounter [S82.844A])    Surgeons: DEAN Tobias DPM Provider: Twan Casanova MD    Anesthesia Type: general with block ASA Status: 2          Anesthesia Type: general with block    Vitals  Vitals Value Taken Time   /71 02/18/22 1547   Temp 98.6 °F (37 °C) 02/18/22 1540   Pulse 75 02/18/22 1549   Resp 15 02/18/22 1540   SpO2 98 % 02/18/22 1549   Vitals shown include unvalidated device data.        Post Anesthesia Care and Evaluation    Patient location during evaluation: PACU  Patient participation: complete - patient participated  Level of consciousness: awake  Pain scale: See nurse's notes for pain score.  Pain management: adequate  Airway patency: patent  Anesthetic complications: No anesthetic complications  PONV Status: none  Cardiovascular status: acceptable  Respiratory status: acceptable  Hydration status: acceptable    Comments: Patient seen and examined postoperatively; vital signs stable; SpO2 greater than or equal to 90%; cardiopulmonary status stable; nausea/vomiting adequately controlled; pain adequately controlled; no apparent anesthesia complications; patient discharged from anesthesia care when discharge criteria were met

## 2022-02-18 NOTE — ANESTHESIA PROCEDURE NOTES
Peripheral Block      Patient reassessed immediately prior to procedure    Patient location during procedure: pre-op  Start time: 2/18/2022 10:44 AM  Reason for block: procedure for pain, at surgeon's request and post-op pain management  Performed by  Anesthesiologist: Twan Casanova MD  Preanesthetic Checklist  Completed: patient identified, IV checked, site marked, risks and benefits discussed, surgical consent, monitors and equipment checked, pre-op evaluation and timeout performed  Prep:  Pt Position: supine  Sterile barriers:cap, gloves and sterile barriers  Prep: ChloraPrep  Patient monitoring: blood pressure monitoring, continuous pulse oximetry and EKG  Procedure    Sedation: yes  Performed under: local infiltration  Guidance:ultrasound guided    ULTRASOUND INTERPRETATION.  Using ultrasound guidance a 20 G gauge needle was placed in close proximity to the brachial plexus nerve, at which point, under ultrasound guidance anesthetic was injected in the area of the nerve and spread of the anesthesia was seen on ultrasound in close proximity thereto.  There were no abnormalities seen on ultrasound; a digital image was taken; and the patient tolerated the procedure with no complications. Images:still images obtained, printed/placed on chart    Laterality:right  Block Type:adductor canal block and popliteal  Injection Technique:single-shot  Needle Type:echogenic  Needle Gauge:20 G  Resistance on Injection: none  Sedation medications used: midazolam (VERSED) injection, 2 mg  fentaNYL citrate (PF) (SUBLIMAZE) injection, 100 mcg  Medications Used: dexamethasone (DECADRON) injection, 4 mg  ropivacaine (NAROPIN) 0.5 % injection, 50 mL  Med administered at 2/18/2022 10:50 AM      Medications  Comment:Ultrasound visualization used for needle and local anesthetic spread visualization.     Post Assessment  Injection Assessment: no paresthesia on injection, negative aspiration for heme and incremental injection  Patient  Tolerance:comfortable throughout block  Complications:no

## 2022-02-18 NOTE — PROGRESS NOTES
A my chart message has been sent to the patient for PATIENT ROUNDING with [facility]

## 2022-02-19 PROBLEM — I80.9 THROMBOPHLEBITIS: Status: ACTIVE | Noted: 2022-02-19

## 2022-03-02 ENCOUNTER — TELEMEDICINE (OUTPATIENT)
Dept: FAMILY MEDICINE CLINIC | Facility: CLINIC | Age: 77
End: 2022-03-02

## 2022-03-02 DIAGNOSIS — I10 PRIMARY HYPERTENSION: Primary | Chronic | ICD-10-CM

## 2022-03-02 PROCEDURE — 99213 OFFICE O/P EST LOW 20 MIN: CPT | Performed by: NURSE PRACTITIONER

## 2022-03-02 NOTE — ASSESSMENT & PLAN NOTE
Home readings have been okay, even off of HCTZ.  Heart rate has been in the 70's at the hospital.   Will continue current meds for now.   Follow-up in the office in 4-6 weeks.

## 2022-03-02 NOTE — PROGRESS NOTES
Chief Complaint  Hypertension    Subjective          History of Present Illness  Patient presents for follow-up on hypertension. She discontinued amlodipine several months ago due to pedal edema. In October her BP was mildly elevated (145/81). No changes were made at that time. At her 1/19/22 visit  her BP was still elevated (152/84) and Metoprolol 25 mg tablet daily was added. Since I last saw her she slipped and fell on ice and sustained a closed bimalleolar right ankle fracture for which she underwent an ORIF on 2/18/22 by Dr. Tobias.    At home BP has been running 130's/70-80's. She has not been taking HCTZ because it is too hard for her to get up and down to the bathroom frequently.     Patient was seen today through synchronous audio/video technology during the COVID-19 Public Health Emergency. Verbal consent was obtained. The patient was located at home. I was located in my [facility] office.         Current Outpatient Medications:   •  albuterol sulfate HFA (Ventolin HFA) 108 (90 Base) MCG/ACT inhaler, Inhale 2 puffs Every 4 (Four) Hours As Needed for Shortness of Air. Two inhalations every 4-6 hours as needed for SOA. (Patient taking differently: Inhale 2 puffs Every 4 (Four) Hours As Needed for Shortness of Air. Two inhalations every 4-6 hours as needed for SOA.  Bring dos), Disp: 18 g, Rfl: 0  •  famciclovir (FAMVIR) 500 MG tablet, TAKE 3 TABLETS BY MOUTH AS A SINGLE DOSE AT FIRST SIGN OF COLD SORE, Disp: 3 tablet, Rfl: 5  •  fluticasone (FLONASE) 50 MCG/ACT nasal spray, INSTILL 2 SPRAYS IN EACH NOSTRIL DAILY, Disp: 48 g, Rfl: 1  •  Glucosamine-Chondroitin (CIDAFLEX PO), Take 2 tablets by mouth Daily. Stop now for surgery, Disp: , Rfl:   •  hydroCHLOROthiazide (HYDRODIURIL) 25 MG tablet, TAKE 1 TABLET BY MOUTH DAILY (Patient taking differently: Take 25 mg by mouth Daily. Do not take dos), Disp: 90 tablet, Rfl: 1  •  HYDROcodone-acetaminophen (NORCO) 7.5-325 MG per tablet, Take 1 tablet by mouth Every 6  (Six) Hours As Needed for Moderate Pain  (Pain)., Disp: 28 tablet, Rfl: 0  •  loratadine (CLARITIN) 10 MG tablet, TAKE 1 TABLET BY MOUTH EVERY DAY, Disp: 90 tablet, Rfl: 2  •  losartan (COZAAR) 100 MG tablet, TAKE 1 TABLET BY MOUTH DAILY (Patient taking differently: Take 100 mg by mouth Every Morning. Stop 24 hr before surgery), Disp: 90 tablet, Rfl: 0  •  lysine ([facility] LYSINE) 500 MG tablet, Take 1 tablet by mouth Daily As Needed. Stop now for surgery, Disp: , Rfl:   •  Magnesium 250 MG tablet, Take  by mouth. Stop now for surgery, Disp: , Rfl:   •  metoprolol succinate XL (Toprol XL) 25 MG 24 hr tablet, Take 1 tablet by mouth Daily. (Patient taking differently: Take 25 mg by mouth every night at bedtime.), Disp: 90 tablet, Rfl: 0  •  Misc Natural Products (LUTEIN 20 PO), Take  by mouth. Stop now for surgery, Disp: , Rfl:   •  Multiple Vitamin (MULTI-VITAMIN DAILY) tablet, Take 1 tablet by mouth Daily. Stop now for surgery, Disp: , Rfl:   •  NON FORMULARY, Sustain eye drops, Disp: , Rfl:   •  omeprazole (priLOSEC) 20 MG capsule, TAKE ONE CAPSULE BY MOUTH EVERY MORNING ON AN EMPTY STOMACH, 30 MINUTES BEFORE EATING OR OTHER MEDICATION (Patient taking differently: Take 20 mg by mouth Daily As Needed.), Disp: 90 capsule, Rfl: 1  •  rivaroxaban (Xarelto) 10 MG tablet, Take 1 tablet by mouth Daily., Disp: 14 tablet, Rfl: 0     Review of Systems   Constitutional: Positive for activity change and fatigue. Negative for appetite change, diaphoresis, unexpected weight gain and unexpected weight loss.   Respiratory: Negative for cough, shortness of breath and wheezing.    Cardiovascular: Negative for chest pain and palpitations.   Neurological: Negative for dizziness, syncope and headache.        Objective     Physical Exam  Constitutional:       General: She is not in acute distress.     Appearance: Normal appearance. She is not ill-appearing.   Pulmonary:      Effort: Pulmonary effort is normal. No respiratory distress.    Neurological:      Mental Status: She is alert and oriented to person, place, and time.   Psychiatric:         Mood and Affect: Mood normal.         Behavior: Behavior normal.         Thought Content: Thought content normal.         Judgment: Judgment normal.          Result Review :   The following data was reviewed by: FELIPE Vázquez on 03/02/2022:  CMP    CMP 4/20/21 10/26/21 2/16/22   Glucose 90 83 101 (A)   BUN 15 16 12   Creatinine 0.76 0.70 0.66   eGFR Non  Am 76 84 87   eGFR  Am 88 97    Sodium 143 143 138   Potassium 3.9 4.1 3.8   Chloride 105 105 103   Calcium 9.4 9.4 9.4   Total Protein 6.7 6.3    Albumin 4.2 4.1    Globulin 2.5 2.2    Total Bilirubin 0.9 0.7    Alkaline Phosphatase 119 (A) 129 (A)    AST (SGOT) 17 18    ALT (SGPT) 17 16    (A) Abnormal value       Comments are available for some flowsheets but are not being displayed.           CBC    CBC 10/26/21 2/16/22   WBC 6.2 7.29   RBC 4.09 4.26   Hemoglobin 12.3 12.6   Hematocrit 37.2 37.7   MCV 91 88.5   MCH 30.1 29.6   MCHC 33.1 33.4   RDW 11.8 11.8 (A)   Platelets 384 379   (A) Abnormal value            Lipid Panel    Lipid Panel 10/26/21   Total Cholesterol 183   Triglycerides 128   HDL Cholesterol 67   VLDL Cholesterol 22   LDL Cholesterol  94         Data reviewed: 2/10/22 ER Note; 2/14/22 H&P; 2/18/22 Op Note            Assessment and Plan    Diagnoses and all orders for this visit:    1. Primary hypertension (Primary)  Assessment & Plan:  Home readings have been okay, even off of HCTZ.  Heart rate has been in the 70's at the hospital.   Will continue current meds for now.   Follow-up in the office in 4-6 weeks.             Follow Up   No follow-ups on file.    Patient was given instructions and counseling regarding her condition and health maintenance advice. Please see specific information in the After Visit Summary.     FELIPE Vázquez 3/2/2022 13:16 EST  This note was electronically signed.

## 2022-03-03 ENCOUNTER — OFFICE VISIT (OUTPATIENT)
Dept: PODIATRY | Facility: CLINIC | Age: 77
End: 2022-03-03

## 2022-03-03 VITALS
HEART RATE: 77 BPM | HEIGHT: 63 IN | SYSTOLIC BLOOD PRESSURE: 183 MMHG | DIASTOLIC BLOOD PRESSURE: 84 MMHG | BODY MASS INDEX: 30.12 KG/M2 | WEIGHT: 170 LBS

## 2022-03-03 DIAGNOSIS — S82.844D CLOSED NONDISPLACED BIMALLEOLAR FRACTURE OF RIGHT ANKLE WITH ROUTINE HEALING, SUBSEQUENT ENCOUNTER: ICD-10-CM

## 2022-03-03 DIAGNOSIS — M25.571 ACUTE RIGHT ANKLE PAIN: Primary | ICD-10-CM

## 2022-03-03 PROCEDURE — 99024 POSTOP FOLLOW-UP VISIT: CPT | Performed by: PODIATRIST

## 2022-03-03 NOTE — PROGRESS NOTES
03/03/2022  Foot and Ankle Surgery - Established Patient/Follow-up  Provider: Dr. Carlos Tobias, DAVID  Location: [facility]    Subjective:  Yancy Mcdonnell is a 76 y.o. female.     Chief Complaint   Patient presents with   • Right Ankle - Pain   • Post-op     Last pcp appt with ESA WANG        HPI: Patient returns 2 weeks after open reduction internal fixation.  She states that she is doing well with decreased pain.  She has remained off weightbearing as instructed.  She has been taking the blood thinner.  No other issues.    Allergies   Allergen Reactions   • Demerol [Meperidine] Unknown (See Comments)     Abstracted from [facility].   • Other Unknown (See Comments)     Sulfa (sulfadiazine)    • Sulfa Antibiotics Unknown - High Severity   • Lisinopril Cough       Current Outpatient Medications on File Prior to Visit   Medication Sig Dispense Refill   • albuterol sulfate HFA (Ventolin HFA) 108 (90 Base) MCG/ACT inhaler Inhale 2 puffs Every 4 (Four) Hours As Needed for Shortness of Air. Two inhalations every 4-6 hours as needed for SOA. (Patient taking differently: Inhale 2 puffs Every 4 (Four) Hours As Needed for Shortness of Air. Two inhalations every 4-6 hours as needed for SOA.   Bring dos) 18 g 0   • famciclovir (FAMVIR) 500 MG tablet TAKE 3 TABLETS BY MOUTH AS A SINGLE DOSE AT FIRST SIGN OF COLD SORE 3 tablet 5   • fluticasone (FLONASE) 50 MCG/ACT nasal spray INSTILL 2 SPRAYS IN EACH NOSTRIL DAILY 48 g 1   • Glucosamine-Chondroitin (CIDAFLEX PO) Take 2 tablets by mouth Daily. Stop now for surgery     • hydroCHLOROthiazide (HYDRODIURIL) 25 MG tablet TAKE 1 TABLET BY MOUTH DAILY (Patient taking differently: Take 25 mg by mouth Daily. Do not take dos) 90 tablet 1   • HYDROcodone-acetaminophen (NORCO) 7.5-325 MG per tablet Take 1 tablet by mouth Every 6 (Six) Hours As Needed for Moderate Pain  (Pain). 28 tablet 0   • loratadine (CLARITIN) 10 MG tablet TAKE 1 TABLET BY MOUTH EVERY DAY 90 tablet 2   •  "losartan (COZAAR) 100 MG tablet TAKE 1 TABLET BY MOUTH DAILY (Patient taking differently: Take 100 mg by mouth Every Morning. Stop 24 hr before surgery) 90 tablet 0   • lysine ([facility] LYSINE) 500 MG tablet Take 1 tablet by mouth Daily As Needed. Stop now for surgery     • Magnesium 250 MG tablet Take  by mouth. Stop now for surgery     • metoprolol succinate XL (Toprol XL) 25 MG 24 hr tablet Take 1 tablet by mouth Daily. (Patient taking differently: Take 25 mg by mouth every night at bedtime.) 90 tablet 0   • Misc Natural Products (LUTEIN 20 PO) Take  by mouth. Stop now for surgery     • Multiple Vitamin (MULTI-VITAMIN DAILY) tablet Take 1 tablet by mouth Daily. Stop now for surgery     • NON FORMULARY Sustain eye drops     • omeprazole (priLOSEC) 20 MG capsule TAKE ONE CAPSULE BY MOUTH EVERY MORNING ON AN EMPTY STOMACH, 30 MINUTES BEFORE EATING OR OTHER MEDICATION (Patient taking differently: Take 20 mg by mouth Daily As Needed.) 90 capsule 1   • rivaroxaban (Xarelto) 10 MG tablet Take 1 tablet by mouth Daily. 14 tablet 0     No current facility-administered medications on file prior to visit.       Objective   BP (!) 183/84   Pulse 77   Ht 160 cm (63\")   Wt 77.1 kg (170 lb)   BMI 30.11 kg/m²      General:   Appearance: appears stated age and healthy    Orientation: AAOx3    Affect: appropriate    Assistance: wheelchair       VASCULAR       Right Foot Vascularity   Normal vascular exam    Dorsalis pedis:  2+  Posterior tibial:  2+  Skin Temperature: warm    Edema Gradin+  CFT:  < 3 seconds  Pedal Hair Growth:  Present  Varicosities: spider veins        NEUROLOGIC      Right Foot Neurologic   Light touch sensation:  Normal  Hot/Cold sensation: normal    Achilles reflex:  2+      MUSCULOSKELETAL       Right Foot Musculoskeletal   Ecchymosis:  Ankle joint  Tenderness: medial malleolus, lateral malleolus and anterior tibiotalar joint line    Arch:  Normal      DERMATOLOGIC      Right Foot Dermatologic   Skin: " Incision sites are dry and stable with intact staples.  No evidence of dehiscence or infection.    Moderate pitting edema involving the right lower extremity.    Assessment/Plan   Diagnoses and all orders for this visit:    1. Acute right ankle pain (Primary)    2. Closed nondisplaced bimalleolar fracture of right ankle with routine healing, subsequent encounter      Patient is doing quite well at this time.  Staples were removed today without complication.  I have recommended that she start partial protected weightbearing in the cam boot as needed.  I would like her to start gentle range of motion and manual therapy exercises.  She is to perform Ace wrap application on a daily basis to manage the swelling.  I would like her to return in 2 weeks for reevaluation and imaging.    No orders of the defined types were placed in this encounter.         Note is dictated utilizing voice recognition software. Unfortunately this leads to occasional typographical errors. I apologize in advance if the situation occurs. If questions occur please do not hesitate to call our office.

## 2022-03-17 ENCOUNTER — OFFICE VISIT (OUTPATIENT)
Dept: PODIATRY | Facility: CLINIC | Age: 77
End: 2022-03-17

## 2022-03-17 ENCOUNTER — HOSPITAL ENCOUNTER (OUTPATIENT)
Dept: CARDIOLOGY | Facility: HOSPITAL | Age: 77
Discharge: HOME | End: 2022-03-17
Admitting: PODIATRIST

## 2022-03-17 VITALS
HEIGHT: 63 IN | HEART RATE: 76 BPM | BODY MASS INDEX: 30.12 KG/M2 | DIASTOLIC BLOOD PRESSURE: 81 MMHG | SYSTOLIC BLOOD PRESSURE: 160 MMHG | WEIGHT: 170 LBS

## 2022-03-17 DIAGNOSIS — S82.844D CLOSED NONDISPLACED BIMALLEOLAR FRACTURE OF RIGHT ANKLE WITH ROUTINE HEALING, SUBSEQUENT ENCOUNTER: Primary | ICD-10-CM

## 2022-03-17 DIAGNOSIS — M79.661 RIGHT CALF PAIN: ICD-10-CM

## 2022-03-17 LAB
BH CV LOW VAS RIGHT VARICOSITY BK VESSEL: 1
BH CV LOWER VASCULAR LEFT COMMON FEMORAL AUGMENT: NORMAL
BH CV LOWER VASCULAR LEFT COMMON FEMORAL COMPETENT: NORMAL
BH CV LOWER VASCULAR LEFT COMMON FEMORAL COMPRESS: NORMAL
BH CV LOWER VASCULAR LEFT COMMON FEMORAL PHASIC: NORMAL
BH CV LOWER VASCULAR LEFT COMMON FEMORAL SPONT: NORMAL
BH CV LOWER VASCULAR RIGHT COMMON FEMORAL AUGMENT: NORMAL
BH CV LOWER VASCULAR RIGHT COMMON FEMORAL COMPETENT: NORMAL
BH CV LOWER VASCULAR RIGHT COMMON FEMORAL COMPRESS: NORMAL
BH CV LOWER VASCULAR RIGHT COMMON FEMORAL PHASIC: NORMAL
BH CV LOWER VASCULAR RIGHT COMMON FEMORAL SPONT: NORMAL
BH CV LOWER VASCULAR RIGHT DISTAL FEMORAL COMPRESS: NORMAL
BH CV LOWER VASCULAR RIGHT GASTRONEMIUS COMPRESS: NORMAL
BH CV LOWER VASCULAR RIGHT GREATER SAPH AK COMPRESS: NORMAL
BH CV LOWER VASCULAR RIGHT GREATER SAPH BK COMPRESS: NORMAL
BH CV LOWER VASCULAR RIGHT LESSER SAPH COMPRESS: NORMAL
BH CV LOWER VASCULAR RIGHT MID FEMORAL AUGMENT: NORMAL
BH CV LOWER VASCULAR RIGHT MID FEMORAL COMPETENT: NORMAL
BH CV LOWER VASCULAR RIGHT MID FEMORAL COMPRESS: NORMAL
BH CV LOWER VASCULAR RIGHT MID FEMORAL PHASIC: NORMAL
BH CV LOWER VASCULAR RIGHT MID FEMORAL SPONT: NORMAL
BH CV LOWER VASCULAR RIGHT PERONEAL COMPRESS: NORMAL
BH CV LOWER VASCULAR RIGHT POPLITEAL AUGMENT: NORMAL
BH CV LOWER VASCULAR RIGHT POPLITEAL COMPETENT: NORMAL
BH CV LOWER VASCULAR RIGHT POPLITEAL COMPRESS: NORMAL
BH CV LOWER VASCULAR RIGHT POPLITEAL PHASIC: NORMAL
BH CV LOWER VASCULAR RIGHT POPLITEAL SPONT: NORMAL
BH CV LOWER VASCULAR RIGHT POSTERIOR TIBIAL COMPRESS: NORMAL
BH CV LOWER VASCULAR RIGHT PROXIMAL FEMORAL COMPRESS: NORMAL
BH CV LOWER VASCULAR RIGHT SAPHENOFEMORAL JUNCTION COMPRESS: NORMAL
BH CV LOWER VASCULAR RIGHT VARICOSITY BK COMPRESS: NORMAL
BH CV LOWER VASCULAR RIGHT VARICOSITY BK THROMBUS: NORMAL
MAXIMAL PREDICTED HEART RATE: 143 BPM
STRESS TARGET HR: 122 BPM

## 2022-03-17 PROCEDURE — 93971 EXTREMITY STUDY: CPT

## 2022-03-17 PROCEDURE — 99024 POSTOP FOLLOW-UP VISIT: CPT | Performed by: PODIATRIST

## 2022-03-17 NOTE — PROGRESS NOTES
03/17/2022  Foot and Ankle Surgery - Established Patient/Follow-up  Provider: Dr. Carlos Tobias DPM  Location: [facility]    Subjective:  Yancy Mcdonnell is a 76 y.o. female.     Chief Complaint   Patient presents with   • Right Ankle - Follow-up, Pain     Last pcp appt with mike holly camron  3/2/2022       HPI: Patient returns for follow-up regarding her right ankle.  She states that she has not been performing her exercises as often as she should.  She states that the ankle is quite tight.  She does complain of pain involving the calf region.  No other issues.    Allergies   Allergen Reactions   • Demerol [Meperidine] Unknown (See Comments)     [facility] from [facility].   • Other Unknown (See Comments)     Sulfa (sulfadiazine)    • Sulfa Antibiotics Unknown - High Severity   • Lisinopril Cough       Current Outpatient Medications on File Prior to Visit   Medication Sig Dispense Refill   • albuterol sulfate HFA (Ventolin HFA) 108 (90 Base) MCG/ACT inhaler Inhale 2 puffs Every 4 (Four) Hours As Needed for Shortness of Air. Two inhalations every 4-6 hours as needed for SOA. (Patient taking differently: Inhale 2 puffs Every 4 (Four) Hours As Needed for Shortness of Air. Two inhalations every 4-6 hours as needed for SOA.   Bring dos) 18 g 0   • famciclovir (FAMVIR) 500 MG tablet TAKE 3 TABLETS BY MOUTH AS A SINGLE DOSE AT FIRST SIGN OF COLD SORE 3 tablet 5   • fluticasone (FLONASE) 50 MCG/ACT nasal spray INSTILL 2 SPRAYS IN EACH NOSTRIL DAILY 48 g 1   • Glucosamine-Chondroitin (CIDAFLEX PO) Take 2 tablets by mouth Daily. Stop now for surgery     • hydroCHLOROthiazide (HYDRODIURIL) 25 MG tablet TAKE 1 TABLET BY MOUTH DAILY (Patient taking differently: Take 25 mg by mouth Daily. Do not take dos) 90 tablet 1   • HYDROcodone-acetaminophen (NORCO) 7.5-325 MG per tablet Take 1 tablet by mouth Every 6 (Six) Hours As Needed for Moderate Pain  (Pain). 28 tablet 0   • loratadine (CLARITIN) 10 MG tablet TAKE 1  "TABLET BY MOUTH EVERY DAY 90 tablet 2   • losartan (COZAAR) 100 MG tablet TAKE 1 TABLET BY MOUTH DAILY (Patient taking differently: Take 100 mg by mouth Every Morning. Stop 24 hr before surgery) 90 tablet 0   • lysine 500 MG tablet Take 1 tablet by mouth Daily As Needed. Stop now for surgery     • Magnesium 250 MG tablet Take  by mouth. Stop now for surgery     • metoprolol succinate XL (Toprol XL) 25 MG 24 hr tablet Take 1 tablet by mouth Daily. (Patient taking differently: Take 25 mg by mouth every night at bedtime.) 90 tablet 0   • Misc Natural Products (LUTEIN 20 PO) Take  by mouth. Stop now for surgery     • Multiple Vitamin (MULTI-VITAMIN DAILY) tablet Take 1 tablet by mouth Daily. Stop now for surgery     • NON FORMULARY Sustain eye drops     • omeprazole (priLOSEC) 20 MG capsule TAKE ONE CAPSULE BY MOUTH EVERY MORNING ON AN EMPTY STOMACH, 30 MINUTES BEFORE EATING OR OTHER MEDICATION (Patient taking differently: Take 20 mg by mouth Daily As Needed.) 90 capsule 1   • rivaroxaban (Xarelto) 10 MG tablet Take 1 tablet by mouth Daily. 14 tablet 0     No current facility-administered medications on file prior to visit.       Objective   /81   Pulse 76   Ht 160 cm (63\")   Wt 77.1 kg (170 lb)   BMI 30.11 kg/m²     General:   Appearance: appears stated age and healthy    Orientation: AAOx3    Affect: appropriate    Assistance: wheelchair       VASCULAR       Right Foot Vascularity   Normal vascular exam    Dorsalis pedis:  2+  Posterior tibial:  2+  Skin Temperature: warm    Edema Gradin+  CFT:  < 3 seconds  Pedal Hair Growth:  Present  Varicosities: spider veins        NEUROLOGIC      Right Foot Neurologic   Light touch sensation:  Normal  Hot/Cold sensation: normal    Achilles reflex:  2+      MUSCULOSKELETAL       Right Foot Musculoskeletal   Ecchymosis:  Ankle joint  Tenderness: medial malleolus, lateral malleolus and anterior tibiotalar joint line    Arch:  Normal      DERMATOLOGIC      Right Foot " Dermatologic   Skin: Incision sites are well-healed.    Ankle range of motion remains limited.  Moderate discomfort involving the calf with palpation.    Assessment/Plan   Diagnoses and all orders for this visit:    1. Closed nondisplaced bimalleolar fracture of right ankle with routine healing, subsequent encounter (Primary)  -     XR Ankle 3+ View Right  -     US Venous Doppler Lower Extremity Right (duplex)    2. Right calf pain  -     Cancel: Duplex Venous Lower Extremity - Left CAR  -     Duplex Venous Lower Extremity - Left CAR  -     US Venous Doppler Lower Extremity Right (duplex)      Patient returns approximately 4 weeks after surgery.  She is doing quite well but continues to have discomfort involving the calf.  She has been taking the aspirin 325 mg daily.  I feel that the calf pain is likely secondary to immobility but we will proceed with a venous duplex ultrasound to rule out underlying DVT.  Imaging was obtained and independently reviewed showing stable internal fixation without any obvious signs of healing at this time.  I have recommended that she start weightbearing activities as needed in the cam boot with walker assistance.  She is to proceed with range of motion and manual therapy exercises.  I would like to see her in 4 weeks for reevaluation and imaging.    Orders Placed This Encounter   Procedures   • XR Ankle 3+ View Right     Order Specific Question:   Reason for Exam:     Answer:   closed nondisplaced bimalleolar fx right ankle.  room 15 wb     Order Specific Question:   Does this patient have a diabetic monitoring/medication delivering device on?     Answer:   No     Order Specific Question:   Release to patient     Answer:   Immediate   • US Venous Doppler Lower Extremity Right (duplex)     Scheduling Instructions:      rigth lower ext venous doppler       Dx   Pain, swelling, s.p right ankle orif     Order Specific Question:   Reason for Exam:     Answer:   s/p rigth ankle orif, pain,  swelling     Order Specific Question:   Release to patient     Answer:   Immediate          Note is dictated utilizing voice recognition software. Unfortunately this leads to occasional typographical errors. I apologize in advance if the situation occurs. If questions occur please do not hesitate to call our office.

## 2022-03-25 ENCOUNTER — TELEPHONE (OUTPATIENT)
Dept: PODIATRY | Facility: CLINIC | Age: 77
End: 2022-03-25

## 2022-03-25 NOTE — TELEPHONE ENCOUNTER
Caller: TREMAINE  Relationship to Patient: SELF    Phone Number: [phone]  Reason for Call:PT CALLED STATING THAT SHE HAS SEVERAL QUESTIONS ABOUT RECEIVING INJECTIONS IN THE FUTURE AND QUESTIONS PERTAINING TO PHYSICAL THERAPY. PT STATES THAT SHE HAS BEEN DOING ALL THE EXERCISES AT HOME AND WEARING HER WALKING BOOT. PLEASE ADVISE PT AT ABOVE PHONE NUMBER.

## 2022-03-28 NOTE — TELEPHONE ENCOUNTER
I SPOKE WITH PATIENT. SHE IS ASKING IF YOU SUGGEST FORMAL PHYSICAL THERAPY  FOR HER? SHE HAS BEEN DOING AT HOME ROM. WOULD LIKE TO GO TO [facility].

## 2022-03-31 DIAGNOSIS — I10 ESSENTIAL HYPERTENSION: Chronic | ICD-10-CM

## 2022-03-31 RX ORDER — LOSARTAN POTASSIUM 100 MG/1
100 TABLET ORAL DAILY
Qty: 90 TABLET | Refills: 0 | Status: SHIPPED | OUTPATIENT
Start: 2022-03-31 | End: 2022-04-21 | Stop reason: SDUPTHER

## 2022-04-07 ENCOUNTER — OFFICE VISIT (OUTPATIENT)
Dept: FAMILY MEDICINE CLINIC | Facility: CLINIC | Age: 77
End: 2022-04-07

## 2022-04-07 VITALS
DIASTOLIC BLOOD PRESSURE: 82 MMHG | BODY MASS INDEX: 31.01 KG/M2 | TEMPERATURE: 98.2 F | OXYGEN SATURATION: 97 % | HEIGHT: 63 IN | RESPIRATION RATE: 16 BRPM | WEIGHT: 175 LBS | HEART RATE: 78 BPM | SYSTOLIC BLOOD PRESSURE: 182 MMHG

## 2022-04-07 DIAGNOSIS — I10 PRIMARY HYPERTENSION: Primary | Chronic | ICD-10-CM

## 2022-04-07 PROCEDURE — 99214 OFFICE O/P EST MOD 30 MIN: CPT | Performed by: NURSE PRACTITIONER

## 2022-04-07 RX ORDER — METOPROLOL SUCCINATE 25 MG/1
25 TABLET, EXTENDED RELEASE ORAL DAILY
Qty: 90 TABLET | Refills: 0 | Status: SHIPPED | OUTPATIENT
Start: 2022-04-07 | End: 2022-04-21 | Stop reason: SDUPTHER

## 2022-04-07 RX ORDER — METOPROLOL SUCCINATE 25 MG/1
25 TABLET, EXTENDED RELEASE ORAL DAILY
Qty: 90 TABLET | Refills: 0 | Status: SHIPPED | OUTPATIENT
Start: 2022-04-07 | End: 2022-04-07 | Stop reason: SDUPTHER

## 2022-04-07 NOTE — PROGRESS NOTES
Chief Complaint  Hypertension    Subjective          History of Present Illness  Patient presents for follow-up on hypertension. She discontinued amlodipine several months ago due to pedal edema. In October her BP was mildly elevated (145/81). No changes were made at that time. At her 1/19/22 visit  her BP was still elevated (152/84) and Metoprolol 25 mg tablet daily was added.     In March she had a telehealth vist. She reported that her home BP had been running 130's/70-80's. She had not been taking HCTZ because it was too hard for her to get up and down to the bathroom frequently (she had recently undergone an ORIF for an ankle fracture).     Today she reports that she has not restarted HCTZ. She has been getting elevated BP readings at home. She denies chest pain, headache, palpitations.         Current Outpatient Medications:   •  albuterol sulfate HFA (Ventolin HFA) 108 (90 Base) MCG/ACT inhaler, Inhale 2 puffs Every 4 (Four) Hours As Needed for Shortness of Air. Two inhalations every 4-6 hours as needed for SOA. (Patient taking differently: Inhale 2 puffs Every 4 (Four) Hours As Needed for Shortness of Air. Two inhalations every 4-6 hours as needed for SOA.  Bring dos), Disp: 18 g, Rfl: 0  •  famciclovir (FAMVIR) 500 MG tablet, TAKE 3 TABLETS BY MOUTH AS A SINGLE DOSE AT FIRST SIGN OF COLD SORE, Disp: 3 tablet, Rfl: 5  •  fluticasone (FLONASE) 50 MCG/ACT nasal spray, INSTILL 2 SPRAYS IN EACH NOSTRIL DAILY, Disp: 48 g, Rfl: 1  •  loratadine (CLARITIN) 10 MG tablet, TAKE 1 TABLET BY MOUTH EVERY DAY, Disp: 90 tablet, Rfl: 2  •  losartan (COZAAR) 100 MG tablet, TAKE 1 TABLET BY MOUTH DAILY, Disp: 90 tablet, Rfl: 0  •  lysine 500 MG tablet, Take 1 tablet by mouth Daily As Needed. Stop now for surgery, Disp: , Rfl:   •  Magnesium 250 MG tablet, Take  by mouth. Stop now for surgery, Disp: , Rfl:   •  metoprolol succinate XL (Toprol XL) 25 MG 24 hr tablet, Take 1 tablet by mouth Daily., Disp: 90 tablet, Rfl: 0  •   "Misc Natural Products (LUTEIN 20 PO), Take  by mouth. Stop now for surgery, Disp: , Rfl:   •  Multiple Vitamin (MULTI-VITAMIN DAILY) tablet, Take 1 tablet by mouth Daily. Stop now for surgery, Disp: , Rfl:   •  NON FORMULARY, Sustain eye drops, Disp: , Rfl:   •  omeprazole (priLOSEC) 20 MG capsule, TAKE ONE CAPSULE BY MOUTH EVERY MORNING ON AN EMPTY STOMACH, 30 MINUTES BEFORE EATING OR OTHER MEDICATION (Patient taking differently: Take 20 mg by mouth Daily As Needed.), Disp: 90 capsule, Rfl: 1  •  hydroCHLOROthiazide (HYDRODIURIL) 25 MG tablet, TAKE 1 TABLET BY MOUTH DAILY (Patient taking differently: Take 25 mg by mouth Daily. Do not take dos), Disp: 90 tablet, Rfl: 1     Review of Systems   Constitutional: Positive for fatigue. Negative for activity change, appetite change, diaphoresis, unexpected weight gain and unexpected weight loss.   Respiratory: Negative for cough, shortness of breath and wheezing.    Cardiovascular: Negative for chest pain and palpitations.   Gastrointestinal: Negative for abdominal pain, constipation, diarrhea, nausea and vomiting.   Neurological: Negative for dizziness, syncope and headache.        Objective   Vital Signs:   Vitals:    04/07/22 1256 04/07/22 1310   BP: (!) 183/82 (!) 182/82   BP Location: Right arm    Patient Position: Sitting    Cuff Size: Adult    Pulse: 78    Resp: 16    Temp: 98.2 °F (36.8 °C)    TempSrc: Infrared    SpO2: 97%    Weight: 79.4 kg (175 lb)    Height: 160 cm (63\")      Body mass index is 31 kg/m².    Physical Exam  Constitutional:       Appearance: She is well-developed.   Neck:      Thyroid: No thyromegaly.      Vascular: No carotid bruit.      Trachea: Trachea normal.   Cardiovascular:      Rate and Rhythm: Normal rate and regular rhythm.      Heart sounds: Normal heart sounds. No murmur heard.    No friction rub. No gallop.   Pulmonary:      Effort: Pulmonary effort is normal.      Breath sounds: Normal breath sounds. No wheezing or rales. "   Musculoskeletal:         General: Normal range of motion.      Cervical back: Normal range of motion and neck supple.      Comments: Walking boot on right leg   Lymphadenopathy:      Cervical: No cervical adenopathy.   Skin:     General: Skin is warm and dry.   Neurological:      Mental Status: She is alert and oriented to person, place, and time.   Psychiatric:         Behavior: Behavior normal.         Thought Content: Thought content normal.         Judgment: Judgment normal.          Result Review :       Data reviewed: Radiologic studies 3/17/22 [facility] Venous US and Consultant notes 3/17/22 Podiatry Note            Assessment and Plan    Diagnoses and all orders for this visit:    1. Primary hypertension (Primary)  Assessment & Plan:  Restart HCTZ. Continue metoprolol and losartan.   Follow-up in 2 weeks.       Other orders  -     Discontinue: metoprolol succinate XL (Toprol XL) 25 MG 24 hr tablet; Take 1 tablet by mouth Daily.  Dispense: 90 tablet; Refill: 0  -     metoprolol succinate XL (Toprol XL) 25 MG 24 hr tablet; Take 1 tablet by mouth Daily.  Dispense: 90 tablet; Refill: 0          Follow Up   Return in about 2 weeks (around 4/21/2022) for Follow-Up hypertension.    Patient was given instructions and counseling regarding her condition and health maintenance advice. Please see specific information in the After Visit Summary.     Munira So, FELIPE 4/7/2022 13:15 EDT  This note was electronically signed.

## 2022-04-09 DIAGNOSIS — I10 ESSENTIAL HYPERTENSION: Chronic | ICD-10-CM

## 2022-04-09 RX ORDER — HYDROCHLOROTHIAZIDE 25 MG/1
25 TABLET ORAL DAILY
Qty: 90 TABLET | Refills: 0 | Status: SHIPPED | OUTPATIENT
Start: 2022-04-09 | End: 2022-04-21 | Stop reason: SDUPTHER

## 2022-04-12 ENCOUNTER — TREATMENT (OUTPATIENT)
Dept: PHYSICAL THERAPY | Facility: CLINIC | Age: 77
End: 2022-04-12

## 2022-04-12 DIAGNOSIS — M25.571 ACUTE RIGHT ANKLE PAIN: ICD-10-CM

## 2022-04-12 DIAGNOSIS — R26.2 DIFFICULTY WALKING: ICD-10-CM

## 2022-04-12 DIAGNOSIS — S82.844D NONDISPLACED BIMALLEOLAR FRACTURE, RIGHT, CLOSED, WITH ROUTINE HEALING, SUBSEQUENT ENCOUNTER: Primary | ICD-10-CM

## 2022-04-12 DIAGNOSIS — R29.898 ANKLE WEAKNESS: ICD-10-CM

## 2022-04-12 PROCEDURE — 97140 MANUAL THERAPY 1/> REGIONS: CPT | Performed by: PHYSICAL THERAPIST

## 2022-04-12 PROCEDURE — 97161 PT EVAL LOW COMPLEX 20 MIN: CPT | Performed by: PHYSICAL THERAPIST

## 2022-04-12 PROCEDURE — 97110 THERAPEUTIC EXERCISES: CPT | Performed by: PHYSICAL THERAPIST

## 2022-04-12 NOTE — PROGRESS NOTES
Physical Therapy Initial Evaluation and Plan of Care    Patient: Yancy Mcdonnell   : 1945  Diagnosis/ICD-10 Code:  Nondisplaced bimalleolar fracture, right, closed, with routine healing, subsequent encounter [S82.844D]  Referring practitioner: DEAN Tobias DPM  Date of Initial Visit: 2022  Today's Date: 2022  Patient seen for 1 sessions           Subjective Questionnaire: PT Functional Test: FAAM = , indicating 31% ability and 69% impairment      Subjective Evaluation    History of Present Illness  Date of onset: 2/10/2022  Date of surgery: 2022  Mechanism of injury: Pt sustained R R ankle bimalleolar fx when she slipped on ice. Pt underwent ORIF on 2022. Pt states she has not been doing exercises as much as she should and ankle is feeling very tight. Pt reports hx of R knee fx and R knee is hurting more. Having L hip pain since she has started walking with boot on. Reports that air bladders in cam boot do not work anymore. Pt using front wheeled walker at all times. Pt stays active and has animals she cares for. States she uses w/c at home when she does not have her boot on and uses walker at other times. Difficulty walking on uneven surfaces (barn yard is gravel). Difficulty stepping in/out of her shower. Has occasional sharp shooting pain. Has burning on lateral aspect of R ankle and distal lower leg. Pt does all housework, grocery shopping, and tasks on the farm. Pt has hired someone to do cleaning since injury. Taking ibuprofen. States she recently got a pedicure and that is felt good to get her feet scrubbed. Not able to get into her big truck. Has not returned to driving yet.  Next f/u is this Thurs 2022.  PLOF: I with all tasks, very active, drives.      Patient Occupation: retired RN   Precautions and Work Restrictions: WBAT R LE in cam bootQuality of life: excellent    Pain  Current pain ratin  At best pain ratin  At worst pain ratin  Location: R  ankle  Quality: throbbing and sharp  Relieving factors: rest  Aggravating factors: movement, standing, stairs, prolonged positioning, ambulation, squatting and repetitive movement  Progression: improved    Social Support  Lives in: one-story house  Lives with: spouse    Treatments  Previous treatment: immobilization  Patient Goals  Patient goals for therapy: decreased edema, decreased pain, improved balance, increased motion, increased strength and independence with ADLs/IADLs  Patient goal: be able to do household and farm tasks.           Objective          Observations     Right Ankle/Foot   Positive for edema and incision.     Additional Ankle/Foot Observation Details  Gait: cam boot in place on R foot, pt ambulating with front wheeled walker, step through pattern, flexed posture.    Few scabbed areas present along incision at lateral ankle. Possible internal stitch appears to be coming out of more proximal incision with thread-like material noted.    Reviewed importance of not soaking foot/ankle until incision is completely healed to decrease risk of infection. Pt verbalized understanding.    Palpation   Left   Tenderness of the piriformis.     Additional Palpation Details  Generalized tenderness R ankle.    Active Range of Motion   Left Ankle/Foot   Dorsiflexion (ke): 0 degrees   Plantar flexion: 44 degrees   Inversion: 22 degrees   Eversion: 8 degrees     Right Ankle/Foot   Plantar flexion: 30 degrees   Inversion: 4 degrees   Eversion: 0 degrees     Additional Active Range of Motion Details  R DF = -10 deg    Passive Range of Motion     Additional Passive Range of Motion Details  R DF = -4 deg    Strength/Myotome Testing     Left Ankle/Foot   Normal strength    Right Ankle/Foot   Dorsiflexion: 3-  Plantar flexion: 3-  Inversion: 3-  Eversion: 3-          Assessment & Plan     Assessment  Impairments: abnormal gait, abnormal or restricted ROM, activity intolerance, impaired balance, impaired physical strength,  lacks appropriate home exercise program, pain with function, safety issue and weight-bearing intolerance  Functional Limitations: sleeping, walking, uncomfortable because of pain, sitting, standing and stooping  Assessment details: Pt is 75 yo female ~2 month s/p ORIF for R ankle bimalleolar fx. Pt presents with decreased ROM and strength of R ankle. Pt is having difficulty ambulating and currently in cam boot and using walker. 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 Difficulty with steps. Difficulty stepping in/out of the shower. Unable to get into her truck. Unable to drive. Difficulty performing household tasks. FAAM indicates 31% ability. 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    Patient presents with the impairments listed above and based on the objective findings and the physical therapy evaluation, the patient’s condition has the potential to improve in response to therapy.   The patient’s condition and/or services required are at a level of complexity that necessitates the skill & supervision of a physical therapist.    Prognosis: good    Goals  Plan Goals: STG to be met in 3 wk:  - Increase R ankle DF AROM to 5 deg.  - Pt to tolerate NuStep x10 min out of boot.  - Pt to be independent with HEP in 3 weeks.  LTG to be met in 12 wk:  - Improve FAAM score to 70% or better ability.  - Increase R ankle strength to 4+/5 in all directions for improved stability required to walk on gravel in her barnyard.  - Pt to ambulate independently in the community without assistive device.  - Pt to rate max pain at 2-3/10 with activity.    Plan  Therapy options: will be seen for skilled therapy services  Planned modality interventions: thermotherapy (hydrocollator packs), cryotherapy and electrical stimulation/Russian stimulation  Other planned modality interventions: modalities as indicated  Planned therapy interventions:  balance/weight-bearing training, manual therapy, flexibility, functional ROM exercises, gait training, home exercise program, joint mobilization, neuromuscular re-education, soft tissue mobilization, strengthening, stretching, therapeutic activities and transfer training  Frequency: 2x week  Duration in weeks: 12  Treatment plan discussed with: patient        Timed:         Manual Therapy:    15     mins  69444;     Therapeutic Exercise:    10     mins  61127;     Neuromuscular Berenice:        mins  95536;    Therapeutic Activity:          mins  53925;     Gait Training:           mins  87438;     Ultrasound:          mins  28530;    Ionto                                   mins   67182  Self - Care                          mins  98554    Un-Timed:  Electrical Stimulation:         mins  40132 ( );  Dry Needling          20561/40184  Traction          mins 76502  Can Repos          mins 93446  Low Eval     20     Mins  40342  Mod Eval          Mins  31187  High Eval                            Mins  27233      Timed Treatment:   25   mins   Total Treatment:     45   mins      PT SIGNATURE: Griselda Weeks PT, CLT  [place] Lic # [id number]  Electronically signed by Griselda Weeks PT, 04/12/22, 1:54 PM EDT    Medicare Initial Certification  Certification Period: 4/12/2022 thru 7/10/2022  I certify that the therapy services are furnished while this patient is under my care.  The services outlined above are required by this patient, and will be reviewed every 90 days.     PHYSICIAN: DEAN Tobias DPM _____________________________________________________  NPI: [id number]                                      DATE:    Please sign and return via fax to [phone]. Thank you, [facility].

## 2022-04-14 ENCOUNTER — OFFICE VISIT (OUTPATIENT)
Dept: PODIATRY | Facility: CLINIC | Age: 77
End: 2022-04-14

## 2022-04-14 VITALS
SYSTOLIC BLOOD PRESSURE: 174 MMHG | HEART RATE: 80 BPM | DIASTOLIC BLOOD PRESSURE: 87 MMHG | BODY MASS INDEX: 31.01 KG/M2 | WEIGHT: 175 LBS | HEIGHT: 63 IN

## 2022-04-14 DIAGNOSIS — S82.844D CLOSED NONDISPLACED BIMALLEOLAR FRACTURE OF RIGHT ANKLE WITH ROUTINE HEALING, SUBSEQUENT ENCOUNTER: Primary | ICD-10-CM

## 2022-04-14 PROCEDURE — 99024 POSTOP FOLLOW-UP VISIT: CPT | Performed by: PODIATRIST

## 2022-04-14 NOTE — PROGRESS NOTES
04/14/2022  Foot and Ankle Surgery - Established Patient/Follow-up  Provider: Dr. Carlos Tobias, DAVID  Location: [facility]    Subjective:  Yancy Mcdonnell is a 76 y.o. female.     Chief Complaint   Patient presents with   • Right Ankle - Pain   • Follow-up     Last pcp appt with ESA So FELIPE 4/7/2022       HPI: Patient returns approximately 8 weeks after surgery.  She is doing better.  She continues to have swelling and limitation involving the ankle.  She has been wearing the cam boot.  No other issues or concerns.    Allergies   Allergen Reactions   • Demerol [Meperidine] Unknown (See Comments)     Abstracted from [employer]   • Other Unknown (See Comments)     Sulfa (sulfadiazine)    • Sulfa Antibiotics Unknown - High Severity   • Lisinopril Cough       Current Outpatient Medications on File Prior to Visit   Medication Sig Dispense Refill   • albuterol sulfate HFA (Ventolin HFA) 108 (90 Base) MCG/ACT inhaler Inhale 2 puffs Every 4 (Four) Hours As Needed for Shortness of Air. Two inhalations every 4-6 hours as needed for SOA. (Patient taking differently: Inhale 2 puffs Every 4 (Four) Hours As Needed for Shortness of Air. Two inhalations every 4-6 hours as needed for SOA.   Bring dos) 18 g 0   • famciclovir (FAMVIR) 500 MG tablet TAKE 3 TABLETS BY MOUTH AS A SINGLE DOSE AT FIRST SIGN OF COLD SORE 3 tablet 5   • fluticasone (FLONASE) 50 MCG/ACT nasal spray INSTILL 2 SPRAYS IN EACH NOSTRIL DAILY 48 g 1   • hydroCHLOROthiazide (HYDRODIURIL) 25 MG tablet Take 1 tablet by mouth Daily. 90 tablet 0   • loratadine (CLARITIN) 10 MG tablet TAKE 1 TABLET BY MOUTH EVERY DAY 90 tablet 2   • losartan (COZAAR) 100 MG tablet TAKE 1 TABLET BY MOUTH DAILY 90 tablet 0   • lysine 500 MG tablet Take 1 tablet by mouth Daily As Needed. Stop now for surgery     • Magnesium 250 MG tablet Take  by mouth. Stop now for surgery     • metoprolol succinate XL (Toprol XL) 25 MG 24 hr tablet Take 1 tablet by mouth Daily. 90 tablet 0  "  • Misc Natural Products (LUTEIN 20 PO) Take  by mouth. Stop now for surgery     • Multiple Vitamin (MULTI-VITAMIN DAILY) tablet Take 1 tablet by mouth Daily. Stop now for surgery     • NON FORMULARY Sustain eye drops     • omeprazole (priLOSEC) 20 MG capsule TAKE ONE CAPSULE BY MOUTH EVERY MORNING ON AN EMPTY STOMACH, 30 MINUTES BEFORE EATING OR OTHER MEDICATION (Patient taking differently: Take 20 mg by mouth Daily As Needed.) 90 capsule 1     No current facility-administered medications on file prior to visit.       Objective   /87   Pulse 80   Ht 160 cm (63\")   Wt 79.4 kg (175 lb)   BMI 31.00 kg/m²     General:   Appearance: appears stated age and healthy    Orientation: AAOx3    Affect: appropriate    Assistance: wheelchair       VASCULAR       Right Foot Vascularity   Normal vascular exam    Dorsalis pedis:  2+  Posterior tibial:  2+  Skin Temperature: warm    Edema Gradin+  CFT:  < 3 seconds  Pedal Hair Growth:  Present  Varicosities: spider veins        NEUROLOGIC      Right Foot Neurologic   Light touch sensation:  Normal  Hot/Cold sensation: normal    Achilles reflex:  2+      MUSCULOSKELETAL       Right Foot Musculoskeletal   Ecchymosis:  Ankle joint  Tenderness: medial malleolus, lateral malleolus and anterior tibiotalar joint line    Arch:  Normal      DERMATOLOGIC      Right Foot Dermatologic   Skin: Incision sites are well-healed.    Range of motion to the ankle is improving.  Swelling is stable.    Assessment/Plan   Diagnoses and all orders for this visit:    1. Closed nondisplaced bimalleolar fracture of right ankle with routine healing, subsequent encounter (Primary)  -     XR Ankle 3+ View Right      Patient is doing better at this time.  I have asked that she gradually progress stretching, range of motion, and strengthening exercises.  She is to gradually return to her regular shoe and normal activity as tolerated.  I would like to see her in 6 weeks for reevaluation and imaging.  " I have asked that she call with any additional issues or concerns.    Orders Placed This Encounter   Procedures   • XR Ankle 3+ View Right     Order Specific Question:   Reason for Exam:     Answer:   POST OP Ankle Open Reduction Internal Fixation - Right room 15 wb     Order Specific Question:   Does this patient have a diabetic monitoring/medication delivering device on?     Answer:   No     Order Specific Question:   Release to patient     Answer:   Immediate          Note is dictated utilizing voice recognition software. Unfortunately this leads to occasional typographical errors. I apologize in advance if the situation occurs. If questions occur please do not hesitate to call our office.

## 2022-04-20 ENCOUNTER — TREATMENT (OUTPATIENT)
Dept: PHYSICAL THERAPY | Facility: CLINIC | Age: 77
End: 2022-04-20

## 2022-04-20 DIAGNOSIS — S82.844D NONDISPLACED BIMALLEOLAR FRACTURE, RIGHT, CLOSED, WITH ROUTINE HEALING, SUBSEQUENT ENCOUNTER: Primary | ICD-10-CM

## 2022-04-20 DIAGNOSIS — R29.898 ANKLE WEAKNESS: ICD-10-CM

## 2022-04-20 DIAGNOSIS — M25.571 ACUTE RIGHT ANKLE PAIN: ICD-10-CM

## 2022-04-20 DIAGNOSIS — R26.2 DIFFICULTY WALKING: ICD-10-CM

## 2022-04-20 PROCEDURE — 97112 NEUROMUSCULAR REEDUCATION: CPT | Performed by: PHYSICAL THERAPIST

## 2022-04-20 PROCEDURE — 97110 THERAPEUTIC EXERCISES: CPT | Performed by: PHYSICAL THERAPIST

## 2022-04-20 PROCEDURE — 97140 MANUAL THERAPY 1/> REGIONS: CPT | Performed by: PHYSICAL THERAPIST

## 2022-04-20 NOTE — PROGRESS NOTES
Physical Therapy Daily Progress Note      Patient: Yancy Mcdonnell   : 1945  Diagnosis/ICD-10 Code:  Nondisplaced bimalleolar fracture, right, closed, with routine healing, subsequent encounter [S82.844D]  Referring practitioner: DEAN Tobias DPM.  Follow up 22  Date of Initial Visit: 2022  Today's Date: 2022  Patient seen for 2 sessions.  POC 2x/wk x 12 weeks, exp 7/15/22  Insurance , exp 7/15/22    R ankle fracture with ORIF 22        VISIT#: 2      Subjective :  No pain currently.  She saw Dr. Tobias who wants her to wean from boot.  She is currently wearing boot and does not have a shoe for her R foot. Pt. Has not been up and down a step yet and wants to learn how to do it.       Objective     See Exercise, Manual, and Modality Logs for complete treatment. Progression as noted. NuStep no initiated as pt. Had no shoe for R foot.       Patient Education:  Pt. Instructed to use ice and elevation after HEP.       Assessment/Plan:  Pt. R LE swollen, stiff.  No moist heat was put on calf today due to history of DVT's.  Pt. Tolerated progression well. Pt. Is anxious with standing activities.       Progress per Plan of Care:  Initiate NuStep and steps.             Timed:         Manual Therapy:  10       mins  71208;     Therapeutic Exercise:    20     mins  16463;     Neuromuscular Berenice:   10     mins  67622;    Therapeutic Activity:     5     mins  42658;     Gait Training:           mins  79356;     Ultrasound:          mins  97661;    Ionto                                   mins   01983  Self Care                            mins   08741  Aquatic                               mins 56045    Un-Timed:  Electrical Stimulation:         mins  32670 ( );  Traction          mins 46535      Timed Treatment:  45    mins   Total Treatment:    45    mins    La Womack PTA  Physical Therapist Assistant   [place] license:  #[id number]

## 2022-04-21 ENCOUNTER — OFFICE VISIT (OUTPATIENT)
Dept: FAMILY MEDICINE CLINIC | Facility: CLINIC | Age: 77
End: 2022-04-21

## 2022-04-21 VITALS
TEMPERATURE: 97.8 F | SYSTOLIC BLOOD PRESSURE: 142 MMHG | HEIGHT: 63 IN | HEART RATE: 70 BPM | RESPIRATION RATE: 16 BRPM | OXYGEN SATURATION: 98 % | DIASTOLIC BLOOD PRESSURE: 76 MMHG | BODY MASS INDEX: 30.3 KG/M2 | WEIGHT: 171 LBS

## 2022-04-21 DIAGNOSIS — I10 PRIMARY HYPERTENSION: Primary | Chronic | ICD-10-CM

## 2022-04-21 PROCEDURE — 99213 OFFICE O/P EST LOW 20 MIN: CPT | Performed by: NURSE PRACTITIONER

## 2022-04-21 RX ORDER — METOPROLOL SUCCINATE 25 MG/1
25 TABLET, EXTENDED RELEASE ORAL DAILY
Qty: 90 TABLET | Refills: 0 | Status: SHIPPED | OUTPATIENT
Start: 2022-04-21 | End: 2022-07-28 | Stop reason: SDUPTHER

## 2022-04-21 RX ORDER — FLUTICASONE PROPIONATE 50 MCG
2 SPRAY, SUSPENSION (ML) NASAL DAILY
Qty: 48 G | Refills: 1 | Status: SHIPPED | OUTPATIENT
Start: 2022-04-21 | End: 2022-10-24

## 2022-04-21 RX ORDER — LOSARTAN POTASSIUM 100 MG/1
100 TABLET ORAL DAILY
Qty: 90 TABLET | Refills: 0 | Status: SHIPPED | OUTPATIENT
Start: 2022-04-21 | End: 2022-07-28 | Stop reason: SDUPTHER

## 2022-04-21 RX ORDER — LORATADINE 10 MG/1
10 TABLET ORAL DAILY
Qty: 90 TABLET | Refills: 1 | Status: SHIPPED | OUTPATIENT
Start: 2022-04-21 | End: 2022-11-28 | Stop reason: SDUPTHER

## 2022-04-21 RX ORDER — HYDROCHLOROTHIAZIDE 25 MG/1
25 TABLET ORAL DAILY
Qty: 90 TABLET | Refills: 0 | Status: SHIPPED | OUTPATIENT
Start: 2022-04-21 | End: 2022-07-28 | Stop reason: SDUPTHER

## 2022-04-21 NOTE — PROGRESS NOTES
Chief Complaint  Hypertension    Subjective          History of Present Illness  Patient presents for follow-up on hypertension. She discontinued amlodipine several months ago due to pedal edema. In October her BP was mildly elevated (145/81). No changes were made at that time. At her 1/19/22 visit  her BP was still elevated (152/84) and Metoprolol 25 mg tablet daily was added.     In March she had a telehealth vist. She reported that her home BP had been running 130's/70-80's. She had not been taking HCTZ because it was too hard for her to get up and down to the bathroom frequently (she had recently undergone an ORIF for an ankle fracture).     On 4/7/22  she reported that she still had not restarted HCTZ. She had been getting elevated BP readings at home. BP was elevated in my office. She was asked to restart her HCTZ. She only restarted it about 2 weeks ago.         Current Outpatient Medications:   •  albuterol sulfate HFA (Ventolin HFA) 108 (90 Base) MCG/ACT inhaler, Inhale 2 puffs Every 4 (Four) Hours As Needed for Shortness of Air. Two inhalations every 4-6 hours as needed for SOA. (Patient taking differently: Inhale 2 puffs Every 4 (Four) Hours As Needed for Shortness of Air. Two inhalations every 4-6 hours as needed for SOA.  Bring dos), Disp: 18 g, Rfl: 0  •  famciclovir (FAMVIR) 500 MG tablet, TAKE 3 TABLETS BY MOUTH AS A SINGLE DOSE AT FIRST SIGN OF COLD SORE, Disp: 3 tablet, Rfl: 5  •  fluticasone (FLONASE) 50 MCG/ACT nasal spray, 2 sprays by Each Nare route Daily., Disp: 48 g, Rfl: 1  •  hydroCHLOROthiazide (HYDRODIURIL) 25 MG tablet, Take 1 tablet by mouth Daily., Disp: 90 tablet, Rfl: 0  •  loratadine (CLARITIN) 10 MG tablet, Take 1 tablet by mouth Daily., Disp: 90 tablet, Rfl: 1  •  losartan (COZAAR) 100 MG tablet, Take 1 tablet by mouth Daily., Disp: 90 tablet, Rfl: 0  •  lysine 500 MG tablet, Take 1 tablet by mouth Daily As Needed. Stop now for surgery, Disp: , Rfl:   •  Magnesium 250 MG tablet,  "Take  by mouth. Stop now for surgery, Disp: , Rfl:   •  metoprolol succinate XL (Toprol XL) 25 MG 24 hr tablet, Take 1 tablet by mouth Daily., Disp: 90 tablet, Rfl: 0  •  Misc Natural Products (LUTEIN 20 PO), Take  by mouth. Stop now for surgery, Disp: , Rfl:   •  Multiple Vitamin (MULTI-VITAMIN DAILY) tablet, Take 1 tablet by mouth Daily. Stop now for surgery, Disp: , Rfl:   •  NON FORMULARY, Sustain eye drops, Disp: , Rfl:   •  omeprazole (priLOSEC) 20 MG capsule, TAKE ONE CAPSULE BY MOUTH EVERY MORNING ON AN EMPTY STOMACH, 30 MINUTES BEFORE EATING OR OTHER MEDICATION (Patient taking differently: Take 20 mg by mouth Daily As Needed.), Disp: 90 capsule, Rfl: 1     Review of Systems   Constitutional: Positive for fatigue. Negative for activity change, appetite change, diaphoresis, unexpected weight gain and unexpected weight loss.   Respiratory: Negative for cough, shortness of breath and wheezing.    Cardiovascular: Negative for chest pain and palpitations.   Gastrointestinal: Negative for abdominal pain, constipation, diarrhea, nausea and vomiting.   Neurological: Negative for dizziness, syncope and headache.        Objective   Vital Signs:   Vitals:    04/21/22 0748 04/21/22 0809   BP: 157/74 142/76   BP Location: Left arm    Patient Position: Sitting    Cuff Size: Adult    Pulse: 70    Resp: 16    Temp: 97.8 °F (36.6 °C)    TempSrc: Infrared    SpO2: 98%    Weight: 77.6 kg (171 lb)    Height: 160 cm (63\")      Body mass index is 30.29 kg/m².      Physical Exam  Constitutional:       Appearance: She is well-developed.   Neck:      Thyroid: No thyromegaly.      Vascular: No carotid bruit.      Trachea: Trachea normal.   Cardiovascular:      Rate and Rhythm: Normal rate and regular rhythm.      Heart sounds: Normal heart sounds. No murmur heard.    No friction rub. No gallop.   Pulmonary:      Effort: Pulmonary effort is normal.      Breath sounds: Normal breath sounds. No wheezing or rales.   Musculoskeletal:    "      General: Normal range of motion.      Cervical back: Normal range of motion and neck supple.      Comments: Walking boot on right leg. Walking with walker.   Lymphadenopathy:      Cervical: No cervical adenopathy.   Skin:     General: Skin is warm and dry.   Neurological:      Mental Status: She is alert and oriented to person, place, and time.   Psychiatric:         Behavior: Behavior normal.         Thought Content: Thought content normal.         Judgment: Judgment normal.          Result Review :   The following data was reviewed by: FELIPE Vázquez on 04/21/2022:  CMP    CMP 10/26/21 2/16/22   Glucose 83 101 (A)   BUN 16 12   Creatinine 0.70 0.66   eGFR Non  Am 84 87   eGFR African Am 97    Sodium 143 138   Potassium 4.1 3.8   Chloride 105 103   Calcium 9.4 9.4   Total Protein 6.3    Albumin 4.1    Globulin 2.2    Total Bilirubin 0.7    Alkaline Phosphatase 129 (A)    AST (SGOT) 18    ALT (SGPT) 16    (A) Abnormal value       Comments are available for some flowsheets but are not being displayed.           CBC    CBC 10/26/21 2/16/22   WBC 6.2 7.29   RBC 4.09 4.26   Hemoglobin 12.3 12.6   Hematocrit 37.2 37.7   MCV 91 88.5   MCH 30.1 29.6   MCHC 33.1 33.4   RDW 11.8 11.8 (A)   Platelets 384 379   (A) Abnormal value                       Assessment and Plan    Diagnoses and all orders for this visit:    1. Primary hypertension (Primary)  Assessment & Plan:  Better, but still mildly elevated.   Since she just restarted the HCTZ 2 weeks ago will hold on any further changes.   Follow-up in 2 months.  If still elevated consider increasing Metoprolol dose.     Orders:  -     losartan (COZAAR) 100 MG tablet; Take 1 tablet by mouth Daily.  Dispense: 90 tablet; Refill: 0  -     metoprolol succinate XL (Toprol XL) 25 MG 24 hr tablet; Take 1 tablet by mouth Daily.  Dispense: 90 tablet; Refill: 0  -     hydroCHLOROthiazide (HYDRODIURIL) 25 MG tablet; Take 1 tablet by mouth Daily.  Dispense: 90 tablet;  Refill: 0    Other orders  -     loratadine (CLARITIN) 10 MG tablet; Take 1 tablet by mouth Daily.  Dispense: 90 tablet; Refill: 1  -     fluticasone (FLONASE) 50 MCG/ACT nasal spray; 2 sprays by Each Nare route Daily.  Dispense: 48 g; Refill: 1          Follow Up   No follow-ups on file.    Patient was given instructions and counseling regarding her condition and health maintenance advice. Please see specific information in the After Visit Summary.     Munira So, FELIPE 4/21/2022 08:18 EDT  This note was electronically signed.

## 2022-04-21 NOTE — ASSESSMENT & PLAN NOTE
Better, but still mildly elevated.   Since she just restarted the HCTZ 2 weeks ago will hold on any further changes.   Follow-up in 2 months.  If still elevated consider increasing Metoprolol dose.

## 2022-04-27 ENCOUNTER — TREATMENT (OUTPATIENT)
Dept: PHYSICAL THERAPY | Facility: CLINIC | Age: 77
End: 2022-04-27

## 2022-04-27 DIAGNOSIS — S82.844D NONDISPLACED BIMALLEOLAR FRACTURE, RIGHT, CLOSED, WITH ROUTINE HEALING, SUBSEQUENT ENCOUNTER: Primary | ICD-10-CM

## 2022-04-27 PROCEDURE — 97530 THERAPEUTIC ACTIVITIES: CPT | Performed by: PHYSICAL THERAPIST

## 2022-04-27 PROCEDURE — 97140 MANUAL THERAPY 1/> REGIONS: CPT | Performed by: PHYSICAL THERAPIST

## 2022-04-27 PROCEDURE — 97110 THERAPEUTIC EXERCISES: CPT | Performed by: PHYSICAL THERAPIST

## 2022-04-27 NOTE — PROGRESS NOTES
Physical Therapy Daily Progress Note      Patient: aYncy Mcdonnell   : 1945  Diagnosis/ICD-10 Code:  Nondisplaced bimalleolar fracture, right, closed, with routine healing, subsequent encounter [S82.844D]  Referring practitioner: DEAN Tobias DPM.  Follow up 22  Date of Initial Visit: 2022  Today's Date: 2022  Patient seen for 3 sessions.  POC 2x/wk x 12 weeks, exp 7/15/22  Insurance , exp 7/15/22    R ankle fracture with ORIF 22        VISIT#: 3      Subjective :  Pt. Tried to work in garden without boot and notes that her ankle does not hurt her but she has pain in her right lateral knee and right calf is swollen.  She is wearing shoes today.  She want to learn to go up and down steps.       Objective     See Exercise, Manual, and Modality Logs for complete treatment. Progression as noted.     OBSERVATION:  R mid calf swollen.  Pt. Has had testing for DVT which was negative.     Patient Education:  Elevate R LE periodically throughout the day. Procedure for going up and down steps.       Assessment/Plan:  Pt. Up and down 2 steps with 1 rail with supervision.  Swelling in R LE worse today which is most likely due to pt. Working in garden but will need to monitor.  Pt. Able to progress without issue.       Progress per Plan of Care:  Monitor swelling in R calf.             Timed:         Manual Therapy:  10       mins  93310;     Therapeutic Exercise:    20     mins  35632;     Neuromuscular Berenice:        mins  27880;    Therapeutic Activity:     15     mins  51411;     Gait Training:           mins  65688;     Ultrasound:          mins  54734;    Ionto                                   mins   56287  Self Care                            mins   30154  Aquatic                               mins 86737    Un-Timed:  Electrical Stimulation:         mins  21586 ( );  Traction          mins 53721      Timed Treatment:  45    mins   Total Treatment:    45    mins    La Womack  SHUN  Physical Therapist Assistant   [place] license:  #[id number]

## 2022-05-02 ENCOUNTER — TREATMENT (OUTPATIENT)
Dept: PHYSICAL THERAPY | Facility: CLINIC | Age: 77
End: 2022-05-02

## 2022-05-02 DIAGNOSIS — M25.571 ACUTE RIGHT ANKLE PAIN: ICD-10-CM

## 2022-05-02 DIAGNOSIS — S82.844D NONDISPLACED BIMALLEOLAR FRACTURE, RIGHT, CLOSED, WITH ROUTINE HEALING, SUBSEQUENT ENCOUNTER: Primary | ICD-10-CM

## 2022-05-02 PROCEDURE — 97530 THERAPEUTIC ACTIVITIES: CPT | Performed by: PHYSICAL THERAPIST

## 2022-05-02 PROCEDURE — 97110 THERAPEUTIC EXERCISES: CPT | Performed by: PHYSICAL THERAPIST

## 2022-05-02 PROCEDURE — 97140 MANUAL THERAPY 1/> REGIONS: CPT | Performed by: PHYSICAL THERAPIST

## 2022-05-02 NOTE — PROGRESS NOTES
Physical Therapy Daily Progress Note      Patient: Yancy Mcdonnell   : 1945  Diagnosis/ICD-10 Code:  Nondisplaced bimalleolar fracture, right, closed, with routine healing, subsequent encounter [S82.844D]  Referring practitioner: DEAN Tobias DPM.  Follow up 22  Date of Initial Visit: 2022  Today's Date: 2022  Patient seen for 4 sessions.  POC 2x/wk x 12 weeks, exp 7/15/22  Insurance 3/24, exp 7/15/22    R ankle fracture with ORIF 22        VISIT#: 4      Subjective :   Pt. Notes she is having pain behind her R knee, /10, which is worse than her R ankle pain /10.  She did not do HEP over weekend as in too much pain.     Objective     See Exercise, Manual, and Modality Logs for complete treatment.  Progression as noted.     Pt. With small pea sized divot in dorsal surface of R foot.  She reports that it has been there since she can remember.     Patient Education: Elevated R LE in supine position with it up on pillows.  Monitor skin integrity.     Assessment/Plan:  Pt. With poor skin integrity R LE below knee. Lateral incision has scabbed over area.  Swelling persists. Tenderness posterior R knee, calf.     Plan Goals: STG to be met in 3 wk:  - Increase R ankle DF AROM to 5 deg.  - Pt to tolerate NuStep x10 min out of boot.  - Pt to be independent with HEP in 3 weeks.-MET  LTG to be met in 12 wk:  - Improve FAAM score to 70% or better ability.  - Increase R ankle strength to 4+/5 in all directions for improved stability required [facility]  - Pt to ambulate independently in the community without assistive device.  - Pt to rate max pain at 2-3/10 with activity.    Progress per Plan of Care:              Timed:         Manual Therapy:  15       mins  95127;     Therapeutic Exercise:    20     mins  83823;     Neuromuscular Berenice:        mins  65240;    Therapeutic Activity:     10     mins  55908;     Gait Training:           mins  97992;     Ultrasound:           mins  45157;    Ionto                                   mins   41124  Self Care                            mins   14194  Aquatic                               mins 54808    Un-Timed:  Electrical Stimulation:         mins  04556 ( );  Traction          mins 28990      Timed Treatment:  45    mins   Total Treatment:    45    mins    La Womack PTA  Physical Therapist Assistant   [place] license:  #[id number]

## 2022-05-09 ENCOUNTER — TREATMENT (OUTPATIENT)
Dept: PHYSICAL THERAPY | Facility: CLINIC | Age: 77
End: 2022-05-09

## 2022-05-09 DIAGNOSIS — R26.2 DIFFICULTY WALKING: ICD-10-CM

## 2022-05-09 DIAGNOSIS — S82.844D NONDISPLACED BIMALLEOLAR FRACTURE, RIGHT, CLOSED, WITH ROUTINE HEALING, SUBSEQUENT ENCOUNTER: Primary | ICD-10-CM

## 2022-05-09 DIAGNOSIS — M25.571 ACUTE RIGHT ANKLE PAIN: ICD-10-CM

## 2022-05-09 PROCEDURE — 97530 THERAPEUTIC ACTIVITIES: CPT | Performed by: PHYSICAL THERAPIST

## 2022-05-09 PROCEDURE — 97140 MANUAL THERAPY 1/> REGIONS: CPT | Performed by: PHYSICAL THERAPIST

## 2022-05-09 PROCEDURE — 97110 THERAPEUTIC EXERCISES: CPT | Performed by: PHYSICAL THERAPIST

## 2022-05-09 NOTE — PROGRESS NOTES
Physical Therapy Daily Progress Note      Patient: Yancy Mcdonnell   : 1945  Diagnosis/ICD-10 Code:  Nondisplaced bimalleolar fracture, right, closed, with routine healing, subsequent encounter [S82.844D]  Referring practitioner: DEAN Tobias DPM.  Follow up 22  Date of Initial Visit: 2022  Today's Date: 2022  Patient seen for 5 sessions.  POC 2x/wk x 12 weeks, exp 7/15/22  Insurance , exp 7/15/22    R ankle fracture with ORIF 22        VISIT#: 5      Subjective :  No pain currently.  When she was getting out of the car this morning her right knee buckled and she had pain, it takes a while before she can put weight on it again. She bought a Hurri cane and brought it with her.       Objective     See Exercise, Manual, and Modality Logs for complete treatment.  Gait with hurri cane in department.  Cane is too tall for patient and unable to adjust any shorter.        Patient Education:      Assessment/Plan: Gait with hurri cane unsafe due to patient moving cane from hand to hand and it is too tall.  Pt. Going to try to exchange it.  Swelling R mid calf minimally decreased.       Progress per Plan of Care:              Timed:         Manual Therapy:  15       mins  00629;     Therapeutic Exercise:    20     mins  39860;     Neuromuscular Berenice:        mins  84236;    Therapeutic Activity:     10     mins  10467;     Gait Training:           mins  47753;     Ultrasound:          mins  15904;    Ionto                                   mins   54463  Self Care                            mins   73733  Aquatic                               mins 76479    Un-Timed:  Electrical Stimulation:         mins  78882 ( );  Traction          mins 54698      Timed Treatment:  45    mins   Total Treatment:    45    mins    La Womack PTA  Physical Therapist Assistant   [place] license:  #[id number]

## 2022-05-16 ENCOUNTER — TREATMENT (OUTPATIENT)
Dept: PHYSICAL THERAPY | Facility: CLINIC | Age: 77
End: 2022-05-16

## 2022-05-16 DIAGNOSIS — R29.898 ANKLE WEAKNESS: ICD-10-CM

## 2022-05-16 DIAGNOSIS — R26.2 DIFFICULTY WALKING: ICD-10-CM

## 2022-05-16 DIAGNOSIS — M25.571 ACUTE RIGHT ANKLE PAIN: ICD-10-CM

## 2022-05-16 DIAGNOSIS — S82.844D NONDISPLACED BIMALLEOLAR FRACTURE, RIGHT, CLOSED, WITH ROUTINE HEALING, SUBSEQUENT ENCOUNTER: Primary | ICD-10-CM

## 2022-05-16 PROCEDURE — 97530 THERAPEUTIC ACTIVITIES: CPT | Performed by: PHYSICAL THERAPIST

## 2022-05-16 PROCEDURE — 97140 MANUAL THERAPY 1/> REGIONS: CPT | Performed by: PHYSICAL THERAPIST

## 2022-05-16 PROCEDURE — 97110 THERAPEUTIC EXERCISES: CPT | Performed by: PHYSICAL THERAPIST

## 2022-05-16 NOTE — PROGRESS NOTES
Physical Therapy Daily Progress Note      Patient: Yancy Mcdonnell   : 1945  Diagnosis/ICD-10 Code:  Nondisplaced bimalleolar fracture, right, closed, with routine healing, subsequent encounter [S82.844D]   Problems Addressed this Visit    None     Visit Diagnoses     Nondisplaced bimalleolar fracture, right, closed, with routine healing, subsequent encounter    -  Primary    Acute right ankle pain        Difficulty walking        Ankle weakness          Diagnoses       Codes Comments    Nondisplaced bimalleolar fracture, right, closed, with routine healing, subsequent encounter    -  Primary ICD-10-CM: S82.844D  ICD-9-CM: V54.19     Acute right ankle pain     ICD-10-CM: M25.571  ICD-9-CM: 719.47, 338.19     Difficulty walking     ICD-10-CM: R26.2  ICD-9-CM: 719.7     Ankle weakness     ICD-10-CM: R29.898  ICD-9-CM: 719.67         Referring practitioner: DEAN Tobias DPM  Date of Initial Visit: Type: THERAPY  Noted: 2022  Today's Date: 2022    VISIT#: 6    Subjective : Pt reports having R knee pain today, front and back of knee. States her leg is staying swollen all the time and has had 2 dopplers done since fx. Was having some sharp pain just below medial and lateral malleoli yesterday. Got a new Hurry Cane.    Objective : R ankle DF AROM = 0 deg, PROM = 2 deg  R ankle strength 4/5 in all directions.  Pitting edema present throughout R lower leg and dorsum of R foot. No redness or warmth. No calf pain with wt bearing, neg Mitali's sign.  Applied kinesiotape for edema to R lower leg. Instructed pt to remove if she has any skin irritation, burning, or itching. Pt verbalized good understanding.  Gait training: hurry cane alternating between L and R hand, verbal cues to decrease R hip ER for improved alignment of R foot.   See Exercise, Manual, and Modality Logs for complete treatment.     Assessment/Plan : Pt making gains in R ankle DF but continues to have significant tightness in all directions.  R knee pain decreased post-tx. Gait improved with cues. Good tolerance to ther ex with modifications made as needed if knee pain present. Pt will benefit from continued PT services to progress R ankle ROM and strength and progress R knee and hip strength for improved ambulation ability, including uneven surfaces so she can more easily walk in her [place].    STG to be met in 3 wk:  - Increase R ankle DF AROM to 5 deg. - Progressing  - Pt to tolerate NuStep x10 min out of boot. - Progressing  - Pt to be independent with HEP in 3 weeks. - MET  LTG to be met in 12 wk:  - Improve FAAM score to 70% or better ability. - Not met  - Increase R ankle strength to 4+/5 in all directions for improved stability required to walk on gravel in her [facility]. - Progressing  - Pt to ambulate independently in the community without assistive device. - Not met  - Pt to rate max pain at 2-3/10 with activity. - Not met    Progress per Plan of Care and Progress strengthening /stabilization /functional activity         Timed:         Manual Therapy:    15     mins  66135;     Therapeutic Exercise:    20     mins  13670;     Neuromuscular Berenice:        mins  32952;    Therapeutic Activity:     10     mins  46661;     Gait Training:           mins  78658;     Ultrasound:          mins  86249;    Ionto                                   mins   25083  Self Care                            mins   28370    Un-Timed:  Electrical Stimulation:         mins  34868 ( );  Dry Needling          mins 07897/99107  Traction          mins 72898  Canalith Repos                   mins  34990  Low Eval          Mins  56393  Mod Eval          Mins  74476  High Eval                            Mins  03128  Re-Eval                               mins  43327    Timed Treatment:   45   mins   Total Treatment:     45   mins    Griselda Weeks, PT, CLT, Cert DN  Physical Therapist  [place] Lic # [id number]

## 2022-05-18 ENCOUNTER — TREATMENT (OUTPATIENT)
Dept: PHYSICAL THERAPY | Facility: CLINIC | Age: 77
End: 2022-05-18

## 2022-05-18 DIAGNOSIS — M25.571 ACUTE RIGHT ANKLE PAIN: ICD-10-CM

## 2022-05-18 DIAGNOSIS — R26.2 DIFFICULTY WALKING: ICD-10-CM

## 2022-05-18 DIAGNOSIS — S82.844D NONDISPLACED BIMALLEOLAR FRACTURE, RIGHT, CLOSED, WITH ROUTINE HEALING, SUBSEQUENT ENCOUNTER: Primary | ICD-10-CM

## 2022-05-18 DIAGNOSIS — R29.898 ANKLE WEAKNESS: ICD-10-CM

## 2022-05-18 PROCEDURE — 97140 MANUAL THERAPY 1/> REGIONS: CPT | Performed by: PHYSICAL THERAPIST

## 2022-05-18 PROCEDURE — 97530 THERAPEUTIC ACTIVITIES: CPT | Performed by: PHYSICAL THERAPIST

## 2022-05-18 NOTE — PROGRESS NOTES
Physical Therapy Daily Progress Note      Patient: Yancy Mcdonnell   : 1945  Diagnosis/ICD-10 Code:  Nondisplaced bimalleolar fracture, right, closed, with routine healing, subsequent encounter [S82.844D]  Referring practitioner: DEAN Tobias DPM.  Follow up 22  Date of Initial Visit: 2022  Today's Date: 2022  Patient seen for 7 sessions.  POC 2x/wk x 12 weeks, exp 7/15/22  Insurance , exp 7/15/22    R ankle fracture with ORIF 22        VISIT#: 7      Subjective :  No pain R foot/ankle.  Pain in R knee 3/10.  Patient R LE still noticeably swollen and pt. Reports that taping did not seem to help.     Objective Pt. Late for session.     See Exercise, Manual, and Modality Logs for complete treatment.  Gait with tez cane in department.         Patient Education:      Assessment/Plan: Treatment abbreviated due to patient being late.  She was able to progress without issue.  R LE remains swollen and ankle with limited mobility.       Progress per Plan of Care:              Timed:         Manual Therapy:  10       mins  74514;     Therapeutic Exercise:    15     mins  71716;     Neuromuscular Berenice:        mins  04962;    Therapeutic Activity:     10     mins  29850;     Gait Training:           mins  45573;     Ultrasound:          mins  70125;    Ionto                                   mins   83209  Self Care                            mins   19615  Aquatic                               mins 60125    Un-Timed:  Electrical Stimulation:         mins  08207 ( );  Traction          mins 16647      Timed Treatment:  35    mins   Total Treatment:    35    mins    La Womack PTA  Physical Therapist Assistant   [place] license:  #[id number]

## 2022-05-23 ENCOUNTER — TREATMENT (OUTPATIENT)
Dept: PHYSICAL THERAPY | Facility: CLINIC | Age: 77
End: 2022-05-23

## 2022-05-23 DIAGNOSIS — R26.2 DIFFICULTY WALKING: ICD-10-CM

## 2022-05-23 DIAGNOSIS — S82.844D NONDISPLACED BIMALLEOLAR FRACTURE, RIGHT, CLOSED, WITH ROUTINE HEALING, SUBSEQUENT ENCOUNTER: Primary | ICD-10-CM

## 2022-05-23 DIAGNOSIS — R29.898 ANKLE WEAKNESS: ICD-10-CM

## 2022-05-23 DIAGNOSIS — M25.571 ACUTE RIGHT ANKLE PAIN: ICD-10-CM

## 2022-05-23 PROCEDURE — 97110 THERAPEUTIC EXERCISES: CPT | Performed by: PHYSICAL THERAPIST

## 2022-05-23 PROCEDURE — 97140 MANUAL THERAPY 1/> REGIONS: CPT | Performed by: PHYSICAL THERAPIST

## 2022-05-23 PROCEDURE — 97530 THERAPEUTIC ACTIVITIES: CPT | Performed by: PHYSICAL THERAPIST

## 2022-05-23 NOTE — PROGRESS NOTES
Physical Therapy Daily Progress Note      Patient: Yancy Mcdonnell   : 1945  Diagnosis/ICD-10 Code:  Nondisplaced bimalleolar fracture, right, closed, with routine healing, subsequent encounter [S82.844D]  Referring practitioner: DEAN Tobias DPM.  Follow up 22  Date of Initial Visit: 2022  Today's Date: 2022  Patient seen for 8 sessions.  POC 2x/wk x 12 weeks, exp 7/15/22  Insurance , exp 7/15/22    R ankle fracture with ORIF 22        VISIT#: 8      Subjective : Pt. Has no pain R ankle. She consistently c/o pain and instability in right knee.  This past Saturday, her knee locked up on her and she could not walk.  Spent the day in the wheelchair.     Objective     See Exercise, Manual, and Modality Logs for complete treatment.  Progression as noted.        Patient Education:  Pursue evaluation of right knee.       Assessment/Plan: Pt. Ambulating more without assistive device.  She exhibits many gait deficits including:  R decreased heel strike, knee flexion, hip extension.  Forward posture with gait.       Progress per Plan of Care:              Timed:         Manual Therapy:  10       mins  32737;     Therapeutic Exercise:    20     mins  80923;     Neuromuscular Berenice:        mins  90804;    Therapeutic Activity:     15     mins  25621;     Gait Training:           mins  56416;     Ultrasound:          mins  74266;    Ionto                                   mins   46445  Self Care                            mins   60201  Aquatic                               mins 85962    Un-Timed:  Electrical Stimulation:         mins  48296 ( );  Traction          mins 94412      Timed Treatment:  45    mins   Total Treatment:    45    mins    La Womack PTA  Physical Therapist Assistant   [place] license:  #[id number]

## 2022-05-25 ENCOUNTER — TREATMENT (OUTPATIENT)
Dept: PHYSICAL THERAPY | Facility: CLINIC | Age: 77
End: 2022-05-25

## 2022-05-25 DIAGNOSIS — M25.571 ACUTE RIGHT ANKLE PAIN: ICD-10-CM

## 2022-05-25 DIAGNOSIS — S82.844D NONDISPLACED BIMALLEOLAR FRACTURE, RIGHT, CLOSED, WITH ROUTINE HEALING, SUBSEQUENT ENCOUNTER: Primary | ICD-10-CM

## 2022-05-25 DIAGNOSIS — R26.2 DIFFICULTY WALKING: ICD-10-CM

## 2022-05-25 PROCEDURE — 97140 MANUAL THERAPY 1/> REGIONS: CPT | Performed by: PHYSICAL THERAPIST

## 2022-05-25 PROCEDURE — 97110 THERAPEUTIC EXERCISES: CPT | Performed by: PHYSICAL THERAPIST

## 2022-05-25 PROCEDURE — 97530 THERAPEUTIC ACTIVITIES: CPT | Performed by: PHYSICAL THERAPIST

## 2022-05-25 NOTE — PROGRESS NOTES
Physical Therapy Daily Progress Note      Patient: Yancy Mcdonnell   : 1945  Diagnosis/ICD-10 Code:  Nondisplaced bimalleolar fracture, right, closed, with routine healing, subsequent encounter [S82.844D]  Referring practitioner: DEAN Tobias DPM.  Follow up 22  Date of Initial Visit: 2022  Today's Date: 2022  Patient seen for 9 sessions.  POC 2x/wk x 12 weeks, exp 7/15/22  Insurance , exp 7/15/22    R ankle fracture with ORIF 22        VISIT#: 9      Subjective : Pt. Reports that she has not had an issue with her R knee since last session.  She currently has no pain in right ankle or right knee.     Objective     See Exercise, Manual, and Modality Logs for complete treatment.  Progression as noted.        Patient Education:        Assessment/Plan: Pt. Showing more confidence with upright activities. No issue with progression.       Progress per Plan of Care:              Timed:         Manual Therapy:  10       mins  70487;     Therapeutic Exercise:    20     mins  99909;     Neuromuscular Berenice:        mins  05701;    Therapeutic Activity:     15     mins  49469;     Gait Training:           mins  70147;     Ultrasound:          mins  97661;    Ionto                                   mins   63638  Self Care                            mins   78430  Aquatic                               mins 94671    Un-Timed:  Electrical Stimulation:         mins  97718 ( );  Traction          mins 12154      Timed Treatment:  45    mins   Total Treatment:    45    mins    La Womack PTA  Physical Therapist Assistant   [place] license:  #[id number]

## 2022-05-26 ENCOUNTER — OFFICE VISIT (OUTPATIENT)
Dept: PODIATRY | Facility: CLINIC | Age: 77
End: 2022-05-26

## 2022-05-26 VITALS
WEIGHT: 171 LBS | DIASTOLIC BLOOD PRESSURE: 91 MMHG | BODY MASS INDEX: 30.3 KG/M2 | HEART RATE: 74 BPM | SYSTOLIC BLOOD PRESSURE: 174 MMHG | HEIGHT: 63 IN

## 2022-05-26 DIAGNOSIS — S82.844D CLOSED NONDISPLACED BIMALLEOLAR FRACTURE OF RIGHT ANKLE WITH ROUTINE HEALING, SUBSEQUENT ENCOUNTER: Primary | ICD-10-CM

## 2022-05-26 PROCEDURE — 99212 OFFICE O/P EST SF 10 MIN: CPT | Performed by: PODIATRIST

## 2022-05-26 NOTE — PROGRESS NOTES
05/26/2022  Foot and Ankle Surgery - Established Patient/Follow-up  Provider: Dr. Carlos Tobias DPM  Location: [facility]    Subjective:  Yancy Mcdonnell is a 76 y.o. female.     Chief Complaint   Patient presents with   • Right Ankle - Follow-up, Pain     Last pcp appt ESA So APRN 4/29/2022       HPI:    The patient is a 76-year-old female who presents for follow up for her right ankle. She is accompanied by an adult male. She states that she is doing fine with no problems. The patient reports that she continues to have swelling, although it has decreased. She is inquiring about any soft tissue range-of-motion limitations that she may have due to her ankle hardware. The patient states that her right knee is symptomatic. She adds that she has a history of fracturing this knee. She is considering seeing Dr. Espinosa as she does not currently see an orthopedic surgeon for this.     Allergies   Allergen Reactions   • Demerol [Meperidine] Unknown (See Comments)     Abstracted from centricity.   • Other Unknown (See Comments)     Sulfa (sulfadiazine)    • Sulfa Antibiotics Unknown - High Severity   • Lisinopril Cough       Current Outpatient Medications on File Prior to Visit   Medication Sig Dispense Refill   • albuterol sulfate HFA (Ventolin HFA) 108 (90 Base) MCG/ACT inhaler Inhale 2 puffs Every 4 (Four) Hours As Needed for Shortness of Air. Two inhalations every 4-6 hours as needed for SOA. (Patient taking differently: Inhale 2 puffs Every 4 (Four) Hours As Needed for Shortness of Air. Two inhalations every 4-6 hours as needed for SOA.   Bring dos) 18 g 0   • famciclovir (FAMVIR) 500 MG tablet TAKE 3 TABLETS BY MOUTH AS A SINGLE DOSE AT FIRST SIGN OF COLD SORE 3 tablet 5   • fluticasone (FLONASE) 50 MCG/ACT nasal spray 2 sprays by Each Nare route Daily. 48 g 1   • hydroCHLOROthiazide (HYDRODIURIL) 25 MG tablet Take 1 tablet by mouth Daily. 90 tablet 0   • loratadine (CLARITIN) 10 MG tablet Take 1 tablet  "by mouth Daily. 90 tablet 1   • losartan (COZAAR) 100 MG tablet Take 1 tablet by mouth Daily. 90 tablet 0   • lysine 500 MG tablet Take 1 tablet by mouth Daily As Needed. Stop now for surgery     • Magnesium 250 MG tablet Take  by mouth. Stop now for surgery     • metoprolol succinate XL (Toprol XL) 25 MG 24 hr tablet Take 1 tablet by mouth Daily. 90 tablet 0   • Misc Natural Products (LUTEIN 20 PO) Take  by mouth. Stop now for surgery     • Multiple Vitamin (MULTI-VITAMIN DAILY) tablet Take 1 tablet by mouth Daily. Stop now for surgery     • NON FORMULARY Sustain eye drops     • omeprazole (priLOSEC) 20 MG capsule TAKE ONE CAPSULE BY MOUTH EVERY MORNING ON AN EMPTY STOMACH, 30 MINUTES BEFORE EATING OR OTHER MEDICATION (Patient taking differently: Take 20 mg by mouth Daily As Needed.) 90 capsule 1     No current facility-administered medications on file prior to visit.       Objective   /91   Pulse 74   Ht 160 cm (63\")   Wt 77.6 kg (171 lb)   BMI 30.29 kg/m²     Foot/Ankle Exam:       General:   Appearance: appears stated age and healthy    Orientation: AAOx3    Affect: appropriate    Assistance: wheelchair      VASCULAR      Right Foot Vascularity   Normal vascular exam    Dorsalis pedis:  2+  Posterior tibial:  2+  Skin Temperature: warm    Edema Gradin+  CFT:  < 3 seconds  Pedal Hair Growth:  Present  Varicosities: none and spider veins        NEUROLOGIC     Right Foot Neurologic   Light touch sensation:  Normal  Hot/Cold sensation: normal    Achilles reflex:  2+     MUSCULOSKELETAL      Right Foot Musculoskeletal   Ecchymosis:  Ankle joint  Tenderness: medial malleolus, lateral malleolus and anterior tibiotalar joint line    Arch:  Normal     DERMATOLOGIC     Right Foot Dermatologic   Skin: skin intact        Right Foot Additional Comments Instability,  range of motion, and muscle strength testing deferred secondary to guarding.  No gross deformity.  No proximal fibular pain.  Neurovascular status " is grossly intact to the digits.      Assessment & Plan   Diagnoses and all orders for this visit:    1. Closed nondisplaced bimalleolar fracture of right ankle with routine healing, subsequent encounter (Primary)  -     XR Ankle 3+ View Right      1. Right ankle fracture    Upon reviewing x-rays of the right ankle, taken today, and upon physical examination, the patient's ankle fracture is well-healed at this time. Her ankle structure is normal; however, her knee function is limited, most likely due to arthritis. Therefore, she will have an uphill cobian with her knee. At this time, she has no restrictions in regards to her ankle, and she can return to normal activity in a regular shoe. The patient can return to driving as tolerated. I recommend the patient wear a compression stocking to help reduce swelling. She does not need to return for follow-up at this time. She can call the office with any questions or concerns in the future.     Orders Placed This Encounter   Procedures   • XR Ankle 3+ View Right     Order Specific Question:   Reason for Exam:     Answer:   POST OP Ankle Open Reduction Internal Fixation - Right 02/18/2022 room 9 WB     Order Specific Question:   Does this patient have a diabetic monitoring/medication delivering device on?     Answer:   No     Order Specific Question:   Release to patient     Answer:   Immediate          Note is dictated utilizing voice recognition software. Unfortunately this leads to occasional typographical errors. I apologize in advance if the situation occurs. If questions occur please do not hesitate to call our office.    Transcribed from ambient dictation for DEAN Tobias DPM by [employer] LIZAMA.  05/26/22   12:22 EDT    Patient verbalized consent to the visit recording.  I have personally performed the services described in this document as transcribed by the above individual, and it is both accurate and complete.  DEAN Tobias DPM  5/27/2022  17:08 EDT

## 2022-06-06 ENCOUNTER — TREATMENT (OUTPATIENT)
Dept: PHYSICAL THERAPY | Facility: CLINIC | Age: 77
End: 2022-06-06

## 2022-06-06 DIAGNOSIS — R29.898 ANKLE WEAKNESS: ICD-10-CM

## 2022-06-06 DIAGNOSIS — R26.2 DIFFICULTY WALKING: ICD-10-CM

## 2022-06-06 DIAGNOSIS — M25.571 ACUTE RIGHT ANKLE PAIN: ICD-10-CM

## 2022-06-06 DIAGNOSIS — S82.844D NONDISPLACED BIMALLEOLAR FRACTURE, RIGHT, CLOSED, WITH ROUTINE HEALING, SUBSEQUENT ENCOUNTER: Primary | ICD-10-CM

## 2022-06-06 PROCEDURE — 97110 THERAPEUTIC EXERCISES: CPT | Performed by: PHYSICAL THERAPIST

## 2022-06-06 PROCEDURE — 97140 MANUAL THERAPY 1/> REGIONS: CPT | Performed by: PHYSICAL THERAPIST

## 2022-06-06 PROCEDURE — 97530 THERAPEUTIC ACTIVITIES: CPT | Performed by: PHYSICAL THERAPIST

## 2022-06-06 NOTE — PROGRESS NOTES
Physical Therapy Daily Progress Note      Patient: Yancy Mcdonnell   : 1945  Diagnosis/ICD-10 Code:  Nondisplaced bimalleolar fracture, right, closed, with routine healing, subsequent encounter [S82.844D]  Referring practitioner: DEAN Tobias DPM.  Follow up 22  Date of Initial Visit: 2022  Today's Date: 2022  Patient seen for 10 sessions.  POC 2x/wk x 12 weeks, exp 7/15/22  Insurance , exp 7/15/22    R ankle fracture with ORIF 22     PT Functional Test: FAAM = 61/84, indicating 73% ability and 27% impairment      VISIT#: 10      Subjective :  No pain currently.  Her R knee still is unstable. Pt. Bought NuStep, it is in her garage, waiting to be cleaned. Pt. Has an appointment for R knee on 22)    Objective     See Exercise, Manual, and Modality Logs for complete treatment.  Progression as noted.        Patient Education:        Assessment/Plan: Pt. Has had a good improvement in FAAM score.  Goals met as noted.       STG to be met in 3 wk:  - Increase R ankle DF AROM to 5 deg. - Progressing  - Pt to tolerate NuStep x10 min out of boot. - MET  - Pt to be independent with HEP in 3 weeks. - MET  LTG to be met in 12 wk:  - Improve FAAM score to 70% or better ability. - MET  - Increase R ankle strength to 4+/5 in all directions for improved stability [facility] - Progressing  - Pt to ambulate independently in the community without assistive device. - Not met  - Pt to rate max pain at 2-3/10 with activity. - MET.      Progress per Plan of Care:              Timed:         Manual Therapy:  10       mins  99668;     Therapeutic Exercise:    20     mins  98281;     Neuromuscular Berenice:        mins  55121;    Therapeutic Activity:     15     mins  91737;     Gait Training:           mins  13070;     Ultrasound:          mins  59578;    Ionto                                   mins   82382  Self Care                            mins   75794  Aquatic                                mins 25339    Un-Timed:  Electrical Stimulation:         mins  02226 ( );  Traction          mins 11358      Timed Treatment:  45    mins   Total Treatment:    45    mins    La Womack PTA  Physical Therapist Assistant   [place] license:  #[id number]

## 2022-06-13 ENCOUNTER — TREATMENT (OUTPATIENT)
Dept: PHYSICAL THERAPY | Facility: CLINIC | Age: 77
End: 2022-06-13

## 2022-06-13 DIAGNOSIS — R26.2 DIFFICULTY WALKING: ICD-10-CM

## 2022-06-13 DIAGNOSIS — M25.571 ACUTE RIGHT ANKLE PAIN: ICD-10-CM

## 2022-06-13 DIAGNOSIS — S82.844D NONDISPLACED BIMALLEOLAR FRACTURE, RIGHT, CLOSED, WITH ROUTINE HEALING, SUBSEQUENT ENCOUNTER: Primary | ICD-10-CM

## 2022-06-13 PROCEDURE — 97140 MANUAL THERAPY 1/> REGIONS: CPT | Performed by: PHYSICAL THERAPIST

## 2022-06-13 PROCEDURE — 97110 THERAPEUTIC EXERCISES: CPT | Performed by: PHYSICAL THERAPIST

## 2022-06-13 PROCEDURE — 97530 THERAPEUTIC ACTIVITIES: CPT | Performed by: PHYSICAL THERAPIST

## 2022-06-13 NOTE — PROGRESS NOTES
Physical Therapy Daily Progress Note      Patient: Yancy Mcdonnell   : 1945  Diagnosis/ICD-10 Code:  Nondisplaced bimalleolar fracture, right, closed, with routine healing, subsequent encounter [S82.844D]  Referring practitioner: DEAN Tobias DPM.  Follow up 22  Date of Initial Visit: 2022  Today's Date: 2022  Patient seen for 11 sessions.  POC 2x/wk x 12 weeks, exp 7/15/22  Insurance 10/24, exp 7/15/22    R ankle fracture with ORIF 22         VISIT#: 11      Subjective :   Pt. Using compression socks but not every day due to difficulty with donning and doffing.  Her current pain is 4/10, right lateral ankle, burning sensation.  Pt. Has an appointment for R knee on 22)    Objective     See Exercise, Manual, and Modality Logs for complete treatment. Progression as noted.     Improved color with decreased swelling noted in R calf/ankle.      Patient Education:        Assessment/Plan : Pt. Able to progress without issue.  She was more confident with activities due to no pain R knee.          Progress per Plan of Care:  Monitor response to progression.             Timed:         Manual Therapy:  10       mins  19498;     Therapeutic Exercise:    20     mins  13309;     Neuromuscular Berenice:        mins  56163;    Therapeutic Activity:     15     mins  03039;     Gait Training:           mins  20436;     Ultrasound:          mins  61243;    Ionto                                   mins   73934  Self Care                            mins   48853  Aquatic                               mins 81589    Un-Timed:  Electrical Stimulation:         mins  12654 ( );  Traction          mins 13236      Timed Treatment:  45    mins   Total Treatment:    45    mins    La Womack PTA  Physical Therapist Assistant   [place] license:  #[id number]

## 2022-06-15 ENCOUNTER — TREATMENT (OUTPATIENT)
Dept: PHYSICAL THERAPY | Facility: CLINIC | Age: 77
End: 2022-06-15

## 2022-06-15 DIAGNOSIS — R26.2 DIFFICULTY WALKING: ICD-10-CM

## 2022-06-15 DIAGNOSIS — M25.571 ACUTE RIGHT ANKLE PAIN: ICD-10-CM

## 2022-06-15 DIAGNOSIS — S82.844D NONDISPLACED BIMALLEOLAR FRACTURE, RIGHT, CLOSED, WITH ROUTINE HEALING, SUBSEQUENT ENCOUNTER: Primary | ICD-10-CM

## 2022-06-15 PROCEDURE — 97140 MANUAL THERAPY 1/> REGIONS: CPT | Performed by: PHYSICAL THERAPIST

## 2022-06-15 PROCEDURE — 97530 THERAPEUTIC ACTIVITIES: CPT | Performed by: PHYSICAL THERAPIST

## 2022-06-15 PROCEDURE — 97110 THERAPEUTIC EXERCISES: CPT | Performed by: PHYSICAL THERAPIST

## 2022-06-15 NOTE — PROGRESS NOTES
Physical Therapy Daily Progress Note      Patient: Yancy Mcdonnell   : 1945  Diagnosis/ICD-10 Code:  Nondisplaced bimalleolar fracture, right, closed, with routine healing, subsequent encounter [S82.844D]  Referring practitioner: DEAN Tobias DPM.  Follow up 22  Date of Initial Visit: 6/15/2022  Today's Date: 6/15/2022  Patient seen for 12 sessions.  POC 2x/wk x 12 weeks, exp 7/15/22  Insurance , exp 7/15/22    R ankle fracture with ORIF 22         VISIT#: 12      Subjective :   Pt. Consistently using NuStep @ home. Her current pain is 4/10, right lateral and medial ankle, burning sensation.  (Pt. Has an appointment for R knee on 22)    Objective     See Exercise, Manual, and Modality Logs for complete treatment.          Patient Education:        Assessment/Plan : Pt. Exhibiting better motor control over ankle but ROM still remains limited, especially inversion and eversion.           Progress per Plan of Care:  Monitor response to progression.             Timed:         Manual Therapy:  10       mins  59764;     Therapeutic Exercise:    20     mins  21638;     Neuromuscular Berenice:        mins  60142;    Therapeutic Activity:     15     mins  04985;     Gait Training:           mins  69146;     Ultrasound:          mins  21037;    Ionto                                   mins   01065  Self Care                            mins   06175  Aquatic                               mins 58627    Un-Timed:  Electrical Stimulation:         mins  31337 ( );  Traction          mins 23754      Timed Treatment:  45    mins   Total Treatment:    45    mins    La Womack PTA  Physical Therapist Assistant   [place] license:  #[id number]

## 2022-06-22 ENCOUNTER — TREATMENT (OUTPATIENT)
Dept: PHYSICAL THERAPY | Facility: CLINIC | Age: 77
End: 2022-06-22

## 2022-06-22 DIAGNOSIS — S82.844D NONDISPLACED BIMALLEOLAR FRACTURE, RIGHT, CLOSED, WITH ROUTINE HEALING, SUBSEQUENT ENCOUNTER: Primary | ICD-10-CM

## 2022-06-22 DIAGNOSIS — M25.571 ACUTE RIGHT ANKLE PAIN: ICD-10-CM

## 2022-06-22 DIAGNOSIS — R29.898 ANKLE WEAKNESS: ICD-10-CM

## 2022-06-22 DIAGNOSIS — R26.2 DIFFICULTY WALKING: ICD-10-CM

## 2022-06-22 PROCEDURE — 97530 THERAPEUTIC ACTIVITIES: CPT | Performed by: PHYSICAL THERAPIST

## 2022-06-22 PROCEDURE — 97140 MANUAL THERAPY 1/> REGIONS: CPT | Performed by: PHYSICAL THERAPIST

## 2022-06-22 PROCEDURE — 97110 THERAPEUTIC EXERCISES: CPT | Performed by: PHYSICAL THERAPIST

## 2022-06-22 NOTE — PROGRESS NOTES
Physical Therapy Progress Note      Patient: Yancy Mcdonnell   : 1945  Diagnosis/ICD-10 Code:  Nondisplaced bimalleolar fracture, right, closed, with routine healing, subsequent encounter [S82.844D]   Problems Addressed this Visit    None     Visit Diagnoses     Nondisplaced bimalleolar fracture, right, closed, with routine healing, subsequent encounter    -  Primary    Acute right ankle pain        Difficulty walking        Ankle weakness          Diagnoses       Codes Comments    Nondisplaced bimalleolar fracture, right, closed, with routine healing, subsequent encounter    -  Primary ICD-10-CM: S82.844D  ICD-9-CM: V54.19     Acute right ankle pain     ICD-10-CM: M25.571  ICD-9-CM: 719.47, 338.19     Difficulty walking     ICD-10-CM: R26.2  ICD-9-CM: 719.7     Ankle weakness     ICD-10-CM: R29.898  ICD-9-CM: 719.67         Referring practitioner: DEAN Tobias DPM  Date of Initial Visit: Type: THERAPY  Noted: 2022  Today's Date: 2022    VISIT#: 13    Subjective : Pt states her ankle is still sore and weak. Still having a lot of pain in R knee and sees MD next week, 2022, regarding knee pain.    Objective : R ankle DF = 4+/5, PF = 4/5, Inv = 4+/5, Ev = 4-/5  DF AROM = 2 deg  R knee patellar mobility is severely hypomobile in all directions.  Pt using standard cane in R hand during ambulation. Decreased emmett.  See Exercise, Manual, and Modality Logs for complete treatment.     Assessment/Plan : Continued R ankle weakness and limited ROM. Pt with R knee pain that is limiting pt's ambulation ability and tolerance. Pt is making gradual but steady gains in R ankle strength. 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 Plan to further address R knee pain if referred by MD at appt next week.     STG to be met in 3 wk:  - Increase R ankle DF AROM to 5 deg.  - Pt to tolerate NuStep x10  min out of boot. - MET  - Pt to be independent with HEP in 3 weeks. - MET  LTG to be met in 12 wk:  - Improve FAAM score to 70% or better ability. 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 - progressing  - Pt to ambulate independently in the community without assistive device. - Not met  - Pt to rate max pain at 2-3/10 with activity. - Not met    Progress per Plan of Care and Progress strengthening /stabilization /functional activity         Timed:         Manual Therapy:    10     mins  70917;     Therapeutic Exercise:    20     mins  40093;     Neuromuscular Berenice:        mins  03595;    Therapeutic Activity:     15     mins  98077;     Gait Training:           mins  09890;     Ultrasound:          mins  46236;    Ionto                                   mins   68505  Self Care                            mins   64731    Un-Timed:  Electrical Stimulation:         mins  99971 ( );  Dry Needling          mins 84466/76710  Traction          mins 85473  Canalith Repos                   mins  53517  Low Eval          Mins  44213  Mod Eval          Mins  99689  High Eval                            Mins  76381  Re-Eval                               mins  47096    Timed Treatment:   45   mins   Total Treatment:     45   mins    Griselda Weeks, PT, CLT, Cert DN  Physical Therapist  [place] Lic # [id number]

## 2022-06-24 ENCOUNTER — TREATMENT (OUTPATIENT)
Dept: PHYSICAL THERAPY | Facility: CLINIC | Age: 77
End: 2022-06-24

## 2022-06-24 DIAGNOSIS — R29.898 ANKLE WEAKNESS: ICD-10-CM

## 2022-06-24 DIAGNOSIS — M25.571 ACUTE RIGHT ANKLE PAIN: ICD-10-CM

## 2022-06-24 DIAGNOSIS — R26.2 DIFFICULTY WALKING: ICD-10-CM

## 2022-06-24 DIAGNOSIS — S82.844D NONDISPLACED BIMALLEOLAR FRACTURE, RIGHT, CLOSED, WITH ROUTINE HEALING, SUBSEQUENT ENCOUNTER: Primary | ICD-10-CM

## 2022-06-24 PROCEDURE — 97140 MANUAL THERAPY 1/> REGIONS: CPT | Performed by: PHYSICAL THERAPIST

## 2022-06-24 PROCEDURE — 97530 THERAPEUTIC ACTIVITIES: CPT | Performed by: PHYSICAL THERAPIST

## 2022-06-24 PROCEDURE — 97110 THERAPEUTIC EXERCISES: CPT | Performed by: PHYSICAL THERAPIST

## 2022-06-27 ENCOUNTER — OFFICE VISIT (OUTPATIENT)
Dept: ORTHOPEDIC SURGERY | Facility: CLINIC | Age: 77
End: 2022-06-27

## 2022-06-27 ENCOUNTER — TREATMENT (OUTPATIENT)
Dept: PHYSICAL THERAPY | Facility: CLINIC | Age: 77
End: 2022-06-27

## 2022-06-27 VITALS
WEIGHT: 171 LBS | DIASTOLIC BLOOD PRESSURE: 88 MMHG | BODY MASS INDEX: 30.3 KG/M2 | HEIGHT: 63 IN | HEART RATE: 73 BPM | SYSTOLIC BLOOD PRESSURE: 166 MMHG

## 2022-06-27 DIAGNOSIS — R29.898 ANKLE WEAKNESS: ICD-10-CM

## 2022-06-27 DIAGNOSIS — M17.11 PRIMARY LOCALIZED OSTEOARTHRITIS OF RIGHT KNEE: ICD-10-CM

## 2022-06-27 DIAGNOSIS — M25.571 ACUTE RIGHT ANKLE PAIN: ICD-10-CM

## 2022-06-27 DIAGNOSIS — G89.29 CHRONIC PAIN OF RIGHT KNEE: Primary | ICD-10-CM

## 2022-06-27 DIAGNOSIS — S82.844D NONDISPLACED BIMALLEOLAR FRACTURE, RIGHT, CLOSED, WITH ROUTINE HEALING, SUBSEQUENT ENCOUNTER: Primary | ICD-10-CM

## 2022-06-27 DIAGNOSIS — R26.2 DIFFICULTY WALKING: ICD-10-CM

## 2022-06-27 DIAGNOSIS — M25.561 CHRONIC PAIN OF RIGHT KNEE: Primary | ICD-10-CM

## 2022-06-27 PROCEDURE — 97140 MANUAL THERAPY 1/> REGIONS: CPT | Performed by: PHYSICAL THERAPIST

## 2022-06-27 PROCEDURE — 20610 DRAIN/INJ JOINT/BURSA W/O US: CPT | Performed by: ORTHOPAEDIC SURGERY

## 2022-06-27 PROCEDURE — 97110 THERAPEUTIC EXERCISES: CPT | Performed by: PHYSICAL THERAPIST

## 2022-06-27 PROCEDURE — 97530 THERAPEUTIC ACTIVITIES: CPT | Performed by: PHYSICAL THERAPIST

## 2022-06-27 PROCEDURE — 99203 OFFICE O/P NEW LOW 30 MIN: CPT | Performed by: ORTHOPAEDIC SURGERY

## 2022-06-27 RX ORDER — TRIAMCINOLONE ACETONIDE 1 MG/G
CREAM TOPICAL
COMMUNITY
Start: 2022-06-07

## 2022-06-27 RX ORDER — TRIAMCINOLONE ACETONIDE 40 MG/ML
80 INJECTION, SUSPENSION INTRA-ARTICULAR; INTRAMUSCULAR ONCE
Status: COMPLETED | OUTPATIENT
Start: 2022-06-27 | End: 2022-06-27

## 2022-06-27 RX ADMIN — TRIAMCINOLONE ACETONIDE 80 MG: 40 INJECTION, SUSPENSION INTRA-ARTICULAR; INTRAMUSCULAR at 16:02

## 2022-06-27 NOTE — PROGRESS NOTES
Patient ID: Yancy Mcdonnell is a 77 y.o. female.    Chief Complaint:    Chief Complaint   Patient presents with   • Right Knee - Pain       HPI:  This is a 77-year-old female here with longstanding right knee pain.  Is recently worse that she recovers from right ankle surgery.  Her knee is swollen and stiff hurts to bend it despite conservative treatment  Past Medical History:   Diagnosis Date   • Allergic rhinitis 06/02/2015   • Ankle fracture, right 02/2022   • Body mass index 29.0-29.9, adult 09/12/2018   • Cervical spinal stenosis 08/07/2017   • Cholelithiasis    • Class 1 obesity due to excess calories with serious comorbidity and body mass index (BMI) of 30.0 to 30.9 in adult 10/14/2020   • Closed bimalleolar fracture of right ankle 2/11/2022   • DDD (degenerative disc disease), cervical    • DDD (degenerative disc disease), lumbar    • DDD (degenerative disc disease), thoracic    • Diverticulosis    • Dyslipidemia 12/05/2012   • Elevated brain natriuretic peptide (BNP) level 01/06/2017   • Eustachian tube disorder, right 02/18/2019   • Fall 02/2022   • Fatigue 06/02/2015   • Female cystocele 06/02/2015   • Hiatal hernia    • Hiatal hernia with gastroesophageal reflux 01/06/2017   • History of blood clots 1980    leg s/p fx   • History of DVT (deep vein thrombosis) 06/02/2015   • History of herpes simplex infection 7/25/2020   • Hyperlipidemia    • Hypertension 06/10/2016   • Left rib fracture 05/2017    11TH RIB    • Migraine headache 06/02/2015   • Mitral insufficiency 01/16/2017    MILD   • Osteoarthritis 05/20/2014   • Osteopenia 12/05/2012   • Over weight 06/14/2018   • Peripheral edema 01/05/2017   • PFO (patent foramen ovale) 01/16/2017   • Pulmonary nodule 05/04/2017    DR CHUN FOLLOWS   • RHA (rheumatoid arthritis) ([facility]) 12/05/2012   • Scleritis 06/02/2015   • Scoliosis    • Thrombophlebitis 2/19/2022    Acute right lower extremity superficial thrombophlebitis noted in the great saphenous (below  "knee) and varicosity (below knee).     • Tricuspid insufficiency     MILD   • Urinary urgency    • Varicose veins of other specified sites    • Venous insufficiency 09/12/2018       Past Surgical History:   Procedure Laterality Date   • ANKLE OPEN REDUCTION INTERNAL FIXATION Right 2/18/2022    Procedure: ANKLE OPEN REDUCTION INTERNAL FIXATION;  Surgeon: DEAN Tobias DPM;  Location: [facility] MAIN OR;  Service: Podiatry;  Laterality: Right;   • COLONOSCOPY  07/20/2017    EGD/ COLONSCOPY LARGE HIATAL HERNIA. MILD DIVERTICULOSIS. OTHERWISE NORMAL.   • INGUINAL HERNIA REPAIR Right    • ORIF ANKLE FRACTURE Right 02/18/2022    Dr. Tobias   • TOTAL ABDOMINAL HYSTERECTOMY  1985    W/BSO WITH A/P REPAIR 1985 BENIGN REASONS DR. DRAKE       Family History   Problem Relation Age of Onset   • Osteoporosis Mother    • Stroke Mother 80   • Dementia Mother    • Heart disease Father    • Hypertension Father    • Heart disease Sister    • Cancer Daughter         BREAST AND MELANOMA   • Breast cancer Daughter 52   • Cancer Son         MELANOMA   • Cancer Grandson         MELANOMA          Social History     Occupational History   • Not on file   Tobacco Use   • Smoking status: Never Smoker   • Smokeless tobacco: Never Used   Vaping Use   • Vaping Use: Never used   Substance and Sexual Activity   • Alcohol use: Yes     Alcohol/week: 1.0 standard drink     Types: 1 Cans of beer per week   • Drug use: No   • Sexual activity: Defer      Review of Systems   Cardiovascular: Negative for chest pain.   Musculoskeletal: Positive for arthralgias.       Objective:    /88   Pulse 73   Ht 160 cm (63\")   Wt 77.6 kg (171 lb)   BMI 30.29 kg/m²     Physical Examination:  Right knee demonstrates moderate effusion no redness or warmth range of motion 5 to 110 degrees with crepitance no significant stability with pain on medial lateral Renetta Mitali negative foot is warm and perfused with intact gross sensation    Imaging:  right Knee " X-Ray  Indication: Knee pain  AP, Lateral views, [place]  Findings: Moderate to severe tricompartmental degenerative joint disease worse in the lateral compartment  no bony lesion  Soft tissues normal  decreased joint spaces  Hardware appropriately positioned not applicable      no prior studies available for comparison    Assessment:  Right knee degenerative joint disease    Plan:  Treatment options discussed, I recomend injection after today's evaluation.  Risks and benefits were discussed. Under sterile technique and after timeout and verbal consent I injected 80 mg of Kenalog and 2 mL of 1% Lidocaine plain into the knee. It was well tolerated. Postinjection instructions were given.  If no better recommend viscosupplementation      Procedures         Disclaimer: Part of this note may be an electronic transcription/translation of spoken language to printed text using the Dragon Dictation System

## 2022-06-27 NOTE — PROGRESS NOTES
Physical Therapy Daily Progress Note      Patient: Yancy Mcdonnell   : 1945  Diagnosis/ICD-10 Code:  Nondisplaced bimalleolar fracture, right, closed, with routine healing, subsequent encounter [S82.844D]  Referring practitioner: DEAN Tobias DPM.  No follow up   Date of Initial Visit: 2022  Today's Date: 2022  Patient seen for 15 sessions.  POC 2x/wk x 12 weeks, exp 7/15/22  Insurance , exp 7/15/22    R ankle fracture with ORIF 22         VISIT#: 15      Subjective :  Pt. To see Dr. Loaiza this afternoon for R knee.  She has no pain R knee today, burning lateral right ankle, 4/10.  Notes intermittent burning at medial and lateral incisions.     Objective     See Exercise, Manual, and Modality Logs for complete treatment.          Patient Education:        Assessment/Plan :  Cueing required throughout treatment for upright posture. She is more confident with upright activity.           Progress per Plan of Care:              Timed:         Manual Therapy:  10       mins  16005;     Therapeutic Exercise:    20     mins  30044;     Neuromuscular Berenice:        mins  51582;    Therapeutic Activity:     15     mins  42082;     Gait Training:           mins  03915;     Ultrasound:          mins  11484;    Ionto                                   mins   59678  Self Care                            mins   41116  Aquatic                               mins 35403    Un-Timed:  Electrical Stimulation:         mins  08508 ( );  Traction          mins 65825      Timed Treatment:  45    mins   Total Treatment:    45    mins    La Womack PTA  Physical Therapist Assistant   [place] license:  #[id number]

## 2022-06-29 ENCOUNTER — TREATMENT (OUTPATIENT)
Dept: PHYSICAL THERAPY | Facility: CLINIC | Age: 77
End: 2022-06-29

## 2022-06-29 DIAGNOSIS — R26.2 DIFFICULTY WALKING: ICD-10-CM

## 2022-06-29 DIAGNOSIS — S82.844D NONDISPLACED BIMALLEOLAR FRACTURE, RIGHT, CLOSED, WITH ROUTINE HEALING, SUBSEQUENT ENCOUNTER: Primary | ICD-10-CM

## 2022-06-29 DIAGNOSIS — M25.571 ACUTE RIGHT ANKLE PAIN: ICD-10-CM

## 2022-06-29 PROCEDURE — 97530 THERAPEUTIC ACTIVITIES: CPT | Performed by: PHYSICAL THERAPIST

## 2022-06-29 PROCEDURE — 97110 THERAPEUTIC EXERCISES: CPT | Performed by: PHYSICAL THERAPIST

## 2022-06-29 PROCEDURE — 97140 MANUAL THERAPY 1/> REGIONS: CPT | Performed by: PHYSICAL THERAPIST

## 2022-06-29 NOTE — PROGRESS NOTES
Physical Therapy Daily Progress Note      Patient: Yancy Mcdonnell   : 1945  Diagnosis/ICD-10 Code:  Nondisplaced bimalleolar fracture, right, closed, with routine healing, subsequent encounter [S82.844D]  Referring practitioner: DEAN Tobias DPM.  No follow up   Date of Initial Visit: 2022  Today's Date: 2022  Patient seen for 16 sessions.  POC 2x/wk x 12 weeks, exp 7/15/22  Insurance 15/24, exp 7/15/22    R ankle fracture with ORIF 22         VISIT#: 16      Subjective :  Pt. Saw Dr. Loaiza who put injection into R knee.  She has mod to severe DJD.  Next step would be gel shots.  Pain       Objective     See Exercise, Manual, and Modality Logs for complete treatment. Progression as noted.          Patient Education:        Assessment/Plan :  Patient able to progress without issue.           Progress per Plan of Care:              Timed:         Manual Therapy:  10       mins  41761;     Therapeutic Exercise:    20     mins  54678;     Neuromuscular Berenice:        mins  32069;    Therapeutic Activity:     15     mins  08211;     Gait Training:           mins  37131;     Ultrasound:          mins  19314;    Ionto                                   mins   78811  Self Care                            mins   64682  Aquatic                               mins 68170    Un-Timed:  Electrical Stimulation:         mins  10199 ( );  Traction          mins 35894      Timed Treatment:  45    mins   Total Treatment:    45    mins    La Womack PTA  Physical Therapist Assistant   [place] license:  #[id number]

## 2022-06-30 NOTE — PROGRESS NOTES
Physical Therapy Daily Progress Note      Patient: Yancy Mcdonnell   : 1945  Diagnosis/ICD-10 Code:  Nondisplaced bimalleolar fracture, right, closed, with routine healing, subsequent encounter [S82.844D]   Problems Addressed this Visit    None     Visit Diagnoses     Nondisplaced bimalleolar fracture, right, closed, with routine healing, subsequent encounter    -  Primary    Acute right ankle pain        Difficulty walking        Ankle weakness          Diagnoses       Codes Comments    Nondisplaced bimalleolar fracture, right, closed, with routine healing, subsequent encounter    -  Primary ICD-10-CM: S82.844D  ICD-9-CM: V54.19     Acute right ankle pain     ICD-10-CM: M25.571  ICD-9-CM: 719.47, 338.19     Difficulty walking     ICD-10-CM: R26.2  ICD-9-CM: 719.7     Ankle weakness     ICD-10-CM: R29.898  ICD-9-CM: 719.67         Referring practitioner: DEAN Tobias DPM  Date of Initial Visit: Type: THERAPY  Noted: 2022  Today's Date: 2022    VISIT#: 14    Subjective : Pt reports ankle feeling about the same with knee pain being bothersome. States she did not realize she was walking bent over and hopes to correct this or keep it from getting worse.      Objective : focused on upright posture during standing ther ex.     See Exercise, Manual, and Modality Logs for complete treatment.     Assessment/Plan : Continued ankle pain and stiffness. Pt able to make appropriate postural corrections but not able to maintaing while walking. Will benefit from continued ankle strengthening and stability along with B LE and core strengthening to improve gait mechanics.    Progress per Plan of Care and Progress strengthening /stabilization /functional activity         Timed:         Manual Therapy:    10     mins  97830;     Therapeutic Exercise:    20     mins  77561;     Neuromuscular Berenice:        mins  84161;    Therapeutic Activity:     15     mins  90227;     Gait Training:           mins  76399;      Ultrasound:          mins  46100;    Ionto                                   mins   53905  Self Care                            mins   24987    Un-Timed:  Electrical Stimulation:         mins  26838 ( );  Dry Needling          mins 64684/45118  Traction          mins 27459  Canalith Repos                   mins  52144  Low Eval          Mins  81673  Mod Eval          Mins  63227  High Eval                            Mins  85900  Re-Eval                               mins  83376    Timed Treatment:   45   mins   Total Treatment:     45   mins    Griselda Weeks PT, CLT, Cert DN  Physical Therapist  [place] Lic # [id number]

## 2022-07-11 ENCOUNTER — TREATMENT (OUTPATIENT)
Dept: PHYSICAL THERAPY | Facility: CLINIC | Age: 77
End: 2022-07-11

## 2022-07-11 DIAGNOSIS — R26.2 DIFFICULTY WALKING: ICD-10-CM

## 2022-07-11 DIAGNOSIS — S82.844D NONDISPLACED BIMALLEOLAR FRACTURE, RIGHT, CLOSED, WITH ROUTINE HEALING, SUBSEQUENT ENCOUNTER: Primary | ICD-10-CM

## 2022-07-11 DIAGNOSIS — M25.571 ACUTE RIGHT ANKLE PAIN: ICD-10-CM

## 2022-07-11 PROCEDURE — 97110 THERAPEUTIC EXERCISES: CPT | Performed by: PHYSICAL THERAPIST

## 2022-07-11 PROCEDURE — 97530 THERAPEUTIC ACTIVITIES: CPT | Performed by: PHYSICAL THERAPIST

## 2022-07-11 PROCEDURE — 97140 MANUAL THERAPY 1/> REGIONS: CPT | Performed by: PHYSICAL THERAPIST

## 2022-07-11 NOTE — PROGRESS NOTES
Physical Therapy Daily Progress Note      Patient: Yancy Mcdonnell   : 1945  Diagnosis/ICD-10 Code:  Nondisplaced bimalleolar fracture, right, closed, with routine healing, subsequent encounter [S82.844D]  Referring practitioner: DEAN Tobias DPM.  No follow up   Date of Initial Visit: 2022  Today's Date: 2022  Patient seen for 17 sessions.  POC 2x/wk x 12 weeks, exp 10/5/22  Insurance , exp 7/15/22    R ankle fracture with ORIF 22         VISIT#: 17      Subjective :  Pt. Went camping over weekend and did relatively well.  Her R knee however gave out a couple of times, no falls.  She currently has no pain in R knee but pain 4/10 sharp, stabbing medial and lateral R ankle.       Objective     See Exercise, Manual, and Modality Logs for complete treatment.          Patient Education:        Assessment/Plan :  Improved ROM and strength R ankle, she should be able to progress to yellow theraband for ankle x 4.           Progress per Plan of Care:  Add yellow TB to ankle x 4.             Timed:         Manual Therapy:  10       mins  19035;     Therapeutic Exercise:    20     mins  93972;     Neuromuscular Berenice:        mins  61184;    Therapeutic Activity:     15     mins  34563;     Gait Training:           mins  50764;     Ultrasound:          mins  97112;    Ionto                                   mins   99675  Self Care                            mins   44782  Aquatic                               mins 81673    Un-Timed:  Electrical Stimulation:         mins  40694 ( );  Traction          mins 04697      Timed Treatment:  45    mins   Total Treatment:    45    mins    La Womack PTA  Physical Therapist Assistant   [place] license:  #[id number]

## 2022-07-13 ENCOUNTER — TREATMENT (OUTPATIENT)
Dept: PHYSICAL THERAPY | Facility: CLINIC | Age: 77
End: 2022-07-13

## 2022-07-13 DIAGNOSIS — M25.571 ACUTE RIGHT ANKLE PAIN: ICD-10-CM

## 2022-07-13 DIAGNOSIS — S82.844D NONDISPLACED BIMALLEOLAR FRACTURE, RIGHT, CLOSED, WITH ROUTINE HEALING, SUBSEQUENT ENCOUNTER: Primary | ICD-10-CM

## 2022-07-13 PROCEDURE — 97110 THERAPEUTIC EXERCISES: CPT | Performed by: PHYSICAL THERAPIST

## 2022-07-13 PROCEDURE — 97530 THERAPEUTIC ACTIVITIES: CPT | Performed by: PHYSICAL THERAPIST

## 2022-07-13 PROCEDURE — 97140 MANUAL THERAPY 1/> REGIONS: CPT | Performed by: PHYSICAL THERAPIST

## 2022-07-13 NOTE — PROGRESS NOTES
Physical Therapy Daily Progress Note-INSURANCE AUTHORIZATION      Patient: Yancy Mcdonnell   : 1945  Diagnosis/ICD-10 Code:  Nondisplaced bimalleolar fracture, right, closed, with routine healing, subsequent encounter [S82.844D]  Referring practitioner: DEAN Tobias DPM.  No follow up   Date of Initial Visit: 2022  Today's Date: 2022  Patient seen for 18 sessions.  POC 2x/wk x 12 weeks, exp 10/5/22  Insurance , exp 7/15/22    R ankle fracture with ORIF 22         VISIT#: 18       FAAM 70/84 = 83% ability and 17% impairment    Subjective :Pt. Notes 50% improvement overall.  Pain levels 1-4/10 right medial and lateral ankle.  Current pain 3/10, needle like, medial and lateral ankle.  She is now driving and using cane for ambulation.     Objective     See Exercise, Manual, and Modality Logs for complete treatment.       Objective : R ankle DF = 4+/5, PF = 4/5, Inv = 4+/5, Ev = 4-/5  DF AROM = 2 deg  R knee patellar mobility is severely hypomobile in all directions.  Pt using standard cane in R hand during ambulation. Decreased emmett.        Patient Education:        Assessment/Plan :  Pt presents with decreased ROM and strength of R ankle. Pt is having difficulty ambulating and currently in cam boot and using walker. 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 Difficulty with steps. Difficulty stepping in/out of the shower. Unable to get into her truck. Unable to drive. Difficulty performing household tasks. FAAM indicates 31% ability. 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 She has met goals as noted.     Goals  Plan Goals: STG to be met in 3 wk:  - Increase R ankle DF AROM to 5 deg.  - Pt to tolerate NuStep x10 min out of boot.-MET  - Pt to be independent with HEP in 3 weeks.-MET  LTG to be met in 12 wk:  - Improve FAAM score to 70% or better ability.-MET  - Increase R ankle strength to 4+/5 in  all directions for improved stability required to walk [place] gravel in [place].  - Pt to ambulate independently in the community without assistive device.  - Pt to rate max pain at 2-3/10 with activity.      Progress per Plan of Care:              Timed:         Manual Therapy:  10       mins  57531;     Therapeutic Exercise:    20     mins  50740;     Neuromuscular Berenice:        mins  70035;    Therapeutic Activity:     15     mins  26261;     Gait Training:           mins  61695;     Ultrasound:          mins  10021;    Ionto                                   mins   78869  Self Care                            mins   03144  Aquatic                               mins 25939    Un-Timed:  Electrical Stimulation:         mins  44374 ( );  Traction          mins 03919      Timed Treatment:  45    mins   Total Treatment:    45    mins    La Womack PTA  Physical Therapist Assistant   [place] license:  #[id number]

## 2022-07-14 ENCOUNTER — TELEPHONE (OUTPATIENT)
Dept: ORTHOPEDIC SURGERY | Facility: CLINIC | Age: 77
End: 2022-07-14

## 2022-07-14 DIAGNOSIS — G89.29 CHRONIC PAIN OF RIGHT KNEE: ICD-10-CM

## 2022-07-14 DIAGNOSIS — M25.561 CHRONIC PAIN OF RIGHT KNEE: ICD-10-CM

## 2022-07-14 DIAGNOSIS — M17.11 PRIMARY LOCALIZED OSTEOARTHRITIS OF RIGHT KNEE: Primary | ICD-10-CM

## 2022-07-14 NOTE — TELEPHONE ENCOUNTER
PATIENT CALLED IN [phone] BECAUSE SHE IS READY TO TRY THE GEL INJECTION HER AND DR GOMES DISCUSSED. INFORMED PATIENT WE WILL WORK ON GETTING IT APPROVED AND WILL CALL TO SCHEDULE APPOINTMENT IF APPROVED.

## 2022-07-18 ENCOUNTER — TREATMENT (OUTPATIENT)
Dept: PHYSICAL THERAPY | Facility: CLINIC | Age: 77
End: 2022-07-18

## 2022-07-18 DIAGNOSIS — R29.898 ANKLE WEAKNESS: ICD-10-CM

## 2022-07-18 DIAGNOSIS — R26.2 DIFFICULTY WALKING: ICD-10-CM

## 2022-07-18 DIAGNOSIS — S82.844D NONDISPLACED BIMALLEOLAR FRACTURE, RIGHT, CLOSED, WITH ROUTINE HEALING, SUBSEQUENT ENCOUNTER: Primary | ICD-10-CM

## 2022-07-18 DIAGNOSIS — M25.571 ACUTE RIGHT ANKLE PAIN: ICD-10-CM

## 2022-07-18 PROCEDURE — 97110 THERAPEUTIC EXERCISES: CPT | Performed by: PHYSICAL THERAPIST

## 2022-07-18 PROCEDURE — 97140 MANUAL THERAPY 1/> REGIONS: CPT | Performed by: PHYSICAL THERAPIST

## 2022-07-18 PROCEDURE — 97530 THERAPEUTIC ACTIVITIES: CPT | Performed by: PHYSICAL THERAPIST

## 2022-07-18 NOTE — PROGRESS NOTES
Physical Therapy Daily Progress Note      Patient: Yancy Mcdonnell   : 1945  Diagnosis/ICD-10 Code:  Nondisplaced bimalleolar fracture, right, closed, with routine healing, subsequent encounter [S82.844D]   Problems Addressed this Visit    None     Visit Diagnoses     Nondisplaced bimalleolar fracture, right, closed, with routine healing, subsequent encounter    -  Primary    Acute right ankle pain        Difficulty walking        Ankle weakness          Diagnoses       Codes Comments    Nondisplaced bimalleolar fracture, right, closed, with routine healing, subsequent encounter    -  Primary ICD-10-CM: S82.844D  ICD-9-CM: V54.19     Acute right ankle pain     ICD-10-CM: M25.571  ICD-9-CM: 719.47, 338.19     Difficulty walking     ICD-10-CM: R26.2  ICD-9-CM: 719.7     Ankle weakness     ICD-10-CM: R29.898  ICD-9-CM: 719.67         Referring practitioner: DEAN Tobias DPM  Date of Initial Visit: Type: THERAPY  Noted: 2022  Today's Date: 2022    VISIT#: 19    Subjective : States having burning sensation below Lateral malleolus that is constant. Otherwise, her ankle feels good. Rates current pain (burning) at 4/10. Pt reports she got steroid injection in L knee on 2022 and states it did not help at all. Waiting on call from MD regarding gel injection. Wants to be able to go hiking on trails but does not feel like her balance is good enough yet.    Objective :     See Exercise, Manual, and Modality Logs for complete treatment.     Assessment/Plan : continued gastroc tightness and limited DF ROM R ankle. Modified SLS continues to be challenging. Pt needs continued R ankle and knee strengthening to improve dynamic standing balance and ambulation on uneven surfaces.    STG to be met in 3 wk:  - Increase R ankle DF AROM to 5 deg.  - Pt to tolerate NuStep x10 min out of boot.-MET  - Pt to be independent with HEP in 3 weeks.-MET  LTG to be met in 12 wk:  - Improve FAAM score to 70% or better  ability.-MET  - Increase R ankle strength to 4+/5 in all directions for improved stability required to walk on gravel in her [place].  - Pt to ambulate independently in the community without assistive device.  - Pt to rate max pain at 2-3/10 with activity.    Progress per Plan of Care and Progress strengthening /stabilization /functional activity         Timed:         Manual Therapy:    10     mins  23448;     Therapeutic Exercise:    15     mins  81692;     Neuromuscular Berenice:        mins  64674;    Therapeutic Activity:     15     mins  82996;     Gait Training:           mins  90852;     Ultrasound:          mins  33882;    Ionto                                   mins   31607  Self Care                            mins   27098    Un-Timed:  Electrical Stimulation:         mins  82383 ( );  Dry Needling          mins 11995/02008  Traction          mins 52498  Canalith Repos                   mins  09654  Low Eval          Mins  31254  Mod Eval          Mins  06278  High Eval                            Mins  00366  Re-Eval                               mins  58384    Timed Treatment:   40   mins   Total Treatment:     40   mins    Griselda Weeks, PT, CLT, Cert DN  Physical Therapist  [place] Lic # [id number]

## 2022-07-21 ENCOUNTER — TREATMENT (OUTPATIENT)
Dept: PHYSICAL THERAPY | Facility: CLINIC | Age: 77
End: 2022-07-21

## 2022-07-21 DIAGNOSIS — S82.844D NONDISPLACED BIMALLEOLAR FRACTURE, RIGHT, CLOSED, WITH ROUTINE HEALING, SUBSEQUENT ENCOUNTER: Primary | ICD-10-CM

## 2022-07-21 DIAGNOSIS — R26.2 DIFFICULTY WALKING: ICD-10-CM

## 2022-07-21 DIAGNOSIS — M25.571 ACUTE RIGHT ANKLE PAIN: ICD-10-CM

## 2022-07-21 DIAGNOSIS — R29.898 ANKLE WEAKNESS: ICD-10-CM

## 2022-07-21 PROCEDURE — 97110 THERAPEUTIC EXERCISES: CPT | Performed by: PHYSICAL THERAPIST

## 2022-07-21 PROCEDURE — 97140 MANUAL THERAPY 1/> REGIONS: CPT | Performed by: PHYSICAL THERAPIST

## 2022-07-21 PROCEDURE — 97530 THERAPEUTIC ACTIVITIES: CPT | Performed by: PHYSICAL THERAPIST

## 2022-07-22 NOTE — PROGRESS NOTES
Physical Therapy Progress Note/Reassessment    Patient: Yancy Mcdonnell   : 1945  Diagnosis/ICD-10 Code:  Nondisplaced bimalleolar fracture, right, closed, with routine healing, subsequent encounter [S82.049U]  Referring practitioner: DEAN Tobias DPM  Date of Initial Evaluation:  Type: THERAPY  Noted: 2022  Patient seen for 20 sessions      Subjective:   Visit Diagnoses:    ICD-10-CM ICD-9-CM   1. Nondisplaced bimalleolar fracture, right, closed, with routine healing, subsequent encounter  S82.752O V54.19   2. Acute right ankle pain  M25.571 719.47     338.19   3. Difficulty walking  R26.2 719.7   4. Ankle weakness  R29.898 719.67         Yancy Mcdonnell reports her R ankle is feeing better, still stiff, by minimal pain. States R knee is limiting her more than ankle.    Subjective Questionnaire: FAAM = not completed this date in error  Clinical Progress: improved  Home Program Compliance: Yes  Treatment has included: therapeutic exercise, neuromuscular re-education, manual therapy, therapeutic activity, gait training and moist heat    Objective          Active Range of Motion     Right Ankle/Foot   Dorsiflexion (ke): 4 degrees     Strength/Myotome Testing     Right Ankle/Foot   Dorsiflexion: 4+  Plantar flexion: 4  Inversion: 4+  Eversion: 4-      Gait: using standard cane upon entering clinic. Continues to use cane in the community. Gait with cane = decreased emmett, decreased R toe off, mild R knee flexion throughout gait cycle. Flexed posture at hips and trunk. Pt able to correct this with verbal cues but not able to maintain.    Issued red Tband for ankle 4-way and copy of exercises for HEP. Will need to review at next visit.    See Exercise, Manual, and Modality Logs for complete treatment.     Assessment/Plan : Pt making good gains in R ankle strength. Continued DF tightness due to gastroc tightness. 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 Pt is making good gains toward goals. 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    STG to be met in 3 wk:  - Increase R ankle DF AROM to 5 deg. - Progressing, currently at 4 deg  - Pt to tolerate NuStep x10 min out of boot. - MET  - Pt to be independent with HEP in 3 weeks. - MET  LTG to be met in 12 wk:  - Improve FAAM score to 70% or better ability. - Progressing  - Increase R ankle strength to 4+/5 in all directions for improved stability required to walk on gravel in her [place]. - Progressing, eversion currently 4-/5  - Pt to ambulate independently in the community without assistive device. - Not met, requires cane  - Pt to rate max pain at 2-3/10 with activity. - MET in regards to ankle pain.       Recommendations: Continue as planned  Timeframe: 2 months  Prognosis to achieve goals: good      Timed:         Manual Therapy:    10     mins  78828;     Therapeutic Exercise:    15     mins  39238;     Neuromuscular Berenice:    5    mins  17760;    Therapeutic Activity:     15     mins  97848;     Gait Training:           mins  00222;     Ultrasound:          mins  01102;    Ionto                                   mins   11316  Self Care                            mins   93121  Canalith Repos         mins 03012      Un-Timed:  Electrical Stimulation:         mins  49241 ( );  Dry Needling          mins self-pay  Traction          mins 42162      Timed Treatment:   45   mins   Total Treatment:     45   mins    PT Signature: Griselda Weeks, PT, CLT  PT License: [place] Lic # [id number]

## 2022-07-25 ENCOUNTER — TREATMENT (OUTPATIENT)
Dept: PHYSICAL THERAPY | Facility: CLINIC | Age: 77
End: 2022-07-25

## 2022-07-25 DIAGNOSIS — S82.844D NONDISPLACED BIMALLEOLAR FRACTURE, RIGHT, CLOSED, WITH ROUTINE HEALING, SUBSEQUENT ENCOUNTER: Primary | ICD-10-CM

## 2022-07-25 DIAGNOSIS — R26.2 DIFFICULTY WALKING: ICD-10-CM

## 2022-07-25 DIAGNOSIS — M25.571 ACUTE RIGHT ANKLE PAIN: ICD-10-CM

## 2022-07-25 PROCEDURE — 97530 THERAPEUTIC ACTIVITIES: CPT | Performed by: PHYSICAL THERAPIST

## 2022-07-25 PROCEDURE — 97110 THERAPEUTIC EXERCISES: CPT | Performed by: PHYSICAL THERAPIST

## 2022-07-25 PROCEDURE — 97140 MANUAL THERAPY 1/> REGIONS: CPT | Performed by: PHYSICAL THERAPIST

## 2022-07-25 NOTE — PROGRESS NOTES
Physical Therapy Daily Progress Note-INSURANCE AUTHORIZATION      Patient: Yancy Mcdonnell   : 1945  Diagnosis/ICD-10 Code:  Nondisplaced bimalleolar fracture, right, closed, with routine healing, subsequent encounter [S82.844D]  Referring practitioner: DEAN Tobias DPM.  No follow up   Date of Initial Visit: 2022  Today's Date: 2022  Patient seen for 21 sessions.  POC 2x/wk x 12 weeks, exp 10/5/22  Insurance , exp 7/15/22    R ankle fracture with ORIF 22         VISIT#: 21       FAAM 71/84 = 85% ability and 15% impairment    Subjective :Pt. Notes 75% improvement overall.  No pain currently.  She would like for today to be her last day of PT.  She feels ready to proceed on her own with HEP.  Her R knee is really what is causing her issues now.     Objective     See Exercise, Manual, and Modality Logs for complete treatment.        Patient Education:  Reviewed HEP      Assessment/Plan : Pt. Wishes to discontinue therapy at this time.  She has met the following goals.     STG to be met in 3 wk:  - Increase R ankle DF AROM to 5 deg. - Progressing, currently at 4 deg  - Pt to tolerate NuStep x10 min out of boot. - MET  - Pt to be independent with HEP in 3 weeks. - MET  LTG to be met in 12 wk:  - Improve FAAM score to 70% or better ability. - Progressing  - Increase R ankle strength to 4+/5 in all directions for improved stability required to walk on gravel in her [facility]. - Progressing, eversion currently 4-/5  - Pt to ambulate independently in the community without assistive device. - Not met, requires cane  - Pt to rate max pain at 2-3/10 with activity. - MET in regards to ankle pain.       Other:  Discharge PT, d/c summary to follow.              Timed:         Manual Therapy:  10       mins  11436;     Therapeutic Exercise:    20     mins  74213;     Neuromuscular Berenice:        mins  96628;    Therapeutic Activity:     15     mins  64600;     Gait Training:           mins  51821;      Ultrasound:          mins  71830;    Ionto                                   mins   29787  Self Care                            mins   72700  Aquatic                               mins 70191    Un-Timed:  Electrical Stimulation:         mins  07729 ( );  Traction          mins 90003      Timed Treatment:  45    mins   Total Treatment:    45    mins    La Womack PTA  Physical Therapist Assistant   [place] license:  #[id number]

## 2022-07-28 ENCOUNTER — APPOINTMENT (OUTPATIENT)
Dept: GENERAL RADIOLOGY | Facility: HOSPITAL | Age: 77
End: 2022-07-28

## 2022-07-28 ENCOUNTER — APPOINTMENT (OUTPATIENT)
Dept: CT IMAGING | Facility: HOSPITAL | Age: 77
End: 2022-07-28

## 2022-07-28 ENCOUNTER — HOSPITAL ENCOUNTER (EMERGENCY)
Facility: HOSPITAL | Age: 77
Discharge: HOME | End: 2022-07-28
Attending: EMERGENCY MEDICINE | Admitting: EMERGENCY MEDICINE

## 2022-07-28 VITALS
OXYGEN SATURATION: 99 % | DIASTOLIC BLOOD PRESSURE: 68 MMHG | HEIGHT: 63 IN | HEART RATE: 75 BPM | SYSTOLIC BLOOD PRESSURE: 143 MMHG | WEIGHT: 165 LBS | TEMPERATURE: 98.6 F | BODY MASS INDEX: 29.23 KG/M2 | RESPIRATION RATE: 15 BRPM

## 2022-07-28 DIAGNOSIS — M54.2 NECK PAIN: ICD-10-CM

## 2022-07-28 DIAGNOSIS — S00.83XA CONTUSION OF FACE, INITIAL ENCOUNTER: Primary | ICD-10-CM

## 2022-07-28 DIAGNOSIS — M54.6 ACUTE BILATERAL THORACIC BACK PAIN: ICD-10-CM

## 2022-07-28 DIAGNOSIS — S00.93XA CONTUSION OF HEAD, UNSPECIFIED PART OF HEAD, INITIAL ENCOUNTER: ICD-10-CM
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
## 2022-07-28 PROCEDURE — 70486 CT MAXILLOFACIAL W/O DYE: CPT

## 2022-07-28 PROCEDURE — 99283 EMERGENCY DEPT VISIT LOW MDM: CPT

## 2022-07-28 PROCEDURE — 72072 X-RAY EXAM THORAC SPINE 3VWS: CPT

## 2022-07-28 PROCEDURE — 72125 CT NECK SPINE W/O DYE: CPT

## 2022-07-28 PROCEDURE — 70450 CT HEAD/BRAIN W/O DYE: CPT

## 2022-07-28 RX ORDER — ACETAMINOPHEN 500 MG
1000 TABLET ORAL ONCE
Status: COMPLETED | OUTPATIENT
Start: 2022-07-28 | End: 2022-07-28

## 2022-07-28 RX ORDER — METHOCARBAMOL 500 MG/1
500 TABLET, FILM COATED ORAL ONCE
Status: COMPLETED | OUTPATIENT
Start: 2022-07-28 | End: 2022-07-28

## 2022-07-28 RX ORDER — LIDOCAINE 50 MG/G
1 PATCH TOPICAL
Status: DISCONTINUED | OUTPATIENT
Start: 2022-07-28 | End: 2022-07-28 | Stop reason: HOSPADM

## 2022-07-28 RX ORDER — FLUTICASONE PROPIONATE 50 MCG
2 SPRAY, SUSPENSION (ML) NASAL DAILY
COMMUNITY
Start: 2022-04-21 | End: 2022-10-24

## 2022-07-28 RX ORDER — LIDOCAINE 50 MG/G
1 PATCH TOPICAL EVERY 24 HOURS
Qty: 6 PATCH | Refills: 0 | Status: SHIPPED | OUTPATIENT
Start: 2022-07-28

## 2022-07-28 RX ORDER — NITROFURANTOIN 25; 75 MG/1; MG/1
100 CAPSULE ORAL 2 TIMES DAILY
COMMUNITY
Start: 2022-07-12 | End: 2023-01-09

## 2022-07-28 RX ORDER — TIZANIDINE 4 MG/1
4 TABLET ORAL 2 TIMES DAILY PRN
Qty: 12 TABLET | Refills: 0 | Status: SHIPPED | OUTPATIENT
Start: 2022-07-28

## 2022-07-28 RX ADMIN — ACETAMINOPHEN 1000 MG: 500 TABLET ORAL at 16:34

## 2022-07-28 RX ADMIN — METHOCARBAMOL 500 MG: 500 TABLET ORAL at 16:34

## 2022-07-28 RX ADMIN — LIDOCAINE 1 PATCH: 50 PATCH TOPICAL at 16:36

## 2022-07-29 ENCOUNTER — OFFICE VISIT (OUTPATIENT)
Dept: FAMILY MEDICINE CLINIC | Facility: CLINIC | Age: 77
End: 2022-07-29

## 2022-07-29 VITALS
DIASTOLIC BLOOD PRESSURE: 85 MMHG | RESPIRATION RATE: 16 BRPM | BODY MASS INDEX: 28.88 KG/M2 | WEIGHT: 163 LBS | TEMPERATURE: 97.3 F | HEART RATE: 80 BPM | SYSTOLIC BLOOD PRESSURE: 161 MMHG | OXYGEN SATURATION: 98 % | HEIGHT: 63 IN

## 2022-07-29 DIAGNOSIS — I10 HYPERTENSION, UNSPECIFIED TYPE: Primary | Chronic | ICD-10-CM

## 2022-07-29 DIAGNOSIS — I10 PRIMARY HYPERTENSION: Chronic | ICD-10-CM

## 2022-07-29 DIAGNOSIS — S10.93XS: ICD-10-CM

## 2022-07-29 DIAGNOSIS — K21.9 GASTROESOPHAGEAL REFLUX DISEASE WITHOUT ESOPHAGITIS: Chronic | ICD-10-CM

## 2022-07-29 DIAGNOSIS — E66.09 CLASS 1 OBESITY DUE TO EXCESS CALORIES WITH SERIOUS COMORBIDITY AND BODY MASS INDEX (BMI) OF 30.0 TO 30.9 IN ADULT: ICD-10-CM

## 2022-07-29 DIAGNOSIS — I34.0 MITRAL VALVE INSUFFICIENCY, UNSPECIFIED ETIOLOGY: ICD-10-CM

## 2022-07-29 DIAGNOSIS — M06.9 RHEUMATOID ARTHRITIS INVOLVING KNEE, UNSPECIFIED LATERALITY, UNSPECIFIED WHETHER RHEUMATOID FACTOR PRESENT: ICD-10-CM

## 2022-07-29 DIAGNOSIS — Z76.89 ENCOUNTER TO ESTABLISH CARE WITH NEW DOCTOR: ICD-10-CM

## 2022-07-29 DIAGNOSIS — J34.2 DEVIATED SEPTUM: ICD-10-CM

## 2022-07-29 DIAGNOSIS — I87.2 VENOUS INSUFFICIENCY: ICD-10-CM

## 2022-07-29 DIAGNOSIS — Z91.81 H/O FALL: ICD-10-CM

## 2022-07-29 DIAGNOSIS — S00.03XS: ICD-10-CM

## 2022-07-29 DIAGNOSIS — E78.5 DYSLIPIDEMIA: ICD-10-CM

## 2022-07-29 DIAGNOSIS — S00.83XS: ICD-10-CM

## 2022-07-29 DIAGNOSIS — Z76.0 MEDICATION REFILL: ICD-10-CM

## 2022-07-29 DIAGNOSIS — Z86.718 HISTORY OF THROMBOSIS: ICD-10-CM

## 2022-07-29 DIAGNOSIS — J30.9 ALLERGIC RHINITIS, UNSPECIFIED SEASONALITY, UNSPECIFIED TRIGGER: Chronic | ICD-10-CM

## 2022-07-29 DIAGNOSIS — Z87.09 HISTORY OF DEVIATED NASAL SEPTUM: ICD-10-CM

## 2022-07-29 DIAGNOSIS — K44.9 HIATAL HERNIA: ICD-10-CM

## 2022-07-29 PROCEDURE — 99214 OFFICE O/P EST MOD 30 MIN: CPT

## 2022-07-29 RX ORDER — OMEPRAZOLE 20 MG/1
20 CAPSULE, DELAYED RELEASE ORAL DAILY
Qty: 90 CAPSULE | Refills: 1 | Status: CANCELLED | OUTPATIENT
Start: 2022-07-29

## 2022-07-29 RX ORDER — ALBUTEROL SULFATE 90 UG/1
2 AEROSOL, METERED RESPIRATORY (INHALATION) EVERY 4 HOURS PRN
Qty: 18 G | Refills: 0 | Status: CANCELLED | OUTPATIENT
Start: 2022-07-29

## 2022-07-29 RX ORDER — METOPROLOL SUCCINATE 25 MG/1
50 TABLET, EXTENDED RELEASE ORAL DAILY
Qty: 90 TABLET | Refills: 1 | Status: SHIPPED | OUTPATIENT
Start: 2022-07-29 | End: 2022-10-31 | Stop reason: SDUPTHER

## 2022-07-29 RX ORDER — LOSARTAN POTASSIUM 100 MG/1
100 TABLET ORAL DAILY
Qty: 90 TABLET | Refills: 0 | Status: SHIPPED | OUTPATIENT
Start: 2022-07-29 | End: 2022-10-31 | Stop reason: SDUPTHER

## 2022-07-29 RX ORDER — HYDROCHLOROTHIAZIDE 25 MG/1
25 TABLET ORAL DAILY
Qty: 90 TABLET | Refills: 0 | Status: SHIPPED | OUTPATIENT
Start: 2022-07-29 | End: 2022-10-31 | Stop reason: SDUPTHER

## 2022-07-29 NOTE — PATIENT INSTRUCTIONS
Follow up for routine health maintenance as discussed.     Take medication as directed.    Make appt with ENT for further management of nose injury    Practice good sleep hygiene, eat a well balanced diet with balanced fruit and vegetables.     Drink at least 8 bottles of water or equivalent per day.     Limit sweetened beverages, sodas, juices.  Follow consistent carb. Diet.    Bake, boil, broil or grill your food, avoid eating fried foods.    Exercise at least 150 minutes per week.    Limit electronic device screen time.

## 2022-07-29 NOTE — PROGRESS NOTES
"Chief Complaint  Hypertension    Subjective        Yancy Mcdonnell presents to [facility]  Patient presents for physical exam and to establish care with new provider.  Has been established with other provider in this office and well managed.  Here for routine lab and medication refills.     Patient reports fall on 7/27/2022 at [employer] [place] the campground she was walking off a ledge and fell again momentum and ran into two trees.  Presented to [facility] ED on 7/28/2022 and management was completed there.    Here with remarkable bruising, unable to breathe through her nose, and denies any neurological deficits related to fall.        Objective   Vital Signs:  /85 (BP Location: Right arm, Patient Position: Sitting, Cuff Size: Adult)   Pulse 80   Temp 97.3 °F (36.3 °C) (Infrared)   Resp 16   Ht 160 cm (63\")   Wt 73.9 kg (163 lb)   SpO2 98%   BMI 28.87 kg/m²   Estimated body mass index is 28.87 kg/m² as calculated from the following:    Height as of this encounter: 160 cm (63\").    Weight as of this encounter: 73.9 kg (163 lb).          Physical Exam  Vitals and nursing note reviewed.   Constitutional:       General: She is not in acute distress.     Appearance: Normal appearance. She is well-groomed. She is obese.   HENT:      Head: Normocephalic and atraumatic.      Nose: Nasal deformity, septal deviation, signs of injury, nasal tenderness, mucosal edema and congestion present.        Mouth/Throat:      Mouth: Mucous membranes are moist.   Eyes:      General: No scleral icterus.        Right eye: No discharge.         Left eye: No discharge.      Extraocular Movements: Extraocular movements intact.      Conjunctiva/sclera: Conjunctivae normal.      Pupils: Pupils are equal, round, and reactive to light.   Cardiovascular:      Rate and Rhythm: Normal rate and regular rhythm.      Pulses: Normal pulses.      Heart sounds: Normal heart sounds.   Pulmonary:      Effort: " Pulmonary effort is normal.      Breath sounds: Normal breath sounds.   Abdominal:      Palpations: Abdomen is not rigid.   Musculoskeletal:      Right shoulder: Normal. No swelling or deformity.      Left shoulder: Normal. No swelling or deformity.        Arms:       Right lower leg: No edema.      Left lower leg: No edema.   Skin:     General: Skin is warm and dry.      Capillary Refill: Capillary refill takes less than 2 seconds.      Findings: Bruising present.          Neurological:      General: No focal deficit present.      Mental Status: She is oriented to person, place, and time.   Psychiatric:         Mood and Affect: Mood normal.         Behavior: Behavior normal. Behavior is cooperative.        Result Review :                Assessment and Plan   Diagnoses and all orders for this visit:    1. Hypertension, unspecified type (Primary)  Assessment & Plan:  Hypertension is worsening.  Dietary sodium restriction.  Weight loss.  Medication changes per orders.  Blood pressure will be reassessed in 3 months.      2. Mitral valve insufficiency, unspecified etiology    3. Venous insufficiency    4. History of thrombosis    5. Class 1 obesity due to excess calories with serious comorbidity and body mass index (BMI) of 30.0 to 30.9 in adult    6. Gastroesophageal reflux disease without esophagitis    7. Hiatal hernia    8. Encounter to establish care with new doctor    9. Medication refill    10. Primary hypertension  Assessment & Plan:  Hypertension is worsening.  Dietary sodium restriction.  Weight loss.  Medication changes per orders.  Blood pressure will be reassessed in 3 months.    Orders:  -     losartan (COZAAR) 100 MG tablet; Take 1 tablet by mouth Daily.  Dispense: 90 tablet; Refill: 0  -     metoprolol succinate XL (Toprol XL) 25 MG 24 hr tablet; Take 2 tablets by mouth Daily.  Dispense: 90 tablet; Refill: 1  -     hydroCHLOROthiazide (HYDRODIURIL) 25 MG tablet; Take 1 tablet by mouth Daily.  Dispense:  90 tablet; Refill: 0    11. Allergic rhinitis, unspecified seasonality, unspecified trigger    12. H/O fall  Assessment & Plan:  Continue care as ordered from ed:      Orders:  -     Ambulatory Referral to ENT (Otolaryngology)    13. Contusion of face, scalp, and neck, sequela  Assessment & Plan:  Continue advise as given in ed:     Take Tylenol as needed for headache or pain.  Take tizanidine as needed for muscle stiffness or spasms.  Use lidocaine patch for additional pain relief.     Use ice to forehead and nose for additional pain and swelling.  May use heating pad to your back or neck as needed for pain.  Use in 10 to 15-minute increments, only while awake.     Follow-up with primary care for recheck  Follow-up with ENT regarding your deviated septum.     Return to the ER for new or worsening symptoms.    Orders:  -     Ambulatory Referral to ENT (Otolaryngology)    14. History of deviated nasal septum    15. Deviated septum  Assessment & Plan:  Make appt with ENT as ED advised.      16. Rheumatoid arthritis involving knee, unspecified laterality, unspecified whether rheumatoid factor present (HCC)  Assessment & Plan:  Patient states only her knees bother her.  Was being treated by Dr. Loaiza with PT.  Denies any other joint concerns.       17. Dyslipidemia  Assessment & Plan:  Continue low fat healthy heart diet.               Follow Up   Return in about 3 months (around 10/29/2022), or labs and annual physical..  Patient was given instructions and counseling regarding her condition or for health maintenance advice. Please see specific information pulled into the AVS if appropriate.

## 2022-07-29 NOTE — ASSESSMENT & PLAN NOTE
Patient states only her knees bother her.  Was being treated by Dr. Loaiza with PT.  Denies any other joint concerns.

## 2022-07-29 NOTE — ASSESSMENT & PLAN NOTE
Continue advise as given in ed:     Take Tylenol as needed for headache or pain.  Take tizanidine as needed for muscle stiffness or spasms.  Use lidocaine patch for additional pain relief.     Use ice to forehead and nose for additional pain and swelling.  May use heating pad to your back or neck as needed for pain.  Use in 10 to 15-minute increments, only while awake.     Follow-up with primary care for recheck  Follow-up with ENT regarding your deviated septum.     Return to the ER for new or worsening symptoms.

## 2022-07-29 NOTE — ASSESSMENT & PLAN NOTE
Hypertension is worsening.  Dietary sodium restriction.  Weight loss.  Medication changes per orders.  Blood pressure will be reassessed in 3 months.

## 2022-08-25 ENCOUNTER — OFFICE VISIT (OUTPATIENT)
Dept: FAMILY MEDICINE CLINIC | Facility: CLINIC | Age: 77
End: 2022-08-25

## 2022-08-25 VITALS
WEIGHT: 167.2 LBS | DIASTOLIC BLOOD PRESSURE: 80 MMHG | TEMPERATURE: 97.3 F | HEART RATE: 74 BPM | OXYGEN SATURATION: 97 % | HEIGHT: 64 IN | SYSTOLIC BLOOD PRESSURE: 148 MMHG | BODY MASS INDEX: 28.54 KG/M2 | RESPIRATION RATE: 18 BRPM

## 2022-08-25 DIAGNOSIS — Z86.718 HISTORY OF THROMBOSIS: ICD-10-CM

## 2022-08-25 DIAGNOSIS — R82.90 ABNORMAL URINALYSIS: ICD-10-CM

## 2022-08-25 DIAGNOSIS — N30.01 ACUTE CYSTITIS WITH HEMATURIA: Primary | ICD-10-CM

## 2022-08-25 DIAGNOSIS — R35.0 URINARY FREQUENCY: ICD-10-CM

## 2022-08-25 DIAGNOSIS — I10 HYPERTENSION, UNSPECIFIED TYPE: Chronic | ICD-10-CM

## 2022-08-25 DIAGNOSIS — Z79.82 LONG TERM (CURRENT) USE OF ASPIRIN: ICD-10-CM

## 2022-08-25 DIAGNOSIS — E66.09 CLASS 1 OBESITY DUE TO EXCESS CALORIES WITH SERIOUS COMORBIDITY AND BODY MASS INDEX (BMI) OF 30.0 TO 30.9 IN ADULT: ICD-10-CM

## 2022-08-25 LAB
BILIRUB BLD-MCNC: NEGATIVE MG/DL
CLARITY, POC: ABNORMAL
COLOR UR: YELLOW
EXPIRATION DATE: ABNORMAL
GLUCOSE UR STRIP-MCNC: NEGATIVE MG/DL
INR PPP: 1 (ref 0.9–1.1)
KETONES UR QL: NEGATIVE
LEUKOCYTE EST, POC: ABNORMAL
Lab: ABNORMAL
NITRITE UR-MCNC: NEGATIVE MG/ML
PH UR: 6.5 [PH] (ref 5–8)
PROT UR STRIP-MCNC: NEGATIVE MG/DL
RBC # UR STRIP: ABNORMAL /UL
SP GR UR: 1.02 (ref 1–1.03)
UROBILINOGEN UR QL: ABNORMAL

## 2022-08-25 PROCEDURE — 81003 URINALYSIS AUTO W/O SCOPE: CPT

## 2022-08-25 PROCEDURE — 99214 OFFICE O/P EST MOD 30 MIN: CPT

## 2022-08-25 PROCEDURE — 36416 COLLJ CAPILLARY BLOOD SPEC: CPT

## 2022-08-25 PROCEDURE — 85610 PROTHROMBIN TIME: CPT

## 2022-08-25 RX ORDER — PHENAZOPYRIDINE HYDROCHLORIDE 200 MG/1
200 TABLET, FILM COATED ORAL 3 TIMES DAILY PRN
Qty: 30 TABLET | Refills: 0 | Status: SHIPPED | OUTPATIENT
Start: 2022-08-25 | End: 2022-08-27

## 2022-08-25 RX ORDER — CEPHALEXIN 500 MG/1
500 CAPSULE ORAL 2 TIMES DAILY
Qty: 30 CAPSULE | Refills: 0 | Status: SHIPPED | OUTPATIENT
Start: 2022-08-25 | End: 2023-01-15 | Stop reason: SDUPTHER

## 2022-08-25 NOTE — PROGRESS NOTES
Subjective   Yancy Mcdonnell is a 77 y.o. female.     Chief Complaint   Patient presents with   • Urinary Frequency       Patient is here for urgency and frequency complaints with urination.  Notes no recent treatment for urinary tract infection approximately 13 days ago.  Patient was given Macrobid for symptoms that initially resolved and now are presenting again today.  Notes that she is having some mild incontinence with urgency related to I am able to urinate fast enough.  She states that when she gets a urinary tract infection she usually gets medication twice before it is cleared.  Her last urine was positive for E. coli, we will treat for E. coli clearance with appropriate antibiotic and Pyridium for bladder spasms.  Additionally I will refer her to urology for further eval and management of the situation.  Patient is afebrile, denies chest pain, palpitations, shortness of breath.  CVA tenderness is not present.  She was a little tender during the abdominal exam at the suprapubic area.       The following portions of the patient's history were reviewed and updated as appropriate: allergies, current medications, past family history, past medical history, past social history, past surgical history and problem list.    Allergies:  Allergies   Allergen Reactions   • Demerol [Meperidine] Unknown (See Comments)     Abstracted from centricity.   • Other Unknown (See Comments)     Sulfa (sulfadiazine)    • Sulfa Antibiotics Unknown - High Severity   • Lisinopril Cough       Social History:  Social History     Socioeconomic History   • Marital status:    Tobacco Use   • Smoking status: Never Smoker   • Smokeless tobacco: Never Used   Vaping Use   • Vaping Use: Never used   Substance and Sexual Activity   • Alcohol use: Yes     Alcohol/week: 1.0 standard drink     Types: 1 Cans of beer per week   • Drug use: No   • Sexual activity: Defer       Family History:  Family History   Problem Relation Age of Onset   •  Osteoporosis Mother    • Stroke Mother 80   • Dementia Mother    • Heart disease Father    • Hypertension Father    • Heart disease Sister    • Cancer Daughter         BREAST AND MELANOMA   • Breast cancer Daughter 52   • Cancer Son         MELANOMA   • Cancer Grandson         MELANOMA       Past Medical History :  Patient Active Problem List   Diagnosis   • Allergic rhinitis   • Lung nodule   • Dyslipidemia   • History of thrombosis   • Hypertension   • Migraine headache   • Mitral valve insufficiency   • Osteoarthritis   • Osteopenia of multiple sites   • Rheumatoid arthritis (HCC)   • Spinal stenosis of cervical region   • Venous insufficiency   • Gastroesophageal reflux disease without esophagitis   • History of herpes simplex infection   • Class 1 obesity due to excess calories with serious comorbidity and body mass index (BMI) of 30.0 to 30.9 in adult   • Elevated alkaline phosphatase level   • Hiatal hernia   • Closed bimalleolar fracture of right ankle   • Thrombophlebitis   • Contusion of face, scalp, and neck, sequela   • H/O fall   • Deviated septum   • Abnormal urinalysis   • Long term (current) use of aspirin   • Urinary frequency       Medication List:  Outpatient Encounter Medications as of 8/25/2022   Medication Sig Dispense Refill   • albuterol sulfate HFA (Ventolin HFA) 108 (90 Base) MCG/ACT inhaler Inhale 2 puffs Every 4 (Four) Hours As Needed for Shortness of Air. Two inhalations every 4-6 hours as needed for SOA. (Patient taking differently: Inhale 2 puffs Every 4 (Four) Hours As Needed for Shortness of Air. Two inhalations every 4-6 hours as needed for SOA.   Bring dos) 18 g 0   • famciclovir (FAMVIR) 500 MG tablet TAKE 3 TABLETS BY MOUTH AS A SINGLE DOSE AT FIRST SIGN OF COLD SORE 3 tablet 5   • fluticasone (FLONASE) 50 MCG/ACT nasal spray 2 sprays by Each Nare route Daily. 48 g 1   • fluticasone (FLONASE) 50 MCG/ACT nasal spray 2 sprays into the nostril(s) as directed by provider Daily.     •  hydroCHLOROthiazide (HYDRODIURIL) 25 MG tablet Take 1 tablet by mouth Daily. 90 tablet 0   • lidocaine (LIDODERM) 5 % Place 1 patch on the skin as directed by provider Daily. Remove & Discard patch within 12 hours or as directed by MD 6 patch 0   • loratadine (CLARITIN) 10 MG tablet Take 1 tablet by mouth Daily. 90 tablet 1   • losartan (COZAAR) 100 MG tablet Take 1 tablet by mouth Daily. 90 tablet 0   • lysine 500 MG tablet Take 1 tablet by mouth Daily As Needed. Stop now for surgery     • Magnesium 250 MG tablet Take  by mouth. Stop now for surgery     • metoprolol succinate XL (Toprol XL) 25 MG 24 hr tablet Take 2 tablets by mouth Daily. 90 tablet 1   • Misc Natural Products (LUTEIN 20 PO) Take  by mouth. Stop now for surgery     • Multiple Vitamin (MULTI-VITAMIN DAILY) tablet Take 1 tablet by mouth Daily. Stop now for surgery     • nitrofurantoin, macrocrystal-monohydrate, (MACROBID) 100 MG capsule Take 100 mg by mouth 2 (Two) Times a Day.     • NON FORMULARY Sustain eye drops     • tiZANidine (ZANAFLEX) 4 MG tablet Take 1 tablet by mouth 2 (Two) Times a Day As Needed for Muscle Spasms. 12 tablet 0   • triamcinolone (KENALOG) 0.1 % cream APPLY TO LOWER LEGS AND TORSO EVERY DAY TO TWICE DAILY FOR ITCHING     • cephalexin (KEFLEX) 500 MG capsule Take 1 capsule by mouth 2 (Two) Times a Day for 7 days. 30 capsule 0   • phenazopyridine (PYRIDIUM) 200 MG tablet Take 1 tablet by mouth 3 (Three) Times a Day As Needed for Bladder Spasms for up to 2 days. 30 tablet 0     No facility-administered encounter medications on file as of 8/25/2022.       Past Surgical History:  Past Surgical History:   Procedure Laterality Date   • ANKLE OPEN REDUCTION INTERNAL FIXATION Right 2/18/2022    Procedure: ANKLE OPEN REDUCTION INTERNAL FIXATION;  Surgeon: DEAN Tobias DPM;  [facility] MAIN OR;  Service: Podiatry;  Laterality: Right;   • COLONOSCOPY  07/20/2017    EGD/ COLONSCOPY LARGE HIATAL HERNIA. MILD DIVERTICULOSIS.  "OTHERWISE NORMAL.   • INGUINAL HERNIA REPAIR Right    • ORIF ANKLE FRACTURE Right 02/18/2022    Dr. Tobias   • TOTAL ABDOMINAL HYSTERECTOMY  1985    W/BSO WITH A/P REPAIR 1985 BENIGN REASONS DR. DRAKE       Review of Systems:  Review of Systems   Respiratory: Negative for wheezing.    Cardiovascular: Positive for chest pain and palpitations.   Genitourinary: Positive for frequency, urgency and urinary incontinence. Negative for decreased urine volume, difficulty urinating, dysuria, flank pain, menstrual problem, pelvic pain, pelvic pressure, vaginal bleeding, vaginal discharge and vaginal pain.   All other systems reviewed and are negative.      I have reviewed and confirmed the accuracy of the HPI and ROS as documented by the MA/LPN/RN FELIPE Munoz    Vital Signs:  Visit Vitals  /80 (BP Location: Right arm, Patient Position: Sitting, Cuff Size: Adult)   Pulse 74   Temp 97.3 °F (36.3 °C) (Infrared)   Resp 18   Ht 162.6 cm (64\")   Wt 75.8 kg (167 lb 3.2 oz)   SpO2 97%   BMI 28.70 kg/m²       Physical Exam  Vitals reviewed.   Constitutional:       Appearance: Normal appearance.   Cardiovascular:      Rate and Rhythm: Normal rate and regular rhythm.      Pulses: Normal pulses.      Heart sounds: Normal heart sounds.   Pulmonary:      Effort: Pulmonary effort is normal.      Breath sounds: Normal breath sounds.   Abdominal:      General: Abdomen is flat. Bowel sounds are normal. There is no distension.      Palpations: Abdomen is soft. There is no shifting dullness, fluid wave, hepatomegaly, splenomegaly, mass or pulsatile mass.      Tenderness: There is no right CVA tenderness or left CVA tenderness.       Musculoskeletal:         General: Normal range of motion.   Neurological:      Mental Status: She is alert.         Assessment and Plan:  Problem List Items Addressed This Visit        Cardiac and Vasculature    Hypertension (Chronic)    Relevant Orders    Lipid Panel    Comprehensive Metabolic " Panel    CBC & Differential       Coag and Thromboembolic    History of thrombosis    Relevant Orders    POC Protime / INR (Completed)    Long term (current) use of aspirin    Relevant Orders    POC Protime / INR (Completed)       Endocrine and Metabolic    Class 1 obesity due to excess calories with serious comorbidity and body mass index (BMI) of 30.0 to 30.9 in adult    Current Assessment & Plan     Patient's (Body mass index is 28.7 kg/m².) indicates that they are obese (BMI >30) with health conditions that include hypertension and dyslipidemias . Weight is improving with lifestyle modifications. BMI is is above average; no BMI management plan is appropriate. We discussed portion control and increasing exercise.             Genitourinary and Reproductive     Abnormal urinalysis    Relevant Orders    Urine Culture - Urine, Urine, Clean Catch    Ambulatory Referral to Urology    Urinary frequency    Current Assessment & Plan     Notes second bout of frequency.  First oone 7/12/22 at Urgent care with acute cystitis with ecoli.  Treated with macrobid.    Here now with c/o frequency x 4 days.  Notes when she has to urinate, she can't get to the bathroom quick enough.  Patient notes she is wearing a pad due to this. Patient denies burning, pain with urination.  Unsure if she empties her bladder all the way or not.            Relevant Orders    POC Urinalysis Dipstick, Automated (Completed)    Ambulatory Referral to Urology      Other Visit Diagnoses     Acute cystitis with hematuria    -  Primary    Relevant Medications    cephalexin (KEFLEX) 500 MG capsule    phenazopyridine (PYRIDIUM) 200 MG tablet          An After Visit Summary and PPPS were given to the patient.       I wore protective equipment throughout this patient encounter to include mask. Hand hygiene was performed before donning protective equipment and after removal when leaving the room.    Findings discussed.  All questions answered.

## 2022-08-25 NOTE — ASSESSMENT & PLAN NOTE
Patient's (Body mass index is 28.7 kg/m².) indicates that they are obese (BMI >30) with health conditions that include hypertension and dyslipidemias . Weight is improving with lifestyle modifications. BMI is is above average; no BMI management plan is appropriate. We discussed portion control and increasing exercise.

## 2022-08-25 NOTE — ASSESSMENT & PLAN NOTE
Notes second bout of frequency.  First oone 7/12/22 at Urgent care with acute cystitis with ecoli.  Treated with macrobid.    Here now with c/o frequency x 4 days.  Notes when she has to urinate, she can't get to the bathroom quick enough.  Patient notes she is wearing a pad due to this. Patient denies burning, pain with urination.  Unsure if she empties her bladder all the way or not.

## 2022-08-27 LAB
ALBUMIN SERPL-MCNC: 3.9 G/DL (ref 3.7–4.7)
ALBUMIN/GLOB SERPL: 2 {RATIO} (ref 1.2–2.2)
ALP SERPL-CCNC: 130 IU/L (ref 44–121)
ALT SERPL-CCNC: 15 IU/L (ref 0–32)
AST SERPL-CCNC: 15 IU/L (ref 0–40)
BASOPHILS # BLD AUTO: 0 X10E3/UL (ref 0–0.2)
BASOPHILS NFR BLD AUTO: 0 %
BILIRUB SERPL-MCNC: 0.6 MG/DL (ref 0–1.2)
BUN SERPL-MCNC: 18 MG/DL (ref 8–27)
BUN/CREAT SERPL: 23 (ref 12–28)
CALCIUM SERPL-MCNC: 9.5 MG/DL (ref 8.7–10.3)
CHLORIDE SERPL-SCNC: 103 MMOL/L (ref 96–106)
CHOLEST SERPL-MCNC: 171 MG/DL (ref 100–199)
CO2 SERPL-SCNC: 27 MMOL/L (ref 20–29)
CREAT SERPL-MCNC: 0.77 MG/DL (ref 0.57–1)
EGFRCR-CYS SERPLBLD CKD-EPI 2021: 79 ML/MIN/1.73
EOSINOPHIL # BLD AUTO: 0.2 X10E3/UL (ref 0–0.4)
EOSINOPHIL NFR BLD AUTO: 2 %
ERYTHROCYTE [DISTWIDTH] IN BLOOD BY AUTOMATED COUNT: 13.5 % (ref 11.7–15.4)
GLOBULIN SER CALC-MCNC: 2 G/DL (ref 1.5–4.5)
GLUCOSE SERPL-MCNC: 84 MG/DL (ref 65–99)
HCT VFR BLD AUTO: 37 % (ref 34–46.6)
HDLC SERPL-MCNC: 52 MG/DL
HGB BLD-MCNC: 11.7 G/DL (ref 11.1–15.9)
IMM GRANULOCYTES # BLD AUTO: 0 X10E3/UL (ref 0–0.1)
IMM GRANULOCYTES NFR BLD AUTO: 1 %
LDLC SERPL CALC-MCNC: 90 MG/DL (ref 0–99)
LYMPHOCYTES # BLD AUTO: 1.4 X10E3/UL (ref 0.7–3.1)
LYMPHOCYTES NFR BLD AUTO: 20 %
MCH RBC QN AUTO: 28.7 PG (ref 26.6–33)
MCHC RBC AUTO-ENTMCNC: 31.6 G/DL (ref 31.5–35.7)
MCV RBC AUTO: 91 FL (ref 79–97)
MONOCYTES # BLD AUTO: 0.8 X10E3/UL (ref 0.1–0.9)
MONOCYTES NFR BLD AUTO: 11 %
NEUTROPHILS # BLD AUTO: 4.7 X10E3/UL (ref 1.4–7)
NEUTROPHILS NFR BLD AUTO: 66 %
PLATELET # BLD AUTO: 405 X10E3/UL (ref 150–450)
POTASSIUM SERPL-SCNC: 3.9 MMOL/L (ref 3.5–5.2)
PROT SERPL-MCNC: 5.9 G/DL (ref 6–8.5)
RBC # BLD AUTO: 4.07 X10E6/UL (ref 3.77–5.28)
SODIUM SERPL-SCNC: 142 MMOL/L (ref 134–144)
TRIGL SERPL-MCNC: 170 MG/DL (ref 0–149)
VLDLC SERPL CALC-MCNC: 29 MG/DL (ref 5–40)
WBC # BLD AUTO: 7.1 X10E3/UL (ref 3.4–10.8)

## 2022-08-30 LAB
BACTERIA UR CULT: ABNORMAL
OTHER ANTIBIOTIC SUSC ISLT: ABNORMAL

## 2022-09-06 ENCOUNTER — TELEPHONE (OUTPATIENT)
Dept: ORTHOPEDIC SURGERY | Facility: CLINIC | Age: 77
End: 2022-09-06

## 2022-10-24 RX ORDER — FLUTICASONE PROPIONATE 50 MCG
2 SPRAY, SUSPENSION (ML) NASAL DAILY
Qty: 9.9 ML | Refills: 3 | Status: SHIPPED | OUTPATIENT
Start: 2022-10-24 | End: 2022-10-25

## 2022-10-25 RX ORDER — FLUTICASONE PROPIONATE 50 MCG
SPRAY, SUSPENSION (ML) NASAL
Qty: 48 G | Refills: 4 | Status: SHIPPED | OUTPATIENT
Start: 2022-10-25

## 2022-10-31 ENCOUNTER — OFFICE VISIT (OUTPATIENT)
Dept: FAMILY MEDICINE CLINIC | Facility: CLINIC | Age: 77
End: 2022-10-31

## 2022-10-31 VITALS
BODY MASS INDEX: 28.85 KG/M2 | HEART RATE: 70 BPM | DIASTOLIC BLOOD PRESSURE: 74 MMHG | HEIGHT: 64 IN | TEMPERATURE: 96.1 F | OXYGEN SATURATION: 99 % | SYSTOLIC BLOOD PRESSURE: 142 MMHG | WEIGHT: 169 LBS | RESPIRATION RATE: 16 BRPM

## 2022-10-31 DIAGNOSIS — N39.3 STRESS INCONTINENCE OF URINE: ICD-10-CM

## 2022-10-31 DIAGNOSIS — Z12.31 SCREENING MAMMOGRAM FOR BREAST CANCER: ICD-10-CM

## 2022-10-31 DIAGNOSIS — I10 PRIMARY HYPERTENSION: Chronic | ICD-10-CM

## 2022-10-31 DIAGNOSIS — Z00.00 ENCOUNTER FOR SUBSEQUENT ANNUAL WELLNESS VISIT (AWV) IN MEDICARE PATIENT: Primary | ICD-10-CM

## 2022-10-31 PROCEDURE — 96160 PT-FOCUSED HLTH RISK ASSMT: CPT

## 2022-10-31 PROCEDURE — 1159F MED LIST DOCD IN RCRD: CPT

## 2022-10-31 PROCEDURE — 1160F RVW MEDS BY RX/DR IN RCRD: CPT

## 2022-10-31 PROCEDURE — G0439 PPPS, SUBSEQ VISIT: HCPCS

## 2022-10-31 PROCEDURE — 1170F FXNL STATUS ASSESSED: CPT

## 2022-10-31 PROCEDURE — 1126F AMNT PAIN NOTED NONE PRSNT: CPT

## 2022-10-31 RX ORDER — LOSARTAN POTASSIUM 100 MG/1
100 TABLET ORAL DAILY
Qty: 90 TABLET | Refills: 0 | Status: SHIPPED | OUTPATIENT
Start: 2022-10-31 | End: 2023-01-04

## 2022-10-31 RX ORDER — ESTRADIOL 0.1 MG/G
CREAM VAGINAL
COMMUNITY
Start: 2022-09-28

## 2022-10-31 RX ORDER — METOPROLOL SUCCINATE 25 MG/1
50 TABLET, EXTENDED RELEASE ORAL DAILY
Qty: 90 TABLET | Refills: 1 | Status: SHIPPED | OUTPATIENT
Start: 2022-10-31 | End: 2022-11-29 | Stop reason: SDUPTHER

## 2022-10-31 RX ORDER — INFLUENZA A VIRUS A/MICHIGAN/45/2015 X-275 (H1N1) ANTIGEN (FORMALDEHYDE INACTIVATED), INFLUENZA A VIRUS A/SINGAPORE/INFIMH-16-0019/2016 IVR-186 (H3N2) ANTIGEN (FORMALDEHYDE INACTIVATED), INFLUENZA B VIRUS B/PHUKET/3073/2013 ANTIGEN (FORMALDEHYDE INACTIVATED), AND INFLUENZA B VIRUS B/MARYLAND/15/2016 BX-69A ANTIGEN (FORMALDEHYDE INACTIVATED) 60; 60; 60; 60 UG/.7ML; UG/.7ML; UG/.7ML; UG/.7ML
INJECTION, SUSPENSION INTRAMUSCULAR
COMMUNITY
Start: 2022-09-30 | End: 2022-10-31

## 2022-10-31 RX ORDER — HYDROCHLOROTHIAZIDE 25 MG/1
25 TABLET ORAL DAILY
Qty: 90 TABLET | Refills: 0 | Status: SHIPPED | OUTPATIENT
Start: 2022-10-31 | End: 2023-01-03 | Stop reason: SDUPTHER

## 2022-10-31 NOTE — PROGRESS NOTES
The ABCs of the Annual Wellness Visit  Subsequent Medicare Wellness Visit    Subjective  Physical Exam  Vitals and nursing note reviewed.   Constitutional:       General: She is not in acute distress.     Appearance: Normal appearance. She is well-groomed and normal weight.   HENT:      Head: Normocephalic.      Right Ear: Tympanic membrane, ear canal and external ear normal. There is no impacted cerumen.      Left Ear: Tympanic membrane, ear canal and external ear normal. There is no impacted cerumen.      Nose: Nose normal.      Mouth/Throat:      Lips: Pink.      Mouth: Mucous membranes are moist.   Eyes:      General: Lids are normal. Vision grossly intact.      Extraocular Movements: Extraocular movements intact.      Conjunctiva/sclera: Conjunctivae normal.      Pupils: Pupils are equal, round, and reactive to light.   Neck:      Thyroid: No thyroid mass, thyromegaly or thyroid tenderness.      Vascular: No carotid bruit.   Cardiovascular:      Rate and Rhythm: Normal rate and regular rhythm.      Pulses: Normal pulses.      Heart sounds: Normal heart sounds.   Pulmonary:      Effort: Pulmonary effort is normal.      Breath sounds: Normal breath sounds.   Abdominal:      General: Abdomen is flat. Bowel sounds are normal.      Palpations: Abdomen is soft. Abdomen is not rigid.      Tenderness: There is no right CVA tenderness or left CVA tenderness.   Musculoskeletal:         General: Normal range of motion.      Cervical back: Full passive range of motion without pain, normal range of motion and neck supple.      Right lower leg: No edema.      Left lower leg: No edema.   Skin:     General: Skin is warm and dry.      Capillary Refill: Capillary refill takes less than 2 seconds.          Neurological:      General: No focal deficit present.      Mental Status: She is alert and oriented to person, place, and time. Mental status is at baseline.   Psychiatric:         Attention and Perception: Attention and  perception normal.         Mood and Affect: Mood and affect normal. Mood is not anxious or depressed.         Speech: Speech normal.         Behavior: Behavior normal. Behavior is cooperative.         Thought Content: Thought content normal.         History of Present Illness:  Yancy Mcdonnell is a 77 y.o. female who presents for a Subsequent Medicare Wellness Visit.    The following portions of the patient's history were reviewed and   updated as appropriate: allergies, current medications, past family history, past medical history, past social history, past surgical history and problem list.  Family History   Problem Relation Age of Onset   • Osteoporosis Mother    • Stroke Mother 80   • Dementia Mother    • Heart disease Father    • Hypertension Father    • Heart disease Sister    • Cancer Daughter         BREAST AND MELANOMA   • Breast cancer Daughter 52   • Cancer Son         MELANOMA   • Cancer Grandson         MELANOMA     Past Surgical History:   Procedure Laterality Date   • ANKLE OPEN REDUCTION INTERNAL FIXATION Right 2/18/2022    Procedure: ANKLE OPEN REDUCTION INTERNAL FIXATION;  Surgeon: DEAN Tobias DPM;  Location: [facility] OR;  Service: Podiatry;  Laterality: Right;   • COLONOSCOPY  07/20/2017    EGD/ COLONSCOPY LARGE HIATAL HERNIA. MILD DIVERTICULOSIS. OTHERWISE NORMAL.   • INGUINAL HERNIA REPAIR Right    • ORIF ANKLE FRACTURE Right 02/18/2022    Dr. Tobias   • TOTAL ABDOMINAL HYSTERECTOMY  1985    W/BSO WITH A/P REPAIR 1985 BENIGN REASONS DR. DRAKE        Compared to one year ago, the patient feels her physical   health is the same.    Compared to one year ago, the patient feels her mental   health is the same.    Recent Hospitalizations:  She was not admitted to the hospital during the last year.       Current Medical Providers:  Patient Care Team:  Alyse Mcnally APRN as PCP - General (Nurse Practitioner)  Dr. AMITA Ross) (Ophthalmology)  Banet, Duane Edward, MD as Consulting  Physician (Dermatology)  Edwin Banks MD as Consulting Physician (Otolaryngology)  Ruslan Davila MD as Consulting Physician (Gastroenterology)  DEAN Tobias DPM as Consulting Physician (Podiatry)    Outpatient Medications Prior to Visit   Medication Sig Dispense Refill   • albuterol sulfate HFA (Ventolin HFA) 108 (90 Base) MCG/ACT inhaler Inhale 2 puffs Every 4 (Four) Hours As Needed for Shortness of Air. Two inhalations every 4-6 hours as needed for SOA. (Patient taking differently: Inhale 2 puffs Every 4 (Four) Hours As Needed for Shortness of Air. Two inhalations every 4-6 hours as needed for SOA.   Bring dos) 18 g 0   • estradiol (ESTRACE) 0.1 MG/GM vaginal cream      • famciclovir (FAMVIR) 500 MG tablet TAKE 3 TABLETS BY MOUTH AS A SINGLE DOSE AT FIRST SIGN OF COLD SORE 3 tablet 5   • fluticasone (FLONASE) 50 MCG/ACT nasal spray INJECT 2 SPRAYS INTO THE NOSTRILS EVERY DAY AS DIRECTED 48 g 4   • lidocaine (LIDODERM) 5 % Place 1 patch on the skin as directed by provider Daily. Remove & Discard patch within 12 hours or as directed by MD 6 patch 0   • loratadine (CLARITIN) 10 MG tablet Take 1 tablet by mouth Daily. 90 tablet 1   • lysine 500 MG tablet Take 1 tablet by mouth Daily As Needed. Stop now for surgery     • Magnesium 250 MG tablet Take  by mouth. Stop now for surgery     • Misc Natural Products (LUTEIN 20 PO) Take  by mouth. Stop now for surgery     • Multiple Vitamin (MULTI-VITAMIN DAILY) tablet Take 1 tablet by mouth Daily. Stop now for surgery     • nitrofurantoin, macrocrystal-monohydrate, (MACROBID) 100 MG capsule Take 100 mg by mouth 2 (Two) Times a Day.     • NON FORMULARY Sustain eye drops     • tiZANidine (ZANAFLEX) 4 MG tablet Take 1 tablet by mouth 2 (Two) Times a Day As Needed for Muscle Spasms. 12 tablet 0   • triamcinolone (KENALOG) 0.1 % cream APPLY TO LOWER LEGS AND TORSO EVERY DAY TO TWICE DAILY FOR ITCHING     • hydroCHLOROthiazide (HYDRODIURIL) 25 MG tablet Take 1 tablet by  mouth Daily. 90 tablet 0   • losartan (COZAAR) 100 MG tablet Take 1 tablet by mouth Daily. 90 tablet 0   • metoprolol succinate XL (Toprol XL) 25 MG 24 hr tablet Take 2 tablets by mouth Daily. 90 tablet 1   • Fluzone High-Dose Quadrivalent 0.7 ML suspension prefilled syringe injection        No facility-administered medications prior to visit.     Pain Score    10/31/22 0956   PainSc: 0-No pain      No opioid medication identified on active medication list. I have reviewed chart for other potential  high risk medication/s and harmful drug interactions in the elderly.          Aspirin is not on active medication list.  Aspirin use is indicated based on review of current medical condition/s. Pros and cons of this therapy have been discussed with this patient. Benefits of this medication outweigh potential harm.  Patient has been instructed to start taking this medication..    Patient Active Problem List   Diagnosis   • Allergic rhinitis   • Lung nodule   • Dyslipidemia   • History of thrombosis   • Hypertension   • Migraine headache   • Mitral valve insufficiency   • Osteoarthritis   • Osteopenia of multiple sites   • Rheumatoid arthritis (HCC)   • Spinal stenosis of cervical region   • Venous insufficiency   • Gastroesophageal reflux disease without esophagitis   • History of herpes simplex infection   • Class 1 obesity due to excess calories with serious comorbidity and body mass index (BMI) of 30.0 to 30.9 in adult   • Elevated alkaline phosphatase level   • Hiatal hernia   • Closed bimalleolar fracture of right ankle   • Thrombophlebitis   • Contusion of face, scalp, and neck, sequela   • H/O fall   • Deviated septum   • Abnormal urinalysis   • Long term (current) use of aspirin   • Urinary frequency   • Encounter for subsequent annual wellness visit (AWV) in Medicare patient   • Stress incontinence of urine     Advance Care Planning  Advance Directive is on file.  ACP discussion was held with the patient during  "this visit. Patient has an advance directive in EMR which is still valid.     Review of Systems   Constitutional: Negative for activity change, appetite change, chills, fatigue and fever.   HENT: Negative for congestion and sore throat.    Respiratory: Negative for cough and shortness of breath.    Cardiovascular: Negative for chest pain, palpitations and leg swelling.   Gastrointestinal: Negative for abdominal distention, abdominal pain, anal bleeding, blood in stool, constipation, diarrhea, nausea, rectal pain and vomiting.   Genitourinary: Positive for urgency (controlled with pessary). Negative for difficulty urinating, flank pain and frequency.   Musculoskeletal: Negative for arthralgias.   Skin: Negative.    Allergic/Immunologic: Negative for environmental allergies.   Neurological: Positive for dizziness. Negative for weakness.   Hematological: Does not bruise/bleed easily.   Psychiatric/Behavioral: Negative for decreased concentration, self-injury, sleep disturbance and suicidal ideas. The patient is not nervous/anxious.         Objective    Vitals:    10/31/22 0956   BP: 150/79   BP Location: Right arm   Patient Position: Sitting   Cuff Size: Adult   Pulse: 70   Resp: 16   Temp: 96.1 °F (35.6 °C)   TempSrc: Infrared   SpO2: 99%   Weight: 76.7 kg (169 lb)   Height: 162.6 cm (64\")   PainSc: 0-No pain       Does the patient have evidence of cognitive impairment? No           HEALTH RISK ASSESSMENT    Smoking Status:  Social History     Tobacco Use   Smoking Status Never   Smokeless Tobacco Never     Alcohol Consumption:  Social History     Substance and Sexual Activity   Alcohol Use Yes   • Alcohol/week: 1.0 standard drink   • Types: 1 Cans of beer per week     Fall Risk Screen:    STEADI Fall Risk Assessment was completed, and patient is at LOW risk for falls.Assessment completed on:10/31/2022    Depression Screening:  PHQ-2/PHQ-9 Depression Screening 10/31/2022   Retired PHQ-9 Total Score -   Retired Total " Score -   Little Interest or Pleasure in Doing Things 0-->not at all   Feeling Down, Depressed or Hopeless 0-->not at all   PHQ-9: Brief Depression Severity Measure Score 0       Health Habits and Functional and Cognitive Screening:  Functional & Cognitive Status 10/31/2022   Do you have difficulty preparing food and eating? No   Do you have difficulty bathing yourself, getting dressed or grooming yourself? No   Do you have difficulty using the toilet? No   Do you have difficulty moving around from place to place? No   Do you have trouble with steps or getting out of a bed or a chair? No   Current Diet Well Balanced Diet   Dental Exam Up to date   Eye Exam Up to date   Exercise (times per week) 0 times per week   Current Exercises Include No Regular Exercise   Current Exercise Activities Include -   Do you need help using the phone?  No   Are you deaf or do you have serious difficulty hearing?  No   Do you need help with transportation? No   Do you need help shopping? No   Do you need help preparing meals?  No   Do you need help with housework?  No   Do you need help with laundry? No   Do you need help taking your medications? No   Do you need help managing money? No   Do you ever drive or ride in a car without wearing a seat belt? No   Have you felt unusual stress, anger or loneliness in the last month? No   Who do you live with? Spouse   If you need help, do you have trouble finding someone available to you? No   Have you been bothered in the last four weeks by sexual problems? No   Do you have difficulty concentrating, remembering or making decisions? No       Age-appropriate Screening Schedule:  Refer to the list below for future screening recommendations based on patient's age, sex and/or medical conditions. Orders for these recommended tests are listed in the plan section. The patient has been provided with a written plan.    Health Maintenance   Topic Date Due   • ZOSTER VACCINE (1 of 2) 06/10/1994   • DXA  SCAN  2023   • LIPID PANEL  2023   • TDAP/TD VACCINES (2 - Td or Tdap) 2027   • INFLUENZA VACCINE  Completed              Assessment & Plan   Medically necessary, significant, and separately identifiable medical problems identified during this visit are addressed on a separate visit note.    CMS Preventative Services Quick Reference  Risk Factors Identified During Encounter  Inactivity/Sedentary  Obesity/Overweight   Urinary Incontinence  The above risks/problems have been discussed with the patient.  Follow up actions/plans if indicated are seen below in the Assessment/Plan Section.  Pertinent information has been shared with the patient in the After Visit Summary.    Follow Up:   In 6 months or as clinical condition warrants.     An After Visit Summary and PPPS were made available to the patient.    Medicare Wellness  Personal Prevention Plan of Service     Date of Office Visit:  10/31/2022  Encounter Provider:  FELIPE Zuluaga  Place of Service:  [facility]  Patient Name: Yancy Mcdonnell  :  1945    As part of the Medicare Wellness portion of your visit today, we are providing you with this personalized preventive plan of services (PPPS). This plan is based upon recommendations of the [place] Preventive Services Task Force (USPSTF) and the Advisory Committee on Immunization Practices (ACIP).    This lists the preventive care services that should be considered, and provides dates of when you are due. Items listed as completed are up-to-date and do not require any further intervention.    Health Maintenance   Topic Date Due   • ZOSTER VACCINE (1 of 2) 06/10/1994   • COVID-19 Vaccine (4 - Booster for Pfizer series) 2021   • DXA SCAN  2023   • LIPID PANEL  2023   • ANNUAL WELLNESS VISIT  10/31/2023   • TDAP/TD VACCINES (2 - Td or Tdap) 2027   • HEPATITIS C SCREENING  Completed   • INFLUENZA VACCINE  Completed   •  Pneumococcal Vaccine 65+  Completed       No orders of the defined types were placed in this encounter.      No follow-ups on file.  Sit-to-Stand Exercise    The sit-to-stand exercise (also known as the chair stand or chair rise exercise) strengthens your lower body and helps you maintain or improve your mobility and independence. The goal is to do the sit-to-stand exercise without using your hands. This will be easier as you become stronger. You should always talk with your health care provider before starting any exercise program, especially if you have had recent surgery.  Do the exercise exactly as told by your health care provider and adjust it as directed. It is normal to feel mild stretching, pulling, tightness, or discomfort as you do this exercise, but you should stop right away if you feel sudden pain or your pain gets worse. Do not begin doing this exercise until told by your health care provider.  What the sit-to-stand exercise does  The sit-to-stand exercise helps to strengthen the muscles in your thighs and the muscles in the center of your body that give you stability (core muscles). This exercise is especially helpful if:  · You have had knee or hip surgery.  · You have trouble getting up from a chair, out of a car, or off the toilet.  How to do the sit-to-stand exercise  1. Sit toward the front edge of a sturdy chair without armrests. Your knees should be bent and your feet should be flat on the floor and shoulder-width apart.  2. Place your hands lightly on each side of the seat. Keep your back and neck as straight as possible, with your chest slightly forward.  3. Breathe in slowly. Lean forward and slightly shift your weight to the front of your feet.  4. Breathe out as you slowly stand up. Use your hands as little as possible.  5. Stand and pause for a full breath in and out.  6. Breathe in as you sit down slowly. Tighten your core and abdominal muscles to control your lowering as much as  possible.  7. Breathe out slowly.  8. Do this exercise 10-15 times. If needed, do it fewer times until you build up strength.  9. Rest for 1 minute, then do another set of 10-15 repetitions.  To change the difficulty of the sit-to-stand exercise  · If the exercise is too difficult, use a chair with sturdy armrests, and push off the armrests to help you come to the standing position. You can also use the armrests to help slowly lower yourself back to sitting. As this gets easier, try to use your arms less. You can also place a firm cushion or pillow on the chair to make the surface higher.  · If this exercise is too easy, do not use your arms to help raise or lower yourself. You can also wear a weighted vest, use hand weights, increase your repetitions, or try a lower chair.  General tips  · You may feel tired when starting an exercise routine. This is normal.  · You may have muscle soreness that lasts a few days. This is normal. As you get stronger, you may not feel muscle soreness.  · Use smooth, steady movements.  · Do not  hold your breath during strength exercises. This can cause unsafe changes in your blood pressure.  · Breathe in slowly through your nose, and breathe out slowly through your mouth.  Summary  · Strengthening your lower body is an important step to help you move safely and independently.  · The sit-to-stand exercise helps strengthen the muscles in your thighs and core.  · You should always talk with your health care provider before starting any exercise program, especially if you have had recent surgery.  This information is not intended to replace advice given to you by your health care provider. Make sure you discuss any questions you have with your health care provider.  Document Revised: 10/16/2019 Document Reviewed: 02/08/2018  Elsevier Patient Education © 2021 [employer]    Advance Care Planning and Advance Directives     You make decisions on a daily basis - decisions about where you  want to live, your career, your home, your life. Perhaps one of the most important decisions you face is your choice for future medical care. Take time to talk with your family and your healthcare team and start planning today.  Advance Care Planning is a process that can help you:  · Understand possible future healthcare decisions in light of your own experiences  · Reflect on those decision in light of your goals and values  · Discuss your decisions with those closest to you and the healthcare professionals that care for you  · Make a plan by creating a document that reflects your wishes    Surrogate Decision Maker  In the event of a medical emergency, which has left you unable to communicate or to make your own decisions, you would need someone to make decisions for you.  It is important to discuss your preferences for medical treatment with this person while you are in good health.     Qualities of a surrogate decision maker:  • Willing to take on this role and responsibility  • Knows what you want for future medical care  • Willing to follow your wishes even if they don't agree with them  • Able to make difficult medical decisions under stressful circumstances    Advance Directives  These are legal documents you can create that will guide your healthcare team and decision maker(s) when needed. These documents can be stored in the electronic medical record.    · Living Will - a legal document to guide your care if you have a terminal condition or a serious illness and are unable to communicate. States vary by statute in document names/types, but most forms may include one or more of the following:        -  Directions regarding life-prolonging treatments        -  Directions regarding artificially provided nutrition/hydration        -  Choosing a healthcare decision maker        -  Direction regarding organ/tissue donation    · Durable Power of  for Healthcare - this document names an -in-fact to  make medical decisions for you, but it may also allow this person to make personal and financial decisions for you. Please seek the advice of an  if you need this type of document.    **Advance Directives are not required and no one may discriminate against you if you do not sign one.    Medical Orders  Many states allow specific forms/orders signed by your physician to record your wishes for medical treatment in your current state of health. This form, signed in personal communication with your physician, addresses resuscitation and other medical interventions that you may or may not want.  For more information or to schedule a time with a [facility] Advance Care Planning Facilitator contact: [facility]/ACP or call [phone] and someone will contact you directly.    Fall Prevention in the Home, Adult  Falls can cause injuries and can happen to people of all ages. There are many things you can do to make your home safe and to help prevent falls. Ask for help when making these changes.  What actions can I take to prevent falls?  General Instructions  1. Use good lighting in all rooms. Replace any light bulbs that burn out.  2. Turn on the lights in dark areas. Use night-lights.  3. Keep items that you use often in easy-to-reach places. Lower the shelves around your home if needed.  4. Set up your furniture so you have a clear path. Avoid moving your furniture around.  5. Do not have throw rugs or other things on the floor that can make you trip.  6. Avoid walking on wet floors.  7. If any of your floors are uneven, fix them.  8. Add color or contrast paint or tape to clearly karla and help you see:  ? Grab bars or handrails.  ? First and last steps of staircases.  ? Where the edge of each step is.  9. If you use a stepladder:  ? Make sure that it is fully opened. Do not climb a closed stepladder.  ? Make sure the sides of the stepladder are locked in place.  ? Ask someone to hold the stepladder  while you use it.  10. Know where your pets are when moving through your home.  What can I do in the bathroom?         · Keep the floor dry. Clean up any water on the floor right away.  · Remove soap buildup in the tub or shower.  · Use nonskid mats or decals on the floor of the tub or shower.  · Attach bath mats securely with double-sided, nonslip rug tape.  · If you need to sit down in the shower, use a plastic, nonslip stool.  · Install grab bars by the toilet and in the tub and shower. Do not use towel bars as grab bars.  What can I do in the bedroom?  · Make sure that you have a light by your bed that is easy to reach.  · Do not use any sheets or blankets for your bed that hang to the floor.  · Have a firm chair with side arms that you can use for support when you get dressed.  What can I do in the kitchen?  · Clean up any spills right away.  · If you need to reach something above you, use a step stool with a grab bar.  · Keep electrical cords out of the way.  · Do not use floor polish or wax that makes floors slippery.  What can I do with my stairs?  · Do not leave any items on the stairs.  · Make sure that you have a light switch at the top and the bottom of the stairs.  · Make sure that there are handrails on both sides of the stairs. Fix handrails that are broken or loose.  · Install nonslip stair treads on all your stairs.  · Avoid having throw rugs at the top or bottom of the stairs.  · Choose a carpet that does not hide the edge of the steps on the stairs.  · Check carpeting to make sure that it is firmly attached to the stairs. Fix carpet that is loose or worn.  What can I do on the outside of my home?  · Use bright outdoor lighting.  · Fix the edges of walkways and driveways and fix any cracks.  · Remove anything that might make you trip as you walk through a door, such as a raised step or threshold.  · Trim any bushes or trees on paths to your home.  · Check to see if handrails are loose or broken and  that both sides of all steps have handrails.  · Install guardrails along the edges of any raised decks and porches.  · Clear paths of anything that can make you trip, such as tools or rocks.  · Have leaves, snow, or ice cleared regularly.  · Use sand or salt on paths during winter.  · Clean up any spills in your garage right away. This includes grease or oil spills.  What other actions can I take?  1. Wear shoes that:  ? Have a low heel. Do not wear high heels.  ? Have rubber bottoms.  ? Feel good on your feet and fit well.  ? Are closed at the toe. Do not wear open-toe sandals.  2. Use tools that help you move around if needed. These include:  ? Canes.  ? Walkers.  ? Scooters.  ? Crutches.  3. Review your medicines with your doctor. Some medicines can make you feel dizzy. This can increase your chance of falling.  Ask your doctor what else you can do to help prevent falls.  Where to find more information  · Centers for Disease Control and Prevention, [place]: www.cdc.gov  · National [place] on Aging: www.aleksandar.nih.gov  Contact a doctor if:  · You are afraid of falling at home.  · You feel weak, drowsy, or dizzy at home.  · You fall at home.  Summary  · There are many simple things that you can do to make your home safe and to help prevent falls.  · Ways to make your home safe include removing things that can make you trip and installing grab bars in the bathroom.  · Ask for help when making these changes in your home.  This information is not intended to replace advice given to you by your health care provider. Make sure you discuss any questions you have with your health care provider.  Document Revised: 07/21/2021 Document Reviewed: 07/21/2021  Elsevier Patient Education © 2021 Elsevier Inc.

## 2022-11-02 ENCOUNTER — TELEPHONE (OUTPATIENT)
Dept: FAMILY MEDICINE CLINIC | Facility: CLINIC | Age: 77
End: 2022-11-02

## 2022-11-02 NOTE — TELEPHONE ENCOUNTER
----- Message from FELIPE Zuluaga sent at 10/31/2022 10:09 AM EDT -----  Regarding: Records  Please get records from [facility] regarding plan of care. Thx

## 2022-11-03 PROBLEM — Z96.0 PRESENCE OF PESSARY: Status: ACTIVE | Noted: 2022-10-14

## 2022-11-03 PROBLEM — N95.2 ATROPHIC VAGINITIS: Status: ACTIVE | Noted: 2022-10-14

## 2022-11-11 ENCOUNTER — HOSPITAL ENCOUNTER (OUTPATIENT)
Dept: MAMMOGRAPHY | Facility: HOSPITAL | Age: 77
Discharge: HOME | End: 2022-11-11

## 2022-11-11 DIAGNOSIS — Z12.31 SCREENING MAMMOGRAM FOR BREAST CANCER: ICD-10-CM

## 2022-11-11 PROCEDURE — 77063 BREAST TOMOSYNTHESIS BI: CPT

## 2022-11-11 PROCEDURE — 77067 SCR MAMMO BI INCL CAD: CPT

## 2022-11-28 RX ORDER — LORATADINE 10 MG/1
10 TABLET ORAL DAILY
Qty: 90 TABLET | Refills: 1 | Status: SHIPPED | OUTPATIENT
Start: 2022-11-28 | End: 2023-02-17 | Stop reason: SDUPTHER

## 2022-11-28 RX ORDER — FAMCICLOVIR 500 MG/1
500 TABLET ORAL DAILY
Qty: 3 TABLET | Refills: 5 | Status: SHIPPED | OUTPATIENT
Start: 2022-11-28 | End: 2022-11-29

## 2022-11-29 DIAGNOSIS — I10 PRIMARY HYPERTENSION: Chronic | ICD-10-CM

## 2022-11-29 RX ORDER — METOPROLOL SUCCINATE 25 MG/1
50 TABLET, EXTENDED RELEASE ORAL DAILY
Qty: 90 TABLET | Refills: 1 | Status: SHIPPED | OUTPATIENT
Start: 2022-11-29 | End: 2023-02-21

## 2022-12-29 DIAGNOSIS — I10 PRIMARY HYPERTENSION: Chronic | ICD-10-CM

## 2023-01-03 ENCOUNTER — DOCUMENTATION (OUTPATIENT)
Dept: PHYSICAL THERAPY | Facility: CLINIC | Age: 78
End: 2023-01-03
Payer: MEDICARE

## 2023-01-03 DIAGNOSIS — S82.844D NONDISPLACED BIMALLEOLAR FRACTURE, RIGHT, CLOSED, WITH ROUTINE HEALING, SUBSEQUENT ENCOUNTER: Primary | ICD-10-CM

## 2023-01-03 DIAGNOSIS — M25.571 ACUTE RIGHT ANKLE PAIN: ICD-10-CM

## 2023-01-03 DIAGNOSIS — I10 PRIMARY HYPERTENSION: Chronic | ICD-10-CM

## 2023-01-03 DIAGNOSIS — R29.898 ANKLE WEAKNESS: ICD-10-CM

## 2023-01-03 DIAGNOSIS — R26.2 DIFFICULTY WALKING: ICD-10-CM

## 2023-01-03 NOTE — PROGRESS NOTES
Discharge Summary  Discharge Summary from Physical Therapy Report      Dates  PT visit: 4/12/2022 - 7/25/2022  Number of Visits: 21     Discharge Status of Patient: See Progress Note dated 7/25/2022    Goals: Partially Met    Discharge Plan: Continue with current home exercise program as instructed    Comments : Pt made good progress in strength and ROM of R ankle s/p fx. At last visit, pt requested PT D/C and would continue with HEP.    Date of Discharge 1/3/2023        Griselda Weeks, PT  Physical Therapist  [place] Lic# [id number]

## 2023-01-03 NOTE — TELEPHONE ENCOUNTER
Caller: SathyaYancy    Relationship: Self    Best call back number: [phone]    Requested Prescriptions:   Requested Prescriptions     Pending Prescriptions Disp Refills   • losartan (COZAAR) 100 MG tablet 90 tablet 0     Sig: Take 1 tablet by mouth Daily.   • hydroCHLOROthiazide (HYDRODIURIL) 25 MG tablet 90 tablet 0     Sig: Take 1 tablet by mouth Daily.        Pharmacy where request should be sent: [id number] - [address] AT [facility] OF  [address] [facility] - [phone] [facility] [phone] FX     Additional details provided by patient: OUT OF MEDICATION BY 1/5/23    Does the patient have less than a 3 day supply:  [x] Yes  [] No    Would you like a call back once the refill request has been completed: [x] Yes [] No    If the office needs to give you a call back, can they leave a voicemail: [x] Yes [] No    Chuck Esucdero   01/03/23 11:14 EST

## 2023-01-04 RX ORDER — LOSARTAN POTASSIUM 100 MG/1
100 TABLET ORAL DAILY
Qty: 90 TABLET | Refills: 0 | Status: SHIPPED | OUTPATIENT
Start: 2023-01-04

## 2023-01-04 RX ORDER — LOSARTAN POTASSIUM 100 MG/1
100 TABLET ORAL DAILY
Qty: 90 TABLET | Refills: 0 | OUTPATIENT
Start: 2023-01-04

## 2023-01-04 RX ORDER — HYDROCHLOROTHIAZIDE 25 MG/1
25 TABLET ORAL DAILY
Qty: 90 TABLET | Refills: 0 | Status: SHIPPED | OUTPATIENT
Start: 2023-01-04 | End: 2023-01-09

## 2023-01-04 RX ORDER — FAMCICLOVIR 500 MG/1
500 TABLET ORAL DAILY
COMMUNITY
Start: 2022-12-15

## 2023-01-09 ENCOUNTER — OFFICE VISIT (OUTPATIENT)
Dept: FAMILY MEDICINE CLINIC | Facility: CLINIC | Age: 78
End: 2023-01-09
Payer: MEDICARE

## 2023-01-09 VITALS
WEIGHT: 176 LBS | HEART RATE: 67 BPM | HEIGHT: 64 IN | DIASTOLIC BLOOD PRESSURE: 78 MMHG | TEMPERATURE: 97 F | BODY MASS INDEX: 30.05 KG/M2 | RESPIRATION RATE: 16 BRPM | OXYGEN SATURATION: 100 % | SYSTOLIC BLOOD PRESSURE: 155 MMHG

## 2023-01-09 DIAGNOSIS — I10 PRIMARY HYPERTENSION: Primary | ICD-10-CM

## 2023-01-09 DIAGNOSIS — R82.90 ABNORMAL URINALYSIS: ICD-10-CM

## 2023-01-09 DIAGNOSIS — R60.0 LOCALIZED EDEMA: ICD-10-CM

## 2023-01-09 LAB
BILIRUB BLD-MCNC: NEGATIVE MG/DL
CLARITY, POC: ABNORMAL
COLOR UR: YELLOW
GLUCOSE UR STRIP-MCNC: NEGATIVE MG/DL
KETONES UR QL: NEGATIVE
LEUKOCYTE EST, POC: ABNORMAL
NITRITE UR-MCNC: NEGATIVE MG/ML
PH UR: 7.5 [PH] (ref 5–8)
PROT UR STRIP-MCNC: NEGATIVE MG/DL
RBC # UR STRIP: ABNORMAL /UL
SP GR UR: 1.02 (ref 1–1.03)
UROBILINOGEN UR QL: NORMAL

## 2023-01-09 PROCEDURE — 81003 URINALYSIS AUTO W/O SCOPE: CPT

## 2023-01-09 PROCEDURE — 93000 ELECTROCARDIOGRAM COMPLETE: CPT

## 2023-01-09 PROCEDURE — 99214 OFFICE O/P EST MOD 30 MIN: CPT

## 2023-01-09 RX ORDER — HYDROCHLOROTHIAZIDE 25 MG/1
50 TABLET ORAL DAILY
Qty: 90 TABLET | Refills: 0 | Status: SHIPPED | OUTPATIENT
Start: 2023-01-09

## 2023-01-09 NOTE — ASSESSMENT & PLAN NOTE
Recheck today 164/74. Pulse 68 bpm.  EKG in office unremarkeable.  Increased hydrochlorothiazide to 50mg daily, changed Toprol XL to 25mg twice a day versus 50mg daily due to increased fatigue.   Return in 2 weeks for b/p check.    Not watching salt intake. Advised to decrease sodium in diet. Informational handouts given.

## 2023-01-09 NOTE — PATIENT INSTRUCTIONS
Continue current plan of care as discussed.   Take medication as ordered (if applicable).    Practice good sleep hygiene.  Eat a well balanced diet with fresh fruit and vegetables.    Stay hydrated, drink water daily.      Limit sweetened beverages, sodas, juices.    Bake, boil, broil or grill your food, avoid eating fried foods.   Regular exercise is important.  Incorporate weight or resistance into your routine.

## 2023-01-09 NOTE — PROGRESS NOTES
Subjective   Yancy Mcdonnell is a 77 y.o. female. Presents to [facility]        Chief Complaint   Patient presents with   • Hypertension       History of Present Illness  Hypertension: Patient here for follow-up of elevated blood pressure. She is not exercising and is not adherent to low salt diet.  Blood pressure is marginally controlled at home. Cardiac symptoms fatigue. Patient denies chest pain, chest pressure/discomfort, cough, exertional chest pressure/discomfort, irregular heart beat, lower extremity edema, near-syncope, palpitations and syncope.  Cardiovascular risk factors: advanced age (older than 55 for men, 65 for women), dyslipidemia, family history of premature cardiovascular disease, obesity (BMI >= 30 kg/m2) and sedentary lifestyle. Use of agents associated with hypertension: none. History of target organ damage: none.  Checked b/p at home:  174/77.   Lowest b/p at home diastolic runs in 74's, highest runs in 77-84 (she does not check the systolic) Advised her to write down both of them.     Recheck today 164/74. Pulse 68 bpm. Unable to get oxygen sat due to nail polish.  EKG in office unremarkeable.  Increased hydrochlorothiazide to 50mg daily, changed Toprol XL to 25mg twice a day versus 50mg daily due to increased fatigue.       BP Readings from Last 3 Encounters:   01/09/23 155/78   10/31/22 142/74   08/25/22 148/80       Review of Systems:  Review of Systems   Constitutional: Negative for chills, fatigue and fever.   Eyes: Positive for visual disturbance (glasses).   Respiratory: Negative for wheezing.    Cardiovascular: Positive for leg swelling. Negative for chest pain and palpitations.   Gastrointestinal: Negative.    Genitourinary: Negative for decreased urine volume, difficulty urinating, dysuria, flank pain, frequency, menstrual problem, pelvic pain, pelvic pressure, urgency, urinary incontinence, vaginal bleeding, vaginal discharge and vaginal pain.   Musculoskeletal:  Positive for arthralgias.   Neurological: Negative.  Negative for dizziness, headache and confusion.   Hematological: Negative.    Psychiatric/Behavioral: Negative.    All other systems reviewed and are negative.        I personally reviewed and updated the patient's allergies, medications, problem list, and past medical, surgical, social, and family history. I have reviewed and confirmed the accuracy of the History of Present Illness and Review of Symptoms as documented by the MA/LPN/RN. FELIPE Blas     Allergies:  Allergies   Allergen Reactions   • Demerol [Meperidine] Unknown (See Comments)     Abstracted from centricity.   • Other Unknown (See Comments)     Sulfa (sulfadiazine)    • Sulfa Antibiotics Unknown - High Severity   • Lisinopril Cough       Social History:  Social History     Socioeconomic History   • Marital status:    Tobacco Use   • Smoking status: Never   • Smokeless tobacco: Never   Vaping Use   • Vaping Use: Never used   Substance and Sexual Activity   • Alcohol use: Yes     Alcohol/week: 1.0 standard drink     Types: 1 Cans of beer per week   • Drug use: No   • Sexual activity: Defer       Family History:  Family History   Problem Relation Age of Onset   • Osteoporosis Mother    • Stroke Mother 80   • Dementia Mother    • Heart disease Father    • Hypertension Father    • Heart disease Sister    • Cancer Daughter         BREAST AND MELANOMA   • Breast cancer Daughter 52   • Cancer Son         MELANOMA   • Cancer Grandson         MELANOMA       Past Medical History :  Patient Active Problem List   Diagnosis   • Allergic rhinitis   • Lung nodule   • Dyslipidemia   • History of thrombosis   • Hypertension   • Migraine headache   • Mitral valve insufficiency   • Osteoarthritis   • Osteopenia of multiple sites   • Rheumatoid arthritis (HCC)   • Spinal stenosis of cervical region   • Venous insufficiency   • Gastroesophageal reflux disease without esophagitis   • History of herpes  simplex infection   • Class 1 obesity due to excess calories with serious comorbidity and body mass index (BMI) of 30.0 to 30.9 in adult   • Elevated alkaline phosphatase level   • Hiatal hernia   • Closed bimalleolar fracture of right ankle   • Thrombophlebitis   • Contusion of face, scalp, and neck, sequela   • H/O fall   • Deviated septum   • Abnormal urinalysis   • Long term (current) use of aspirin   • Urinary frequency   • Encounter for subsequent annual wellness visit (AWV) in Medicare patient   • Stress incontinence of urine   • Presence of pessary   • Atrophic vaginitis       Medication List:    Current Outpatient Medications:   •  albuterol sulfate HFA (Ventolin HFA) 108 (90 Base) MCG/ACT inhaler, Inhale 2 puffs Every 4 (Four) Hours As Needed for Shortness of Air. Two inhalations every 4-6 hours as needed for SOA. (Patient taking differently: Inhale 2 puffs Every 4 (Four) Hours As Needed for Shortness of Air. Two inhalations every 4-6 hours as needed for SOA.  Bring dos), Disp: 18 g, Rfl: 0  •  estradiol (ESTRACE) 0.1 MG/GM vaginal cream, , Disp: , Rfl:   •  famciclovir (FAMVIR) 500 MG tablet, Take 500 mg by mouth Daily., Disp: , Rfl:   •  fluticasone (FLONASE) 50 MCG/ACT nasal spray, INJECT 2 SPRAYS INTO THE NOSTRILS EVERY DAY AS DIRECTED, Disp: 48 g, Rfl: 4  •  hydroCHLOROthiazide (HYDRODIURIL) 25 MG tablet, Take 2 tablets by mouth Daily., Disp: 90 tablet, Rfl: 0  •  lidocaine (LIDODERM) 5 %, Place 1 patch on the skin as directed by provider Daily. Remove & Discard patch within 12 hours or as directed by MD, Disp: 6 patch, Rfl: 0  •  loratadine (CLARITIN) 10 MG tablet, Take 1 tablet by mouth Daily., Disp: 90 tablet, Rfl: 1  •  losartan (COZAAR) 100 MG tablet, TAKE 1 TABLET BY MOUTH DAILY, Disp: 90 tablet, Rfl: 0  •  lysine 500 MG tablet, Take 1 tablet by mouth Daily As Needed. Stop now for surgery, Disp: , Rfl:   •  Magnesium 250 MG tablet, Take  by mouth. Stop now for surgery, Disp: , Rfl:   •   metoprolol succinate XL (Toprol XL) 25 MG 24 hr tablet, Take 2 tablets by mouth Daily., Disp: 90 tablet, Rfl: 1  •  Misc Natural Products (LUTEIN 20 PO), Take  by mouth. Stop now for surgery, Disp: , Rfl:   •  Multiple Vitamin (MULTI-VITAMIN DAILY) tablet, Take 1 tablet by mouth Daily. Stop now for surgery, Disp: , Rfl:   •  NON FORMULARY, Sustain eye drops, Disp: , Rfl:   •  tiZANidine (ZANAFLEX) 4 MG tablet, Take 1 tablet by mouth 2 (Two) Times a Day As Needed for Muscle Spasms., Disp: 12 tablet, Rfl: 0  •  triamcinolone (KENALOG) 0.1 % cream, APPLY TO LOWER LEGS AND TORSO EVERY DAY TO TWICE DAILY FOR ITCHING, Disp: , Rfl:     Past Surgical History:  Past Surgical History:   Procedure Laterality Date   • ANKLE OPEN REDUCTION INTERNAL FIXATION Right 2022    Procedure: ANKLE OPEN REDUCTION INTERNAL FIXATION;  Surgeon: DEAN Tobias DPM;  Location: [facility] OR;  Service: Podiatry;  Laterality: Right;   • COLONOSCOPY  2017    EGD/ COLONSCOPY LARGE HIATAL HERNIA. MILD DIVERTICULOSIS. OTHERWISE NORMAL.   • INGUINAL HERNIA REPAIR Right    • ORIF ANKLE FRACTURE Right 2022    Dr. Tobias   • TOTAL ABDOMINAL HYSTERECTOMY      W/BSO WITH A/P REPAIR 1985 BENIGN REASONS DR. DRAKE         Physical Exam:  Physical Exam  Vitals and nursing note reviewed.   Constitutional:       General: She is not in acute distress.     Appearance: Normal appearance. She is well-groomed and normal weight.   Neck:      Vascular: No carotid bruit.   Cardiovascular:      Rate and Rhythm: Normal rate and regular rhythm.      Pulses: Normal pulses.   Pulmonary:      Effort: Pulmonary effort is normal.      Breath sounds: Normal breath sounds.   Musculoskeletal:      Right lower le+ Pitting Edema present.      Left lower le+ Pitting Edema present.   Skin:     General: Skin is warm and dry.      Capillary Refill: Capillary refill takes less than 2 seconds.   Neurological:      Mental Status: She is alert and oriented  to person, place, and time.   Psychiatric:         Mood and Affect: Mood normal.         Behavior: Behavior normal. Behavior is cooperative.          Vital Signs:  Vital Signs:  /78 (BP Location: Right arm, Patient Position: Sitting, Cuff Size: Large Adult)   Pulse 67   Temp 97 °F (36.1 °C) (Infrared)   Resp 16   Ht 162.6 cm (64\")   Wt 79.8 kg (176 lb)   SpO2 100%   BMI 30.21 kg/m²   Vitals:    01/09/23 0915   BP: 155/78   BP Location: Right arm   Patient Position: Sitting   Cuff Size: Large Adult   Pulse: 67   Resp: 16   Temp: 97 °F (36.1 °C)   TempSrc: Infrared   SpO2: 100%   Weight: 79.8 kg (176 lb)   Height: 162.6 cm (64\")        Estimated body mass index is 30.21 kg/m² as calculated from the following:    Height as of this encounter: 162.6 cm (64\").    Weight as of this encounter: 79.8 kg (176 lb).    Result Review :            ECG 12 Lead    Date/Time: 1/9/2023 10:25 AM  Performed by: Alyse Mcnally APRN  Authorized by: Alyse Mcnally APRN   Comparison: compared with previous ECG from 2/16/2022  Similar to previous ECG  Comparison to previous ECG: Heart rate decrease from 80 to 67.   Rhythm: sinus rhythm  Rate: normal  QRS axis: left    Clinical impression: normal ECG           No visits with results within 1 Day(s) from this visit.   Latest known visit with results is:   Office Visit on 08/25/2022   Component Date Value Ref Range Status   • Color 08/25/2022 Yellow  Yellow, Straw, Dark Yellow, Pallavi Final   • Clarity, UA 08/25/2022 Cloudy (A)  Clear Final   • Specific Gravity  08/25/2022 1.020  1.005 - 1.030 Final   • pH, Urine 08/25/2022 6.5  5.0 - 8.0 Final   • Leukocytes 08/25/2022 Large (3+) (A)  Negative Final   • Nitrite, UA 08/25/2022 Negative  Negative Final   • Protein, POC 08/25/2022 Negative  Negative mg/dL Final   • Glucose, UA 08/25/2022 Negative  Negative mg/dL Final   • Ketones, UA 08/25/2022 Negative  Negative Final   • Urobilinogen, UA 08/25/2022 0.2 E.U./dL  Normal, 0.2  E.U./dL Final   • Bilirubin 08/25/2022 Negative  Negative Final   • Blood, UA 08/25/2022 Small (A)  Negative Final   • Lot Number 08/25/2022 [id number]   Final   • Expiration Date 08/25/2022 02/28/2023   Final   • Urine Culture 08/25/2022 Final report (A)   Final   • Result 1 08/25/2022 Comment (A)   Final    Comment: Escherichia coli, identified by an automated biochemical system.  Cefazolin <=4 ug/mL  Cefazolin with an NIKOLAI <=16 predicts susceptibility to the oral agents  cefaclor, cefdinir, cefpodoxime, cefprozil, cefuroxime, cephalexin,  and loracarbef when used for therapy of uncomplicated urinary tract  infections due to E. coli, Klebsiella pneumoniae, and Proteus  mirabilis.  Greater than 100,000 colony forming units per mL     • Result 2 08/25/2022 Klebsiella pneumoniae (A)   Final    Comment: Greater than 100,000 colony forming units per mL  Cefazolin <=4 ug/mL  Cefazolin with an NIKOLAI <=16 predicts susceptibility to the oral agents  cefaclor, cefdinir, cefpodoxime, cefprozil, cefuroxime, cephalexin,  and loracarbef when used for therapy of uncomplicated urinary tract  infections due to E. coli, Klebsiella pneumoniae, and Proteus  mirabilis.     • Susceptibility Testing 08/25/2022 Comment   Final    Comment:       ** S = Susceptible; I = Intermediate; R = Resistant **                     P = Positive; N = Negative              MICS are expressed in micrograms per mL     Antibiotic                 RSLT#1    RSLT#2    RSLT#3    RSLT#4  Amoxicillin/Clavulanic Acid    S         S  Ampicillin                     S         R  Cefepime                       S         S  Ceftriaxone                    S         S  Cefuroxime                     S         I  Ciprofloxacin                  S         S  Ertapenem                      S         S  Gentamicin                     S         S  Imipenem                       S         S  Levofloxacin                   S         S  Meropenem                      S          S  Nitrofurantoin                 S         R  Piperacillin/Tazobactam        S         S  Tetracycline                   S         I  Tobramycin                     S         S  Trimethoprim/Sulfa             S         S     • Total Cholesterol 08/26/2022 171  100 - 199 mg/dL Final   • Triglycerides 08/26/2022 170 (H)  0 - 149 mg/dL Final   • HDL Cholesterol 08/26/2022 52  >39 mg/dL Final   • VLDL Cholesterol Jared 08/26/2022 29  5 - 40 mg/dL Final   • LDL Chol Calc (NIH) 08/26/2022 90  0 - 99 mg/dL Final   • Glucose 08/26/2022 84  65 - 99 mg/dL Final   • BUN 08/26/2022 18  8 - 27 mg/dL Final   • Creatinine 08/26/2022 0.77  0.57 - 1.00 mg/dL Final   • EGFR Result 08/26/2022 79  >59 mL/min/1.73 Final   • BUN/Creatinine Ratio 08/26/2022 23  12 - 28 Final   • Sodium 08/26/2022 142  134 - 144 mmol/L Final   • Potassium 08/26/2022 3.9  3.5 - 5.2 mmol/L Final   • Chloride 08/26/2022 103  96 - 106 mmol/L Final   • Total CO2 08/26/2022 27  20 - 29 mmol/L Final   • Calcium 08/26/2022 9.5  8.7 - 10.3 mg/dL Final   • Total Protein 08/26/2022 5.9 (L)  6.0 - 8.5 g/dL Final   • Albumin 08/26/2022 3.9  3.7 - 4.7 g/dL Final   • Globulin 08/26/2022 2.0  1.5 - 4.5 g/dL Final   • A/G Ratio 08/26/2022 2.0  1.2 - 2.2 Final   • Total Bilirubin 08/26/2022 0.6  0.0 - 1.2 mg/dL Final   • Alkaline Phosphatase 08/26/2022 130 (H)  44 - 121 IU/L Final   • AST (SGOT) 08/26/2022 15  0 - 40 IU/L Final   • ALT (SGPT) 08/26/2022 15  0 - 32 IU/L Final   • WBC 08/26/2022 7.1  3.4 - 10.8 x10E3/uL Final   • RBC 08/26/2022 4.07  3.77 - 5.28 x10E6/uL Final   • Hemoglobin 08/26/2022 11.7  11.1 - 15.9 g/dL Final   • Hematocrit 08/26/2022 37.0  34.0 - 46.6 % Final   • MCV 08/26/2022 91  79 - 97 fL Final   • MCH 08/26/2022 28.7  26.6 - 33.0 pg Final   • MCHC 08/26/2022 31.6  31.5 - 35.7 g/dL Final   • RDW 08/26/2022 13.5  11.7 - 15.4 % Final   • Platelets 08/26/2022 405  150 - 450 x10E3/uL Final   • Neutrophil Rel % 08/26/2022 66  Not Estab. % Final   •  Lymphocyte Rel % 08/26/2022 20  Not Estab. % Final   • Monocyte Rel % 08/26/2022 11  Not Estab. % Final   • Eosinophil Rel % 08/26/2022 2  Not Estab. % Final   • Basophil Rel % 08/26/2022 0  Not Estab. % Final   • Neutrophils Absolute 08/26/2022 4.7  1.4 - 7.0 x10E3/uL Final   • Lymphocytes Absolute 08/26/2022 1.4  0.7 - 3.1 x10E3/uL Final   • Monocytes Absolute 08/26/2022 0.8  0.1 - 0.9 x10E3/uL Final   • Eosinophils Absolute 08/26/2022 0.2  0.0 - 0.4 x10E3/uL Final   • Basophils Absolute 08/26/2022 0.0  0.0 - 0.2 x10E3/uL Final   • Immature Granulocyte Rel % 08/26/2022 1  Not Estab. % Final   • Immature Grans Absolute 08/26/2022 0.0  0.0 - 0.1 x10E3/uL Final   • INR 08/25/2022 1.0  0.9 - 1.1 Final   ]  PHQ-9 Total Score:                   Assessment and Plan   Problems Addressed this Visit        Cardiac and Vasculature    Hypertension - Primary (Chronic)     Recheck today 164/74. Pulse 68 bpm.  EKG in office unremarkeable.  Increased hydrochlorothiazide to 50mg daily, changed Toprol XL to 25mg twice a day versus 50mg daily due to increased fatigue.   Return in 2 weeks for b/p check.    Not watching salt intake. Advised to decrease sodium in diet. Informational handouts given.          Relevant Medications    hydroCHLOROthiazide (HYDRODIURIL) 25 MG tablet    Other Relevant Orders    CBC & Differential    Comprehensive Metabolic Panel    Lipid Panel    POC Urinalysis Dipstick, Multipro    ECG 12 Lead (Completed)   Other Visit Diagnoses     Localized edema        Increased hydrochlorothiazide to 50mg daily.   Limit dietary intake of salt. Robert hose. check in 2 weeks.     Relevant Orders    CBC & Differential    Comprehensive Metabolic Panel    Lipid Panel    POC Urinalysis Dipstick, Multipro    ECG 12 Lead (Completed)      Diagnoses       Codes Comments    Primary hypertension    -  Primary ICD-10-CM: I10  ICD-9-CM: 401.9     Localized edema     ICD-10-CM: R60.0  ICD-9-CM: 782.3 Increased hydrochlorothiazide to 50mg  daily.   Limit dietary intake of salt. Robert hose. check in 2 weeks.           BMI is >= 25 and <30. (Overweight) The following options were offered after discussion;: exercise counseling/recommendations and nutrition counseling/recommendations      Diagnoses and all orders for this visit:    1. Primary hypertension (Primary)  Assessment & Plan:  Recheck today 164/74. Pulse 68 bpm.  EKG in office unremarkeable.  Increased hydrochlorothiazide to 50mg daily, changed Toprol XL to 25mg twice a day versus 50mg daily due to increased fatigue.   Return in 2 weeks for b/p check.    Not watching salt intake. Advised to decrease sodium in diet. Informational handouts given.     Orders:  -     CBC & Differential  -     Comprehensive Metabolic Panel  -     Lipid Panel  -     POC Urinalysis Dipstick, Multipro  -     ECG 12 Lead  -     hydroCHLOROthiazide (HYDRODIURIL) 25 MG tablet; Take 2 tablets by mouth Daily.  Dispense: 90 tablet; Refill: 0    2. Localized edema  Comments:  Increased hydrochlorothiazide to 50mg daily.   Limit dietary intake of salt. Robert hose. check in 2 weeks.   Orders:  -     CBC & Differential  -     Comprehensive Metabolic Panel  -     Lipid Panel  -     POC Urinalysis Dipstick, Multipro  -     ECG 12 Lead       Follow Up   Return in about 2 weeks (around 1/23/2023), or b/p check.  Patient was given instructions and counseling regarding her condition or for health maintenance advice. Please see specific information pulled into the AVS if appropriate.    Patient Instructions   Continue current plan of care as discussed.   Take medication as ordered (if applicable).    Practice good sleep hygiene.  Eat a well balanced diet with fresh fruit and vegetables.    Stay hydrated, drink water daily.      Limit sweetened beverages, sodas, juices.    Bake, boil, broil or grill your food, avoid eating fried foods.   Regular exercise is important.  Incorporate weight or resistance into your routine.        I wore  protective equipment throughout this patient encounter to include mask and eyewear. Hand hygiene was performed before donning protective equipment and after removal when leaving the room.    This document is intended for medical expert use only. Reading of this document by patients and/or patient's family without participating medical staff guidance may result in misinterpretation and unintended morbidity. Any interpretation of such data is the responsibility of the patient and/or family member responsible for the patient in concert with their primary or specialist providers, not to be left for sources of online searches such as [employer], [employer] or similar queries. Relying on these approaches to knowledge may result in misinterpretation, misguided goals of care and even death should patients or family members try recommendations outside of the realm of professional medical care.

## 2023-01-10 LAB
ALBUMIN SERPL-MCNC: 4.1 G/DL (ref 3.7–4.7)
ALBUMIN/GLOB SERPL: 1.7 {RATIO} (ref 1.2–2.2)
ALP SERPL-CCNC: 139 IU/L (ref 44–121)
ALT SERPL-CCNC: 19 IU/L (ref 0–32)
AST SERPL-CCNC: 18 IU/L (ref 0–40)
BASOPHILS # BLD AUTO: 0 X10E3/UL (ref 0–0.2)
BASOPHILS NFR BLD AUTO: 0 %
BILIRUB SERPL-MCNC: 0.7 MG/DL (ref 0–1.2)
BUN SERPL-MCNC: 15 MG/DL (ref 8–27)
BUN/CREAT SERPL: 19 (ref 12–28)
CALCIUM SERPL-MCNC: 9.6 MG/DL (ref 8.7–10.3)
CHLORIDE SERPL-SCNC: 104 MMOL/L (ref 96–106)
CHOLEST SERPL-MCNC: 173 MG/DL (ref 100–199)
CO2 SERPL-SCNC: 27 MMOL/L (ref 20–29)
CREAT SERPL-MCNC: 0.77 MG/DL (ref 0.57–1)
EGFRCR SERPLBLD CKD-EPI 2021: 79 ML/MIN/1.73
EOSINOPHIL # BLD AUTO: 0.1 X10E3/UL (ref 0–0.4)
EOSINOPHIL NFR BLD AUTO: 1 %
ERYTHROCYTE [DISTWIDTH] IN BLOOD BY AUTOMATED COUNT: 12.1 % (ref 11.7–15.4)
GLOBULIN SER CALC-MCNC: 2.4 G/DL (ref 1.5–4.5)
GLUCOSE SERPL-MCNC: 98 MG/DL (ref 70–99)
HCT VFR BLD AUTO: 31.9 % (ref 34–46.6)
HDLC SERPL-MCNC: 52 MG/DL
HGB BLD-MCNC: 9.8 G/DL (ref 11.1–15.9)
IMM GRANULOCYTES # BLD AUTO: 0 X10E3/UL (ref 0–0.1)
IMM GRANULOCYTES NFR BLD AUTO: 0 %
LDLC SERPL CALC-MCNC: 89 MG/DL (ref 0–99)
LYMPHOCYTES # BLD AUTO: 0.9 X10E3/UL (ref 0.7–3.1)
LYMPHOCYTES NFR BLD AUTO: 14 %
MCH RBC QN AUTO: 26.7 PG (ref 26.6–33)
MCHC RBC AUTO-ENTMCNC: 30.7 G/DL (ref 31.5–35.7)
MCV RBC AUTO: 87 FL (ref 79–97)
MONOCYTES # BLD AUTO: 0.7 X10E3/UL (ref 0.1–0.9)
MONOCYTES NFR BLD AUTO: 12 %
NEUTROPHILS # BLD AUTO: 4.7 X10E3/UL (ref 1.4–7)
NEUTROPHILS NFR BLD AUTO: 73 %
PLATELET # BLD AUTO: 391 X10E3/UL (ref 150–450)
POTASSIUM SERPL-SCNC: 4.3 MMOL/L (ref 3.5–5.2)
PROT SERPL-MCNC: 6.5 G/DL (ref 6–8.5)
RBC # BLD AUTO: 3.67 X10E6/UL (ref 3.77–5.28)
SODIUM SERPL-SCNC: 142 MMOL/L (ref 134–144)
TRIGL SERPL-MCNC: 186 MG/DL (ref 0–149)
VLDLC SERPL CALC-MCNC: 32 MG/DL (ref 5–40)
WBC # BLD AUTO: 6.5 X10E3/UL (ref 3.4–10.8)

## 2023-01-12 ENCOUNTER — TELEPHONE (OUTPATIENT)
Dept: FAMILY MEDICINE CLINIC | Facility: CLINIC | Age: 78
End: 2023-01-12
Payer: MEDICARE

## 2023-01-12 DIAGNOSIS — D64.9 ANEMIA, UNSPECIFIED TYPE: ICD-10-CM

## 2023-01-12 DIAGNOSIS — R74.8 ELEVATED ALKALINE PHOSPHATASE LEVEL: Primary | ICD-10-CM

## 2023-01-12 NOTE — TELEPHONE ENCOUNTER
Caller: Yancy Mcdonnell    Relationship: Self    Best call back number: [phone]    What was the call regarding: PLEASE CALL PATIENT TO SCHEDULE LABS    Do you require a callback: YES

## 2023-01-12 NOTE — TELEPHONE ENCOUNTER
HUB TO ADVISE PATIENT ON THE MESSAGE ATTACHED.    Let us know if she is having any urinary symptoms

## 2023-01-12 NOTE — TELEPHONE ENCOUNTER
----- Message from FELIPE Zuluaga sent at 1/12/2023 12:26 AM EST -----  Please advise patient that her most recent lab results indicate she has some anemia. I have ordered some blood work to determine the origin of the anemia.  Have her go to [facility] at her convenience.     Urinalysis was questionable for a urinary tract  infection - does she have any urinary symptoms?     Please remind her to follow through with the colonoscopy I previously placed.

## 2023-01-12 NOTE — PROGRESS NOTES
Please advise patient that her most recent lab results indicate she has some anemia. I have ordered some blood work to determine the origin of the anemia.  Have her go to [facility] at her convenience.     Urinalysis was questionable for a urinary tract  infection - does she have any urinary symptoms?     Please remind her to follow through with the colonoscopy I previously placed.

## 2023-01-13 LAB
BACTERIA UR CULT: ABNORMAL
BACTERIA UR CULT: ABNORMAL
OTHER ANTIBIOTIC SUSC ISLT: ABNORMAL

## 2023-01-15 DIAGNOSIS — N30.01 ACUTE CYSTITIS WITH HEMATURIA: ICD-10-CM

## 2023-01-15 RX ORDER — CEPHALEXIN 500 MG/1
500 CAPSULE ORAL 2 TIMES DAILY
Qty: 30 CAPSULE | Refills: 0 | Status: SHIPPED | OUTPATIENT
Start: 2023-01-15 | End: 2023-01-22

## 2023-01-18 RX ORDER — LEVOTHYROXINE SODIUM 0.03 MG/1
25 TABLET ORAL DAILY
Qty: 30 TABLET | Refills: 12 | Status: SHIPPED | OUTPATIENT
Start: 2023-01-18

## 2023-01-19 RX ORDER — LEVOTHYROXINE SODIUM 0.03 MG/1
25 TABLET ORAL DAILY
Qty: 90 TABLET | OUTPATIENT
Start: 2023-01-19

## 2023-01-23 ENCOUNTER — CLINICAL SUPPORT (OUTPATIENT)
Dept: FAMILY MEDICINE CLINIC | Facility: CLINIC | Age: 78
End: 2023-01-23
Payer: MEDICARE

## 2023-01-23 VITALS
HEART RATE: 67 BPM | TEMPERATURE: 97.4 F | OXYGEN SATURATION: 96 % | RESPIRATION RATE: 18 BRPM | DIASTOLIC BLOOD PRESSURE: 69 MMHG | HEIGHT: 64 IN | WEIGHT: 175 LBS | BODY MASS INDEX: 29.88 KG/M2 | SYSTOLIC BLOOD PRESSURE: 126 MMHG

## 2023-01-23 DIAGNOSIS — I10 HYPERTENSION, UNSPECIFIED TYPE: Chronic | ICD-10-CM

## 2023-02-17 DIAGNOSIS — I10 PRIMARY HYPERTENSION: Chronic | ICD-10-CM

## 2023-02-17 RX ORDER — LORATADINE 10 MG/1
10 TABLET ORAL DAILY
Qty: 90 TABLET | Refills: 1 | Status: SHIPPED | OUTPATIENT
Start: 2023-02-17

## 2023-02-21 RX ORDER — METOPROLOL SUCCINATE 25 MG/1
50 TABLET, EXTENDED RELEASE ORAL DAILY
Qty: 90 TABLET | Refills: 1 | Status: SHIPPED | OUTPATIENT
Start: 2023-02-21

## 2023-02-28 ENCOUNTER — TELEPHONE (OUTPATIENT)
Dept: FAMILY MEDICINE CLINIC | Facility: CLINIC | Age: 78
End: 2023-02-28
Payer: MEDICARE

## 2023-02-28 DIAGNOSIS — D64.9 ANEMIA, UNSPECIFIED TYPE: Primary | ICD-10-CM

## 2023-02-28 NOTE — TELEPHONE ENCOUNTER
Caller: Yancy Mcdonnell    Relationship to patient: Self    Best call back number: [phone]    Patient is needing: PATIENT CALLED AND SAID THAT SHE IS SUPPOSED TO HAVE REPEAT THYROID LABS DONE BUT IS NOT SURE IF THE ORDERS HAVE BEEN ENTERED OR NOT     SHE IS WANTING TO HAVE THEM DONE AT THE [facility] THERE IN THE [facility]

## 2023-03-02 LAB — TSH SERPL DL<=0.005 MIU/L-ACNC: 1.98 UIU/ML (ref 0.45–4.5)

## 2023-03-06 ENCOUNTER — APPOINTMENT (OUTPATIENT)
Dept: GENERAL RADIOLOGY | Facility: HOSPITAL | Age: 78
End: 2023-03-06
Payer: MEDICARE

## 2023-03-06 ENCOUNTER — HOSPITAL ENCOUNTER (EMERGENCY)
Facility: HOSPITAL | Age: 78
Discharge: HOME | End: 2023-03-06
Attending: EMERGENCY MEDICINE | Admitting: EMERGENCY MEDICINE
Payer: MEDICARE

## 2023-03-06 VITALS
SYSTOLIC BLOOD PRESSURE: 150 MMHG | TEMPERATURE: 97.7 F | WEIGHT: 167.77 LBS | RESPIRATION RATE: 16 BRPM | DIASTOLIC BLOOD PRESSURE: 57 MMHG | HEIGHT: 62 IN | HEART RATE: 78 BPM | OXYGEN SATURATION: 98 % | BODY MASS INDEX: 30.87 KG/M2
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
## 2023-03-06 DIAGNOSIS — R26.89 BALANCE PROBLEM: ICD-10-CM

## 2023-03-06 DIAGNOSIS — S20.229A CONTUSION OF BACK, UNSPECIFIED LATERALITY, INITIAL ENCOUNTER: ICD-10-CM

## 2023-03-06 PROCEDURE — 71111 X-RAY EXAM RIBS/CHEST4/> VWS: CPT

## 2023-03-06 PROCEDURE — 72072 X-RAY EXAM THORAC SPINE 3VWS: CPT

## 2023-03-06 PROCEDURE — 99283 EMERGENCY DEPT VISIT LOW MDM: CPT

## 2023-03-06 NOTE — CASE MANAGEMENT/SOCIAL WORK
"Discharge Planning Assessment  [facility]     Patient Name: Yancy Mcdonnell  MRN: [id number]  Today's Date: 3/6/2023    Admit Date: 3/6/2023    Plan: Home with family and outpatient PT at [facility]   Discharge Needs Assessment     Row Name 03/06/23 1224       Living Environment    People in Home spouse    Name(s) of People in Home Braydon    Current Living Arrangements home    Primary Care Provided by self    Provides Primary Care For no one    Family Caregiver if Needed spouse    Family Caregiver Names Braydon    Quality of Family Relationships other (see comments)  \"will help if he has to\"    Able to Return to Prior Arrangements yes       Resource/Environmental Concerns    Resource/Environmental Concerns none    Transportation Concerns none       Transition Planning    Patient/Family Anticipates Transition to home with family    Patient/Family Anticipated Services at Transition outpatient care    Transportation Anticipated family or friend will provide  Yoan Bryson       Discharge Needs Assessment    Equipment Currently Used at Home other (see comments)  Tri-tip cane    Concerns to be Addressed denies needs/concerns at this time    Anticipated Changes Related to Illness none    Equipment Needed After Discharge none    Outpatient/Agency/Support Group Needs outpatient therapy    Provided Post Acute Provider List? Refused    Refused Provider List Comment Pt reports she has had [facility] outpatient PT, and that is who she wants again               Discharge Plan     Row Name 03/06/23 1226       Plan    Plan Home with family and outpatient PT at [facility]    Patient/Family in Agreement with Plan yes    Plan Comments Contacted by AGUILA OGNZALEZ about setting patient up with home health PT.Upon speaking with patient, she reports she is IADLs. 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  spoke with Griselda at that location and obtained an appointment for patient at 1600 on 3/7/23. Pt given this information. She verbalized understanding and denies additional dc needs. PCP and pharmacy were confirmed with patient.She denies transportation on medication issues.              Continued Care and Services - Discharged on 3/6/2023 Admission date: 3/6/2023 - Discharge disposition: Home or Self Care    Therapy     Service Provider Request Status Selected Services Address Phone Fax Patient Preferred    [facility] PHYSICAL THERAPY [place]  Selected Outpatient Rehabilitation [address], [place] [address] [phone] --                 Demographic Summary     Row Name 03/06/23 1224       General Information    Arrived From home    Referral Source hospital clinician/department    Reason for Consult discharge planning    Preferred Language English       Contact Information    Permission Granted to Share Info With ;facility                Functional Status     Row Name 03/06/23 1224       Functional Status    Usual Activity Tolerance good    Current Activity Tolerance moderate       Functional Status, IADL    Medications independent    Meal Preparation independent    Housekeeping independent    Laundry independent    Shopping independent              Ghislaine Villarreal RN, [facility]  Office: [phone]  Fax: [phone]  [email or web address]      I met with patient in room wearing PPE: mask and goggles.     Maintained distance greater than six feet and spent </=15 minutes in the room      Ghislaine Villarreal RN

## 2023-03-06 NOTE — DISCHARGE INSTRUCTIONS
Apply ice every 2 hours while awake, on for 20 minutes.  Tylenol Motrin as needed for pain.  Perform back exercises as directed.  Schedule follow-up with primary care for recheck.  Return to the ER for new or worsening symptoms.

## 2023-03-06 NOTE — DISCHARGE PLACEMENT REQUEST
"Yancy Mcdonnell (77 y.o. Female)     Date of Birth   1945    Social Security Number       Address   [address] [place] [address] [address] [place] [facility]    Home Phone   [phone]    MRN   [id number]       Gnosticist   Synagogue    Marital Status                               Admission Date   3/6/23    Admission Type   Emergency    Admitting Provider       Attending Provider   Elvin Cheng MD    Department, Room/Bed   [facility] EMERGENCY DEPARTMENT, 25/25       Discharge Date       Discharge Disposition       Discharge Destination                               Attending Provider: Elvin Cheng MD    Allergies: Demerol [Meperidine], Other, Sulfa Antibiotics, Lisinopril    Isolation: None   Infection: None   Code Status: Prior    Ht: 157.5 cm (62\")   Wt: 76.1 kg (167 lb 12.3 oz)    Admission Cmt: None   Principal Problem: None                Active Insurance as of 3/6/2023     Primary Coverage     Payor Plan Insurance Group Employer/Plan Group    HUMANA MEDICARE REPLACEMENT HUMANA MEDICARE REPLACEMENT [id number]     Payor Plan Address Payor Plan Phone Number Payor Plan Fax Number Effective Dates    [address] [address] [phone]  1/1/2022 - None Entered    [facility] [address]       Subscriber Name Subscriber Birth Date Member ID       YANCY MCDONNELL 1945 [id number]                 Emergency Contacts      (Rel.) Home Phone Work Phone Mobile Phone    KIRA MCDONNELL (Spouse) [phone] -- [phone]          "

## 2023-03-06 NOTE — ED PROVIDER NOTES
Subjective   History of Present Illness  77-year-old female presents with complaint of fall while sitting on the edge of the bathtub.  Patient reports distal thoracic spine pain and posterior rib pain.  She also reports ecchymosis of bilateral forearms, denies bone pain.  She reports that she had fallen 2 days prior.  She reports that she has balance issues due to previous right ankle fracture and she ambulates with a three-point cane.  She denies LOC.  Denies any cervical spine tenderness.        Review of Systems    Past Medical History:   Diagnosis Date   • Allergic rhinitis 06/02/2015   • Ankle fracture, right 02/2022   • Body mass index 29.0-29.9, adult 09/12/2018   • Cervical spinal stenosis 08/07/2017   • Cholelithiasis    • Class 1 obesity due to excess calories with serious comorbidity and body mass index (BMI) of 30.0 to 30.9 in adult 10/14/2020   • Closed bimalleolar fracture of right ankle 2/11/2022   • DDD (degenerative disc disease), cervical    • DDD (degenerative disc disease), lumbar    • DDD (degenerative disc disease), thoracic    • Diverticulosis    • Dyslipidemia 12/05/2012   • Elevated brain natriuretic peptide (BNP) level 01/06/2017   • Eustachian tube disorder, right 02/18/2019   • Fall 02/2022   • Fatigue 06/02/2015   • Female cystocele 06/02/2015   • Hiatal hernia    • Hiatal hernia with gastroesophageal reflux 01/06/2017   • History of blood clots 1980    leg s/p fx   • History of DVT (deep vein thrombosis) 06/02/2015   • History of herpes simplex infection 7/25/2020   • Hyperlipidemia    • Hypertension 06/10/2016   • Left rib fracture 05/2017    11TH RIB    • Migraine headache 06/02/2015   • Mitral insufficiency 01/16/2017    MILD   • Osteoarthritis 05/20/2014   • Osteopenia 12/05/2012   • Over weight 06/14/2018   • Peripheral edema 01/05/2017   • PFO (patent foramen ovale) 01/16/2017   • Pulmonary nodule 05/04/2017    DR CHUN FOLLOWS   • RHA (rheumatoid arthritis) ([facility]) 12/05/2012   •  Scleritis 06/02/2015   • Scoliosis    • Thrombophlebitis 2/19/2022    Acute right lower extremity superficial thrombophlebitis noted in the great saphenous (below knee) and varicosity (below knee).     • Tricuspid insufficiency     MILD   • Urinary urgency    • Varicose veins of other specified sites    • Venous insufficiency 09/12/2018       Allergies   Allergen Reactions   • Demerol [Meperidine] Unknown (See Comments)     [facility]   • Other Unknown (See Comments)     Sulfa (sulfadiazine)    • Sulfa Antibiotics Unknown - High Severity   • Lisinopril Cough       Past Surgical History:   Procedure Laterality Date   • ANKLE OPEN REDUCTION INTERNAL FIXATION Right 02/18/2022    Procedure: ANKLE OPEN REDUCTION INTERNAL FIXATION;  Surgeon: DEAN Tobias DPM;  Location: [facility] MAIN OR;  Service: Podiatry;  Laterality: Right;   • COLONOSCOPY  07/20/2017    EGD/ COLONSCOPY LARGE HIATAL HERNIA. MILD DIVERTICULOSIS. OTHERWISE NORMAL.   • INGUINAL HERNIA REPAIR Right    • ORIF ANKLE FRACTURE Right 02/18/2022    Dr. Tobias   • TOTAL ABDOMINAL HYSTERECTOMY  1985    W/BSO WITH A/P REPAIR 1985 BENIGN REASONS DR. DRAKE       Family History   Problem Relation Age of Onset   • Osteoporosis Mother    • Stroke Mother 80   • Dementia Mother    • Heart disease Father    • Hypertension Father    • Heart disease Sister    • Cancer Daughter         BREAST AND MELANOMA   • Breast cancer Daughter 52   • Cancer Son         MELANOMA   • Cancer Grandson         MELANOMA       Social History     Socioeconomic History   • Marital status:    Tobacco Use   • Smoking status: Never   • Smokeless tobacco: Never   Vaping Use   • Vaping Use: Never used   Substance and Sexual Activity   • Alcohol use: Yes     Alcohol/week: 1.0 standard drink     Types: 1 Cans of beer per week   • Drug use: No   • Sexual activity: Defer           Objective   Physical Exam  Vitals and nursing note reviewed.   Constitutional:       General: She  is awake. She is not in acute distress.     Appearance: Normal appearance. She is well-developed and normal weight. She is not ill-appearing, toxic-appearing or diaphoretic.   HENT:      Head: Normocephalic and atraumatic.   Eyes:      Extraocular Movements: Extraocular movements intact.      Conjunctiva/sclera: Conjunctivae normal.      Pupils: Pupils are equal, round, and reactive to light.   Neck:      Thyroid: No thyromegaly.      Vascular: No JVD.      Trachea: Trachea and phonation normal. No tracheal deviation.   Cardiovascular:      Rate and Rhythm: Normal rate and regular rhythm.      Pulses: Normal pulses.           Radial pulses are 2+ on the right side and 2+ on the left side.        Dorsalis pedis pulses are 2+ on the right side and 2+ on the left side.        Posterior tibial pulses are 2+ on the right side and 2+ on the left side.      Heart sounds: Normal heart sounds, S1 normal and S2 normal. Heart sounds not distant. No murmur heard.    No friction rub. No gallop.   Pulmonary:      Effort: Pulmonary effort is normal. No respiratory distress.      Breath sounds: Normal breath sounds and air entry. No wheezing or rales.   Chest:      Chest wall: No tenderness.   Abdominal:      General: Bowel sounds are normal. There is no distension.      Palpations: Abdomen is soft. Abdomen is not rigid. There is no mass.      Tenderness: There is no abdominal tenderness. There is no guarding or rebound.      Hernia: No hernia is present.   Musculoskeletal:         General: Tenderness and signs of injury present. No swelling or deformity. Normal range of motion.      Cervical back: Normal, full passive range of motion without pain, normal range of motion and neck supple. No tenderness.      Thoracic back: Signs of trauma, tenderness and bony tenderness present. No swelling or deformity. Normal range of motion.      Lumbar back: Normal.        Back:       Right lower leg: No edema.      Left lower leg: No edema.  "  Skin:     General: Skin is warm and dry.      Capillary Refill: Capillary refill takes less than 2 seconds.      Coloration: Skin is not pale.      Findings: Bruising present. No rash.          Neurological:      Mental Status: She is alert and oriented to person, place, and time.      Sensory: No sensory deficit.      Motor: No abnormal muscle tone.   Psychiatric:         Mood and Affect: Mood normal.         Behavior: Behavior normal. Behavior is cooperative.         Thought Content: Thought content normal.         Judgment: Judgment normal.         Procedures           ED Course  ED Course as of 03/06/23 1217   Mon Mar 06, 2023   1209 Case management at bedside. [AL]      ED Course User Index  [AL] Michelle Silva, APRN                                           Medical Decision Making  Balance problem: acute illness or injury  Contusion of back, unspecified laterality, initial encounter: acute illness or injury  Fall, initial encounter: acute illness or injury  Amount and/or Complexity of Data Reviewed  Radiology: ordered. Decision-making details documented in ED Course.        Interpreted by radiologist as below:     XR Ribs Bilateral 4+ View With PA Chest    Result Date: 3/6/2023  Impression: 1. No acute right or left rib fracture. 2. Old left 11th rib fracture posteriorly. 3. No acute chest finding. Electronically Signed: Ratna Limon  3/6/2023 10:58 AM EST  Workstation ID: [id number]    XR Spine Thoracic 3 View    Result Date: 3/6/2023  Impression: 1.Scoliosis thoracolumbar spine with underlying degenerative change. Electronically Signed: Benjamin Haley  3/6/2023 11:03 AM EST  Workstation ID: [id number]        /60   Pulse 78   Temp 97.7 °F (36.5 °C) (Oral)   Resp 16   Ht 157.5 cm (62\")   Wt 76.1 kg (167 lb 12.3 oz)   SpO2 99%   BMI 30.69 kg/m²      Lab Results (last 24 hours)     ** No results found for the last 24 hours. **           Medications - No data to display     Patient undressed and placed " in gown for exam.  Appropriate PPE worn during patient exam.  Appropriate monitoring initiated. Patient is alert and oriented x3.  No acute distress noted.  Point tenderness noted to the distal thoracic spine and surrounding ribs.  There is no overlying erythema, ecchymosis, bony deformities.  S1-S2 heart sounds on exam.  Lungs are clear to auscultation.  Patient declined offer for analgesia.  Rib series with chest and thoracic spine x-rays obtained with the above findings.  X-rays were found to be unremarkable.  Consulted case management for patient's multiple falls and possible physical therapy.  She recommends outpatient therapy, this order was placed at disposition.    I reviewed chart 1/20/2023 patient was seen by urology for cystocele, atrophic vaginitis, urgency. My radiology interpretation rib series with chest shows no rib fractures, pneumothorax.  Thoracic spine shows no acute fracture.  Differential Diagnoses, not all-inclusive and does not constitute entirety of all causes: Balance disturbance, vertebral fracture, rib fractures.  Patient reports that she has longstanding issue with balance since having ankle surgery, this needs to be further evaluated outpatient.  Rib fractures and vertebral fracture ruled out by x-rays.    Disposition/Treatment: Discussed results with patient, verbalized understanding. Discussed reasons to return to the ER, patient verbalized understanding. Agreeable with plan of care. Patient was stable upon discharge.    Upon reassessment, patient is flesh tone warm and dry no acute distress noted.  Vital signs are stable.    Part of this note may be an electronic transcription/translation of spoken language to printed text using the Dragon Dictation System.         Final diagnoses:   Fall, initial encounter   Balance problem   Contusion of back, unspecified laterality, initial encounter       ED Disposition  ED Disposition     ED Disposition   Discharge    Condition   Stable     Comment   --             Alyse Mcnally, APRN  [address]  [place] [address]  [phone]    Schedule an appointment as soon as possible for a visit       [facility] EMERGENCY DEPARTMENT  [facility] [facility] [address] [phone]  Go to   If symptoms worsen         Medication List      Changed    albuterol sulfate  (90 Base) MCG/ACT inhaler  Commonly known as: Ventolin HFA  Inhale 2 puffs Every 4 (Four) Hours As Needed for Shortness of Air. Two inhalations every 4-6 hours as needed for SOA.  What changed: additional instructions             Michelle Silva, APRN  03/06/23 1215

## 2023-03-07 ENCOUNTER — PATIENT OUTREACH (OUTPATIENT)
Dept: CASE MANAGEMENT | Facility: OTHER | Age: 78
End: 2023-03-07
Payer: MEDICARE

## 2023-03-07 ENCOUNTER — TREATMENT (OUTPATIENT)
Dept: PHYSICAL THERAPY | Facility: CLINIC | Age: 78
End: 2023-03-07
Payer: MEDICARE

## 2023-03-07 DIAGNOSIS — M25.511 ACUTE PAIN OF BOTH SHOULDERS: ICD-10-CM

## 2023-03-07 DIAGNOSIS — M25.512 ACUTE PAIN OF BOTH SHOULDERS: ICD-10-CM

## 2023-03-07 DIAGNOSIS — R53.1 WEAKNESS GENERALIZED: ICD-10-CM

## 2023-03-07 DIAGNOSIS — R26.89 IMPAIRMENT OF BALANCE: ICD-10-CM
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
## 2023-03-07 PROCEDURE — 97163 PT EVAL HIGH COMPLEX 45 MIN: CPT | Performed by: PHYSICAL THERAPIST

## 2023-03-07 NOTE — PROGRESS NOTES
Physical Therapy Initial Evaluation and Plan of Care     [address] [place] [address] [place] [address]    Patient: Yancy Mcdonnell   : 1945  Diagnosis/ICD-10 Code:  Falls, initial encounter [W19.XXXA]  Referring practitioner: FELIPE Mcmanus  Date of Initial Visit: 3/7/2023  Today's Date: 3/7/2023  Patient seen for 1 sessions               Subjective Evaluation    History of Present Illness  Mechanism of injury: Pt rpeorts she has fallen 2 x in the past 3 days. The first fall happened when she tripped over her shoes in the bathroom. Yesterday she was trying to change a bandage on her R lower leg and fell backwards. Hit her forearms and back on edge of tall bathtub. Pt did to go ER and x-rays were neg for fx. Pt sustained skin tear on R lower leg last week when a wheel [place] fell over and hit her leg. Pt states she is sore today from the fall yesterday and is moving slow. States she has bruised ribs, having pain across mid back (just below bra line) and B forearms. Having R lower rib pain with deep breaths. Slept in recliner last night due to not able to tolerate laying in bed. Reports she needs to work on her balance. Has fallen over mole hills in the field over the past couple months. 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 Pt states her R ankle is doing good (seen by PT last year for this) and that L leg is giving her problems now. Pt reports she was carrying her cane with her when going out before fall. Pt reports having B shoulder pain also at times since falling. Pain with reaching up.      Patient Occupation: retired, works on her farm Pain  Current pain ratin  Location: mid back, B arms  Quality: dull ache    Social Support  Lives in: one-story house  Lives with: spouse    Patient Goals  Patient goals for therapy: improved balance, increased motion, decreased pain, increased strength and return to sport/leisure activities             Objective     "      Strength/Myotome Testing     Left Hip   Planes of Motion   Flexion: 4  Abduction: 4    Right Hip   Planes of Motion   Flexion: 4  Abduction: 4    Left Knee   Flexion: 4+  Extension: 4+    Right Knee   Flexion: 4+  Extension: 4+    Left Ankle/Foot   Dorsiflexion: 4+    Right Ankle/Foot   Dorsiflexion: 4+      Posture: flexed posture at hips, decreased lumbar lordosis, standing with cane, heels ~3-4\" apart.   Transfers sit to stand with use of B UE and CGA to SBA. Pt braces back of B lower legs on chair during transition. Pt with posterior displacement of balance and had to sit back down (uncontrolled) and try again to complete transfer.  Gait: Pt ambulates with cane (\"Hurry Cane\") with flexed posture (especially at hips and lumbar spine), head forward of feet. Liz increases after first few steps.    30 sec STS = 2 reps with use of B hands. Severe posterior LOB upon initial standing balance.  TUG = 24 sec with \"hurry\" cane  MCTSIB = 3/4 (30,30,30,11)  Tinetti = 17/28 (balance = 8/16, gait = 9/12), indicating 61% ability and 39% impairment    Assessment & Plan     Assessment  Impairments: abnormal gait, abnormal or restricted ROM, activity intolerance, impaired balance, impaired physical strength, lacks appropriate home exercise program and safety issue  Functional Limitations: moving in bed and stooping  Assessment details: Pt is a 77 y.o. female with c/o unsteadiness and 2 falls over the past few days. Pt presents with decreased strength B LE and impaired balance. Pt with poor righting reactions to posterior displacement of balance. Pt with impaired sensory integration as evidenced by MCTSIB of 3/4. Tinetti indicates 39% impairment. Difficulty with ambulation.  Difficulty with completing farm tasks. Difficulty with dynamic standing tasks and loses balance backwards very easily. Pt also with c/o shoulder pain to be addressed at future visits as needed.    Patient presents with the impairments listed above " and based on the objective findings and the physical therapy evaluation, the patient's condition has the potential to improve in response to therapy.   The patient's condition and/or services required are at a level of complexity that necessitates the skill & supervision of a physical therapist.    Prognosis: good    Goals  Plan Goals: STG to be met in 3 wk:  - Pt to complete STS x4 reps with use of B UE in 30 sec and no posterior LOB.  - Pt to report no falls for 2 consecutive weeks.  - Pt to be independent with HEP.  LTG to be met in 3 wk:  - Improve Tinetti score to 22/28 or greater for decreased fall risk when caring for her horse.  - Improve sensory integration as evidenced by MCTSIB of 4/4.  - Increase B hip flex and abd strength to 4+/5 or greater for improved stability when walking in her field.  - Pt to perform sit to stand with use of B UE in 30 sec for improved ease and safety getting up from chairs.  - Pt to report no shoulder pain with reaching overhead in order to retrieve plates from upper cabinets.    Plan  Therapy options: will be seen for skilled therapy services  Planned modality interventions: thermotherapy (hydrocollator packs) and cryotherapy  Planned therapy interventions: balance/weight-bearing training, body mechanics training, gait training, home exercise program, manual therapy, neuromuscular re-education, postural training, strengthening, therapeutic activities, transfer training, functional ROM exercises, flexibility and stretching  Frequency: 2x week  Duration in weeks: 12  Treatment plan discussed with: patient     :     Timed:         Manual Therapy:         mins  81805;     Therapeutic Exercise:    5     mins  31738;     Neuromuscular Berenice:        mins  12754;    Therapeutic Activity:          mins  03631;     Gait Training:           mins  37071;     Ultrasound:          mins  21310;    Ionto                                   mins   67399  Self - Care                          mins   70894    Un-Timed:  Electrical Stimulation:         mins  16569 ( );  Dry Needling          20561/32214  Traction          mins 48011  Can Repos          mins 88512  Low Eval          Mins  73683  Mod Eval          Mins  60925  High Eval                       40     Mins  33085      Timed Treatment:   5   mins   Total Treatment:     45   mins      PT SIGNATURE: Griselda Weeks PT, CLT  [place] Lic # [id number]  Electronically signed by Griselda Weeks PT, 03/07/23, 2:48 PM EST    Initial Certification  Certification Period: 3/7/2023 thru 6/4/2023  I certify that the therapy services are furnished while this patient is under my care.  The services outlined above are required by this patient, and will be reviewed every 90 days.     PHYSICIAN: Michelle Silva APRN _____________________________________________________  NPI: [id number]                                      DATE:    Please sign and return via fax to [phone]. Thank you, [facility].

## 2023-03-07 NOTE — OUTREACH NOTE
AMBULATORY CASE MANAGEMENT NOTE    Name and Relationship of Patient/Support Person: Sathya Yancy A - Self  Patient Outreach  RN-ACM outreach with patient  Discussed  3/7/23 ED visit regarding back contusion with fall. Patient treated and discharged home.  Patient states to be compliant with ED recommendations; applying ice; taking medications as ED directed. Patient states to have had outpatient physical therapy today and has 3/10/23 PCP appointment. Patient states no difficulty with chest pain; SOB; appetite or sleeping. Reviewed with patient ED AVS recommendations; education; role of RN-ACM and HRCM case management services. Patient verbalized understanding. Patient states to appreciate outreach and declines needs for further outreach at this time. No further questions voiced at this time.    Social Work Assessment  Questions/Answers    Flowsheet Row Most Recent Value   People in Home spouse   Functional Status Comments Patient states to be independent with ADL's,  light meal preparation,  transportation and ambulating with 3 point cane   Equipment Currently Used at Home other (see comments)  [3 point cane]      SDOH updated and reviewed with the patient during this program:  Financial Resource Strain: Low Risk    • Difficulty of Paying Living Expenses: Not hard at all      Food Insecurity: No Food Insecurity   • Worried About Running Out of Food in the Last Year: Never true   • Ran Out of Food in the Last Year: Never true      Transportation Needs: No Transportation Needs   • Lack of Transportation (Medical): No   • Lack of Transportation (Non-Medical): No         Education Documentation  Unresolved/Worsening Symptoms, taught by Angela Araujo, RN at 3/7/2023  4:31 PM.  Learner: Patient  Readiness: Acceptance  Method: Explanation  Response: Verbalizes Understanding    Safety, taught by Angela Araujo RN at 3/7/2023  4:31 PM.  Learner: Patient  Readiness: Acceptance  Method: Explanation  Response:  Verbalizes Understanding    Home Safety, taught by Angela Araujo, RN at 3/7/2023  4:31 PM.  Learner: Patient  Readiness: Acceptance  Method: Explanation  Response: Verbalizes Understanding    Energy Conservation, taught by Angela Araujo, RN at 3/7/2023  4:31 PM.  Learner: Patient  Readiness: Acceptance  Method: Explanation  Response: Verbalizes Understanding          Angela MORAN  Ambulatory Case Management    3/7/2023, 16:31 EST

## 2023-03-10 ENCOUNTER — DOCUMENTATION (OUTPATIENT)
Dept: FAMILY MEDICINE CLINIC | Facility: CLINIC | Age: 78
End: 2023-03-10

## 2023-03-10 ENCOUNTER — OFFICE VISIT (OUTPATIENT)
Dept: FAMILY MEDICINE CLINIC | Facility: CLINIC | Age: 78
End: 2023-03-10
Payer: MEDICARE

## 2023-03-10 VITALS
TEMPERATURE: 97.4 F | BODY MASS INDEX: 30.55 KG/M2 | SYSTOLIC BLOOD PRESSURE: 136 MMHG | HEART RATE: 78 BPM | RESPIRATION RATE: 16 BRPM | DIASTOLIC BLOOD PRESSURE: 69 MMHG | WEIGHT: 166 LBS | HEIGHT: 62 IN

## 2023-03-10 DIAGNOSIS — Z09 FOLLOW-UP EXAM: Primary | ICD-10-CM

## 2023-03-10 DIAGNOSIS — L03.115 CELLULITIS OF RIGHT LOWER LEG: ICD-10-CM

## 2023-03-10 PROCEDURE — 3075F SYST BP GE 130 - 139MM HG: CPT

## 2023-03-10 PROCEDURE — 3078F DIAST BP <80 MM HG: CPT

## 2023-03-10 PROCEDURE — 99213 OFFICE O/P EST LOW 20 MIN: CPT

## 2023-03-10 RX ORDER — DOXYCYCLINE 100 MG/1
100 CAPSULE ORAL 2 TIMES DAILY
Qty: 20 CAPSULE | Refills: 0 | Status: SHIPPED | OUTPATIENT
Start: 2023-03-10

## 2023-03-10 NOTE — PATIENT INSTRUCTIONS
Take Naproxen at home for pain and inflammation.  XR for injury  Follow up for wound recheck.  Avoid narcotic pain relief.    Seek urgent care if wound worsens, pain increases or other concern.

## 2023-03-10 NOTE — PROGRESS NOTES
Hospital Follow Up Visit    [unfilled]  Yancykely Mcdonnell presents to [facility]  For .[facility]    Yancy Mcdonnell   has a past medical history of Allergic rhinitis (06/02/2015), Ankle fracture, right (02/2022), Body mass index 29.0-29.9, adult (09/12/2018), Cervical spinal stenosis (08/07/2017), Cholelithiasis, Class 1 obesity due to excess calories with serious comorbidity and body mass index (BMI) of 30.0 to 30.9 in adult (10/14/2020), Closed bimalleolar fracture of right ankle (2/11/2022), DDD (degenerative disc disease), cervical, DDD (degenerative disc disease), lumbar, DDD (degenerative disc disease), thoracic, Diverticulosis, Dyslipidemia (12/05/2012), Elevated brain natriuretic peptide (BNP) level (01/06/2017), Eustachian tube disorder, right (02/18/2019), Fall (02/2022), Fatigue (06/02/2015), Female cystocele (06/02/2015), Hiatal hernia, Hiatal hernia with gastroesophageal reflux (01/06/2017), History of blood clots (1980), History of DVT (deep vein thrombosis) (06/02/2015), History of herpes simplex infection (7/25/2020), Hyperlipidemia, Hypertension (06/10/2016), Left rib fracture (05/2017), Migraine headache (06/02/2015), Mitral insufficiency (01/16/2017), Osteoarthritis (05/20/2014), Osteopenia (12/05/2012), Over weight (06/14/2018), Peripheral edema (01/05/2017), PFO (patent foramen ovale) (01/16/2017), Pulmonary nodule (05/04/2017), RHA (rheumatoid arthritis) (HCC) (12/05/2012), Scleritis (06/02/2015), Scoliosis, Thrombophlebitis (2/19/2022), Tricuspid insufficiency, Urinary urgency, Varicose veins of other specified sites, and Venous insufficiency (09/12/2018).    Past Surgical History:   Procedure Laterality Date   • ANKLE OPEN REDUCTION INTERNAL FIXATION Right 02/18/2022    Procedure: ANKLE OPEN REDUCTION INTERNAL FIXATION;  Surgeon: DEAN Tobias DPM;  Location: [facility] MAIN OR;  Service: Podiatry;  Laterality: Right;   • COLONOSCOPY  07/20/2017     EGD/ COLONSCOPY LARGE HIATAL HERNIA. MILD DIVERTICULOSIS. OTHERWISE NORMAL.   • INGUINAL HERNIA REPAIR Right    • ORIF ANKLE FRACTURE Right 02/18/2022    Dr. Tobias   • TOTAL ABDOMINAL HYSTERECTOMY  1985    W/BSO WITH A/P REPAIR 1985 BENIGN REASONS DR. DRAKE         I reviewed and discussed the details of that call along with the discharge summary, hospital problems, inpatient lab results, inpatient diagnostic studies, and consultation reports with Yancy   Current outpatient and discharge medications have been reconciled for the patient.  Reviewed by: FELIPE Zuluaga  History of Present Illness  Patient is here for a follow up visit for a fall that was a result of a leg laceration.  She is here displaying a would to the right lower lateral leg with a signifcant hematoma.  She was seen on 3/6/2023 at [facility] emergency department and discharged.  She denies any pain with Mitali's sign.  She reports her hematoma is improving and denies cough, shortness of breath or chest pain.  She has a previous ankle injury and uses a three point cane for assistance with ambulation.  She is attending physical therapy for mobility improvement 2-3 times per week. Her next visit is March 15.      We discussed plan of care and patient agrees to return on Wednesday with Mari for wound check. Imaging to be completed today and she will be placed on doxycycline antibiotics.        Below is an excerpt from [facility] ED visit provider dictation:   ++  History of Present Illness  77-year-old female presents with complaint of fall while sitting on the edge of the bathtub.  Patient reports distal thoracic spine pain and posterior rib pain.  She also reports ecchymosis of bilateral forearms, denies bone pain.  She reports that she had fallen 2 days prior.  She reports that she has balance issues due to previous right ankle fracture and she ambulates with a three-point cane.  She denies LOC.  Denies any cervical spine  tenderness.     Patient undressed and placed in gown for exam.  Appropriate PPE worn during patient exam.  Appropriate monitoring initiated. Patient is alert and oriented x3.  No acute distress noted.  Point tenderness noted to the distal thoracic spine and surrounding ribs.  There is no overlying erythema, ecchymosis, bony deformities.  S1-S2 heart sounds on exam.  Lungs are clear to auscultation.  Patient declined offer for analgesia.  Rib series with chest and thoracic spine x-rays obtained with the above findings.  X-rays were found to be unremarkable.  Consulted case management for patient's multiple falls and possible physical therapy.  She recommends outpatient therapy, this order was placed at disposition.     I reviewed chart 1/20/2023 patient was seen by urology for cystocele, atrophic vaginitis, urgency. My radiology interpretation rib series with chest shows no rib fractures, pneumothorax.  Thoracic spine shows no acute fracture.  Differential Diagnoses, not all-inclusive and does not constitute entirety of all causes: Balance disturbance, vertebral fracture, rib fractures.  Patient reports that she has longstanding issue with balance since having ankle surgery, this needs to be further evaluated outpatient.  Rib fractures and vertebral fracture ruled out by x-rays.     Disposition/Treatment: Discussed results with patient, verbalized understanding. Discussed reasons to return to the ER, patient verbalized understanding. Agreeable with plan of care. Patient was stable upon discharge.   ++         .  Course During Hospital Stay:  ED visit. Did not stay overnight     The following portions of the patient's history were reviewed and updated as appropriate: allergies, current medications, past family history, past medical history, past social history, past surgical history and problem list.  Review of Systems   Constitutional: Negative for chills, fatigue and fever.   Eyes: Positive for visual disturbance  "(glasses).   Respiratory: Negative for wheezing.    Cardiovascular: Positive for leg swelling. Negative for chest pain and palpitations.   Gastrointestinal: Negative.    Genitourinary: Negative for decreased urine volume, difficulty urinating, dysuria, flank pain, frequency, menstrual problem, pelvic pain, pelvic pressure, urgency, urinary incontinence, vaginal bleeding, vaginal discharge and vaginal pain.   Musculoskeletal: Positive for arthralgias, gait problem (three point cane) and myalgias (right lateral calf muscle).   Skin: Positive for color change and bruise.        See image on chart   Neurological: Negative for dizziness, headache and confusion.   Hematological: Negative.    Psychiatric/Behavioral: Negative.    All other systems reviewed and are negative.      Objective    Vitals:    03/10/23 1003   BP: 136/69   BP Location: Right arm   Patient Position: Sitting   Cuff Size: Adult   Pulse: 78   Resp: 16   Temp: 97.4 °F (36.3 °C)   TempSrc: Infrared   Weight: 75.3 kg (166 lb)   Height: 157.5 cm (62\")       Physical Exam  Vitals and nursing note reviewed.   Constitutional:       General: She is not in acute distress.     Appearance: Normal appearance. She is well-groomed and normal weight. She is not ill-appearing, toxic-appearing or diaphoretic.   Neck:      Vascular: No carotid bruit.   Cardiovascular:      Rate and Rhythm: Normal rate and regular rhythm.      Pulses: Normal pulses.      Heart sounds: Normal heart sounds.   Pulmonary:      Effort: Pulmonary effort is normal.      Breath sounds: Normal breath sounds.   Musculoskeletal:         General: Tenderness and signs of injury present.   Skin:     General: Skin is warm and dry.      Capillary Refill: Capillary refill takes less than 2 seconds.      Findings: Bruising and erythema present.   Neurological:      Mental Status: She is alert and oriented to person, place, and time.   Psychiatric:         Mood and Affect: Mood normal.         Behavior: " Behavior normal. Behavior is cooperative.         Assessment & Plan   Problems Addressed this Visit    None  Visit Diagnoses     Follow-up exam    -  Primary    Cellulitis of right lower leg        Relevant Medications    doxycycline (MONODOX) 100 MG capsule    Other Relevant Orders    XR Tibia Fibula 2 View Right      Diagnoses       Codes Comments    Follow-up exam    -  Primary ICD-10-CM: Z09  ICD-9-CM: V67.9     Cellulitis of right lower leg     ICD-10-CM: L03.115  ICD-9-CM: 682.6                     Diagnoses and all orders for this visit:    1. Follow-up exam (Primary)    2. Cellulitis of right lower leg  -     XR Tibia Fibula 2 View Right  -     doxycycline (MONODOX) 100 MG capsule; Take 1 capsule by mouth 2 (Two) Times a Day.  Dispense: 20 capsule; Refill: 0        Return in about 5 days (around 3/15/2023), or wound check, for Recheck.      I spent 22 minutes caring for Yancy on this date of service. This time includes time spent by me in the following activities:preparing for the visit, reviewing tests, obtaining and/or reviewing a separately obtained history, performing a medically appropriate examination and/or evaluation , counseling and educating the patient/family/caregiver, ordering medications, tests, or procedures and documenting information in the medical record  Follow Up     Patient was given instructions and counseling regarding @HIS@ condition or for health maintenance advice. Please see specific information pulled into the AVS if appropriate.    Patient Instructions   Take Naproxen at home for pain and inflammation.  XR for injury  Follow up for wound recheck.  Avoid narcotic pain relief.    Seek urgent care if wound worsens, pain increases or other concern.       This document is intended for medical expert use only. Reading of this document by patients and/or patient's family without participating medical staff guidance may result in misinterpretation and unintended morbidity. Any  interpretation of such data is the responsibility of the patient and/or family member responsible for the patient in concert with their primary or specialist providers, not to be left for sources of online searches such as [employer], [employer] or similar queries. Relying on these approaches to knowledge may result in misinterpretation, misguided goals of care and even death should patients or family members try recommendations outside of the realm of professional medical care.

## 2023-03-15 ENCOUNTER — OFFICE VISIT (OUTPATIENT)
Dept: FAMILY MEDICINE CLINIC | Facility: CLINIC | Age: 78
End: 2023-03-15
Payer: MEDICARE

## 2023-03-15 ENCOUNTER — TREATMENT (OUTPATIENT)
Dept: PHYSICAL THERAPY | Facility: CLINIC | Age: 78
End: 2023-03-15
Payer: MEDICARE

## 2023-03-15 VITALS
HEART RATE: 73 BPM | DIASTOLIC BLOOD PRESSURE: 77 MMHG | OXYGEN SATURATION: 100 % | BODY MASS INDEX: 30.8 KG/M2 | HEIGHT: 62 IN | TEMPERATURE: 97.3 F | WEIGHT: 167.4 LBS | RESPIRATION RATE: 20 BRPM | SYSTOLIC BLOOD PRESSURE: 129 MMHG

## 2023-03-15 DIAGNOSIS — M25.511 ACUTE PAIN OF BOTH SHOULDERS: ICD-10-CM

## 2023-03-15 DIAGNOSIS — R53.1 WEAKNESS GENERALIZED: ICD-10-CM

## 2023-03-15 DIAGNOSIS — L03.115 CELLULITIS OF RIGHT LOWER LEG: ICD-10-CM

## 2023-03-15 DIAGNOSIS — R26.89 IMPAIRMENT OF BALANCE: ICD-10-CM

## 2023-03-15 DIAGNOSIS — M25.512 ACUTE PAIN OF BOTH SHOULDERS: ICD-10-CM
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
## 2023-03-15 DIAGNOSIS — S80.11XA HEMATOMA OF RIGHT LOWER LEG: Primary | ICD-10-CM

## 2023-03-15 PROCEDURE — 3078F DIAST BP <80 MM HG: CPT | Performed by: NURSE PRACTITIONER

## 2023-03-15 PROCEDURE — 1159F MED LIST DOCD IN RCRD: CPT | Performed by: NURSE PRACTITIONER

## 2023-03-15 PROCEDURE — 97110 THERAPEUTIC EXERCISES: CPT | Performed by: PHYSICAL THERAPIST

## 2023-03-15 PROCEDURE — 97112 NEUROMUSCULAR REEDUCATION: CPT | Performed by: PHYSICAL THERAPIST

## 2023-03-15 PROCEDURE — 3074F SYST BP LT 130 MM HG: CPT | Performed by: NURSE PRACTITIONER

## 2023-03-15 PROCEDURE — 99213 OFFICE O/P EST LOW 20 MIN: CPT | Performed by: NURSE PRACTITIONER

## 2023-03-15 NOTE — PROGRESS NOTES
Chief Complaint  Wound Check    Subjective          Yancy is a 77 y.o. female  who presents to [facility]     Patient Care Team:  Alyse Mcnally APRN as PCP - General (Nurse Practitioner)  Dr. AMITA Palma & Vandana) (Ophthalmology)  Banet, Duane Edward, MD as Consulting Physician (Dermatology)  Edwin Banks MD as Consulting Physician (Otolaryngology)  Ruslan Davila MD as Consulting Physician (Gastroenterology)  DEAN Tobias DPM as Consulting Physician (Podiatry)  Angela Araujo, SOL as Ambulatory  (Population Health)     History of Present Illness  Yancy went to the ER at [facility] on 3/6/23 due to a fall.  She followed up with her PCP Alyse Mcnally NP on 3/10/23.    She was prescribed doxycycline 100 mg BID for 10 days for cellulitis and a tib/fib xray was ordered.    She is here today to recheck wound on right lower leg.  She feels her leg is improving.  Hematoma is still present and it is not warm to touch.  Slightly smaller.  She is still taking doxycycline.  She did not get the tib/fib xray done.  No pain with walking.        Yancy Mcdonnell  has a past medical history of Allergic rhinitis (06/02/2015), Ankle fracture, right (02/2022), Body mass index 29.0-29.9, adult (09/12/2018), Cervical spinal stenosis (08/07/2017), Cholelithiasis, Class 1 obesity due to excess calories with serious comorbidity and body mass index (BMI) of 30.0 to 30.9 in adult (10/14/2020), Closed bimalleolar fracture of right ankle (2/11/2022), DDD (degenerative disc disease), cervical, DDD (degenerative disc disease), lumbar, DDD (degenerative disc disease), thoracic, Diverticulosis, Dyslipidemia (12/05/2012), Elevated brain natriuretic peptide (BNP) level (01/06/2017), Eustachian tube disorder, right (02/18/2019), Fall (02/2022), Fatigue (06/02/2015), Female cystocele (06/02/2015), Hiatal hernia, Hiatal hernia with gastroesophageal reflux (01/06/2017), History of  blood clots (1980), History of DVT (deep vein thrombosis) (06/02/2015), History of herpes simplex infection (7/25/2020), Hyperlipidemia, Hypertension (06/10/2016), Left rib fracture (05/2017), Migraine headache (06/02/2015), Mitral insufficiency (01/16/2017), Osteoarthritis (05/20/2014), Osteopenia (12/05/2012), Over weight (06/14/2018), Peripheral edema (01/05/2017), PFO (patent foramen ovale) (01/16/2017), Pulmonary nodule (05/04/2017), RHA (rheumatoid arthritis) (HCC) (12/05/2012), Scleritis (06/02/2015), Scoliosis, Thrombophlebitis (2/19/2022), Tricuspid insufficiency, Urinary urgency, Varicose veins of other specified sites, and Venous insufficiency (09/12/2018).      Review of Systems   Constitutional: Negative for fever.   Respiratory: Negative for cough and shortness of breath.    Cardiovascular: Positive for leg swelling (slight). Negative for chest pain.   Gastrointestinal: Negative for abdominal pain, nausea and vomiting.   Skin: Positive for wound (right lower leg).        Family History   Problem Relation Age of Onset   • Osteoporosis Mother    • Stroke Mother 80   • Dementia Mother    • Heart disease Father    • Hypertension Father    • Heart disease Sister    • Cancer Daughter         BREAST AND MELANOMA   • Breast cancer Daughter 52   • Cancer Son         MELANOMA   • Cancer Grandson         MELANOMA        Past Surgical History:   Procedure Laterality Date   • ANKLE OPEN REDUCTION INTERNAL FIXATION Right 02/18/2022    Procedure: ANKLE OPEN REDUCTION INTERNAL FIXATION;  Surgeon: DEAN Tobias DPM;  Location: [facility] MAIN OR;  Service: Podiatry;  Laterality: Right;   • COLONOSCOPY  07/20/2017    EGD/ COLONSCOPY LARGE HIATAL HERNIA. MILD DIVERTICULOSIS. OTHERWISE NORMAL.   • INGUINAL HERNIA REPAIR Right    • ORIF ANKLE FRACTURE Right 02/18/2022    Dr. Tobias   • TOTAL ABDOMINAL HYSTERECTOMY  1985    W/BSO WITH A/P REPAIR 1985 BENIGN REASONS DR. DRAKE        Social History     Socioeconomic History    • Marital status:    Tobacco Use   • Smoking status: Never   • Smokeless tobacco: Never   Vaping Use   • Vaping Use: Never used   Substance and Sexual Activity   • Alcohol use: Yes     Alcohol/week: 1.0 standard drink     Types: 1 Cans of beer per week   • Drug use: No   • Sexual activity: Defer        Immunization History   Administered Date(s) Administered   • COVID-19 (PFIZER) PURPLE CAP 02/08/2021, 03/01/2021, 10/28/2021   • FLUAD TRI 65YR+ 09/16/2019   • Fluad Quad 65+ 09/14/2020   • Fluzone High Dose =>65 Years ([employer] ONLY) 10/09/2015, 09/30/2016, 09/22/2017, 09/12/2018, 11/16/2021   • Fluzone High-Dose 65+yrs 09/30/2022   • Hepatitis A 05/01/2018, 12/19/2018   • Pneumococcal Conjugate 13-Valent (PCV13) 06/02/2015, 10/09/2015   • Pneumococcal Polysaccharide (PPSV23) 09/10/2010   • Tdap 01/05/2017   • Zoster, Unspecified 12/30/2009       Objective       Current Outpatient Medications:   •  albuterol sulfate HFA (Ventolin HFA) 108 (90 Base) MCG/ACT inhaler, Inhale 2 puffs Every 4 (Four) Hours As Needed for Shortness of Air. Two inhalations every 4-6 hours as needed for SOA. (Patient taking differently: Inhale 2 puffs Every 4 (Four) Hours As Needed for Shortness of Air. Two inhalations every 4-6 hours as needed for SOA.  Bring dos), Disp: 18 g, Rfl: 0  •  doxycycline (MONODOX) 100 MG capsule, Take 1 capsule by mouth 2 (Two) Times a Day., Disp: 20 capsule, Rfl: 0  •  estradiol (ESTRACE) 0.1 MG/GM vaginal cream, , Disp: , Rfl:   •  famciclovir (FAMVIR) 500 MG tablet, Take 1 tablet by mouth Daily., Disp: , Rfl:   •  fluticasone (FLONASE) 50 MCG/ACT nasal spray, INJECT 2 SPRAYS INTO THE NOSTRILS EVERY DAY AS DIRECTED, Disp: 48 g, Rfl: 4  •  hydroCHLOROthiazide (HYDRODIURIL) 25 MG tablet, Take 2 tablets by mouth Daily., Disp: 90 tablet, Rfl: 0  •  levothyroxine (SYNTHROID, LEVOTHROID) 25 MCG tablet, Take 1 tablet by mouth Daily., Disp: 30 tablet, Rfl: 12  •  lidocaine (LIDODERM) 5 %, Place 1 patch on the  "skin as directed by provider Daily. Remove & Discard patch within 12 hours or as directed by MD, Disp: 6 patch, Rfl: 0  •  loratadine (CLARITIN) 10 MG tablet, Take 1 tablet by mouth Daily., Disp: 90 tablet, Rfl: 1  •  losartan (COZAAR) 100 MG tablet, TAKE 1 TABLET BY MOUTH DAILY, Disp: 90 tablet, Rfl: 0  •  lysine 500 MG tablet, Take 1 tablet by mouth Daily As Needed. Stop now for surgery, Disp: , Rfl:   •  Magnesium 250 MG tablet, Take  by mouth. Stop now for surgery, Disp: , Rfl:   •  metoprolol succinate XL (TOPROL-XL) 25 MG 24 hr tablet, TAKE 2 TABLETS BY MOUTH DAILY, Disp: 90 tablet, Rfl: 1  •  Misc Natural Products (LUTEIN 20 PO), Take  by mouth. Stop now for surgery, Disp: , Rfl:   •  Multiple Vitamin (MULTI-VITAMIN DAILY) tablet, Take 1 tablet by mouth Daily. Stop now for surgery, Disp: , Rfl:   •  NON FORMULARY, Sustain eye drops, Disp: , Rfl:   •  tiZANidine (ZANAFLEX) 4 MG tablet, Take 1 tablet by mouth 2 (Two) Times a Day As Needed for Muscle Spasms., Disp: 12 tablet, Rfl: 0  •  triamcinolone (KENALOG) 0.1 % cream, APPLY TO LOWER LEGS AND TORSO EVERY DAY TO TWICE DAILY FOR ITCHING, Disp: , Rfl:     Vital Signs:      /77   Pulse 73   Temp 97.3 °F (36.3 °C) (Infrared)   Resp 20   Ht 157.5 cm (62.01\")   Wt 75.9 kg (167 lb 6.4 oz)   SpO2 100%   BMI 30.61 kg/m²     Vitals:    03/15/23 1442   BP: 129/77   Pulse: 73   Resp: 20   Temp: 97.3 °F (36.3 °C)   TempSrc: Infrared   SpO2: 100%   Weight: 75.9 kg (167 lb 6.4 oz)   Height: 157.5 cm (62.01\")      Physical Exam  Vitals reviewed.   Constitutional:       General: She is not in acute distress.     Appearance: Normal appearance.   HENT:      Head: Normocephalic and atraumatic.   Eyes:      General: No scleral icterus.     Conjunctiva/sclera: Conjunctivae normal.   Neck:      Thyroid: No thyromegaly.   Cardiovascular:      Rate and Rhythm: Normal rate and regular rhythm.      Heart sounds: Normal heart sounds.   Pulmonary:      Effort: Pulmonary " effort is normal. No respiratory distress.      Breath sounds: Normal breath sounds. No wheezing.   Musculoskeletal:      Right lower leg: Edema (trace) present.      Left lower leg: Edema (trace) present.   Skin:     General: Skin is warm and dry.      Comments: Hematoma on lateral side of right lower leg.  No warmth or tenderness to touch.   Neurological:      Mental Status: She is alert and oriented to person, place, and time.   Psychiatric:         Mood and Affect: Mood normal.        Result Review :                     Assessment and Plan    Diagnoses and all orders for this visit:    1. Hematoma of right lower leg (Primary)    2. Cellulitis of right lower leg       Leg is improving.  Continue to wash daily with soap and water.  Pat dry. Finish doxycycline as directed.  Follow-up if not better in 7-10 days or sooner if worse.      Follow Up   Return if symptoms worsen or fail to improve.  Patient was given instructions and counseling regarding her condition or for health maintenance advice. Please see specific information pulled into the AVS if appropriate.    There are no Patient Instructions on file for this visit.

## 2023-03-15 NOTE — PROGRESS NOTES
Physical Therapy Daily Treatment Note      Patient: Yancy Mcdonnell   : 1945  Diagnosis/ICD-10 Code:  Falls, initial encounter [W19.XXXA]   Problems Addressed this Visit    None  Visit Diagnoses     Falls, initial encounter    -  Primary    Impairment of balance        Weakness generalized        Acute pain of both shoulders          Diagnoses       Codes Comments    Falls, initial encounter    -  Primary ICD-10-CM: W19.XXXA  ICD-9-CM: E888.9     Impairment of balance     ICD-10-CM: R26.89  ICD-9-CM: 781.2     Weakness generalized     ICD-10-CM: R53.1  ICD-9-CM: 780.79     Acute pain of both shoulders     ICD-10-CM: M25.511, M25.512  ICD-9-CM: 719.41         Referring practitioner: FELIPE Mcmanus  Date of Initial Visit: Type: THERAPY  Noted: 3/7/2023  Today's Date: 3/15/2023    VISIT#: 2    Subjective : Pt reports no falls since evaluation. States she start antibiotic last week for cellulitis of R lower leg and sees MD again today. Pt states she still cannot sleep in bed due to rib pain across her back and not able to get out of bed. Still feels weak and states she just wants to sleep all the time.    Objective : In standing and during gait, pt with flexed posture and R lean through trunk. Verbal cues provided for posture corrections. Pt able to make corrections but not able to maintain >1 min. Cues provided for form during ther ex also.  Added several new exercises for general strengthening and balance activities. Attempted bridges but was very painful to ribs posteriorly.    Laceration on R lower leg appears to be healing well. Skin color looks good (pt reports it was very red last week). Raised area present at laceration and pt reports this is a hematoma.  See Exercise, Manual, and Modality Logs for complete treatment.     Assessment/Plan : Bridges were too painful to perform due to continued posterior rib pain. Able to perform glute set in hklyg. Good tolerance to ther ex progression. Pt needs B hip  and core strengthening to improve balance and posture during all ambulation and functional mobility. Will progress ther ex as tolerated.    Goals  Plan Goals: STG to be met in 3 wk:  - Pt to complete STS x4 reps with use of B UE in 30 sec and no posterior LOB.  - Pt to report no falls for 2 consecutive weeks.  - Pt to be independent with HEP.  LTG to be met in 3 wk:  - Improve Tinetti score to 22/28 or greater for decreased fall risk when caring for her horse.  - Improve sensory integration as evidenced by MCTSIB of 4/4.  - Increase B hip flex and abd strength to 4+/5 or greater for improved stability when walking in her field.  - Pt to perform sit to stand with use of B UE in 30 sec for improved ease and safety getting up from chairs.  - Pt to report no shoulder pain with reaching overhead in order to retrieve plates from upper cabinets.    Progress per Plan of Care and Progress strengthening /stabilization /functional activity         Timed:         Manual Therapy:         mins  49272;     Therapeutic Exercise:    25     mins  74866;     Neuromuscular Berenice:    15    mins  87777;    Therapeutic Activity:          mins  65160;     Gait Training:           mins  27059;     Ultrasound:          mins  96423;    Ionto                                   mins   45791  Self Care                            mins   63203    Un-Timed:  Electrical Stimulation:         mins  27353 ( );  Dry Needling          mins 90612/79806  Traction          mins 49941  Canalith Repos                   mins  84687  Low Eval          Mins  82010  Mod Eval          Mins  25812  High Eval                            Mins  86875  Re-Eval                               mins  75743    Timed Treatment:   40   mins   Total Treatment:     40   mins    Griselda Weeks, PT, CLT, Cert DN  Physical Therapist  [place] Lic # [id number]

## 2023-03-21 ENCOUNTER — TREATMENT (OUTPATIENT)
Dept: PHYSICAL THERAPY | Facility: CLINIC | Age: 78
End: 2023-03-21
Payer: MEDICARE
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
## 2023-03-21 DIAGNOSIS — M25.511 ACUTE PAIN OF BOTH SHOULDERS: ICD-10-CM

## 2023-03-21 DIAGNOSIS — R53.1 WEAKNESS GENERALIZED: ICD-10-CM

## 2023-03-21 DIAGNOSIS — R26.89 IMPAIRMENT OF BALANCE: ICD-10-CM

## 2023-03-21 DIAGNOSIS — M25.512 ACUTE PAIN OF BOTH SHOULDERS: ICD-10-CM

## 2023-03-21 PROCEDURE — 97110 THERAPEUTIC EXERCISES: CPT | Performed by: PHYSICAL THERAPIST

## 2023-03-21 PROCEDURE — 97112 NEUROMUSCULAR REEDUCATION: CPT | Performed by: PHYSICAL THERAPIST

## 2023-03-21 NOTE — PROGRESS NOTES
"Physical Therapy Daily Treatment Note      Patient: Yancy Mcdonnell   : 1945  Diagnosis/ICD-10 Code:  Falls, initial encounter [W19.XXXA]   Problems Addressed this Visit    None  Visit Diagnoses     Falls, initial encounter    -  Primary    Impairment of balance        Weakness generalized        Acute pain of both shoulders          Diagnoses       Codes Comments    Falls, initial encounter    -  Primary ICD-10-CM: W19.XXXA  ICD-9-CM: E888.9     Impairment of balance     ICD-10-CM: R26.89  ICD-9-CM: 781.2     Weakness generalized     ICD-10-CM: R53.1  ICD-9-CM: 780.79     Acute pain of both shoulders     ICD-10-CM: M25.511, M25.512  ICD-9-CM: 719.41         Referring practitioner: FELIPE Mcmanus  Date of Initial Visit: Type: THERAPY  Noted: 3/7/2023  Today's Date: 3/21/2023    VISIT#: 3    Subjective : pt reports she had a near fall this morning. Was walking through the gate of pet fence in her home, caught foot on it, and sat down on box was that there in the corner. States she has a small bruise on L low back from hitting rail of pet fence when this happened.      Objective : PT looked at pt's L low back and pt was found to have a very large bruise that extended form lateral aspect of L low back and around her side (~8\" in length and ~2\" width). Bruise was very purple. Pt reports she takes \"adult aspirin\" daily since her ankle surgery. PT encouraged pt to talk with her PCP about if she needs to continue taking aspirin.  Pt not able to tolerate laying on L side today due to L-sided low back pain.    L trendelenburg gait pattern worsens without use of cane.    See Exercise, Manual, and Modality Logs for complete treatment.     Assessment/Plan : Good tolerance to ther ex but not able to tolerate lying on L side today. Pt with extensive bruise L-sided low back bruise from near fall earlier this morning. Pt needs B hip and core strengthening to improve balance and posture during all ambulation and " functional mobility. Will progress ther ex as tolerated.     Goals  Plan Goals: STG to be met in 3 wk:  - Pt to complete STS x4 reps with use of B UE in 30 sec and no posterior LOB.  - Pt to report no falls for 2 consecutive weeks.  - Pt to be independent with HEP.  LTG to be met in 3 wk:  - Improve Tinetti score to 22/28 or greater for decreased fall risk when caring for her horse.  - Improve sensory integration as evidenced by MCTSIB of 4/4.  - Increase B hip flex and abd strength to 4+/5 or greater for improved stability when walking in her field.  - Pt to perform sit to stand with use of B UE in 30 sec for improved ease and safety getting up from chairs.  - Pt to report no shoulder pain with reaching overhead in order to retrieve plates from upper cabinets.    Progress per Plan of Care and Progress strengthening /stabilization /functional activity         Timed:         Manual Therapy:         mins  94495;     Therapeutic Exercise:    25     mins  54848;     Neuromuscular Berenice:    15    mins  00157;    Therapeutic Activity:          mins  68760;     Gait Training:           mins  85517;     Ultrasound:          mins  08520;    Ionto                                   mins   63417  Self Care                            mins   89188    Un-Timed:  Electrical Stimulation:         mins  16630 ( );  Dry Needling          mins 39478/43798  Traction          mins 22341  Canalith Repos                   mins  31869  Low Eval          Mins  49047  Mod Eval          Mins  58632  High Eval                            Mins  02515  Re-Eval                               mins  17661    Timed Treatment:   40   mins   Total Treatment:     40   mins    Griselda Weeks, PT, CLT, Cert DN  Physical Therapist  [place] Lic # [id number]

## 2023-03-23 ENCOUNTER — TREATMENT (OUTPATIENT)
Dept: PHYSICAL THERAPY | Facility: CLINIC | Age: 78
End: 2023-03-23
Payer: MEDICARE

## 2023-03-23 DIAGNOSIS — R26.89 IMPAIRMENT OF BALANCE: ICD-10-CM
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
## 2023-03-23 DIAGNOSIS — R53.1 WEAKNESS GENERALIZED: ICD-10-CM

## 2023-03-23 DIAGNOSIS — M25.512 ACUTE PAIN OF BOTH SHOULDERS: ICD-10-CM

## 2023-03-23 DIAGNOSIS — M25.511 ACUTE PAIN OF BOTH SHOULDERS: ICD-10-CM

## 2023-03-23 PROCEDURE — 97112 NEUROMUSCULAR REEDUCATION: CPT | Performed by: PHYSICAL THERAPIST

## 2023-03-23 PROCEDURE — 97110 THERAPEUTIC EXERCISES: CPT | Performed by: PHYSICAL THERAPIST

## 2023-03-23 NOTE — PROGRESS NOTES
"Physical Therapy Daily Treatment Note      Patient: Yancy Mcdonnell   : 1945  Diagnosis/ICD-10 Code:  Falls, initial encounter [W19.XXXA]   Problems Addressed this Visit    None  Visit Diagnoses     Falls, initial encounter    -  Primary    Impairment of balance        Weakness generalized        Acute pain of both shoulders          Diagnoses       Codes Comments    Falls, initial encounter    -  Primary ICD-10-CM: W19.XXXA  ICD-9-CM: E888.9     Impairment of balance     ICD-10-CM: R26.89  ICD-9-CM: 781.2     Weakness generalized     ICD-10-CM: R53.1  ICD-9-CM: 780.79     Acute pain of both shoulders     ICD-10-CM: M25.511, M25.512  ICD-9-CM: 719.41         Referring practitioner: FELIPE Mcmanus  Date of Initial Visit: Type: THERAPY  Noted: 3/7/2023  Today's Date: 3/23/2023    VISIT#: 4    Subjective : Pt reports she her L side is still sore from fall earlier this week. States bruise on side seems to be fading. Pt reports she is going back to take 81 mg aspirin daily instead of 325 mg. Pt states if she could find someone to take her animals, she would \"go to a home.\" Pt was talking about a nursing home but then reports she likes behind outside and their home is set up on one level. Pt reports this past winter has been very hard on her physically.    Objective : PT assessed bruise on L side of low back. Bruise extending slight more anteriorly onto abdomen. Bruise still dark purple in color. Pt highly encouraged pt that if she hits her head during a fall or near fall, to go to the ER. Pt verbalized understanding.  Held supine ther ex due to pain with laying down and getting back up.  See Exercise, Manual, and Modality Logs for complete treatment.     Assessment/Plan : Continued L sided low back soreness from fall earlier this week. Good tolerance to ther ex with supine ther ex held. Pt with L trendelenburg pattern. Needs continued LE strengthening and balance training to decrease fall risk and improved " safety and stability during all functional mobility.    Goals  Plan Goals: STG to be met in 3 wk:  - Pt to complete STS x4 reps with use of B UE in 30 sec and no posterior LOB.  - Pt to report no falls for 2 consecutive weeks.  - Pt to be independent with HEP.  LTG to be met in 3 wk:  - Improve Tinetti score to 22/28 or greater for decreased fall risk when caring for her horse.  - Improve sensory integration as evidenced by MCTSIB of 4/4.  - Increase B hip flex and abd strength to 4+/5 or greater for improved stability when walking in her field.  - Pt to perform sit to stand with use of B UE in 30 sec for improved ease and safety getting up from chairs.  - Pt to report no shoulder pain with reaching overhead in order to retrieve plates from upper cabinets.    Progress per Plan of Care         Timed:         Manual Therapy:         mins  44364;     Therapeutic Exercise:    25     mins  73733;     Neuromuscular Berenice:    15    mins  55612;    Therapeutic Activity:          mins  60204;     Gait Training:           mins  85755;     Ultrasound:          mins  97636;    Ionto                                   mins   03360  Self Care                            mins   27546    Un-Timed:  Electrical Stimulation:         mins  86615 ( );  Dry Needling          mins 93106/75847  Traction          mins 13541  Canalith Repos                   mins  72546  Low Eval          Mins  55302  Mod Eval          Mins  25348  High Eval                            Mins  48051  Re-Eval                               mins  46267    Timed Treatment:   40   mins   Total Treatment:     40   mins    Griselda Weeks, PT, CLT, Cert DN  Physical Therapist  [place] Lic # [id number]

## 2023-03-27 ENCOUNTER — TREATMENT (OUTPATIENT)
Dept: PHYSICAL THERAPY | Facility: CLINIC | Age: 78
End: 2023-03-27
Payer: MEDICARE
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
## 2023-03-27 DIAGNOSIS — R26.89 IMPAIRMENT OF BALANCE: Primary | ICD-10-CM

## 2023-03-27 DIAGNOSIS — R53.1 WEAKNESS GENERALIZED: ICD-10-CM

## 2023-03-27 PROCEDURE — 97530 THERAPEUTIC ACTIVITIES: CPT | Performed by: PHYSICAL THERAPIST

## 2023-03-27 PROCEDURE — 97110 THERAPEUTIC EXERCISES: CPT | Performed by: PHYSICAL THERAPIST

## 2023-03-27 PROCEDURE — 97112 NEUROMUSCULAR REEDUCATION: CPT | Performed by: PHYSICAL THERAPIST

## 2023-03-27 NOTE — PROGRESS NOTES
Physical Therapy Daily Treatment Note    Patient: Yancy Mcdonnell  : 1945  Referring practitioner: FELIPE Mcmanus  Today's Date: 3/27/2023    VISIT#: 5      Subjective   Yancy Mcdonnell reports: Doing ok, nothing new going on.       Objective     See Exercise, Manual, and Modality Logs for complete treatment.     Patient Education: continue HEP    Assessment/Plan  Good response to session, noted continued BLE weakness and difficulty with sit to/from stand without UE assist. Cues for upright posture, noted left hip Trendelenburg during SLS.     Progress per Plan of Care            Timed:         Manual Therapy:         mins  93928;     Therapeutic Exercise:    10     mins  79399;     Neuromuscular Berenice:    20    mins  22621;    Therapeutic Activity:     10     mins  97144;     Gait Training:           mins  00921;     Ultrasound:          mins  33709;    Ionto:                                   mins   79265  Self Care:                            mins   89442    Un-Timed:  Electrical Stimulation:         mins  82365 ( );  Dry Needling          mins self-pay  Traction          mins 53099  Re-Eval                               mins  02609    Timed Treatment:   40   mins   Total Treatment:     40   mins    Estela Girard, PT  Physical Therapist  [place] PT license #: [id number]  [place] PT license #: [id number]

## 2023-03-29 ENCOUNTER — TREATMENT (OUTPATIENT)
Dept: PHYSICAL THERAPY | Facility: CLINIC | Age: 78
End: 2023-03-29
Payer: MEDICARE
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
## 2023-03-29 DIAGNOSIS — R53.1 WEAKNESS GENERALIZED: ICD-10-CM

## 2023-03-29 DIAGNOSIS — R26.89 IMPAIRMENT OF BALANCE: Primary | ICD-10-CM

## 2023-03-29 PROCEDURE — 97112 NEUROMUSCULAR REEDUCATION: CPT | Performed by: PHYSICAL THERAPIST

## 2023-03-29 PROCEDURE — 97110 THERAPEUTIC EXERCISES: CPT | Performed by: PHYSICAL THERAPIST

## 2023-03-29 NOTE — PROGRESS NOTES
Physical Therapy Daily Treatment Note    Patient: Yancy Mcdonnell  : 1945  Referring practitioner: FELIPE Mcmanus  Today's Date: 3/29/2023    VISIT#: 6      Subjective   Yancy Mcdonnell reports: Doing well, nothing new going on, felt fine after last session.       Objective     See Exercise, Manual, and Modality Logs for complete treatment.     Patient Education:continue HEP    Assessment/Plan  Good response to session, Yancy is continuing to show good progress with therapy. She was today able to do bridge exercise without pain. Continues to demonstrate some hip weakness and some balance instability with dynamic standing activities.    Progress per Plan of Care            Timed:         Manual Therapy:         mins  98049;     Therapeutic Exercise:    25     mins  48321;     Neuromuscular Berenice:    15    mins  23416;    Therapeutic Activity:          mins  54340;     Gait Training:           mins  07939;     Ultrasound:          mins  92076;    Ionto:                                   mins   30244  Self Care:                            mins   96205    Un-Timed:  Electrical Stimulation:         mins  57075 ( );  Dry Needling          mins self-pay  Traction          mins 87106  Re-Eval                               mins  43830    Timed Treatment:   40   mins   Total Treatment:     40   mins    Estela Girard, PT  Physical Therapist  [place] PT license #: [id number]  [place] PT license #: [id number]

## 2023-04-04 ENCOUNTER — TREATMENT (OUTPATIENT)
Dept: PHYSICAL THERAPY | Facility: CLINIC | Age: 78
End: 2023-04-04
Payer: MEDICARE

## 2023-04-04 DIAGNOSIS — R26.89 IMPAIRMENT OF BALANCE: Primary | ICD-10-CM

## 2023-04-04 DIAGNOSIS — R53.1 WEAKNESS GENERALIZED: ICD-10-CM

## 2023-04-04 DIAGNOSIS — M25.511 ACUTE PAIN OF BOTH SHOULDERS: ICD-10-CM

## 2023-04-04 DIAGNOSIS — M25.512 ACUTE PAIN OF BOTH SHOULDERS: ICD-10-CM
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
## 2023-04-04 PROCEDURE — 97112 NEUROMUSCULAR REEDUCATION: CPT | Performed by: PHYSICAL THERAPIST

## 2023-04-04 PROCEDURE — 97110 THERAPEUTIC EXERCISES: CPT | Performed by: PHYSICAL THERAPIST

## 2023-04-04 NOTE — PROGRESS NOTES
Physical Therapy Daily Treatment Note      Patient: Yancy Mcdonnell   : 1945  Diagnosis/ICD-10 Code:  Impairment of balance [R26.89]  Referring practitioner: FELIPE Mcmanus  Date of Initial Visit: Type: THERAPY  Noted: 3/7/2023  Today's Date: 2023  Patient seen for 7 sessions         Yancy Mcdonnell reports: she is ding better today stating she has had no new falls or imbalances. Pt. States she has been exercising more at home.     Objective   See Exercise, Manual, and Modality Logs for complete treatment.     Assessment/Plan   Pt. Tolerated treatment well this visit and continues to show improving balance this date with head movement requires no UE assistance. Pt. Will continue to benefit form increasing LE isolation for balance maybe initiating modified SLS next.    Goals  Plan Goals: STG to be met in 3 wk:  - Pt to complete STS x4 reps with use of B UE in 30 sec and no posterior LOB.  - Pt to report no falls for 2 consecutive weeks.  - Pt to be independent with HEP.  LTG to be met in 3 wk:  - Improve Tinetti score to 22/28 or greater for decreased fall risk when caring for her horse.  - Improve sensory integration as evidenced by MCTSIB of 4/4.  - Increase B hip flex and abd strength to 4+/5 or greater for improved stability when walking in her field.  - Pt to perform sit to stand with use of B UE in 30 sec for improved ease and safety getting up from chairs.  - Pt to report no shoulder pain with reaching overhead in order to retrieve plates from upper cabinets.    Progress strengthening /stabilization /functional activity           Timed:           Therapeutic Exercise:    15     mins  73962;     Neuromuscular Berenice:    15    mins  96843;          Timed Treatment:   30   mins   Total Treatment:     30   mins    No Walters PTA  Physical Therapist Assistant License #[id number]

## 2023-04-06 ENCOUNTER — TREATMENT (OUTPATIENT)
Dept: PHYSICAL THERAPY | Facility: CLINIC | Age: 78
End: 2023-04-06
Payer: MEDICARE

## 2023-04-06 DIAGNOSIS — M25.512 ACUTE PAIN OF BOTH SHOULDERS: ICD-10-CM
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
## 2023-04-06 DIAGNOSIS — R53.1 WEAKNESS GENERALIZED: ICD-10-CM

## 2023-04-06 DIAGNOSIS — R26.89 IMPAIRMENT OF BALANCE: Primary | ICD-10-CM

## 2023-04-06 DIAGNOSIS — M25.511 ACUTE PAIN OF BOTH SHOULDERS: ICD-10-CM

## 2023-04-06 PROCEDURE — 97110 THERAPEUTIC EXERCISES: CPT | Performed by: PHYSICAL THERAPIST

## 2023-04-06 PROCEDURE — 97112 NEUROMUSCULAR REEDUCATION: CPT | Performed by: PHYSICAL THERAPIST

## 2023-04-06 NOTE — PROGRESS NOTES
Physical Therapy Progress Note/Reassessment  [address] [place] [address] , [address], [place] [address]    Patient: Yancy Mcdonnell   : 1945  Diagnosis/ICD-10 Code:  Impairment of balance [R26.89]  Referring practitioner: FELIPE Mcmanus  Date of Initial Evaluation:  Type: THERAPY  Noted: 3/7/2023  Patient seen for 8 sessions      Subjective:   Visit Diagnoses:    ICD-10-CM ICD-9-CM   1. Impairment of balance  R26.89 781.2   2. Weakness generalized  R53.1 780.79   3. Falls, initial encounter  W19.XXXA E888.9   4. Acute pain of both shoulders  M25.511 719.41    M25.512          Yancy Mcdonnell reports she is feeling very stiff today and not moving well. States her balance might be a little better but is still off. Still needs to get stronger all over. Has been having more L shoulder pain. Has been doing 2# lateral raises and it is hurting her shoulder. Pain with reaching up. Had to get a bale of hay from the loft (stepped up on a skit) and did not feel like she had the balance to reach up and get it but she was able to get it.   Subjective Questionnaire: not completed  Clinical Progress: improved  Home Program Compliance: Yes  Treatment has included: therapeutic exercise, neuromuscular re-education, manual therapy, therapeutic activity, gait training and moist heat      Objective          Strength/Myotome Testing     Left Hip   Planes of Motion   Flexion: 4  Abduction: 4    Right Hip   Planes of Motion   Flexion: 4+  Abduction: 4+      30 sec STS = 4 reps with use of B UE (posterior LOB on 1st attempt only and had to sit back down).  TUG = 17 sec without cane  Tinetti = 21/ (balance = , gait 10/12)    Educated pt on B lateral raises and B shoulder flex to 90 deg. Instructed pt to use 1# (lucas soup or water bottle) in L hand and 2# in R hand. Recommended to increase wt in L hand to 2# as 1# gets easy. Pt verbalized understanding.    See Exercise, Manual, and Modality Logs for complete treatment.      Assessment/Plan : Despite pt reports of now feeling well today, she has made gains in all objective testing. R hip flexion strength has improved, L hip weakness unchanged with MMT. 30 sec STS has improved from 2 reps to 4 reps with use of B UE. PT continues to have posterior displacement of COG during sit to stand, especially on 1st attempt and takes 2nd attempt to complete transfer. Tinetti has improved from 17/28 to 21/28. Pt needs continued B LE strengthening and balance training to improved transfers and ambulation with decreased fall risk.    Goals  Plan Goals: STG to be met in 3 wk:  - Pt to complete STS x4 reps with use of B UE in 30 sec and no posterior LOB. - MET  - Pt to report no falls for 2 consecutive weeks. - MET  - Pt to be independent with HEP.- Progressing  LTG to be met in 3 wk:  - Improve Tinetti score to 22/28 or greater for decreased fall risk when caring for her horse. - Progressing, currently at 21/28  - Improve sensory integration as evidenced by MCTSIB of 4/4. - not met  - Increase B hip flex and abd strength to 4+/5 or greater for improved stability when walking in her field. - Progressing  - Pt to perform sit to stand x8 reps with use of B UE in 30 sec for improved ease and safety getting up from chairs. - Not met  - Pt to report no shoulder pain with reaching overhead in order to retrieve plates from upper cabinets. - not met       Recommendations: Continue as planned  Timeframe: 2 months  Prognosis to achieve goals: good      Timed:         Manual Therapy:         mins  15456;     Therapeutic Exercise:    15     mins  86680;     Neuromuscular Berenice:    15    mins  18002;    Therapeutic Activity:          mins  68413;     Gait Training:           mins  72045;     Ultrasound:          mins  72253;    Ionto                                   mins   82986  Self Care                            mins   35632      Un-Timed:  Electrical Stimulation:         mins  84030 ( );  Dry  Needling          mins self-pay  Traction          mins 09143  [facility]         mins 79084      Timed Treatment:   30   mins   Total Treatment:     30   mins    PT Signature: Griselda Weeks PT, CLT  PT License: [place] Lic # [id number]

## 2023-04-11 ENCOUNTER — TREATMENT (OUTPATIENT)
Dept: PHYSICAL THERAPY | Facility: CLINIC | Age: 78
End: 2023-04-11
Payer: MEDICARE

## 2023-04-11 DIAGNOSIS — M25.511 ACUTE PAIN OF BOTH SHOULDERS: ICD-10-CM

## 2023-04-11 DIAGNOSIS — R53.1 WEAKNESS GENERALIZED: ICD-10-CM

## 2023-04-11 DIAGNOSIS — M25.512 ACUTE PAIN OF BOTH SHOULDERS: ICD-10-CM
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
## 2023-04-11 DIAGNOSIS — R26.89 IMPAIRMENT OF BALANCE: Primary | ICD-10-CM

## 2023-04-11 PROCEDURE — 97530 THERAPEUTIC ACTIVITIES: CPT | Performed by: PHYSICAL THERAPIST

## 2023-04-11 PROCEDURE — 97110 THERAPEUTIC EXERCISES: CPT | Performed by: PHYSICAL THERAPIST

## 2023-04-11 PROCEDURE — 97112 NEUROMUSCULAR REEDUCATION: CPT | Performed by: PHYSICAL THERAPIST

## 2023-04-11 NOTE — PROGRESS NOTES
Physical Therapy Daily Treatment Note      Patient: Yancy Mcdonnell   : 1945  Diagnosis/ICD-10 Code:  Impairment of balance [R26.89]  Referring practitioner: FELIPE Mcmanus  Date of Initial Visit: Type: THERAPY  Noted: 3/7/2023  Today's Date: 2023  Patient seen for 9 sessions         Yancy Mcdonnell reports: she is doing better today and hasn't had any falls since last visit she states getting on and off her mower scares her because theres nothing good to grab onto except the wall and she has to straddle it before she sits down.    Objective   See Exercise, Manual, and Modality Logs for complete treatment.     Assessment/Plan   Pt. Tolerates treatment very well this visit and continues to show improvement with balance activities this date adding difficulty by doing romberg stance with head movement on foam and no LOB was noted only moderate sway at times with no need for UE correction of balance.    Goals  Plan Goals: STG to be met in 3 wk:  - Pt to complete STS x4 reps with use of B UE in 30 sec and no posterior LOB. - MET  - Pt to report no falls for 2 consecutive weeks. - MET  - Pt to be independent with HEP.- Progressing  LTG to be met in 3 wk:  - Improve Tinetti score to 22/28 or greater for decreased fall risk when caring for her horse. - Progressing, currently at 21/28  - Improve sensory integration as evidenced by MCTSIB of 4/4. - not met  - Increase B hip flex and abd strength to 4+/5 or greater for improved stability when walking in her field. - Progressing  - Pt to perform sit to stand x8 reps with use of B UE in 30 sec for improved ease and safety getting up from chairs. - Not met  - Pt to report no shoulder pain with reaching overhead in order to retrieve plates from upper cabinets. - not met     Progress strengthening /stabilization /functional activity           Timed:           Therapeutic Exercise:    15     mins  71473;     Neuromuscular Berenice:    10    mins  93187;     Therapeutic Activity:     15     mins  26138;         Timed Treatment:   40   mins   Total Treatment:     40   mins    No Walters PTA  Physical Therapist Assistant License #[id number]

## 2023-04-13 ENCOUNTER — TREATMENT (OUTPATIENT)
Dept: PHYSICAL THERAPY | Facility: CLINIC | Age: 78
End: 2023-04-13
Payer: MEDICARE

## 2023-04-13 DIAGNOSIS — M25.511 ACUTE PAIN OF BOTH SHOULDERS: ICD-10-CM

## 2023-04-13 DIAGNOSIS — M25.512 ACUTE PAIN OF BOTH SHOULDERS: ICD-10-CM

## 2023-04-13 DIAGNOSIS — R26.89 IMPAIRMENT OF BALANCE: Primary | ICD-10-CM
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
## 2023-04-13 DIAGNOSIS — R53.1 WEAKNESS GENERALIZED: ICD-10-CM

## 2023-04-13 NOTE — PROGRESS NOTES
Physical Therapy Daily Treatment Note      Patient: Yancy Mcdonnell   : 1945  Diagnosis/ICD-10 Code:  Impairment of balance [R26.89]  Referring practitioner: FELIPE Mcmanus  Date of Initial Visit: Type: THERAPY  Noted: 3/7/2023  Today's Date: 2023  Patient seen for 10 sessions         Yancy Mcdonnell reports: she ha snot had falls but has consistently been losing balance still so she uses her cane just in case. Pt. States she just hasn't been doing as much around the house and property as she had been so she can avoid falls. Pt. States she has an appointment with Dr. Cazares scheduled to address her increasing shoulder pain and weakness.    Objective   See Exercise, Manual, and Modality Logs for complete treatment.     Assessment/Plan   Pt. Tolerates exercise well and will benefit form consistent increase in both LE and UE posture and ergonomics to address pain and mobility limitations on her ADL's. Pt. Is encouraged to be very intentional about sitting and standing to avoid stumbles and to makes sure her footing is good before taking off walking.    Goals  Plan Goals: STG to be met in 3 wk:  - Pt to complete STS x4 reps with use of B UE in 30 sec and no posterior LOB. - MET  - Pt to report no falls for 2 consecutive weeks. - MET  - Pt to be independent with HEP.- Progressing  LTG to be met in 3 wk:  - Improve Tinetti score to 22/28 or greater for decreased fall risk when caring for her horse. - Progressing, currently at 21/28  - Improve sensory integration as evidenced by MCTSIB of 4/4. - not met  - Increase B hip flex and abd strength to 4+/5 or greater for improved stability when walking in her field. - Progressing  - Pt to perform sit to stand x8 reps with use of B UE in 30 sec for improved ease and safety getting up from chairs. - Not met  - Pt to report no shoulder pain with reaching overhead in order to retrieve plates from upper cabinets. - not met    Progress strengthening /stabilization  /functional activity           Timed:           Therapeutic Exercise:    15     mins  09083;     Neuromuscular Berenice:    15    mins  [id number];      Timed Treatment:   30   mins   Total Treatment:     30   mins    No Walters PTA  Physical Therapist Assistant License #[id number]

## 2023-04-17 ENCOUNTER — OFFICE VISIT (OUTPATIENT)
Dept: ORTHOPEDIC SURGERY | Facility: CLINIC | Age: 78
End: 2023-04-17
Payer: MEDICARE

## 2023-04-17 VITALS — WEIGHT: 167 LBS | HEIGHT: 62 IN | BODY MASS INDEX: 30.73 KG/M2 | HEART RATE: 75 BPM | OXYGEN SATURATION: 98 %

## 2023-04-17 DIAGNOSIS — G89.29 CHRONIC LEFT SHOULDER PAIN: Primary | ICD-10-CM

## 2023-04-17 DIAGNOSIS — M19.012 OSTEOARTHRITIS OF LEFT GLENOHUMERAL JOINT: ICD-10-CM

## 2023-04-17 DIAGNOSIS — M25.512 CHRONIC LEFT SHOULDER PAIN: Primary | ICD-10-CM

## 2023-04-17 RX ORDER — TRIAMCINOLONE ACETONIDE 40 MG/ML
80 INJECTION, SUSPENSION INTRA-ARTICULAR; INTRAMUSCULAR ONCE
Status: COMPLETED | OUTPATIENT
Start: 2023-04-17 | End: 2023-04-17

## 2023-04-17 RX ADMIN — TRIAMCINOLONE ACETONIDE 80 MG: 40 INJECTION, SUSPENSION INTRA-ARTICULAR; INTRAMUSCULAR at 10:52

## 2023-04-17 NOTE — PROGRESS NOTES
Patient ID: Yancy Mcdonnell is a 77 y.o. female.    Chief Complaint:    Chief Complaint   Patient presents with   • Left Shoulder - Pain, Initial Evaluation     Pt states that this has been going on for 2 years. Constant aching, weakness, no full range of motion.       HPI:  This is a 77-year-old female who has developed shoulder pain over the last several months she has had to use her arms for gait assistance she is developed pain deep in the shoulder some weakness lifting overhead does not really got better with physical therapy or medication  Past Medical History:   Diagnosis Date   • Allergic rhinitis 06/02/2015   • Ankle fracture, right 02/2022   • Body mass index 29.0-29.9, adult 09/12/2018   • Cervical spinal stenosis 08/07/2017   • Cholelithiasis    • Class 1 obesity due to excess calories with serious comorbidity and body mass index (BMI) of 30.0 to 30.9 in adult 10/14/2020   • Closed bimalleolar fracture of right ankle 2/11/2022   • DDD (degenerative disc disease), cervical    • DDD (degenerative disc disease), lumbar    • DDD (degenerative disc disease), thoracic    • Diverticulosis    • Dyslipidemia 12/05/2012   • Elevated brain natriuretic peptide (BNP) level 01/06/2017   • Eustachian tube disorder, right 02/18/2019   • Fall 02/2022   • Fatigue 06/02/2015   • Female cystocele 06/02/2015   • Hiatal hernia    • Hiatal hernia with gastroesophageal reflux 01/06/2017   • History of blood clots 1980    leg s/p fx   • History of DVT (deep vein thrombosis) 06/02/2015   • History of herpes simplex infection 7/25/2020   • Hyperlipidemia    • Hypertension 06/10/2016   • Left rib fracture 05/2017    11TH RIB    • Migraine headache 06/02/2015   • Mitral insufficiency 01/16/2017    MILD   • Osteoarthritis 05/20/2014   • Osteopenia 12/05/2012   • Over weight 06/14/2018   • Peripheral edema 01/05/2017   • PFO (patent foramen ovale) 01/16/2017   • Pulmonary nodule 05/04/2017    DR CHUN FOLLOWS   • RHA (rheumatoid  "arthritis) 12/05/2012   • Scleritis 06/02/2015   • Scoliosis    • Thrombophlebitis 2/19/2022    Acute right lower extremity superficial thrombophlebitis noted in the great saphenous (below knee) and varicosity (below knee).     • Tricuspid insufficiency     MILD   • Urinary urgency    • Varicose veins of other specified sites    • Venous insufficiency 09/12/2018       Past Surgical History:   Procedure Laterality Date   • ANKLE OPEN REDUCTION INTERNAL FIXATION Right 02/18/2022    Procedure: ANKLE OPEN REDUCTION INTERNAL FIXATION;  Surgeon: DEAN Tobias DPM;  Location: [facility] MAIN OR;  Service: Podiatry;  Laterality: Right;   • COLONOSCOPY  07/20/2017    EGD/ COLONSCOPY LARGE HIATAL HERNIA. MILD DIVERTICULOSIS. OTHERWISE NORMAL.   • INGUINAL HERNIA REPAIR Right    • ORIF ANKLE FRACTURE Right 02/18/2022    Dr. Tobias   • TOTAL ABDOMINAL HYSTERECTOMY  1985    W/BSO WITH A/P REPAIR 1985 BENIGN REASONS DR. DRAKE       Family History   Problem Relation Age of Onset   • Osteoporosis Mother    • Stroke Mother 80   • Dementia Mother    • Heart disease Father    • Hypertension Father    • Heart disease Sister    • Cancer Daughter         BREAST AND MELANOMA   • Breast cancer Daughter 52   • Cancer Son         MELANOMA   • Cancer Grandson         MELANOMA          Social History     Occupational History   • Not on file   Tobacco Use   • Smoking status: Never   • Smokeless tobacco: Never   Vaping Use   • Vaping Use: Never used   Substance and Sexual Activity   • Alcohol use: Yes     Alcohol/week: 1.0 standard drink     Types: 1 Cans of beer per week   • Drug use: No   • Sexual activity: Defer      Review of Systems   Cardiovascular: Negative for chest pain.   Musculoskeletal: Positive for arthralgias.       Objective:    Pulse 75   Ht 157.5 cm (62.01\")   Wt 75.8 kg (167 lb)   SpO2 98%   BMI 30.54 kg/m²     Physical Examination:  Left shoulder demonstrates intact skin she has diffuse pain over the bicep groove and " impingement area passive elevation 160 abduction 130 external rotation 30 internal rotation left hip with pain and weakness on Speed, Coffee, supraspinatus testing.  Sensory and motor exam are intact all distributions. Radial pulse is palpable and capillary refill is less than two seconds to all digits.    Imaging:  left Shoulder X-Ray  Indication: Shoulder pain no trauma  AP Y and Lateral views  Findings: Moderate joint space narrowing with osteophyte formation  no bony lesion  Soft tissues normal  decreased joint spaces  Hardware appropriately positioned not applicable      no prior studies available for comparison.    Assessment:  Left shoulder degenerative joint disease    Plan:  Treatment options discussed she would like to begin with injection  I recommend injection after today's evaluation. Risks and benefits of the injection were discussed. Under sterile technique and after timeout and verbal consent I injected 80 mg of Kenalog and 2 mL of 1% Lidocaine in the shoulder and subacromial space. It was well tolerated.  See me as needed      Procedures         Disclaimer: Part of this note may be an electronic transcription/translation of spoken language to printed text using the Dragon Dictation System

## 2023-04-19 ENCOUNTER — TREATMENT (OUTPATIENT)
Dept: PHYSICAL THERAPY | Facility: CLINIC | Age: 78
End: 2023-04-19
Payer: MEDICARE

## 2023-04-19 DIAGNOSIS — M25.511 ACUTE PAIN OF BOTH SHOULDERS: ICD-10-CM

## 2023-04-19 DIAGNOSIS — R53.1 WEAKNESS GENERALIZED: ICD-10-CM

## 2023-04-19 DIAGNOSIS — R26.89 IMPAIRMENT OF BALANCE: Primary | ICD-10-CM
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
## 2023-04-19 DIAGNOSIS — M25.512 ACUTE PAIN OF BOTH SHOULDERS: ICD-10-CM

## 2023-04-19 NOTE — PROGRESS NOTES
Physical Therapy Daily Treatment Note      Patient: Yancy Mcdonnell   : 1945  Diagnosis/ICD-10 Code:  Impairment of balance [R26.89]  Referring practitioner: FELIPE Mcmanus  Date of Initial Visit: Type: THERAPY  Noted: 3/7/2023  Today's Date: 2023  Patient seen for 11 sessions         Yancy Mcdonnell reports: she is doing better in most aspects but also notes she has been doing a lot more outdoor activity with the nice weather so she continues to be sore but she knows that's more because she has done more and feels she has improved with therapy.    Objective   See Exercise, Manual, and Modality Logs for complete treatment.     Assessment/Plan   Pt. Is doing well this date resistance progression was made to standing hip abd and ext. Pt. Completes exercises this date with no pain and no excess fatigue. Pt. Is doing well and subtle progressions to weight and reps can be made to increase activity.    Goals  Plan Goals: STG to be met in 3 wk:  - Pt to complete STS x4 reps with use of B UE in 30 sec and no posterior LOB. - MET  - Pt to report no falls for 2 consecutive weeks. - MET  - Pt to be independent with HEP.- Progressing  LTG to be met in 3 wk:  - Improve Tinetti score to 22/28 or greater for decreased fall risk when caring for her horse. - Progressing, currently at 21/28  - Improve sensory integration as evidenced by MCTSIB of 4/4. - not met  - Increase B hip flex and abd strength to 4+/5 or greater for improved stability when walking in her field. - Progressing  - Pt to perform sit to stand x8 reps with use of B UE in 30 sec for improved ease and safety getting up from chairs. - Not met  - Pt to report no shoulder pain with reaching overhead in order to retrieve plates from upper cabinets. - not met    Progress strengthening /stabilization /functional activity           Timed:           Therapeutic Exercise:    15     mins  [id number];     Neuromuscular Berenice:    15    mins  [id number];      Timed  Treatment:   30   mins   Total Treatment:     30   mins    No Walters PTA  Physical Therapist Assistant License #[id number]

## 2023-04-21 ENCOUNTER — TREATMENT (OUTPATIENT)
Dept: PHYSICAL THERAPY | Facility: CLINIC | Age: 78
End: 2023-04-21
Payer: MEDICARE

## 2023-04-21 DIAGNOSIS — M25.512 ACUTE PAIN OF BOTH SHOULDERS: ICD-10-CM

## 2023-04-21 DIAGNOSIS — M25.511 ACUTE PAIN OF BOTH SHOULDERS: ICD-10-CM
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
## 2023-04-21 DIAGNOSIS — R26.89 IMPAIRMENT OF BALANCE: Primary | ICD-10-CM

## 2023-04-21 DIAGNOSIS — R53.1 WEAKNESS GENERALIZED: ICD-10-CM

## 2023-04-21 NOTE — PROGRESS NOTES
Physical Therapy Daily Treatment Note      Patient: Yancy Mcdonnell   : 1945  Diagnosis/ICD-10 Code:  Impairment of balance [R26.89]  Referring practitioner: FELIPE Mcmanus  Date of Initial Visit: Type: THERAPY  Noted: 3/7/2023  Today's Date: 2023  Patient seen for 12 sessions         Yancy Mcdonnell reports: she is experiencing no back pain today but continues to be off balance she states she has had no new fall but does have moments of instability where she has to center herself.    Objective   See Exercise, Manual, and Modality Logs for complete treatment.     Assessment/Plan  Pt. Completes activity well this date having no complaints of pain at all and balance was imporved with compliant surface this date and may benefit from increase in difficulty of balance challenges. Step outs still provide the mot challenge at this time.    Goals  Plan Goals: STG to be met in 3 wk:  - Pt to complete STS x4 reps with use of B UE in 30 sec and no posterior LOB. - MET  - Pt to report no falls for 2 consecutive weeks. - MET  - Pt to be independent with HEP.- Progressing  LTG to be met in 3 wk:  - Improve Tinetti score to 22/28 or greater for decreased fall risk when caring for her horse. - Progressing, currently at   - Improve sensory integration as evidenced by MCTSIB of 4/4. - not met  - Increase B hip flex and abd strength to 4+/5 or greater for improved stability when walking in her field. - Progressing  - Pt to perform sit to stand x8 reps with use of B UE in 30 sec for improved ease and safety getting up from chairs. - Not met  - Pt to report no shoulder pain with reaching overhead in order to retrieve plates from upper cabinets. - not met    Progress strengthening /stabilization /functional activity           Timed:          Therapeutic Exercise:    15     mins  69066;     Neuromuscular Berenice:    15    mins  [id number];        Timed Treatment:   30   mins   Total Treatment:     30   mins    No  SHUN Walters  Physical Therapist Assistant License #[id number]

## 2023-04-24 ENCOUNTER — TREATMENT (OUTPATIENT)
Dept: PHYSICAL THERAPY | Facility: CLINIC | Age: 78
End: 2023-04-24
Payer: MEDICARE

## 2023-04-24 DIAGNOSIS — R26.89 IMPAIRMENT OF BALANCE: Primary | ICD-10-CM

## 2023-04-24 DIAGNOSIS — R53.1 WEAKNESS GENERALIZED: ICD-10-CM
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
## 2023-04-24 DIAGNOSIS — M25.511 ACUTE PAIN OF BOTH SHOULDERS: ICD-10-CM

## 2023-04-24 DIAGNOSIS — M25.512 ACUTE PAIN OF BOTH SHOULDERS: ICD-10-CM

## 2023-04-24 NOTE — PROGRESS NOTES
Physical Therapy Daily Treatment Note      Patient: Yancy Mcdonnell   : 1945  Diagnosis/ICD-10 Code:  Impairment of balance [R26.89]  Referring practitioner: FELIPE Mcmanus  Date of Initial Visit: Type: THERAPY  Noted: 3/7/2023  Today's Date: 2023  Patient seen for 13 sessions         Yancy Mcdonnell reports: she has had no new falls and continues to progress with balance having less stumbles in her day. Pt. 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    Objective   See Exercise, Manual, and Modality Logs for complete treatment.     Assessment/Plan   Pt. Tolerates treatment very well today adding half tandem on foam for chalenge to  today Pt. Struggles more with R LE back having greater need for fingertip intervention. Pt. Tolerates shoulder activity better this day with no reports of pain.    Goals  Plan Goals: STG to be met in 3 wk:  - Pt to complete STS x4 reps with use of B UE in 30 sec and no posterior LOB. - MET  - Pt to report no falls for 2 consecutive weeks. - MET  - Pt to be independent with HEP.- Progressing  LTG to be met in 3 wk:  - Improve Tinetti score to 22/28 or greater for decreased fall risk when caring for her horse. - Progressing, currently at 21/28  - Improve sensory integration as evidenced by MCTSIB of 4/4. - not met  - Increase B hip flex and abd strength to 4+/5 or greater for improved stability when walking in her field. - Progressing  - Pt to perform sit to stand x8 reps with use of B UE in 30 sec for improved ease and safety getting up from chairs. - Not met  - Pt to report no shoulder pain with reaching overhead in order to retrieve plates from upper cabinets. - not met    Progress strengthening /stabilization /functional activity           Timed:          Therapeutic Exercise:    15     mins  93420;     Neuromuscular Berenice:    15    mins  [id number];      Timed Treatment:   30   mins   Total Treatment:      30   mins    No Walters PTA  Physical Therapist Assistant License #[id number]

## 2023-04-26 ENCOUNTER — TREATMENT (OUTPATIENT)
Dept: PHYSICAL THERAPY | Facility: CLINIC | Age: 78
End: 2023-04-26
Payer: MEDICARE

## 2023-04-26 DIAGNOSIS — R53.1 WEAKNESS GENERALIZED: ICD-10-CM
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
## 2023-04-26 DIAGNOSIS — R26.89 IMPAIRMENT OF BALANCE: Primary | ICD-10-CM

## 2023-04-26 DIAGNOSIS — M25.512 ACUTE PAIN OF BOTH SHOULDERS: ICD-10-CM

## 2023-04-26 DIAGNOSIS — M25.511 ACUTE PAIN OF BOTH SHOULDERS: ICD-10-CM

## 2023-04-26 NOTE — PROGRESS NOTES
Physical Therapy Daily Treatment Note      Patient: Yancy Mcdonnell   : 1945  Diagnosis/ICD-10 Code:  Impairment of balance [R26.89]  Referring practitioner: FELIPE Mcmanus  Date of Initial Visit: Type: THERAPY  Noted: 3/7/2023  Today's Date: 2023  Patient seen for 14 sessions         Yancy Mcdonnell reports: she has no pain at this time and she did have one small fall since last visit where she was taking a step back and the dog was under her feet and she stepped on the dog twice in succession and fell. Pt. States it was a soft fall and she got up on her own.    Objective   See Exercise, Manual, and Modality Logs for complete treatment.     Assessment/Plan   Pt. is completing exercise much more efficiently at this time. Pt. Balance is improved as well showing better steadiness with balance activities this date. Pt. May benefit from more difficult balance activities to facilitate improvement.    Goals  Plan Goals: STG to be met in 3 wk:  - Pt to complete STS x4 reps with use of B UE in 30 sec and no posterior LOB. - MET  - Pt to report no falls for 2 consecutive weeks. - MET  - Pt to be independent with HEP.- Progressing  LTG to be met in 3 wk:  - Improve Tinetti score to 22/28 or greater for decreased fall risk when caring for her horse. - Progressing, currently at 21/28  - Improve sensory integration as evidenced by MCTSIB of 4/4. - not met  - Increase B hip flex and abd strength to 4+/5 or greater for improved stability when walking in her field. - Progressing  - Pt to perform sit to stand x8 reps with use of B UE in 30 sec for improved ease and safety getting up from chairs. - Not met  - Pt to report no shoulder pain with reaching overhead in order to retrieve plates from upper cabinets. - not met    Progress strengthening /stabilization /functional activity           Timed:           Therapeutic Exercise:    15     mins  70956;     Neuromuscular Berenice:    15    mins  67133;        Timed  Treatment:   30   mins   Total Treatment:     30   mins    No Walters PTA  Physical Therapist Assistant License #[id number]

## 2023-04-27 ENCOUNTER — OFFICE VISIT (OUTPATIENT)
Dept: ORTHOPEDIC SURGERY | Facility: CLINIC | Age: 78
End: 2023-04-27
Payer: MEDICARE

## 2023-04-27 VITALS — WEIGHT: 166 LBS | TEMPERATURE: 97.2 F | HEIGHT: 62 IN | BODY MASS INDEX: 30.55 KG/M2

## 2023-04-27 DIAGNOSIS — M25.562 ACUTE PAIN OF LEFT KNEE: Primary | ICD-10-CM

## 2023-04-27 DIAGNOSIS — M17.12 PRIMARY LOCALIZED OSTEOARTHROSIS OF THE KNEE, LEFT: ICD-10-CM

## 2023-04-27 RX ORDER — TRIAMCINOLONE ACETONIDE 40 MG/ML
80 INJECTION, SUSPENSION INTRA-ARTICULAR; INTRAMUSCULAR ONCE
Status: COMPLETED | OUTPATIENT
Start: 2023-04-27 | End: 2023-04-27

## 2023-04-27 RX ADMIN — TRIAMCINOLONE ACETONIDE 80 MG: 40 INJECTION, SUSPENSION INTRA-ARTICULAR; INTRAMUSCULAR at 03:05

## 2023-04-27 NOTE — PROGRESS NOTES
Patient ID: Yancy Mcdonnell is a 77 y.o. female.    Chief Complaint:    Chief Complaint   Patient presents with   • Left Knee - Initial Evaluation, Pain     Pain 8-9 when hurting        HPI:  This is a 77-year-old female here with 4 to 6 weeks of left knee pain there is no trauma she has a feeling of catching and locking in giving out of the knee.  Most pain is over the lateral joint line associated with grinding and clicking she takes an over-the-counter medication and uses a cane  Past Medical History:   Diagnosis Date   • Allergic rhinitis 06/02/2015   • Ankle fracture, right 02/2022   • Body mass index 29.0-29.9, adult 09/12/2018   • Cervical spinal stenosis 08/07/2017   • Cholelithiasis    • Class 1 obesity due to excess calories with serious comorbidity and body mass index (BMI) of 30.0 to 30.9 in adult 10/14/2020   • Closed bimalleolar fracture of right ankle 2/11/2022   • DDD (degenerative disc disease), cervical    • DDD (degenerative disc disease), lumbar    • DDD (degenerative disc disease), thoracic    • Diverticulosis    • Dyslipidemia 12/05/2012   • Elevated brain natriuretic peptide (BNP) level 01/06/2017   • Eustachian tube disorder, right 02/18/2019   • Fall 02/2022   • Fatigue 06/02/2015   • Female cystocele 06/02/2015   • Hiatal hernia    • Hiatal hernia with gastroesophageal reflux 01/06/2017   • History of blood clots 1980    leg s/p fx   • History of DVT (deep vein thrombosis) 06/02/2015   • History of herpes simplex infection 7/25/2020   • Hyperlipidemia    • Hypertension 06/10/2016   • Left rib fracture 05/2017    11TH RIB    • Migraine headache 06/02/2015   • Mitral insufficiency 01/16/2017    MILD   • Osteoarthritis 05/20/2014   • Osteopenia 12/05/2012   • Over weight 06/14/2018   • Peripheral edema 01/05/2017   • PFO (patent foramen ovale) 01/16/2017   • Pulmonary nodule 05/04/2017    DR CHUN FOLLOWS   • RHA (rheumatoid arthritis) 12/05/2012   • Scleritis 06/02/2015   • Scoliosis   "  • Thrombophlebitis 2/19/2022    Acute right lower extremity superficial thrombophlebitis noted in the great saphenous (below knee) and varicosity (below knee).     • Tricuspid insufficiency     MILD   • Urinary urgency    • Varicose veins of other specified sites    • Venous insufficiency 09/12/2018       Past Surgical History:   Procedure Laterality Date   • ANKLE OPEN REDUCTION INTERNAL FIXATION Right 02/18/2022    Procedure: ANKLE OPEN REDUCTION INTERNAL FIXATION;  Surgeon: DEAN Tobias DPM;  Location: [facility] MAIN OR;  Service: Podiatry;  Laterality: Right;   • COLONOSCOPY  07/20/2017    EGD/ COLONSCOPY LARGE HIATAL HERNIA. MILD DIVERTICULOSIS. OTHERWISE NORMAL.   • INGUINAL HERNIA REPAIR Right    • ORIF ANKLE FRACTURE Right 02/18/2022    Dr. Tobias   • TOTAL ABDOMINAL HYSTERECTOMY  1985    W/BSO WITH A/P REPAIR 1985 BENIGN REASONS DR. DRAKE       Family History   Problem Relation Age of Onset   • Osteoporosis Mother    • Stroke Mother 80   • Dementia Mother    • Heart disease Father    • Hypertension Father    • Heart disease Sister    • Cancer Daughter         BREAST AND MELANOMA   • Breast cancer Daughter 52   • Cancer Son         MELANOMA   • Cancer Grandson         MELANOMA          Social History     Occupational History   • Not on file   Tobacco Use   • Smoking status: Never   • Smokeless tobacco: Never   Vaping Use   • Vaping Use: Never used   Substance and Sexual Activity   • Alcohol use: Yes     Alcohol/week: 1.0 standard drink     Types: 1 Cans of beer per week   • Drug use: No   • Sexual activity: Defer      Review of Systems   Cardiovascular: Negative for chest pain.   Musculoskeletal: Positive for arthralgias.       Objective:    Temp 97.2 °F (36.2 °C)   Ht 157.5 cm (62.01\")   Wt 75.3 kg (166 lb)   BMI 30.35 kg/m²     Physical Examination:  Left knee demonstrates mild effusion she has moderate lateral joint line tenderness range of motion 0-1 25 with crepitance over the lateral compartment " no gross instability  Sensory and motor exam are intact in all distributions. Dorsalis pedis and posterior tibialis pulses are palpable and capillary refill is less than two seconds to all digits.    Imaging:  left Knee X-Ray  Indication: Knee pain  AP, Lateral views, [place]  Findings: Moderate tricompartmental degenerative disease worse in the lateral compartment  no bony lesion  Soft tissues normal  decreased joint spaces  Hardware appropriately positioned not applicable      no prior studies available for comparison.    Assessment:  Left knee degenerative joint disease    Plan:  Treatment options discussed she would like to proceed with steroid injection  I recomend injection after today's evaluation.  Risks and benefits were discussed. Under sterile technique and after timeout and verbal consent I injected 80 mg of Kenalog and 2 mL of 1% Lidocaine plain into the knee. It was well tolerated. Postinjection instructions were given.  If not improved recommend viscosupplementation      Procedures         Disclaimer: Part of this note may be an electronic transcription/translation of spoken language to printed text using the Dragon Dictation System   Double O-Z Plasty Text: The defect edges were debeveled with a #15 scalpel blade.  Given the location of the defect, shape of the defect and the proximity to free margins a Double O-Z plasty (double transposition flap) was deemed most appropriate.  Using a sterile surgical marker, the appropriate transposition flaps were drawn incorporating the defect and placing the expected incisions within the relaxed skin tension lines where possible. The area thus outlined was incised deep to adipose tissue with a #15 scalpel blade.  The skin margins were undermined to an appropriate distance in all directions utilizing iris scissors.  Hemostasis was achieved with electrocautery.  The flaps were then transposed into place, one clockwise and the other counterclockwise, and anchored with interrupted buried subcutaneous sutures.

## 2023-05-02 ENCOUNTER — TREATMENT (OUTPATIENT)
Dept: PHYSICAL THERAPY | Facility: CLINIC | Age: 78
End: 2023-05-02
Payer: MEDICARE

## 2023-05-02 DIAGNOSIS — M25.512 ACUTE PAIN OF BOTH SHOULDERS: ICD-10-CM

## 2023-05-02 DIAGNOSIS — R53.1 WEAKNESS GENERALIZED: ICD-10-CM

## 2023-05-02 DIAGNOSIS — M25.511 ACUTE PAIN OF BOTH SHOULDERS: ICD-10-CM

## 2023-05-02 DIAGNOSIS — R26.89 IMPAIRMENT OF BALANCE: Primary | ICD-10-CM
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
## 2023-05-02 NOTE — PROGRESS NOTES
Physical Therapy Daily Treatment Note      Patient: Yancy Mcdonnell   : 1945  Diagnosis/ICD-10 Code:  Impairment of balance [R26.89]  Referring practitioner: FELIPE Mcmanus  Date of Initial Visit: Type: THERAPY  Noted: 3/7/2023  Today's Date: 2023  Patient seen for 15 sessions         Yancy Mcdonnell reports: she continues to improve feeling more stable however she states having her dog around has been a challenge because it likes to get under her feet sometimes and it also likes to run away so it has been a hassle for her but she enjoys the dog so she intends to keep him.    Objective   See Exercise, Manual, and Modality Logs for complete treatment.     Assessment/Plan  Pt. responds well to balance and strength training today this date adding weighted leg press to replace band resisted with good tolerance. Pt. Will continue to be progressed to improve leg strength.    Goals  Plan Goals: STG to be met in 3 wk:  - Pt to complete STS x4 reps with use of B UE in 30 sec and no posterior LOB. - MET  - Pt to report no falls for 2 consecutive weeks. - MET  - Pt to be independent with HEP.- Progressing  LTG to be met in 3 wk:  - Improve Tinetti score to 22/28 or greater for decreased fall risk when caring for her horse. - Progressing, currently at 21/28  - Improve sensory integration as evidenced by MCTSIB of 4/4. - not met  - Increase B hip flex and abd strength to 4+/5 or greater for improved stability when walking in her field. - Progressing  - Pt to perform sit to stand x8 reps with use of B UE in 30 sec for improved ease and safety getting up from chairs. - Not met  - Pt to report no shoulder pain with reaching overhead in order to retrieve plates from upper cabinets. - not met    Progress strengthening /stabilization /functional activity           Timed:           Therapeutic Exercise:    15     mins  [id number];     Neuromuscular Berenice:  15      mins  [id number];        Timed Treatment:   30   mins   Total  Treatment:     30   mins    No Walters PTA  Physical Therapist Assistant License #[id number]

## 2023-05-04 ENCOUNTER — TREATMENT (OUTPATIENT)
Dept: PHYSICAL THERAPY | Facility: CLINIC | Age: 78
End: 2023-05-04
Payer: MEDICARE

## 2023-05-04 DIAGNOSIS — R26.89 IMPAIRMENT OF BALANCE: Primary | ICD-10-CM

## 2023-05-04 DIAGNOSIS — M25.511 ACUTE PAIN OF BOTH SHOULDERS: ICD-10-CM

## 2023-05-04 DIAGNOSIS — R53.1 WEAKNESS GENERALIZED: ICD-10-CM

## 2023-05-04 DIAGNOSIS — M25.512 ACUTE PAIN OF BOTH SHOULDERS: ICD-10-CM
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
## 2023-05-04 NOTE — PROGRESS NOTES
Physical Therapy Daily Treatment Note      Patient: Yancy Mcdonnell   : 1945  Diagnosis/ICD-10 Code:  Impairment of balance [R26.89]  Referring practitioner: FELIPE Mcmanus  Date of Initial Visit: Type: THERAPY  Noted: 3/7/2023  Today's Date: 2023  Patient seen for 16 sessions         Yancy Mcdonnell reports: she is doing better she continues to do her exercises at home and has been noticing improvement overall but states she knows she still needs work for strength because she struggled with carrying chicken feed a bag ~16 pounds.     Objective   See Exercise, Manual, and Modality Logs for complete treatment.     Assessment/Plan  Pt. Completes all exercise without a rest break this date and continues to perform very well with stepping reaction and with balance. Increase balance next to half tandem.     Goals  Plan Goals: STG to be met in 3 wk:  - Pt to complete STS x4 reps with use of B UE in 30 sec and no posterior LOB. - MET  - Pt to report no falls for 2 consecutive weeks. - MET  - Pt to be independent with HEP.- Progressing  LTG to be met in 3 wk:  - Improve Tinetti score to 22/28 or greater for decreased fall risk when caring for her horse. - Progressing, currently at 21/28  - Improve sensory integration as evidenced by MCTSIB of 4/4. - not met  - Increase B hip flex and abd strength to 4+/5 or greater for improved stability when walking in her field. - Progressing  - Pt to perform sit to stand x8 reps with use of B UE in 30 sec for improved ease and safety getting up from chairs. - Not met  - Pt to report no shoulder pain with reaching overhead in order to retrieve plates from upper cabinets. - not met    Progress strengthening /stabilization /functional activity           Timed:           Therapeutic Exercise:    15     mins  48259;     Neuromuscular Berenice:    15    mins  28696;        Timed Treatment:   30   mins   Total Treatment:     30   mins    No Walters PTA  Physical Therapist  Assistant License #[id number]

## 2023-05-11 ENCOUNTER — TREATMENT (OUTPATIENT)
Dept: PHYSICAL THERAPY | Facility: CLINIC | Age: 78
End: 2023-05-11
Payer: MEDICARE

## 2023-05-11 DIAGNOSIS — M25.512 ACUTE PAIN OF BOTH SHOULDERS: ICD-10-CM

## 2023-05-11 DIAGNOSIS — R26.89 IMPAIRMENT OF BALANCE: Primary | ICD-10-CM
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
## 2023-05-11 DIAGNOSIS — M25.511 ACUTE PAIN OF BOTH SHOULDERS: ICD-10-CM

## 2023-05-11 DIAGNOSIS — R53.1 WEAKNESS GENERALIZED: ICD-10-CM

## 2023-05-11 NOTE — PROGRESS NOTES
Physical Therapy Daily Treatment Note      Patient: Yancy Mcdonnell   : 1945  Diagnosis/ICD-10 Code:  Impairment of balance [R26.89]  Referring practitioner: FELIPE Mcmanus  Date of Initial Visit: Type: THERAPY  Noted: 3/7/2023  Today's Date: 2023  Patient seen for 17 sessions         Yancy Mcdonnell reports: her back has been very sore the past couple days stating it was worse yesterday but it has been flared up. Pt. Inquires about dry needling and if it would be effective for her pain.    Objective   See Exercise, Manual, and Modality Logs for complete treatment.     Assessment/Plan   Pt. Was given informational packet regarding dry needling this date and verbalizes she will review it. PT Griselda Weeks sees pt. Next and may trial dry needling. Pt. Continues to show good form with activity and continues to improve posture self correcting intermittently throughout session.    Goals  Plan Goals: STG to be met in 3 wk:  - Pt to complete STS x4 reps with use of B UE in 30 sec and no posterior LOB. - MET  - Pt to report no falls for 2 consecutive weeks. - MET  - Pt to be independent with HEP.- Progressing  LTG to be met in 3 wk:  - Improve Tinetti score to 22/28 or greater for decreased fall risk when caring for her horse. - Progressing, currently at 21/28  - Improve sensory integration as evidenced by MCTSIB of 4/4. - not met  - Increase B hip flex and abd strength to 4+/5 or greater for improved stability when walking in her field. - Progressing  - Pt to perform sit to stand x8 reps with use of B UE in 30 sec for improved ease and safety getting up from chairs. - Not met  - Pt to report no shoulder pain with reaching overhead in order to retrieve plates from upper cabinets. - not met    Progress strengthening /stabilization /functional activity           Timed:          Therapeutic Exercise:    15     mins  98510;     Neuromuscular Berenice:    15    mins  25358;      Timed Treatment:   30   mins    Total Treatment:     30   mins    No Walters PTA  Physical Therapist Assistant License #[id number]

## 2023-05-16 ENCOUNTER — TREATMENT (OUTPATIENT)
Dept: PHYSICAL THERAPY | Facility: CLINIC | Age: 78
End: 2023-05-16
Payer: MEDICARE

## 2023-05-16 DIAGNOSIS — M25.512 ACUTE PAIN OF BOTH SHOULDERS: ICD-10-CM

## 2023-05-16 DIAGNOSIS — R53.1 WEAKNESS GENERALIZED: ICD-10-CM

## 2023-05-16 DIAGNOSIS — R26.89 IMPAIRMENT OF BALANCE: Primary | ICD-10-CM

## 2023-05-16 DIAGNOSIS — M25.511 ACUTE PAIN OF BOTH SHOULDERS: ICD-10-CM
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
## 2023-05-18 ENCOUNTER — TREATMENT (OUTPATIENT)
Dept: PHYSICAL THERAPY | Facility: CLINIC | Age: 78
End: 2023-05-18
Payer: MEDICARE

## 2023-05-18 DIAGNOSIS — R53.1 WEAKNESS GENERALIZED: ICD-10-CM

## 2023-05-18 DIAGNOSIS — M25.511 ACUTE PAIN OF BOTH SHOULDERS: ICD-10-CM

## 2023-05-18 DIAGNOSIS — M25.512 ACUTE PAIN OF BOTH SHOULDERS: ICD-10-CM

## 2023-05-18 DIAGNOSIS — R26.89 IMPAIRMENT OF BALANCE: Primary | ICD-10-CM
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
## 2023-05-18 NOTE — PROGRESS NOTES
Physical Therapy Daily Treatment Note/ Reassessment      Patient: Yancy Mcdonnell   : 1945  Diagnosis/ICD-10 Code:  Impairment of balance [R26.89]   Problems Addressed this Visit    None  Visit Diagnoses     Impairment of balance    -  Primary    Weakness generalized        Falls, initial encounter        Acute pain of both shoulders          Diagnoses       Codes Comments    Impairment of balance    -  Primary ICD-10-CM: R26.89  ICD-9-CM: 781.2     Weakness generalized     ICD-10-CM: R53.1  ICD-9-CM: 780.79     Falls, initial encounter     ICD-10-CM: W19.XXXA  ICD-9-CM: E888.9     Acute pain of both shoulders     ICD-10-CM: M25.511, M25.512  ICD-9-CM: 719.41         Referring practitioner: FELIPE Mcmanus  Date of Initial Visit: Type: THERAPY  Noted: 3/7/2023  Today's Date: 2023    VISIT#: [id number]    Subjective : Pt reports having severe pain in L mid back and around L shoulder blade and L shoulder. States this pain started while she was working with her raspberry bushes and she had to stop. Pain has been constant and states she almost cancelled PT today because of pain. Pt asking to try dry needling for mid back pain and L shoulder blade pain.    Objective : Dry needling performed with written consent to the following areas: B thoracic paraspinals, L infraspinatus muscle belly, L levator scap distal insertion, L medial scap border.  R hip flex and abd = 4/5    30 sec STS = 5 reps with use of B UE   TUG = 16 sec without cane  Tinetti = / (balance = , gait 10/12)    See Exercise, Manual, and Modality Logs for complete treatment.     Assessment/Plan : Good tolerance to dry needling and manual techniques with decreased pain reported post-tx. Pt states severe pain is gone at end of session. Pt needs continued scapular and mid back strengthening to further decrease pain along with core and B LE strengthening to decrease fall risk. Will progress ther ex and balance activities as  tolerated.    Goals  Plan Goals: STG to be met in 3 wk:  - Pt to complete STS x4 reps with use of B UE in 30 sec and no posterior LOB. - MET  - Pt to report no falls for 2 consecutive weeks. - MET  - Pt to be independent with HEP.- Progressing  LTG to be met in 3 wk:  - Improve Tinetti score to 22/28 or greater for decreased fall risk when caring for her horse. - Progressing, currently at 21/28  - Improve sensory integration as evidenced by MCTSIB of 4/4. - not met  - Increase B hip flex and abd strength to 4+/5 or greater for improved stability when walking in her field. - Progressing  - Pt to perform sit to stand x8 reps with use of B UE in 30 sec for improved ease and safety getting up from chairs. - Not met  - Pt to report no shoulder pain with reaching overhead in order to retrieve plates from upper cabinets. - not met       Progress per Plan of Care and Progress strengthening /stabilization /functional activity  Continue PT 2x/wk x1 month and will reassess.        Timed:         Manual Therapy:    15     mins  61996;     Therapeutic Exercise:    10     mins  92037;     Neuromuscular Berenice:        mins  23321;    Therapeutic Activity:          mins  02435;     Gait Training:           mins  09669;     Ultrasound:          mins  48078;    Ionto                                   mins   54658  Self Care                            mins   22748    Un-Timed:  Electrical Stimulation:         mins  41205 ( );  Dry Needling     15     mins 72371/28188  Traction          mins 12969  Canalith Repos                   mins  80203  Low Eval          Mins  32437  Mod Eval          Mins  98747  High Eval                            Mins  55810  Re-Eval                               mins  34595    Timed Treatment:   25   mins   Total Treatment:     40   mins    Griselda Weeks, PT, CLT, Cert DN  Physical Therapist  [place] Lic # [id number]

## 2023-05-18 NOTE — PROGRESS NOTES
Physical Therapy Daily Treatment Note      Patient: Yancy Mcdonnell   : 1945  Diagnosis/ICD-10 Code:  Impairment of balance [R26.89]  Referring practitioner: FELIPE Mcmanus  Date of Initial Visit: Type: THERAPY  Noted: 3/7/2023  Today's Date: 2023  Patient seen for 19 sessions         Yancy Mcdonnell reports: her upper back has been much better since she received dry needling and overall has felt she is moving better and has better posture.    Objective   See Exercise, Manual, and Modality Logs for complete treatment.     Assessment/Plan  Pt. Tolerates treatment well this visit and continues to benefit from strengthening for balance and ambulation. Pt. Clears the floor better at this time not dragging her feet as bad and also looks forward when she walks now.    Goals  Plan Goals: STG to be met in 3 wk:  - Pt to complete STS x4 reps with use of B UE in 30 sec and no posterior LOB. - MET  - Pt to report no falls for 2 consecutive weeks. - MET  - Pt to be independent with HEP.- Progressing  LTG to be met in 3 wk:  - Improve Tinetti score to 22/28 or greater for decreased fall risk when caring for her horse. - Progressing, currently at 21/28  - Improve sensory integration as evidenced by MCTSIB of 4/4. - not met  - Increase B hip flex and abd strength to 4+/5 or greater for improved stability when walking in her field. - Progressing  - Pt to perform sit to stand x8 reps with use of B UE in 30 sec for improved ease and safety getting up from chairs. - Not met  - Pt to report no shoulder pain with reaching overhead in order to retrieve plates from upper cabinets. - not met    Progress strengthening /stabilization /functional activity           Timed:           Therapeutic Exercise:    15     mins  82879;     Neuromuscular Berenice:    15    mins  83120;        Timed Treatment:   30   mins   Total Treatment:     30   mins    No Walters PTA  Physical Therapist Assistant License #[id number]

## 2023-05-23 ENCOUNTER — TREATMENT (OUTPATIENT)
Dept: PHYSICAL THERAPY | Facility: CLINIC | Age: 78
End: 2023-05-23
Payer: MEDICARE

## 2023-05-23 DIAGNOSIS — M25.512 ACUTE PAIN OF BOTH SHOULDERS: ICD-10-CM

## 2023-05-23 DIAGNOSIS — M25.511 ACUTE PAIN OF BOTH SHOULDERS: ICD-10-CM

## 2023-05-23 DIAGNOSIS — R26.89 IMPAIRMENT OF BALANCE: Primary | ICD-10-CM

## 2023-05-23 DIAGNOSIS — R53.1 WEAKNESS GENERALIZED: ICD-10-CM
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
## 2023-05-23 NOTE — PROGRESS NOTES
Physical Therapy Daily Treatment Note      Patient: Yancy Mcdonnell   : 1945  Diagnosis/ICD-10 Code:  Impairment of balance [R26.89]  Referring practitioner: FELIPE Mcmanus  Date of Initial Visit: Type: THERAPY  Noted: 3/7/2023  Today's Date: 2023  Patient seen for 20 sessions         Yancy Mcdonnell reports: her low back pain is still present but better today. Pt. States her pain gets worse because she has to tend to everything at home. Pt. States she continues to walk with staggering at times or unevenly. Pt. States she constantly tries to straighten her posture now and improve back pain.    Objective   See Exercise, Manual, and Modality Logs for complete treatment.     Assessment/Plan   Pt. tolerates treatment well this session and when Pt. is able to focus on task at hand balance is much more achievable. Pt. attempts to correct posture throughout session and continues to perform exercises well.    Goals  Plan Goals: STG to be met in 3 wk:  - Pt to complete STS x4 reps with use of B UE in 30 sec and no posterior LOB. - MET  - Pt to report no falls for 2 consecutive weeks. - MET  - Pt to be independent with HEP.- Progressing  LTG to be met in 3 wk:  - Improve Tinetti score to 22/28 or greater for decreased fall risk when caring for her horse. - Progressing, currently at 21/28  - Improve sensory integration as evidenced by MCTSIB of 4/4. - not met  - Increase B hip flex and abd strength to 4+/5 or greater for improved stability when walking in her field. - Progressing  - Pt to perform sit to stand x8 reps with use of B UE in 30 sec for improved ease and safety getting up from chairs. - Not met  - Pt to report no shoulder pain with reaching overhead in order to retrieve plates from upper cabinets. - not met    Progress strengthening /stabilization /functional activity           Timed:           Therapeutic Exercise:    15     mins  54558;     Neuromuscular Berenice:    15    mins  52351;         Timed Treatment:   30   mins   Total Treatment:     30   mins    No Walters PTA  Physical Therapist Assistant License #[id number]

## 2023-05-25 DIAGNOSIS — I10 PRIMARY HYPERTENSION: ICD-10-CM

## 2023-05-25 RX ORDER — HYDROCHLOROTHIAZIDE 25 MG/1
25 TABLET ORAL 2 TIMES DAILY
Qty: 180 TABLET | Refills: 0 | Status: SHIPPED | OUTPATIENT
Start: 2023-05-25

## 2023-05-25 RX ORDER — HYDROCHLOROTHIAZIDE 25 MG/1
50 TABLET ORAL DAILY
Qty: 90 TABLET | Refills: 0 | Status: CANCELLED | OUTPATIENT
Start: 2023-05-25

## 2023-05-25 NOTE — TELEPHONE ENCOUNTER
Ivana only has script on file for 1 tablet daily.  Needs new script sent over with new directions

## 2023-05-29 NOTE — PROGRESS NOTES
Chief Complaint  Laceration (Both lower legs)    Subjective          Yancy is a 77 y.o. female  who presents to [facility]     Patient Care Team:  Alyse Mcnally APRN as PCP - General (Nurse Practitioner)  Dr. AMITA Palma & Vandana) (Ophthalmology)  Banet, Duane Edward, MD as Consulting Physician (Dermatology)  Edwin Banks MD as Consulting Physician (Otolaryngology)  Ruslan Davila MD as Consulting Physician (Gastroenterology)  DEAN Tobias DPM as Consulting Physician (Podiatry)     History of Present Illness  Yancy is here today for lower leg redness and lacerations    Location: both lower legs  Quality: skin red, warm to touch  Severity:  moderate  Duration: for 1 day  Timing: constant  Context: fall   Alleviating factors: nothing makes better  Aggravating factors: nothing makes worse  Associated Symptoms:  No fever, no drainage    She fell yesterday in the barn and cut her legs on a wood palate  Legs are red  No pain  Tetanus is up to date.  She has been washing the areas and applying triple antibiotic        Yancy Mcdonnell  has a past medical history of Allergic rhinitis (06/02/2015), Ankle fracture, right (02/2022), Body mass index 29.0-29.9, adult (09/12/2018), Cervical spinal stenosis (08/07/2017), Cholelithiasis, Class 1 obesity due to excess calories with serious comorbidity and body mass index (BMI) of 30.0 to 30.9 in adult (10/14/2020), Closed bimalleolar fracture of right ankle (2/11/2022), DDD (degenerative disc disease), cervical, DDD (degenerative disc disease), lumbar, DDD (degenerative disc disease), thoracic, Diverticulosis, Dyslipidemia (12/05/2012), Elevated brain natriuretic peptide (BNP) level (01/06/2017), Eustachian tube disorder, right (02/18/2019), Fall (02/2022), Fatigue (06/02/2015), Female cystocele (06/02/2015), Hiatal hernia, Hiatal hernia with gastroesophageal reflux (01/06/2017), History of blood clots (1980), History of DVT (deep vein  thrombosis) (06/02/2015), History of herpes simplex infection (7/25/2020), Hyperlipidemia, Hypertension (06/10/2016), Left rib fracture (05/2017), Migraine headache (06/02/2015), Mitral insufficiency (01/16/2017), Osteoarthritis (05/20/2014), Osteopenia (12/05/2012), Over weight (06/14/2018), Peripheral edema (01/05/2017), PFO (patent foramen ovale) (01/16/2017), Pulmonary nodule (05/04/2017), RHA (rheumatoid arthritis) (12/05/2012), Scleritis (06/02/2015), Scoliosis, Thrombophlebitis (2/19/2022), Tricuspid insufficiency, Urinary urgency, Varicose veins of other specified sites, and Venous insufficiency (09/12/2018).      Review of Systems   Constitutional: Negative for fever.   Gastrointestinal: Negative for abdominal pain, nausea and vomiting.   Skin: Positive for color change and wound.        Family History   Problem Relation Age of Onset   • Osteoporosis Mother    • Stroke Mother 80   • Dementia Mother    • Heart disease Father    • Hypertension Father    • Heart disease Sister    • Cancer Daughter         BREAST AND MELANOMA   • Breast cancer Daughter 52   • Cancer Son         MELANOMA   • Cancer Grandson         MELANOMA        Past Surgical History:   Procedure Laterality Date   • ANKLE OPEN REDUCTION INTERNAL FIXATION Right 02/18/2022    Procedure: ANKLE OPEN REDUCTION INTERNAL FIXATION;  Surgeon: DEAN Tobias DPM;  Location: [facility] OR;  Service: Podiatry;  Laterality: Right;   • COLONOSCOPY  07/20/2017    EGD/ COLONSCOPY LARGE HIATAL HERNIA. MILD DIVERTICULOSIS. OTHERWISE NORMAL.   • INGUINAL HERNIA REPAIR Right    • ORIF ANKLE FRACTURE Right 02/18/2022    Dr. Tobias   • TOTAL ABDOMINAL HYSTERECTOMY  1985    W/BSO WITH A/P REPAIR 1985 BENIGN REASONS DR. DRAKE        Social History     Socioeconomic History   • Marital status:    Tobacco Use   • Smoking status: Never   • Smokeless tobacco: Never   Vaping Use   • Vaping Use: Never used   Substance and Sexual Activity   • Alcohol use: Yes      Alcohol/week: 1.0 standard drink     Types: 1 Cans of beer per week   • Drug use: No   • Sexual activity: Defer        Immunization History   Administered Date(s) Administered   • COVID-19 (PFIZER) Purple Cap Monovalent 02/08/2021, 03/01/2021, 10/28/2021   • FLUAD TRI 65YR+ 09/16/2019   • Fluad Quad 65+ 09/14/2020   • Fluzone High Dose =>65 Years ([employer] ONLY) 10/09/2015, 09/30/2016, 09/22/2017, 09/12/2018, 11/16/2021   • Fluzone High-Dose 65+yrs 09/30/2022   • Hepatitis A 05/01/2018, 12/19/2018   • Pneumococcal Conjugate 13-Valent (PCV13) 06/02/2015, 10/09/2015   • Pneumococcal Polysaccharide (PPSV23) 09/10/2010   • Tdap 01/05/2017   • Zoster, Unspecified 12/30/2009       Objective       Current Outpatient Medications:   •  estradiol (ESTRACE) 0.1 MG/GM vaginal cream, , Disp: , Rfl:   •  fluticasone (FLONASE) 50 MCG/ACT nasal spray, INJECT 2 SPRAYS INTO THE NOSTRILS EVERY DAY AS DIRECTED, Disp: 48 g, Rfl: 4  •  hydroCHLOROthiazide (HYDRODIURIL) 25 MG tablet, Take 1 tablet by mouth 2 (Two) Times a Day., Disp: 180 tablet, Rfl: 0  •  levothyroxine (SYNTHROID, LEVOTHROID) 25 MCG tablet, Take 1 tablet by mouth Daily., Disp: 30 tablet, Rfl: 12  •  loratadine (CLARITIN) 10 MG tablet, Take 1 tablet by mouth Daily., Disp: 90 tablet, Rfl: 1  •  losartan (COZAAR) 100 MG tablet, TAKE 1 TABLET BY MOUTH DAILY, Disp: 90 tablet, Rfl: 0  •  lysine 500 MG tablet, Take 1 tablet by mouth Daily As Needed. Stop now for surgery, Disp: , Rfl:   •  Magnesium 250 MG tablet, Take  by mouth. Stop now for surgery, Disp: , Rfl:   •  metoprolol succinate XL (TOPROL-XL) 25 MG 24 hr tablet, TAKE 2 TABLETS BY MOUTH DAILY, Disp: 90 tablet, Rfl: 1  •  Misc Natural Products (LUTEIN 20 PO), Take  by mouth. Stop now for surgery, Disp: , Rfl:   •  Multiple Vitamin (MULTI-VITAMIN DAILY) tablet, Take 1 tablet by mouth Daily. Stop now for surgery, Disp: , Rfl:   •  NON FORMULARY, Sustain eye drops, Disp: , Rfl:   •  cephalexin (Keflex) 500 MG capsule, Take  "1 capsule by mouth Every 8 (Eight) Hours for 10 days., Disp: 30 capsule, Rfl: 0  •  mupirocin (BACTROBAN) 2 % ointment, Apply 1 application topically to the appropriate area as directed 3 (Three) Times a Day., Disp: 30 g, Rfl: 1    Vital Signs:      /73 (BP Location: Right arm, Patient Position: Sitting, Cuff Size: Large Adult)   Pulse 76   Temp 98.1 °F (36.7 °C) (Infrared)   Resp 16   Ht 157.5 cm (62\")   Wt 75.3 kg (166 lb)   SpO2 95%   BMI 30.36 kg/m²     Vitals:    05/31/23 1636   BP: 160/73   BP Location: Right arm   Patient Position: Sitting   Cuff Size: Large Adult   Pulse: 76   Resp: 16   Temp: 98.1 °F (36.7 °C)   TempSrc: Infrared   SpO2: 95%   Weight: 75.3 kg (166 lb)   Height: 157.5 cm (62\")      Physical Exam  Vitals reviewed.   Constitutional:       General: She is not in acute distress.     Appearance: Normal appearance. She is obese.   HENT:      Head: Normocephalic and atraumatic.   Eyes:      General: No scleral icterus.     Conjunctiva/sclera: Conjunctivae normal.   Cardiovascular:      Rate and Rhythm: Normal rate and regular rhythm.      Heart sounds: Normal heart sounds.   Pulmonary:      Effort: Pulmonary effort is normal. No respiratory distress.      Breath sounds: Normal breath sounds. No wheezing.   Musculoskeletal:      Right lower leg: Edema present.      Left lower leg: Edema present.   Skin:     General: Skin is warm and dry.             Comments: See picture.  Laceration/skin tear on medial aspect of right lower leg below knee.  Laceration/skin tear on left lower leg below knee.  Both lower legs with color change of venous insufficiency and erythema, warmth around wounds.   Neurological:      Mental Status: She is alert and oriented to person, place, and time.   Psychiatric:         Mood and Affect: Mood normal.        Result Review :                     Assessment and Plan    Diagnoses and all orders for this visit:    1. Cellulitis of right lower extremity (Primary)  -    "  cephalexin (Keflex) 500 MG capsule; Take 1 capsule by mouth Every 8 (Eight) Hours for 10 days.  Dispense: 30 capsule; Refill: 0  -     mupirocin (BACTROBAN) 2 % ointment; Apply 1 application topically to the appropriate area as directed 3 (Three) Times a Day.  Dispense: 30 g; Refill: 1    2. Cellulitis of left lower extremity  -     cephalexin (Keflex) 500 MG capsule; Take 1 capsule by mouth Every 8 (Eight) Hours for 10 days.  Dispense: 30 capsule; Refill: 0  -     mupirocin (BACTROBAN) 2 % ointment; Apply 1 application topically to the appropriate area as directed 3 (Three) Times a Day.  Dispense: 30 g; Refill: 1    3. Laceration of right lower extremity, initial encounter    4. Laceration of left lower extremity, initial encounter       Wash skin daily with soap and water.  Pat dry.  Apply Bactroban to wound.  Stop triple antibiotic.  Begin oral Keflex.  Follow up with PCP in 1 week for recheck or sooner if needed.  Tdap up to date.      Follow Up   Return in about 1 week (around 6/7/2023) for recheck legs.  Patient was given instructions and counseling regarding her condition or for health maintenance advice. Please see specific information pulled into the AVS if appropriate.    There are no Patient Instructions on file for this visit.

## 2023-05-30 ENCOUNTER — TREATMENT (OUTPATIENT)
Dept: PHYSICAL THERAPY | Facility: CLINIC | Age: 78
End: 2023-05-30
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
## 2023-05-30 DIAGNOSIS — R53.1 WEAKNESS GENERALIZED: ICD-10-CM

## 2023-05-30 DIAGNOSIS — M25.511 ACUTE PAIN OF BOTH SHOULDERS: ICD-10-CM

## 2023-05-30 DIAGNOSIS — R26.89 IMPAIRMENT OF BALANCE: Primary | ICD-10-CM

## 2023-05-30 DIAGNOSIS — M25.512 ACUTE PAIN OF BOTH SHOULDERS: ICD-10-CM

## 2023-05-30 NOTE — PROGRESS NOTES
Physical Therapy Daily Treatment Note      Patient: Yancy Mcdonnell   : 1945  Diagnosis/ICD-10 Code:  Impairment of balance [R26.89]  Referring practitioner: FELIPE Mcmanus  Date of Initial Visit: Type: THERAPY  Noted: 3/7/2023  Today's Date: 2023  Patient seen for 21 sessions         Yancy Mcdonnell reports: she had a therapeutic massage since the last time she's been here and she is impressed how much better she feels in her low back and it even made her shoulders feel better as well. Pt. States her pain level is down now but she continues to have a lack of balance and needs continued work on that.    Objective   See Exercise, Manual, and Modality Logs for complete treatment.     Assessment/Plan   Pt. completes exercise well and despite Pt. Doubt of balance improving Pt. is able to complete step outs without UE support this date as well as marching on foam with only one UE for support. Pt. shows subtle improvements in balance and also continued improvement in upright posture.    Goals  Plan Goals: STG to be met in 3 wk:  - Pt to complete STS x4 reps with use of B UE in 30 sec and no posterior LOB. - MET  - Pt to report no falls for 2 consecutive weeks. - MET  - Pt to be independent with HEP.- Progressing  LTG to be met in 3 wk:  - Improve Tinetti score to 22/28 or greater for decreased fall risk when caring for her horse. - Progressing, currently at 21/28  - Improve sensory integration as evidenced by MCTSIB of 4/4. - not met  - Increase B hip flex and abd strength to 4+/5 or greater for improved stability when walking in her field. - Progressing  - Pt to perform sit to stand x8 reps with use of B UE in 30 sec for improved ease and safety getting up from chairs. - Not met  - Pt to report no shoulder pain with reaching overhead in order to retrieve plates from upper cabinets. - not met    Progress strengthening /stabilization /functional activity           Timed:           Therapeutic  Exercise:    15     mins  00119;     Neuromuscular Berenice:   15     mins  62506;  ;      Timed Treatment:   30   mins   Total Treatment:     30   mins    No Walters PTA  Physical Therapist Assistant License #[id number]

## 2023-05-31 ENCOUNTER — OFFICE VISIT (OUTPATIENT)
Dept: FAMILY MEDICINE CLINIC | Facility: CLINIC | Age: 78
End: 2023-05-31

## 2023-05-31 VITALS
RESPIRATION RATE: 16 BRPM | HEIGHT: 62 IN | DIASTOLIC BLOOD PRESSURE: 73 MMHG | OXYGEN SATURATION: 95 % | WEIGHT: 166 LBS | HEART RATE: 76 BPM | SYSTOLIC BLOOD PRESSURE: 160 MMHG | TEMPERATURE: 98.1 F | BODY MASS INDEX: 30.55 KG/M2

## 2023-05-31 DIAGNOSIS — S81.811A LACERATION OF RIGHT LOWER EXTREMITY, INITIAL ENCOUNTER: ICD-10-CM

## 2023-05-31 DIAGNOSIS — L03.115 CELLULITIS OF RIGHT LOWER EXTREMITY: Primary | ICD-10-CM

## 2023-05-31 DIAGNOSIS — S81.812A LACERATION OF LEFT LOWER EXTREMITY, INITIAL ENCOUNTER: ICD-10-CM

## 2023-05-31 DIAGNOSIS — L03.116 CELLULITIS OF LEFT LOWER EXTREMITY: ICD-10-CM

## 2023-05-31 RX ORDER — CEPHALEXIN 500 MG/1
500 CAPSULE ORAL EVERY 8 HOURS
Qty: 30 CAPSULE | Refills: 0 | Status: SHIPPED | OUTPATIENT
Start: 2023-05-31 | End: 2023-06-10

## 2023-06-01 ENCOUNTER — TREATMENT (OUTPATIENT)
Dept: PHYSICAL THERAPY | Facility: CLINIC | Age: 78
End: 2023-06-01
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
## 2023-06-01 DIAGNOSIS — R26.89 IMPAIRMENT OF BALANCE: Primary | ICD-10-CM

## 2023-06-01 DIAGNOSIS — M25.512 ACUTE PAIN OF BOTH SHOULDERS: ICD-10-CM

## 2023-06-01 DIAGNOSIS — M25.511 ACUTE PAIN OF BOTH SHOULDERS: ICD-10-CM

## 2023-06-01 DIAGNOSIS — R53.1 WEAKNESS GENERALIZED: ICD-10-CM

## 2023-06-01 NOTE — PROGRESS NOTES
Physical Therapy Progress Note/Reassessment  [address] [place] [address] Dr. BLANCHARD [address]   [place] [address]    Patient: Yancy Mcdonnell   : 1945  Diagnosis/ICD-10 Code:  Impairment of balance [R26.89]  Referring practitioner: FELIPE Mcmanus  Date of Initial Evaluation:  Type: THERAPY  Noted: 3/7/2023  Patient seen for 22 sessions      Subjective:   Visit Diagnoses:    ICD-10-CM ICD-9-CM   1. Impairment of balance  R26.89 781.2   2. Weakness generalized  R53.1 780.79   3. Falls, initial encounter  W19.XXXA E888.9   4. Acute pain of both shoulders  M25.511 719.41    M25.512          Yancy Mcdonnell reports she had another fal but it was of her own doing because she was trying to move pallets in her barn because the dogs had trapped something behind them and she needed to move them so it would leave the barn while moving them she got tnagled up with her footing and fell scraping both shins one for sure wa son the pallet but the other she doesn't know how. P.t states she saw her MD and she said to keep the wounds covered and to allow them to heal but is not overly concerned with infection. Pt. States she has felt no shoulder pain only low back pain at this time and states even that has improved with exercise and then the massage she went too really helped as well.  Clinical Progress: improved  Home Program Compliance: Yes  Treatment has included: therapeutic exercise, neuromuscular re-education, manual therapy, therapeutic activity and gait training    Objective   Tinetti Assessment total score = 23  Sit to stand test = 7 in 30 seconds  MCTSIB =   MMT R hip Abd 5/5 L hip Abd 4+/5 R hip Flex 4+/5 L hip Flex 4+/5    Assessment/Plan  Pt. Continues to progress with strength and mobility particularly with UE and walking showing improvement. Pt. Has trouble with more nuanced mobility and movements especially when she abandons her cane. Pt. needs continued PT at this time to continue improving balance and reaction to avoid falls  and return to PLOF for completion of chores and ADL's. Pt. Goals highlight progress through strength and mobility.    Goals  Plan Goals: STG to be met in 3 wk:  - Pt to complete STS x4 reps with use of B UE in 30 sec and no posterior LOB. - MET  - Pt to report no falls for 2 consecutive weeks. - MET  - Pt to be independent with HEP.- MET  LTG to be met in 3 wk:  - Improve Tinetti score to 22/28 or greater for decreased fall risk when caring for her horse. - Met   - Improve sensory integration as evidenced by MCTSIB of 4/4. - not met  - Increase B hip flex and abd strength to 4+/5 or greater for improved stability when walking in her field. - MET  - Pt to perform sit to stand x8 reps with use of B UE in 30 sec for improved ease and safety getting up from chairs. - Progressing (7 today)  - Pt to report no shoulder pain with reaching overhead in order to retrieve plates from upper cabinets. -MET     Recommendations: Continue as planned  Timeframe: 3 months  Prognosis to achieve goals: good      Timed:           Therapeutic Exercise:    15     mins  53234;     Neuromuscular Berenice:    15    mins  19457;      Timed Treatment:   30   mins   Total Treatment:     30   mins    PT Signature: No Walters PTA  [place] License: #[id number]

## 2023-06-06 DIAGNOSIS — I10 PRIMARY HYPERTENSION: Chronic | ICD-10-CM

## 2023-06-06 RX ORDER — METOPROLOL SUCCINATE 25 MG/1
50 TABLET, EXTENDED RELEASE ORAL DAILY
Qty: 90 TABLET | Refills: 1 | Status: SHIPPED | OUTPATIENT
Start: 2023-06-06

## 2023-06-12 NOTE — PROGRESS NOTES
Chief Complaint  Wound Check    Caitie Haines Presents to [facility] for     Wound Check  She was originally treated 5 to 10 days ago. Previous treatment included oral antibiotics and wound cleansing or irrigation. Her temperature was unmeasured prior to arrival. There has been no drainage from the wound. The redness has improved. The swelling has improved. The pain has improved. She has no difficulty moving the affected extremity or digit.      History of Present Illness     Patient Active Problem List    Diagnosis     Dysphagia [R13.10]     Subclinical hypothyroidism [E03.8]     Encounter for subsequent annual wellness visit (AWV) in Medicare patient [Z00.00]     Stress incontinence of urine [N39.3]     Presence of pessary [Z96.0]     Atrophic vaginitis [N95.2]     Abnormal urinalysis [R82.90]     Long term (current) use of aspirin [Z79.82]     Urinary frequency [R35.0]     Contusion of face, scalp, and neck, sequela [S00.83XS, S00.03XS, S10.93XS]     H/O fall [Z91.81]     Deviated septum [J34.2]     Thrombophlebitis [I80.9]     Closed bimalleolar fracture of right ankle [S82.841A]     Hiatal hernia [K44.9]     Elevated alkaline phosphatase level [R74.8]     Class 1 obesity due to excess calories with serious comorbidity and body mass index (BMI) of 30.0 to 30.9 in adult [E66.09, Z68.30]     History of herpes simplex infection [Z86.19]     Gastroesophageal reflux disease without esophagitis [K21.9]     Osteoarthritis [M19.90]     Venous insufficiency [I87.2]     Spinal stenosis of cervical region [M48.02]     Lung nodule [R91.1]     Mitral valve insufficiency [I34.0]     Hypertension [I10]     Allergic rhinitis [J30.9]     History of thrombosis [Z86.718]     Migraine headache [G43.909]     Dyslipidemia [E78.5]     Osteopenia of multiple sites [M85.89]     Rheumatoid arthritis [M06.9]      Review of Systems   Constitutional:  Negative for activity change, chills,  fatigue and fever.   Respiratory:  Negative for cough, shortness of breath and wheezing.    Cardiovascular:  Positive for leg swelling. Negative for chest pain and palpitations.   Gastrointestinal:  Positive for GERD.   Musculoskeletal:  Positive for arthralgias and myalgias.   Skin:  Positive for color change and wound.   Neurological:  Negative for dizziness, weakness, light-headedness and confusion.   Psychiatric/Behavioral: Negative.       Allergies   Allergen Reactions    Demerol [Meperidine] Unknown (See Comments)     Abstracted from centricity.    Other Unknown (See Comments)     Sulfa (sulfadiazine)     Sulfa Antibiotics Unknown - High Severity    Lisinopril Cough       Social History     Socioeconomic History    Marital status:    Tobacco Use    Smoking status: Never    Smokeless tobacco: Never   Vaping Use    Vaping Use: Never used   Substance and Sexual Activity    Alcohol use: Yes     Alcohol/week: 1.0 standard drink     Types: 1 Cans of beer per week    Drug use: No    Sexual activity: Defer       Family History   Problem Relation Age of Onset    Osteoporosis Mother     Stroke Mother 80    Dementia Mother     Heart disease Father     Hypertension Father     Heart disease Sister     Cancer Daughter         BREAST AND MELANOMA    Breast cancer Daughter 52    Cancer Son         MELANOMA    Cancer Grandson         MELANOMA       Active Ambulatory Problems     Diagnosis Date Noted    Allergic rhinitis 06/02/2015    Lung nodule 05/04/2017    Dyslipidemia 12/05/2012    History of thrombosis 06/02/2015    Hypertension 06/10/2016    Migraine headache 06/02/2015    Mitral valve insufficiency 01/16/2017    Osteoarthritis 07/17/2019    Osteopenia of multiple sites 12/05/2012    Rheumatoid arthritis 12/05/2012    Spinal stenosis of cervical region 08/07/2017    Venous insufficiency 09/12/2018    Gastroesophageal reflux disease without esophagitis 08/18/2019    History of herpes simplex infection 07/25/2020     Class 1 obesity due to excess calories with serious comorbidity and body mass index (BMI) of 30.0 to 30.9 in adult 10/14/2020    Elevated alkaline phosphatase level 04/28/2021    Hiatal hernia 06/15/2021    Closed bimalleolar fracture of right ankle 02/10/2022    Thrombophlebitis 02/19/2022    Contusion of face, scalp, and neck, sequela 07/29/2022    H/O fall 07/29/2022    Deviated septum 07/29/2022    Abnormal urinalysis 08/25/2022    Long term (current) use of aspirin 08/25/2022    Urinary frequency 08/25/2022    Encounter for subsequent annual wellness visit (AWV) in Medicare patient 10/31/2022    Stress incontinence of urine 10/31/2022    Presence of pessary 10/14/2022    Atrophic vaginitis 10/14/2022    Dysphagia 06/13/2023    Subclinical hypothyroidism 06/13/2023     Resolved Ambulatory Problems     Diagnosis Date Noted    Abnormal laboratory test result 01/06/2017    Eustachian tube disorder, right 02/18/2019    Abdominal pain 07/25/2020    Hypocalcemia 07/25/2020    Acute cystitis without hematuria 12/07/2020    Dysuria 12/07/2020    History of deviated nasal septum 07/29/2022     Past Medical History:   Diagnosis Date    Ankle fracture, right 02/2022    Body mass index 29.0-29.9, adult 09/12/2018    Cervical spinal stenosis 08/07/2017    Cholelithiasis     DDD (degenerative disc disease), cervical     DDD (degenerative disc disease), lumbar     DDD (degenerative disc disease), thoracic     Diverticulosis     Elevated brain natriuretic peptide (BNP) level 01/06/2017    Fall 02/2022    Fatigue 06/02/2015    Female cystocele 06/02/2015    Hiatal hernia with gastroesophageal reflux 01/06/2017    History of blood clots 1980    History of DVT (deep vein thrombosis) 06/02/2015    Hyperlipidemia     Left rib fracture 05/2017    Mitral insufficiency 01/16/2017    Osteopenia 12/05/2012    Over weight 06/14/2018    Peripheral edema 01/05/2017    PFO (patent foramen ovale) 01/16/2017    Pulmonary nodule 05/04/2017     RHA (rheumatoid arthritis) 12/05/2012    Scleritis 06/02/2015    Scoliosis     Tricuspid insufficiency     Urinary urgency     Varicose veins of other specified sites        Current Outpatient Medications:     estradiol (ESTRACE) 0.1 MG/GM vaginal cream, , Disp: , Rfl:     fluticasone (FLONASE) 50 MCG/ACT nasal spray, INJECT 2 SPRAYS INTO THE NOSTRILS EVERY DAY AS DIRECTED, Disp: 48 g, Rfl: 4    hydroCHLOROthiazide (HYDRODIURIL) 25 MG tablet, Take 1 tablet by mouth 2 (Two) Times a Day., Disp: 180 tablet, Rfl: 0    levothyroxine (SYNTHROID, LEVOTHROID) 50 MCG tablet, Take 1 tablet by mouth Daily., Disp: 30 tablet, Rfl: 1    loratadine (CLARITIN) 10 MG tablet, Take 1 tablet by mouth Daily., Disp: 90 tablet, Rfl: 1    losartan (COZAAR) 100 MG tablet, TAKE 1 TABLET BY MOUTH DAILY, Disp: 90 tablet, Rfl: 0    lysine 500 MG tablet, Take 1 tablet by mouth Daily As Needed. Stop now for surgery, Disp: , Rfl:     Magnesium 250 MG tablet, Take  by mouth. Stop now for surgery, Disp: , Rfl:     metoprolol succinate XL (TOPROL-XL) 25 MG 24 hr tablet, TAKE 2 TABLETS BY MOUTH DAILY, Disp: 90 tablet, Rfl: 1    Misc Natural Products (LUTEIN 20 PO), Take  by mouth. Stop now for surgery, Disp: , Rfl:     Multiple Vitamin (MULTI-VITAMIN DAILY) tablet, Take 1 tablet by mouth Daily. Stop now for surgery, Disp: , Rfl:     mupirocin (BACTROBAN) 2 % ointment, Apply 1 application topically to the appropriate area as directed 3 (Three) Times a Day., Disp: 30 g, Rfl: 1    NON FORMULARY, Sustain eye drops, Disp: , Rfl:     doxycycline (MONODOX) 100 MG capsule, Take 1 capsule by mouth 2 (Two) Times a Day., Disp: 20 capsule, Rfl: 0    Past Surgical History:   Procedure Laterality Date    ANKLE OPEN REDUCTION INTERNAL FIXATION Right 02/18/2022    Procedure: ANKLE OPEN REDUCTION INTERNAL FIXATION;  Surgeon: DEAN Tobias DPM;  Location: [facility] MAIN OR;  Service: Podiatry;  Laterality: Right;    COLONOSCOPY  07/20/2017    EGD/ COLONSCOPY LARGE  "HIATAL HERNIA. MILD DIVERTICULOSIS. OTHERWISE NORMAL.    INGUINAL HERNIA REPAIR Right     ORIF ANKLE FRACTURE Right 02/18/2022    Dr. Tobias    TOTAL ABDOMINAL HYSTERECTOMY  1985    W/BSO WITH A/P REPAIR 1985 BENIGN REASONS DR. DRAKE       Depression Screen:       10/31/2022     9:00 AM   PHQ-2/PHQ-9 Depression Screening   Little Interest or Pleasure in Doing Things 0-->not at all   Feeling Down, Depressed or Hopeless 0-->not at all   PHQ-9: Brief Depression Severity Measure Score 0       Fall Risk Screen:  [facility] Fall Risk Assessment has not been completed.    Review of Systems   Constitutional:  Negative for activity change, chills, fatigue and fever.   Respiratory:  Negative for cough, shortness of breath and wheezing.    Cardiovascular:  Positive for leg swelling. Negative for chest pain and palpitations.   Musculoskeletal:  Positive for arthralgias and myalgias.   Skin:  Positive for color change and wound.   Neurological:  Negative for dizziness, weakness, light-headedness and confusion.   Psychiatric/Behavioral: Negative.          Objective   Rooming Tab(CC,VS,Pt Hx,Fall Screen)    Vitals:    06/13/23 1637   BP: 132/83   BP Location: Left arm   Patient Position: Sitting   Cuff Size: Adult   Pulse: 74   Resp: 16   Temp: 98 °F (36.7 °C)   TempSrc: Infrared   SpO2: 97%   Weight: 68.9 kg (152 lb)   Height: 157.5 cm (62\")       Body mass index is 27.8 kg/m².     Physical Exam  Vitals reviewed.   Constitutional:       General: She is not in acute distress.     Appearance: Normal appearance. She is obese.   HENT:      Head: Normocephalic and atraumatic.   Eyes:      General: No scleral icterus.     Conjunctiva/sclera: Conjunctivae normal.   Cardiovascular:      Rate and Rhythm: Normal rate and regular rhythm.      Heart sounds: Normal heart sounds.   Pulmonary:      Effort: Pulmonary effort is normal. No respiratory distress.      Breath sounds: Normal breath sounds. No wheezing.   Musculoskeletal:      Right lower " leg: Edema present.      Left lower leg: Edema present.   Skin:     General: Skin is warm and dry.             Comments: Laceration site with healing wound bed on medial aspect of right lower leg below knee.  Laceration site with healing wound bed on left lower leg below knee.  Left lower leg proximal to knee with reported  improvement of erythema/edema  and slight redness associated with venous insufficiency.    Neurological:      Mental Status: She is alert and oriented to person, place, and time.   Psychiatric:         Mood and Affect: Mood normal.       Result Review :                Assessment and Plan  Diagnoses and all orders for this visit:    1. Follow-up exam (Primary)    2. Cellulitis of left lower extremity  Comments:  Begin antibiotic if not improved within two days and return for follow up visit for further eval.    3. Cellulitis of right lower extremity  Comments:  Begin antibiotic if not improved within two days and return for follow up visit for further eval.    4. Subclinical hypothyroidism  Comments:  Currently tkaing 25mg levothyroxine.   Increase levothyroxine to 50mg daily.  TSH/T4 ordered  Orders:  -     levothyroxine (SYNTHROID, LEVOTHROID) 50 MCG tablet; Take 1 tablet by mouth Daily.  Dispense: 30 tablet; Refill: 1  -     TSH  -     T4, Free    5. Elevated alkaline phosphatase level  Comments:  recheck cmp,cbc,  Orders:  -     Comprehensive Metabolic Panel  -     CBC & Differential    6. Dysphagia, unspecified type  Comments:  EGD ordered  Orders:  -     Ambulatory referral for Screening EGD    7. Hiatal hernia  Comments:  egd ordered  Orders:  -     Ambulatory referral for Screening EGD    8. Urinary frequency  Comments:  poc ua  Orders:  -     POC Urinalysis Dipstick, Multipro    9. Primary hypertension  -     Lipid Panel    Other orders  -     doxycycline (MONODOX) 100 MG capsule; Take 1 capsule by mouth 2 (Two) Times a Day.  Dispense: 20 capsule; Refill: 0             Wrapup Tab  Follow  Up   Requested Prescriptions     Signed Prescriptions Disp Refills    doxycycline (MONODOX) 100 MG capsule 20 capsule 0     Sig: Take 1 capsule by mouth 2 (Two) Times a Day.    levothyroxine (SYNTHROID, LEVOTHROID) 50 MCG tablet 30 tablet 1     Sig: Take 1 tablet by mouth Daily.     Medications Discontinued During This Encounter   Medication Reason    levothyroxine (SYNTHROID, LEVOTHROID) 25 MCG tablet Reorder     Return in about 1 week (around 6/20/2023), or Friday if not better., for Recheck.   Patient was given instructions and counseling regarding her condition or for health maintenance advice. Please see specific information pulled into the AVS if appropriate.    Hand hygiene was performed during entrance to exam room and following assessment of patient.   This document is intended for medical expert use only.     EMR Dragon/Transcription disclaimer:   Much of this encounter note is an electronic transcription/translation of spoken language to printed text. The electronic translation of spoken language may permit erroneous, or at times, nonsensical words or phrases to be inadvertently transcribed.

## 2023-06-13 ENCOUNTER — OFFICE VISIT (OUTPATIENT)
Dept: FAMILY MEDICINE CLINIC | Facility: CLINIC | Age: 78
End: 2023-06-13
Payer: MEDICARE

## 2023-06-13 VITALS
BODY MASS INDEX: 27.97 KG/M2 | TEMPERATURE: 98 F | DIASTOLIC BLOOD PRESSURE: 83 MMHG | WEIGHT: 152 LBS | OXYGEN SATURATION: 97 % | RESPIRATION RATE: 16 BRPM | HEART RATE: 74 BPM | SYSTOLIC BLOOD PRESSURE: 132 MMHG | HEIGHT: 62 IN

## 2023-06-13 DIAGNOSIS — Z09 FOLLOW-UP EXAM: Primary | ICD-10-CM

## 2023-06-13 DIAGNOSIS — K44.9 HIATAL HERNIA: ICD-10-CM

## 2023-06-13 DIAGNOSIS — R13.10 DYSPHAGIA, UNSPECIFIED TYPE: ICD-10-CM

## 2023-06-13 DIAGNOSIS — R74.8 ELEVATED ALKALINE PHOSPHATASE LEVEL: ICD-10-CM

## 2023-06-13 DIAGNOSIS — L03.115 CELLULITIS OF RIGHT LOWER EXTREMITY: ICD-10-CM

## 2023-06-13 DIAGNOSIS — R35.0 URINARY FREQUENCY: ICD-10-CM

## 2023-06-13 DIAGNOSIS — L03.116 CELLULITIS OF LEFT LOWER EXTREMITY: ICD-10-CM

## 2023-06-13 DIAGNOSIS — I10 PRIMARY HYPERTENSION: ICD-10-CM

## 2023-06-13 DIAGNOSIS — E03.8 SUBCLINICAL HYPOTHYROIDISM: Chronic | ICD-10-CM

## 2023-06-13 LAB
BILIRUB BLD-MCNC: NEGATIVE MG/DL
CLARITY, POC: CLEAR
COLOR UR: YELLOW
GLUCOSE UR STRIP-MCNC: NEGATIVE MG/DL
KETONES UR QL: NEGATIVE
LEUKOCYTE EST, POC: NEGATIVE
NITRITE UR-MCNC: NEGATIVE MG/ML
PH UR: 6 [PH] (ref 5–8)
PROT UR STRIP-MCNC: NEGATIVE MG/DL
RBC # UR STRIP: NEGATIVE /UL
SP GR UR: 1.01 (ref 1–1.03)
UROBILINOGEN UR QL: NORMAL

## 2023-06-13 RX ORDER — LEVOTHYROXINE SODIUM 0.05 MG/1
50 TABLET ORAL DAILY
Qty: 30 TABLET | Refills: 1 | Status: SHIPPED | OUTPATIENT
Start: 2023-06-13

## 2023-06-13 RX ORDER — DOXYCYCLINE 100 MG/1
100 CAPSULE ORAL 2 TIMES DAILY
Qty: 20 CAPSULE | Refills: 0 | Status: SHIPPED | OUTPATIENT
Start: 2023-06-13

## 2023-06-13 NOTE — PATIENT INSTRUCTIONS
Call if area worsens such as increased streaking around this area, fever, increased pain  Wash with antibacterial soap.   Apply moist heat to effected area for 10-20 minutes at least 4 times a day until healed  Call if not improving with this treatment after 48 hours    Have blood work completed.

## 2023-06-14 ENCOUNTER — TREATMENT (OUTPATIENT)
Dept: PHYSICAL THERAPY | Facility: CLINIC | Age: 78
End: 2023-06-14
Payer: MEDICARE
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
## 2023-06-14 DIAGNOSIS — R53.1 WEAKNESS GENERALIZED: ICD-10-CM

## 2023-06-14 DIAGNOSIS — R26.89 IMPAIRMENT OF BALANCE: Primary | ICD-10-CM

## 2023-06-14 NOTE — PROGRESS NOTES
Physical Therapy Daily Treatment Note      Patient: Yancy Mcdonnell   : 1945  Diagnosis/ICD-10 Code:  Impairment of balance [R26.89]  Referring practitioner: FELIPE Mcmanus  Date of Initial Visit: Type: THERAPY  Noted: 3/7/2023  Today's Date: 2023  Patient seen for 23 sessions         Yancy Mcdonnell reports: she continues to have balance issues in her home and daily life stating she had another fall recently and skinned up both of her shins. Pt. States she is okay but no matter how she trains she just can't seem to stop falling.    Objective   See Exercise, Manual, and Modality Logs for complete treatment.     Assessment/Plan  Pt. Tolerates treatment well this date and responds well to strengthening when attention to task at hand Pt. Is able to balance without PTA intervention and complete other exercises with slow steady movement this date and minimal compensation.    Goals  Plan Goals: STG to be met in 3 wk:  - Pt to complete STS x4 reps with use of B UE in 30 sec and no posterior LOB. - MET  - Pt to report no falls for 2 consecutive weeks. - MET  - Pt to be independent with HEP.- MET  LTG to be met in 3 wk:  - Improve Tinetti score to 22/28 or greater for decreased fall risk when caring for her horse. - Met   - Improve sensory integration as evidenced by MCTSIB of 4/4. - not met  - Increase B hip flex and abd strength to 4+/5 or greater for improved stability when walking in her field. - MET  - Pt to perform sit to stand x8 reps with use of B UE in 30 sec for improved ease and safety getting up from chairs. - Progressing (7 today)  - Pt to report no shoulder pain with reaching overhead in order to retrieve plates from upper cabinets. -MET    Progress strengthening /stabilization /functional activity           Timed:             Therapeutic Exercise:    15     mins  43814;     Neuromuscular Berenice:    15    mins  [id number];        Timed Treatment:   30   mins   Total Treatment:     30    jacqueline Walters [facility]  Physical Therapist Assistant License #[id number]

## 2023-06-15 LAB
ALBUMIN SERPL-MCNC: 4.2 G/DL (ref 3.7–4.7)
ALBUMIN/GLOB SERPL: 2.1 {RATIO} (ref 1.2–2.2)
ALP SERPL-CCNC: 126 IU/L (ref 44–121)
ALT SERPL-CCNC: 22 IU/L (ref 0–32)
AST SERPL-CCNC: 20 IU/L (ref 0–40)
BASOPHILS # BLD AUTO: 0 X10E3/UL (ref 0–0.2)
BASOPHILS NFR BLD AUTO: 0 %
BILIRUB SERPL-MCNC: 0.6 MG/DL (ref 0–1.2)
BUN SERPL-MCNC: 18 MG/DL (ref 8–27)
BUN/CREAT SERPL: 23 (ref 12–28)
CALCIUM SERPL-MCNC: 9.9 MG/DL (ref 8.7–10.3)
CHLORIDE SERPL-SCNC: 104 MMOL/L (ref 96–106)
CHOLEST SERPL-MCNC: 190 MG/DL (ref 100–199)
CO2 SERPL-SCNC: 25 MMOL/L (ref 20–29)
CREAT SERPL-MCNC: 0.78 MG/DL (ref 0.57–1)
EGFRCR SERPLBLD CKD-EPI 2021: 77 ML/MIN/1.73
EOSINOPHIL # BLD AUTO: 0.2 X10E3/UL (ref 0–0.4)
EOSINOPHIL NFR BLD AUTO: 2 %
ERYTHROCYTE [DISTWIDTH] IN BLOOD BY AUTOMATED COUNT: 18 % (ref 11.7–15.4)
GLOBULIN SER CALC-MCNC: 2 G/DL (ref 1.5–4.5)
GLUCOSE SERPL-MCNC: 92 MG/DL (ref 70–99)
HCT VFR BLD AUTO: 38.3 % (ref 34–46.6)
HDLC SERPL-MCNC: 57 MG/DL
HGB BLD-MCNC: 12.2 G/DL (ref 11.1–15.9)
IMM GRANULOCYTES # BLD AUTO: 0 X10E3/UL (ref 0–0.1)
IMM GRANULOCYTES NFR BLD AUTO: 0 %
LDLC SERPL CALC-MCNC: 105 MG/DL (ref 0–99)
LYMPHOCYTES # BLD AUTO: 1.4 X10E3/UL (ref 0.7–3.1)
LYMPHOCYTES NFR BLD AUTO: 19 %
MCH RBC QN AUTO: 26.8 PG (ref 26.6–33)
MCHC RBC AUTO-ENTMCNC: 31.9 G/DL (ref 31.5–35.7)
MCV RBC AUTO: 84 FL (ref 79–97)
MONOCYTES # BLD AUTO: 0.7 X10E3/UL (ref 0.1–0.9)
MONOCYTES NFR BLD AUTO: 10 %
NEUTROPHILS # BLD AUTO: 4.8 X10E3/UL (ref 1.4–7)
NEUTROPHILS NFR BLD AUTO: 69 %
PLATELET # BLD AUTO: 391 X10E3/UL (ref 150–450)
POTASSIUM SERPL-SCNC: 4 MMOL/L (ref 3.5–5.2)
PROT SERPL-MCNC: 6.2 G/DL (ref 6–8.5)
RBC # BLD AUTO: 4.56 X10E6/UL (ref 3.77–5.28)
SODIUM SERPL-SCNC: 144 MMOL/L (ref 134–144)
T4 FREE SERPL-MCNC: 1.53 NG/DL (ref 0.82–1.77)
TRIGL SERPL-MCNC: 159 MG/DL (ref 0–149)
TSH SERPL DL<=0.005 MIU/L-ACNC: 2.39 UIU/ML (ref 0.45–4.5)
VLDLC SERPL CALC-MCNC: 28 MG/DL (ref 5–40)
WBC # BLD AUTO: 7.1 X10E3/UL (ref 3.4–10.8)

## 2023-06-16 ENCOUNTER — TREATMENT (OUTPATIENT)
Dept: PHYSICAL THERAPY | Facility: CLINIC | Age: 78
End: 2023-06-16
Payer: MEDICARE

## 2023-06-16 DIAGNOSIS — R53.1 WEAKNESS GENERALIZED: ICD-10-CM

## 2023-06-16 DIAGNOSIS — M25.512 ACUTE PAIN OF BOTH SHOULDERS: ICD-10-CM

## 2023-06-16 DIAGNOSIS — R26.89 IMPAIRMENT OF BALANCE: Primary | ICD-10-CM
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
## 2023-06-16 DIAGNOSIS — M25.511 ACUTE PAIN OF BOTH SHOULDERS: ICD-10-CM

## 2023-06-16 NOTE — PROGRESS NOTES
Physical Therapy Daily Treatment Note      Patient: Yancy Mcdonnell   : 1945  Diagnosis/ICD-10 Code:  Impairment of balance [R26.89]  Referring practitioner: FELIPE Mcmanus  Date of Initial Visit: Type: THERAPY  Noted: 3/7/2023  Today's Date: 2023  Patient seen for 24 sessions         Yancy Mcdonnell reports: She has not had any new falls and continues to struggle with balance but has been doing better to avoid vulnerable situations.    Objective   See Exercise, Manual, and Modality Logs for complete treatment.     Assessment/Plan  Pt. Tolerates treatment well this visit and shows better tolerance to step out activities and walking with step over small red caps was added this date with clear signs of instability and poor coordination when approaching step overs.    Goals  Plan Goals: STG to be met in 3 wk:  - Pt to complete STS x4 reps with use of B UE in 30 sec and no posterior LOB. - MET  - Pt to report no falls for 2 consecutive weeks. - MET  - Pt to be independent with HEP.- MET  LTG to be met in 3 wk:  - Improve Tinetti score to 22/28 or greater for decreased fall risk when caring for her horse. - Met   - Improve sensory integration as evidenced by MCTSIB of 4/4. - not met  - Increase B hip flex and abd strength to 4+/5 or greater for improved stability when walking in her field. - MET  - Pt to perform sit to stand x8 reps with use of B UE in 30 sec for improved ease and safety getting up from chairs. - Progressing (7 today)  - Pt to report no shoulder pain with reaching overhead in order to retrieve plates from upper cabinets. -MET    Progress strengthening /stabilization /functional activity           Timed:           Therapeutic Exercise:    10     mins  [id number];     Neuromuscular Berenice:    20    mins  [id number];        Timed Treatment:   30   mins   Total Treatment:     30   mins    No Walters PTA  Physical Therapist Assistant License #[id number]

## 2023-06-19 ENCOUNTER — TREATMENT (OUTPATIENT)
Dept: PHYSICAL THERAPY | Facility: CLINIC | Age: 78
End: 2023-06-19
Payer: MEDICARE
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
## 2023-06-19 DIAGNOSIS — R53.1 WEAKNESS GENERALIZED: ICD-10-CM

## 2023-06-19 DIAGNOSIS — M25.511 ACUTE PAIN OF BOTH SHOULDERS: ICD-10-CM

## 2023-06-19 DIAGNOSIS — R26.89 IMPAIRMENT OF BALANCE: Primary | ICD-10-CM

## 2023-06-19 DIAGNOSIS — M25.512 ACUTE PAIN OF BOTH SHOULDERS: ICD-10-CM

## 2023-06-19 PROCEDURE — 97112 NEUROMUSCULAR REEDUCATION: CPT | Performed by: PHYSICAL THERAPIST

## 2023-06-19 PROCEDURE — 97110 THERAPEUTIC EXERCISES: CPT | Performed by: PHYSICAL THERAPIST

## 2023-06-19 NOTE — PROGRESS NOTES
Physical Therapy Daily Treatment Note      Patient: Yancy Mcdonnell   : 1945  Diagnosis/ICD-10 Code:  Impairment of balance [R26.89]  Referring practitioner: FELIPE Mcmanus  Date of Initial Visit: Type: THERAPY  Noted: 3/7/2023  Today's Date: 2023  Patient seen for 25 sessions         Yancy Mcdonnell reports: no new falls and states she is feeling a bit better on her feet in general but she worries still about some of the stuff she wants to do around the house because being on uneven ground gets her the worst.    Objective   See Exercise, Manual, and Modality Logs for complete treatment.     Assessment/Plan  Pt. Tolerates treatment well today and shows much improved competence with step over small red caps today. Pt. Is also more even in her stride and gait today as she navigates the clinic between activities.    Goals  Plan Goals: STG to be met in 3 wk:  - Pt to complete STS x4 reps with use of B UE in 30 sec and no posterior LOB. - MET  - Pt to report no falls for 2 consecutive weeks. - MET  - Pt to be independent with HEP.- MET  LTG to be met in 3 wk:  - Improve Tinetti score to 22/28 or greater for decreased fall risk when caring for her horse. - Met   - Improve sensory integration as evidenced by MCTSIB of 4/4. - not met  - Increase B hip flex and abd strength to 4+/5 or greater for improved stability when walking in her field. - MET  - Pt to perform sit to stand x8 reps with use of B UE in 30 sec for improved ease and safety getting up from chairs. - Progressing (7 today)  - Pt to report no shoulder pain with reaching overhead in order to retrieve plates from upper cabinets. -MET    Progress strengthening /stabilization /functional activity           Timed:           Therapeutic Exercise:    10     mins  72162;     Neuromuscular Berenice:    20    mins  [id number];        Timed Treatment:   30   mins   Total Treatment:     30   mins    No Walters PTA  Physical Therapist Assistant License  #[id number]

## 2023-06-20 PROBLEM — Z09 FOLLOW-UP EXAM: Status: ACTIVE | Noted: 2023-06-20

## 2023-06-21 PROBLEM — R26.89 BALANCE PROBLEM: Status: ACTIVE | Noted: 2023-06-21

## 2023-07-28 ENCOUNTER — OFFICE (AMBULATORY)
Dept: URBAN - METROPOLITAN AREA PATHOLOGY 4 | Facility: PATHOLOGY | Age: 78
End: 2023-07-28
Payer: MEDICARE

## 2023-07-28 ENCOUNTER — ON CAMPUS - OUTPATIENT (AMBULATORY)
Dept: URBAN - METROPOLITAN AREA HOSPITAL 2 | Facility: HOSPITAL | Age: 78
End: 2023-07-28
Payer: MEDICARE

## 2023-07-28 VITALS
SYSTOLIC BLOOD PRESSURE: 100 MMHG | HEIGHT: 63 IN | OXYGEN SATURATION: 100 % | DIASTOLIC BLOOD PRESSURE: 65 MMHG | HEART RATE: 79 BPM | HEART RATE: 81 BPM | DIASTOLIC BLOOD PRESSURE: 73 MMHG | SYSTOLIC BLOOD PRESSURE: 164 MMHG | SYSTOLIC BLOOD PRESSURE: 114 MMHG | DIASTOLIC BLOOD PRESSURE: 67 MMHG | OXYGEN SATURATION: 99 % | HEART RATE: 66 BPM | RESPIRATION RATE: 18 BRPM | OXYGEN SATURATION: 98 % | SYSTOLIC BLOOD PRESSURE: 127 MMHG | HEART RATE: 67 BPM | HEART RATE: 76 BPM | SYSTOLIC BLOOD PRESSURE: 145 MMHG | SYSTOLIC BLOOD PRESSURE: 149 MMHG | DIASTOLIC BLOOD PRESSURE: 62 MMHG | TEMPERATURE: 96.9 F | DIASTOLIC BLOOD PRESSURE: 68 MMHG | WEIGHT: 165 LBS | OXYGEN SATURATION: 97 % | SYSTOLIC BLOOD PRESSURE: 126 MMHG | DIASTOLIC BLOOD PRESSURE: 74 MMHG | RESPIRATION RATE: 16 BRPM | HEART RATE: 68 BPM | SYSTOLIC BLOOD PRESSURE: 110 MMHG | DIASTOLIC BLOOD PRESSURE: 6 MMHG

## 2023-07-28 DIAGNOSIS — K44.9 DIAPHRAGMATIC HERNIA WITHOUT OBSTRUCTION OR GANGRENE: ICD-10-CM

## 2023-07-28 DIAGNOSIS — R13.10 DYSPHAGIA, UNSPECIFIED: ICD-10-CM

## 2023-07-28 DIAGNOSIS — K22.4 DYSKINESIA OF ESOPHAGUS: ICD-10-CM

## 2023-07-28 DIAGNOSIS — K31.9 DISEASE OF STOMACH AND DUODENUM, UNSPECIFIED: ICD-10-CM

## 2023-07-28 DIAGNOSIS — K29.60 OTHER GASTRITIS WITHOUT BLEEDING: ICD-10-CM

## 2023-07-28 PROBLEM — K31.89 OTHER DISEASES OF STOMACH AND DUODENUM: Status: ACTIVE | Noted: 2023-07-28

## 2023-07-28 LAB
GI HISTOLOGY: A. UNSPECIFIED: (no result)
GI HISTOLOGY: B. SELECT: (no result)
GI HISTOLOGY: PDF REPORT: (no result)

## 2023-07-28 PROCEDURE — 88342 IMHCHEM/IMCYTCHM 1ST ANTB: CPT | Mod: 26 | Performed by: INTERNAL MEDICINE

## 2023-07-28 PROCEDURE — 43249 ESOPH EGD DILATION <30 MM: CPT | Performed by: INTERNAL MEDICINE

## 2023-07-28 PROCEDURE — 43239 EGD BIOPSY SINGLE/MULTIPLE: CPT | Mod: 59 | Performed by: INTERNAL MEDICINE

## 2023-07-28 PROCEDURE — 88305 TISSUE EXAM BY PATHOLOGIST: CPT | Mod: 26 | Performed by: INTERNAL MEDICINE

## 2023-07-31 ENCOUNTER — APPOINTMENT (OUTPATIENT)
Dept: GENERAL RADIOLOGY | Facility: HOSPITAL | Age: 78
End: 2023-07-31
Payer: MEDICARE

## 2023-07-31 ENCOUNTER — HOSPITAL ENCOUNTER (EMERGENCY)
Facility: HOSPITAL | Age: 78
Discharge: HOME | End: 2023-07-31
Attending: EMERGENCY MEDICINE | Admitting: EMERGENCY MEDICINE
Payer: MEDICARE

## 2023-07-31 ENCOUNTER — TELEPHONE (OUTPATIENT)
Dept: FAMILY MEDICINE CLINIC | Facility: CLINIC | Age: 78
End: 2023-07-31

## 2023-07-31 VITALS
WEIGHT: 165 LBS | DIASTOLIC BLOOD PRESSURE: 77 MMHG | BODY MASS INDEX: 29.23 KG/M2 | OXYGEN SATURATION: 98 % | RESPIRATION RATE: 17 BRPM | SYSTOLIC BLOOD PRESSURE: 149 MMHG | TEMPERATURE: 98 F | HEIGHT: 63 IN | HEART RATE: 77 BPM

## 2023-07-31 DIAGNOSIS — S70.01XA CONTUSION OF RIGHT HIP, INITIAL ENCOUNTER: Primary | ICD-10-CM

## 2023-07-31 DIAGNOSIS — S40.011A CONTUSION OF MULTIPLE SITES OF RIGHT SHOULDER, INITIAL ENCOUNTER: ICD-10-CM

## 2023-07-31 DIAGNOSIS — M15.9 PRIMARY OSTEOARTHRITIS INVOLVING MULTIPLE JOINTS: ICD-10-CM

## 2023-07-31 DIAGNOSIS — S20.211A CHEST WALL CONTUSION, RIGHT, INITIAL ENCOUNTER: ICD-10-CM

## 2023-07-31 PROCEDURE — 99283 EMERGENCY DEPT VISIT LOW MDM: CPT

## 2023-07-31 PROCEDURE — 25010000002 MORPHINE PER 10 MG: Performed by: EMERGENCY MEDICINE

## 2023-07-31 PROCEDURE — 96372 THER/PROPH/DIAG INJ SC/IM: CPT

## 2023-07-31 PROCEDURE — 63710000001 ONDANSETRON ODT 4 MG TABLET DISPERSIBLE: Performed by: EMERGENCY MEDICINE

## 2023-07-31 PROCEDURE — 73030 X-RAY EXAM OF SHOULDER: CPT

## 2023-07-31 PROCEDURE — 73060 X-RAY EXAM OF HUMERUS: CPT

## 2023-07-31 PROCEDURE — 73502 X-RAY EXAM HIP UNI 2-3 VIEWS: CPT

## 2023-07-31 PROCEDURE — 71045 X-RAY EXAM CHEST 1 VIEW: CPT

## 2023-07-31 RX ORDER — TRAMADOL HYDROCHLORIDE 50 MG/1
50 TABLET ORAL EVERY 6 HOURS PRN
Qty: 12 TABLET | Refills: 0 | Status: SHIPPED | OUTPATIENT
Start: 2023-07-31 | End: 2023-08-03 | Stop reason: SDUPTHER

## 2023-07-31 RX ORDER — ONDANSETRON 4 MG/1
4 TABLET, ORALLY DISINTEGRATING ORAL ONCE
Status: COMPLETED | OUTPATIENT
Start: 2023-07-31 | End: 2023-07-31

## 2023-07-31 RX ADMIN — MORPHINE SULFATE 4 MG: 4 INJECTION, SOLUTION INTRAMUSCULAR; INTRAVENOUS at 19:02

## 2023-07-31 RX ADMIN — ONDANSETRON 4 MG: 4 TABLET, ORALLY DISINTEGRATING ORAL at 19:02

## 2023-07-31 NOTE — TELEPHONE ENCOUNTER
Caller: Yancy Mcdonnell    Relationship to patient: Self    Best call back number: [phone]     Chief complaint: PATIENT STATED SHE FELL AN HOUR AGO.  EMS CAME AND EXAMINED HER.  SHE DECLINED GOING TO THE ER.  SHE CALLS  NOW AND STATES  SHE HAS MORE PAIN IN HER HIB, A HEMATOMA, AND SHOULDER PAIN.  [facility] TOLD PATIENT TO GO TO THE ER SINCE THERE ARE NO SAME DAY APPOINTMENTS.      Patient directed to call 911 or go to their nearest emergency room.     Patient verbalized understanding: [x] Yes  [] No  If no, why?

## 2023-08-01 ENCOUNTER — HOME HEALTH ADMISSION (OUTPATIENT)
Dept: HOME HEALTH SERVICES | Facility: HOME HEALTHCARE | Age: 78
End: 2023-08-01
Payer: MEDICARE

## 2023-08-01 ENCOUNTER — TELEPHONE (OUTPATIENT)
Dept: FAMILY MEDICINE CLINIC | Facility: CLINIC | Age: 78
End: 2023-08-01
Payer: MEDICARE

## 2023-08-01 DIAGNOSIS — Z91.81 H/O FALL: Primary | ICD-10-CM

## 2023-08-01 DIAGNOSIS — M25.611 DECREASED RANGE OF MOTION OF RIGHT SHOULDER: ICD-10-CM

## 2023-08-01 DIAGNOSIS — R26.89 BALANCE PROBLEM: ICD-10-CM

## 2023-08-01 DIAGNOSIS — S40.011S CONTUSION OF MULTIPLE SITES OF RIGHT SHOULDER, SEQUELA: ICD-10-CM

## 2023-08-01 DIAGNOSIS — S70.01XS CONTUSION OF RIGHT HIP, SEQUELA: ICD-10-CM

## 2023-08-01 DIAGNOSIS — Z78.9 DIFFICULTY WITH ACTIVITIES OF DAILY LIVING: ICD-10-CM

## 2023-08-01 PROBLEM — E78.00 PURE HYPERCHOLESTEROLEMIA: Status: ACTIVE | Noted: 2023-08-01

## 2023-08-01 PROBLEM — S10.93XS: Status: RESOLVED | Noted: 2022-07-29 | Resolved: 2023-08-01

## 2023-08-01 PROBLEM — J30.2 SEASONAL ALLERGIES: Status: ACTIVE | Noted: 2023-08-01

## 2023-08-01 PROBLEM — D64.9 ANEMIA: Status: ACTIVE | Noted: 2017-07-20

## 2023-08-01 PROBLEM — E07.9 THYROID DISEASE: Status: ACTIVE | Noted: 2023-08-01

## 2023-08-01 PROBLEM — S00.83XS: Status: RESOLVED | Noted: 2022-07-29 | Resolved: 2023-08-01

## 2023-08-01 PROBLEM — K44.9 DIAPHRAGMATIC HERNIA WITHOUT OBSTRUCTION OR GANGRENE: Status: ACTIVE | Noted: 2017-07-20

## 2023-08-01 PROBLEM — S00.03XS: Status: RESOLVED | Noted: 2022-07-29 | Resolved: 2023-08-01

## 2023-08-01 PROBLEM — K22.4 ESOPHAGEAL MOTILITY DISORDER: Status: ACTIVE | Noted: 2023-07-28

## 2023-08-01 PROBLEM — S82.841A CLOSED BIMALLEOLAR FRACTURE OF RIGHT ANKLE: Status: RESOLVED | Noted: 2022-02-10 | Resolved: 2023-08-01

## 2023-08-01 PROBLEM — M54.9 BACK PAIN: Status: ACTIVE | Noted: 2023-08-01

## 2023-08-01 PROBLEM — K31.89 OTHER DISEASES OF STOMACH AND DUODENUM: Status: ACTIVE | Noted: 2023-07-28

## 2023-08-01 PROBLEM — K57.92 DIVERTICULITIS: Status: ACTIVE | Noted: 2023-08-01

## 2023-08-02 ENCOUNTER — HOME CARE VISIT (OUTPATIENT)
Dept: HOME HEALTH SERVICES | Facility: HOME HEALTHCARE | Age: 78
End: 2023-08-02
Payer: MEDICARE

## 2023-08-02 VITALS
DIASTOLIC BLOOD PRESSURE: 70 MMHG | TEMPERATURE: 98 F | OXYGEN SATURATION: 97 % | SYSTOLIC BLOOD PRESSURE: 120 MMHG | HEART RATE: 100 BPM

## 2023-08-02 PROCEDURE — G0151 HHCP-SERV OF PT,EA 15 MIN: HCPCS

## 2023-08-03 ENCOUNTER — OFFICE VISIT (OUTPATIENT)
Dept: FAMILY MEDICINE CLINIC | Facility: CLINIC | Age: 78
End: 2023-08-03
Payer: MEDICARE

## 2023-08-03 ENCOUNTER — HOME CARE VISIT (OUTPATIENT)
Dept: HOME HEALTH SERVICES | Facility: HOME HEALTHCARE | Age: 78
End: 2023-08-03
Payer: MEDICARE

## 2023-08-03 VITALS
BODY MASS INDEX: 28.7 KG/M2 | RESPIRATION RATE: 16 BRPM | TEMPERATURE: 97.2 F | HEART RATE: 120 BPM | SYSTOLIC BLOOD PRESSURE: 112 MMHG | OXYGEN SATURATION: 97 % | WEIGHT: 162 LBS | HEIGHT: 63 IN | DIASTOLIC BLOOD PRESSURE: 66 MMHG

## 2023-08-03 DIAGNOSIS — R26.89 BALANCE PROBLEM: ICD-10-CM

## 2023-08-03 DIAGNOSIS — L03.115 CELLULITIS OF RIGHT LOWER EXTREMITY: ICD-10-CM

## 2023-08-03 DIAGNOSIS — I10 PRIMARY HYPERTENSION: Chronic | ICD-10-CM

## 2023-08-03 DIAGNOSIS — M25.511 ACUTE PAIN OF RIGHT SHOULDER: ICD-10-CM

## 2023-08-03 DIAGNOSIS — Z09 FOLLOW-UP EXAM: ICD-10-CM

## 2023-08-03 DIAGNOSIS — S70.01XA CONTUSION OF RIGHT HIP, INITIAL ENCOUNTER: ICD-10-CM

## 2023-08-03 DIAGNOSIS — Z91.81 PERSONAL HISTORY OF FALL: Primary | ICD-10-CM

## 2023-08-03 PROCEDURE — G0152 HHCP-SERV OF OT,EA 15 MIN: HCPCS

## 2023-08-03 RX ORDER — METOPROLOL SUCCINATE 50 MG/1
TABLET, EXTENDED RELEASE ORAL
Qty: 90 TABLET | OUTPATIENT
Start: 2023-08-03

## 2023-08-03 RX ORDER — METOPROLOL SUCCINATE 50 MG/1
50 TABLET, EXTENDED RELEASE ORAL DAILY
Qty: 30 TABLET | Refills: 1 | Status: SHIPPED | OUTPATIENT
Start: 2023-08-03

## 2023-08-03 RX ORDER — LOSARTAN POTASSIUM 25 MG/1
TABLET ORAL
Qty: 90 TABLET | OUTPATIENT
Start: 2023-08-03

## 2023-08-03 RX ORDER — TOPIRAMATE 50 MG/1
50 TABLET, FILM COATED ORAL 4 TIMES DAILY
COMMUNITY
End: 2023-08-03

## 2023-08-03 RX ORDER — LOSARTAN POTASSIUM 25 MG/1
25 TABLET ORAL DAILY
Qty: 30 TABLET | Refills: 1 | Status: SHIPPED | OUTPATIENT
Start: 2023-08-03

## 2023-08-03 RX ORDER — TRAMADOL HYDROCHLORIDE 50 MG/1
50 TABLET ORAL EVERY 6 HOURS PRN
Qty: 7 TABLET | Refills: 0 | Status: SHIPPED | OUTPATIENT
Start: 2023-08-03

## 2023-08-03 RX ORDER — HYDROCHLOROTHIAZIDE 25 MG/1
25 TABLET ORAL DAILY
Qty: 30 TABLET | Refills: 0 | Status: SHIPPED | OUTPATIENT
Start: 2023-08-03

## 2023-08-03 RX ORDER — HYDROCHLOROTHIAZIDE 25 MG/1
TABLET ORAL
Qty: 90 TABLET | OUTPATIENT
Start: 2023-08-03

## 2023-08-04 ENCOUNTER — HOME CARE VISIT (OUTPATIENT)
Dept: HOME HEALTH SERVICES | Facility: HOME HEALTHCARE | Age: 78
End: 2023-08-04
Payer: MEDICARE

## 2023-08-04 ENCOUNTER — TELEPHONE (OUTPATIENT)
Dept: FAMILY MEDICINE CLINIC | Facility: CLINIC | Age: 78
End: 2023-08-04
Payer: MEDICARE

## 2023-08-04 VITALS
TEMPERATURE: 98 F | DIASTOLIC BLOOD PRESSURE: 60 MMHG | SYSTOLIC BLOOD PRESSURE: 112 MMHG | OXYGEN SATURATION: 96 % | HEART RATE: 100 BPM

## 2023-08-04 PROCEDURE — G0155 HHCP-SVS OF CSW,EA 15 MIN: HCPCS

## 2023-08-04 NOTE — HOME HEALTH
Pt is a 79 yo female who lives in 1 story home with spouse.   Pt with recent visit to ED secondary to fall at home with R hip contusion and pain in R shldr and decreased ROM in R UE secondary to fall.      PLOF pt independent with all aDls    Currently pt indeepndent with feeding and requires MAX assist with all other self care at this time.   Total assist with IADLs      Skilled OT for ther ex ADL training and transfers.   Pt and therapist in agreement with goals and poc        Pt denies any falls     Next visit ther ex and transfers    Pt to see Dr Loaiza on 8-10-23 for consult due to pain and decreased ROM with R shldr             f

## 2023-08-07 ENCOUNTER — HOME CARE VISIT (OUTPATIENT)
Dept: HOME HEALTH SERVICES | Facility: HOME HEALTHCARE | Age: 78
End: 2023-08-07
Payer: MEDICARE

## 2023-08-07 VITALS
HEART RATE: 82 BPM | OXYGEN SATURATION: 95 % | DIASTOLIC BLOOD PRESSURE: 70 MMHG | RESPIRATION RATE: 16 BRPM | TEMPERATURE: 98.4 F | SYSTOLIC BLOOD PRESSURE: 134 MMHG

## 2023-08-07 PROCEDURE — G0157 HHC PT ASSISTANT EA 15: HCPCS

## 2023-08-08 ENCOUNTER — TELEPHONE (OUTPATIENT)
Dept: FAMILY MEDICINE CLINIC | Facility: CLINIC | Age: 78
End: 2023-08-08

## 2023-08-08 NOTE — TELEPHONE ENCOUNTER
Caller: Yancy Mcdonnell    Relationship: Self    Best call back number: [phone]    What medication are you requesting: SOMETHING FOR SYMPTOMS    What are your current symptoms: SWELLING IN RIGHT LEG AND FOOT, WARM TO TOUCH    How long have you been experiencing symptoms: COUPLE DAYS    Have you had these symptoms before:    [x] Yes  [] No    Have you been treated for these symptoms before:   [x] Yes  [] No    If a prescription is needed, what is your preferred pharmacy and phone number: [id number] [facility] OF  & [facility] - [phone]  - [phone]     Additional notes:  PATIENT STATED THAT FELIX GAVE HER SOMETHING FOR ABRASIONS ON HER LEGS WHEN SHE FELL IN THE BARN.    PATIENT REQUESTING TO SEE IF SHE CAN RECEIVE THE SAME MEDICINE FOR HER CURRENT ABRASION.    IF PATIENT NEEDS TO BE SEEN SHE IS OKAY WITH THAT AS WELL.

## 2023-08-08 NOTE — HOME HEALTH
pt up and is prepared for PT session,   feeling well but with weakness and related functional deficits.  pt reports she is very motivated to improve and return to plf.   no med changes or complications.  spouse present and is assisting as needed.        pt was limited today in all areas but struggled with weakness and fatigue and needed extensive postural cues.   pt was educated on correct form with hep review,  with cues needed to fully contract/hold and to avoid compensatory movement.  pt was minimally unsteady upon standing but was able to self correct .       pt was fatigued to end PT session but was pleased to have participated.         plan for next session-  cont PT with gati trg, hep review,  balance/transfer trg.    advance as able with education as needed

## 2023-08-09 ENCOUNTER — HOME CARE VISIT (OUTPATIENT)
Dept: HOME HEALTH SERVICES | Facility: HOME HEALTHCARE | Age: 78
End: 2023-08-09
Payer: MEDICARE

## 2023-08-09 ENCOUNTER — OFFICE VISIT (OUTPATIENT)
Dept: FAMILY MEDICINE CLINIC | Facility: CLINIC | Age: 78
End: 2023-08-09
Payer: MEDICARE

## 2023-08-09 VITALS
BODY MASS INDEX: 30.02 KG/M2 | OXYGEN SATURATION: 99 % | HEIGHT: 63 IN | HEART RATE: 77 BPM | SYSTOLIC BLOOD PRESSURE: 149 MMHG | DIASTOLIC BLOOD PRESSURE: 75 MMHG | TEMPERATURE: 92.8 F | RESPIRATION RATE: 18 BRPM | WEIGHT: 169.4 LBS

## 2023-08-09 VITALS
DIASTOLIC BLOOD PRESSURE: 70 MMHG | TEMPERATURE: 98.2 F | HEART RATE: 75 BPM | OXYGEN SATURATION: 96 % | RESPIRATION RATE: 14 BRPM | SYSTOLIC BLOOD PRESSURE: 132 MMHG

## 2023-08-09 DIAGNOSIS — L03.115 CELLULITIS OF RIGHT LOWER EXTREMITY: Primary | ICD-10-CM

## 2023-08-09 DIAGNOSIS — I10 PRIMARY HYPERTENSION: ICD-10-CM

## 2023-08-09 DIAGNOSIS — S81.811A LACERATION OF RIGHT LOWER EXTREMITY, INITIAL ENCOUNTER: ICD-10-CM
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
## 2023-08-09 DIAGNOSIS — R60.9 PERIPHERAL EDEMA: ICD-10-CM

## 2023-08-09 PROCEDURE — G0157 HHC PT ASSISTANT EA 15: HCPCS

## 2023-08-09 RX ORDER — AMOXICILLIN AND CLAVULANATE POTASSIUM 875; 125 MG/1; MG/1
1 TABLET, FILM COATED ORAL EVERY 12 HOURS
Qty: 20 TABLET | Refills: 0 | Status: SHIPPED | OUTPATIENT
Start: 2023-08-09 | End: 2023-08-19

## 2023-08-09 NOTE — PROGRESS NOTES
Chief Complaint  Leg Swelling and Laceration (Dog jumped on her right leg and scratched her it is hot to the touch and drainage)    Caitie Haines is a 78 y.o. female  who presents to [facility]     Patient Care Team:  Alyse Mcnally APRN as PCP - General (Nurse Practitioner)  Dr. AMITA Ross) (Ophthalmology)  Banet, Duane Edward, MD as Consulting Physician (Dermatology)  Edwin Banks MD as Consulting Physician (Otolaryngology)  Ruslan Davila MD as Consulting Physician (Gastroenterology)  DEAN Tobias DPM as Consulting Physician (Podiatry)     History of Present Illness  Yancy is here today for redness and swelling in right lower leg    Location: Redness, swelling in right lower leg  Quality: painful, warm to touch  Severity: moderate  Duration: for 8 days  Timing: constant  Context: Her dog scratched her leg about 8 days ago  Alleviating factors: She has been washing skin and applying Bactroban  Aggravating factors: nothing  Associated Symptoms: no fever    Her blood pressure has been low.    She states she talked to on call doctor over this past weekend and was told to stop the losartan.  She was told on 8/4/23 to hold the HCTZ unless b/p is greater than 120/80 two hours after medication.      Yancy Mcdonnell  has a past medical history of Allergic rhinitis (06/02/2015), Ankle fracture, right (02/2022), Body mass index 29.0-29.9, adult (09/12/2018), Cervical spinal stenosis (08/07/2017), Cholelithiasis, Class 1 obesity due to excess calories with serious comorbidity and body mass index (BMI) of 30.0 to 30.9 in adult (10/14/2020), Closed bimalleolar fracture of right ankle (2/11/2022), DDD (degenerative disc disease), cervical, DDD (degenerative disc disease), lumbar, DDD (degenerative disc disease), thoracic, Diverticulosis, Dyslipidemia (12/05/2012), Elevated brain natriuretic peptide (BNP) level (01/06/2017), Eustachian tube disorder, right  (02/18/2019), Fall (02/2022), Fatigue (06/02/2015), Female cystocele (06/02/2015), Hiatal hernia, Hiatal hernia with gastroesophageal reflux (01/06/2017), History of blood clots (1980), History of DVT (deep vein thrombosis) (06/02/2015), History of herpes simplex infection (7/25/2020), Hyperlipidemia, Hypertension (06/10/2016), Left rib fracture (05/2017), Migraine headache (06/02/2015), Mitral insufficiency (01/16/2017), Osteoarthritis (05/20/2014), Osteopenia (12/05/2012), Over weight (06/14/2018), Peripheral edema (01/05/2017), PFO (patent foramen ovale) (01/16/2017), Pulmonary nodule (05/04/2017), RHA (rheumatoid arthritis) (12/05/2012), Scleritis (06/02/2015), Scoliosis, Thrombophlebitis (2/19/2022), Tricuspid insufficiency, Urinary urgency, Varicose veins of other specified sites, and Venous insufficiency (09/12/2018).      Review of Systems   Constitutional:  Negative for fever.   Cardiovascular:  Positive for leg swelling.   Gastrointestinal:  Negative for abdominal pain, nausea and vomiting.   Skin:  Positive for color change and wound.      Family History   Problem Relation Age of Onset    Osteoporosis Mother     Stroke Mother 80    Dementia Mother     Heart disease Father     Hypertension Father     Heart disease Sister     Cancer Daughter         BREAST AND MELANOMA    Breast cancer Daughter 52    Cancer Son         MELANOMA    Cancer Grandson         MELANOMA        Past Surgical History:   Procedure Laterality Date    ANKLE OPEN REDUCTION INTERNAL FIXATION Right 02/18/2022    Procedure: ANKLE OPEN REDUCTION INTERNAL FIXATION;  Surgeon: DEAN Tobias DPM;  Location: [facility] MAIN OR;  Service: Podiatry;  Laterality: Right;    COLONOSCOPY  07/20/2017    EGD/ COLONSCOPY LARGE HIATAL HERNIA. MILD DIVERTICULOSIS. OTHERWISE NORMAL.    ESOPHAGOSCOPY / EGD  07/28/2023    Dr. Davila    INGUINAL HERNIA REPAIR Right     ORIF ANKLE FRACTURE Right 02/18/2022    Dr. Tobias    TOTAL ABDOMINAL HYSTERECTOMY  1985     W/BSO WITH A/P REPAIR 1985 BENIGN REASONS DR. DRAKE        Social History     Socioeconomic History    Marital status:    Tobacco Use    Smoking status: Never    Smokeless tobacco: Never   Vaping Use    Vaping Use: Never used   Substance and Sexual Activity    Alcohol use: Yes     Alcohol/week: 1.0 standard drink     Types: 1 Cans of beer per week    Drug use: No    Sexual activity: Defer        Immunization History   Administered Date(s) Administered    COVID-19 (PFIZER) Purple Cap Monovalent 02/08/2021, 03/01/2021, 10/28/2021    FLUAD TRI 65YR+ 09/16/2019    Fluad Quad 65+ 09/14/2020    Fluzone High Dose =>65 Years ([employer] ONLY) 10/09/2015, 09/30/2016, 09/22/2017, 09/12/2018, 11/16/2021    Fluzone High-Dose 65+yrs 09/30/2022    Hepatitis A 05/01/2018, 12/19/2018    Pneumococcal Conjugate 13-Valent (PCV13) 06/02/2015, 10/09/2015    Pneumococcal Polysaccharide (PPSV23) 09/10/2010    Tdap 01/05/2017    Zoster, Unspecified 12/30/2009       Objective       Current Outpatient Medications:     fluticasone (FLONASE) 50 MCG/ACT nasal spray, INJECT 2 SPRAYS INTO THE NOSTRILS EVERY DAY AS DIRECTED, Disp: 48 g, Rfl: 4    hydroCHLOROthiazide (HYDRODIURIL) 25 MG tablet, Take 1 tablet by mouth Daily. Indications: Edema (Patient taking differently: Take 1 tablet by mouth Daily.), Disp: 30 tablet, Rfl: 0    levothyroxine (SYNTHROID, LEVOTHROID) 50 MCG tablet, Take 1 tablet by mouth Daily., Disp: 30 tablet, Rfl: 1    loratadine (CLARITIN) 10 MG tablet, Take 1 tablet by mouth Daily., Disp: 90 tablet, Rfl: 1    lysine 500 MG tablet, Take 1 tablet by mouth Daily As Needed. Stop now for surgery  Indications: Migraine Headache, Disp: , Rfl:     Magnesium 250 MG tablet, Take 1 tablet by mouth Daily. Indications: supplement, Disp: , Rfl:     metoprolol succinate XL (TOPROL-XL) 50 MG 24 hr tablet, Take 1 tablet by mouth Daily. Indications: High Blood Pressure Disorder, Disp: 30 tablet, Rfl: 1    Misc Natural Products (LUTEIN 20  "PO), Take 1 tablet by mouth Daily. Indications: supplement, Disp: , Rfl:     mupirocin (BACTROBAN) 2 % ointment, Apply 1 application  topically to the appropriate area as directed 3 (Three) Times a Day., Disp: 30 g, Rfl: 1    NON FORMULARY, Apply 1 dose to eye(s) as directed by provider Daily. Sustain eye drops   Indications: dry eyes, Disp: , Rfl:     traMADol (ULTRAM) 50 MG tablet, Take 1 tablet by mouth Every 6 (Six) Hours As Needed for Moderate Pain or Severe Pain. Indications: Pain, Disp: 7 tablet, Rfl: 0    amoxicillin-clavulanate (AUGMENTIN) 875-125 MG per tablet, Take 1 tablet by mouth Every 12 (Twelve) Hours for 10 days. Take with food, Disp: 20 tablet, Rfl: 0    losartan (COZAAR) 25 MG tablet, Take 1 tablet by mouth Daily. Indications: High Blood Pressure Disorder (Patient not taking: Reported on 8/9/2023), Disp: 30 tablet, Rfl: 1  No current facility-administered medications for this visit.    Vital Signs:      /75 (BP Location: Left arm, Patient Position: Sitting, Cuff Size: Large Adult)   Pulse 77   Temp 92.8 øF (33.8 øC) (Temporal)   Resp 18   Ht 160 cm (63\")   Wt 76.8 kg (169 lb 6.4 oz)   SpO2 99%   BMI 30.01 kg/mý     Vitals:    08/09/23 1433   BP: 149/75   BP Location: Left arm   Patient Position: Sitting   Cuff Size: Large Adult   Pulse: 77   Resp: 18   Temp: 92.8 øF (33.8 øC)   TempSrc: Temporal   SpO2: 99%   Weight: 76.8 kg (169 lb 6.4 oz)   Height: 160 cm (63\")   PainSc:   5      Physical Exam  Vitals reviewed. Exam conducted with a chaperone present (daughter and ).   Constitutional:       General: She is not in acute distress.     Appearance: Normal appearance.   HENT:      Head: Normocephalic and atraumatic.   Eyes:      General: No scleral icterus.     Conjunctiva/sclera: Conjunctivae normal.   Cardiovascular:      Rate and Rhythm: Normal rate and regular rhythm.      Heart sounds: Normal heart sounds.   Pulmonary:      Effort: Pulmonary effort is normal. No respiratory " distress.      Breath sounds: Normal breath sounds. No wheezing.   Musculoskeletal:      Right lower le+ Edema present.      Left lower le+ Edema present.        Legs:    Skin:     General: Skin is warm and dry.   Neurological:      Mental Status: She is alert and oriented to person, place, and time.   Psychiatric:         Mood and Affect: Mood normal.      Result Review :                      Assessment and Plan    Diagnoses and all orders for this visit:    1. Cellulitis of right lower extremity (Primary)  -     amoxicillin-clavulanate (AUGMENTIN) 875-125 MG per tablet; Take 1 tablet by mouth Every 12 (Twelve) Hours for 10 days. Take with food  Dispense: 20 tablet; Refill: 0    2. Laceration of right lower extremity, initial encounter  -     Ambulatory Referral to Wound Clinic    3. Dog scratch    4. Peripheral edema    5. Primary hypertension       Continue to wash skin daily with soap and water.  Pat dry.  Instructed to leave blister intact.  Begin antibiotic   Refer to wound care.  Tdap up to date.    Advised to resume HCTZ 25 mg daily due to edema, weight gain (she is up 7 lbs since office visit on 8/3/23) and elevated blood pressure today in office.     Follow up as scheduled with PCP on 23 for recheck or sooner if needed.            Follow Up   Return for Follow up as scheduled or sooner if needed.  Patient was given instructions and counseling regarding her condition or for health maintenance advice. Please see specific information pulled into the AVS if appropriate.    There are no Patient Instructions on file for this visit.

## 2023-08-10 ENCOUNTER — OFFICE VISIT (OUTPATIENT)
Dept: ORTHOPEDIC SURGERY | Facility: CLINIC | Age: 78
End: 2023-08-10
Payer: MEDICARE

## 2023-08-10 VITALS — BODY MASS INDEX: 29.73 KG/M2 | HEIGHT: 63 IN | HEART RATE: 76 BPM | WEIGHT: 167.8 LBS

## 2023-08-10 DIAGNOSIS — M25.511 RIGHT SHOULDER PAIN, UNSPECIFIED CHRONICITY: Primary | ICD-10-CM

## 2023-08-10 RX ORDER — TRIAMCINOLONE ACETONIDE 40 MG/ML
40 INJECTION, SUSPENSION INTRA-ARTICULAR; INTRAMUSCULAR ONCE
Status: COMPLETED | OUTPATIENT
Start: 2023-08-10 | End: 2023-08-10

## 2023-08-10 RX ADMIN — TRIAMCINOLONE ACETONIDE 40 MG: 40 INJECTION, SUSPENSION INTRA-ARTICULAR; INTRAMUSCULAR at 15:12

## 2023-08-10 NOTE — PROGRESS NOTES
Patient ID: Yancy Mcdonnell is a 78 y.o. female.    Chief Complaint:    Chief Complaint   Patient presents with    Right Shoulder - Pain, Initial Evaluation     Pain 6 DOI 7/31/2023 Fall        HPI:  This is a 78-year-old female here with right shoulder pain she had a recent fall causing acute pain to the right shoulder with weakness.  In the last couple days or so she has been able to start to move the arm more she had a session or 2 of therapy some mild improvement  Past Medical History:   Diagnosis Date    Allergic rhinitis 06/02/2015    Ankle fracture, right 02/2022    Body mass index 29.0-29.9, adult 09/12/2018    Cervical spinal stenosis 08/07/2017    Cholelithiasis     Class 1 obesity due to excess calories with serious comorbidity and body mass index (BMI) of 30.0 to 30.9 in adult 10/14/2020    Closed bimalleolar fracture of right ankle 2/11/2022    DDD (degenerative disc disease), cervical     DDD (degenerative disc disease), lumbar     DDD (degenerative disc disease), thoracic     Diverticulosis     Dyslipidemia 12/05/2012    Elevated brain natriuretic peptide (BNP) level 01/06/2017    Eustachian tube disorder, right 02/18/2019    Fall 02/2022    Fatigue 06/02/2015    Female cystocele 06/02/2015    Hiatal hernia     Hiatal hernia with gastroesophageal reflux 01/06/2017    History of blood clots 1980    leg s/p fx    History of DVT (deep vein thrombosis) 06/02/2015    History of herpes simplex infection 7/25/2020    Hyperlipidemia     Hypertension 06/10/2016    Left rib fracture 05/2017    11TH RIB     Migraine headache 06/02/2015    Mitral insufficiency 01/16/2017    MILD    Osteoarthritis 05/20/2014    Osteopenia 12/05/2012    Over weight 06/14/2018    Peripheral edema 01/05/2017    PFO (patent foramen ovale) 01/16/2017    Pulmonary nodule 05/04/2017    DR CHUN FOLLOWS    RHA (rheumatoid arthritis) 12/05/2012    Scleritis 06/02/2015    Scoliosis     Thrombophlebitis 2/19/2022    Acute right lower  "extremity superficial thrombophlebitis noted in the great saphenous (below knee) and varicosity (below knee).      Tricuspid insufficiency     MILD    Urinary urgency     Varicose veins of other specified sites     Venous insufficiency 09/12/2018       Past Surgical History:   Procedure Laterality Date    ANKLE OPEN REDUCTION INTERNAL FIXATION Right 02/18/2022    Procedure: ANKLE OPEN REDUCTION INTERNAL FIXATION;  Surgeon: DEAN Tobias DPM;  Location: [facility] MAIN OR;  Service: Podiatry;  Laterality: Right;    COLONOSCOPY  07/20/2017    EGD/ COLONSCOPY LARGE HIATAL HERNIA. MILD DIVERTICULOSIS. OTHERWISE NORMAL.    INGUINAL HERNIA REPAIR Right     ORIF ANKLE FRACTURE Right 02/18/2022    Dr. Tobias    TOTAL ABDOMINAL HYSTERECTOMY  1985    W/BSO WITH A/P REPAIR 1985 BENIGN REASONS DR. DRAKE       Family History   Problem Relation Age of Onset    Osteoporosis Mother     Stroke Mother 80    Dementia Mother     Heart disease Father     Hypertension Father     Heart disease Sister     Cancer Daughter         BREAST AND MELANOMA    Breast cancer Daughter 52    Cancer Son         MELANOMA    Cancer Grandson         MELANOMA          Social History     Occupational History    Not on file   Tobacco Use    Smoking status: Never    Smokeless tobacco: Never   Vaping Use    Vaping Use: Never used   Substance and Sexual Activity    Alcohol use: Yes     Alcohol/week: 1.0 standard drink     Types: 1 Cans of beer per week    Drug use: No    Sexual activity: Defer      Review of Systems   Cardiovascular:  Negative for chest pain.   Musculoskeletal:  Positive for arthralgias.     Objective:    Pulse 76   Ht 160 cm (63\")   Wt 76.1 kg (167 lb 12.8 oz)   BMI 29.72 kg/mý     Physical Examination:  Right shoulder has intact skin no bruising no bony tenderness of the glenohumeral joint or scapula passive elevation 170 abduction 150 external rotation 50 with minimal pain but significant weakness on Speed [place] supraspinatus testing.  " Belly press and liftoff are 4/5 and 3/5.  Sensory and motor exam are intact all distributions. Radial pulse is palpable and capillary refill is less than two seconds to all digits.    Imaging:  Prior x-ray of the right shoulder demonstrates no fracture moderate arthritis with rounding of the greater tuberosity consistent with chronic cuff pathology    Assessment:  Right shoulder pain    Plan:  Treatment options discussed she would like to proceed with injection because of her pain, she can resume physical therapy and then activity as tolerated see me as needed  I recommend injection after todays evaluation.  Risks and benefits were discussed. Under sterile technique and after timeout and verbal consent I injected 40 mg of Kenalog and 1 mL of 1% Lidocaine plain into the subacromial space. It was well tolerated.      Procedures         Disclaimer: Part of this note may be an electronic transcription/translation of spoken language to printed text using the Dragon Dictation System

## 2023-08-10 NOTE — HOME HEALTH
pt up and is prepared for PT session,  pt is motivated to improve and return to plf.    pt has been attempting hep review as tolerated but with limitations.   pt is going to MD later today for RLE assessment and treatment of possible cellulitis.         pt was limited but compliant and able perform the requested activities.  pt was challenged to transfer to standing today with repeated attempts needed and cues for proper form.  pt was challenged to ambulate long distance and with cues needed for proper posture,  to avoid forward flexed posture       plan for next session-   cont PT with gait trg, hep review,  balance trg,

## 2023-08-14 ENCOUNTER — HOME CARE VISIT (OUTPATIENT)
Dept: HOME HEALTH SERVICES | Facility: HOME HEALTHCARE | Age: 78
End: 2023-08-14
Payer: MEDICARE

## 2023-08-14 VITALS
HEART RATE: 82 BPM | SYSTOLIC BLOOD PRESSURE: 134 MMHG | DIASTOLIC BLOOD PRESSURE: 70 MMHG | RESPIRATION RATE: 14 BRPM | OXYGEN SATURATION: 16 % | TEMPERATURE: 97.8 F

## 2023-08-14 PROCEDURE — G0157 HHC PT ASSISTANT EA 15: HCPCS

## 2023-08-14 RX ORDER — LORATADINE 10 MG/1
10 TABLET ORAL DAILY
Qty: 90 TABLET | Refills: 1 | Status: SHIPPED | OUTPATIENT
Start: 2023-08-14

## 2023-08-15 ENCOUNTER — HOME CARE VISIT (OUTPATIENT)
Dept: HOME HEALTH SERVICES | Facility: HOME HEALTHCARE | Age: 78
End: 2023-08-15
Payer: MEDICARE

## 2023-08-15 VITALS
SYSTOLIC BLOOD PRESSURE: 120 MMHG | DIASTOLIC BLOOD PRESSURE: 70 MMHG | HEART RATE: 60 BPM | OXYGEN SATURATION: 100 % | TEMPERATURE: 97.3 F

## 2023-08-15 PROCEDURE — G0152 HHCP-SERV OF OT,EA 15 MIN: HCPCS

## 2023-08-15 NOTE — HOME HEALTH
pt up and is prepared for PT jenna,  fatigued from earlier outing but agrees to participate.     pt has been attempting hep review as able but admits she struggles at times and continues to have weakness and limitations       pt was limited today in all areas but was able to perform the requested activities wiith no mentioned c/o's  of pain,  only fatigue and weakness.    pt was cued on proper posture and to avoid compensatory movement.  pt was cued to properly advance the LEs during gait trg with correctly step length/step through and to widen navdeep for turns       plan for next session- cont  PT with gait trg, hep review,  balance trg and wb activities

## 2023-08-16 ENCOUNTER — HOME CARE VISIT (OUTPATIENT)
Dept: HOME HEALTH SERVICES | Facility: HOME HEALTHCARE | Age: 78
End: 2023-08-16
Payer: MEDICARE

## 2023-08-16 VITALS — DIASTOLIC BLOOD PRESSURE: 70 MMHG | RESPIRATION RATE: 15 BRPM | SYSTOLIC BLOOD PRESSURE: 128 MMHG

## 2023-08-16 PROCEDURE — G0157 HHC PT ASSISTANT EA 15: HCPCS

## 2023-08-16 NOTE — PROGRESS NOTES
Office Note     Name: Yancy Mcdonnell    : 1945     MRN: [id number]     Chief Complaint  Hypertension  Hypertension  Patient does check blood pressure at home.   Patient denies chest pain, blurred vision,fatigue, palpitations, shortness of breath or headaches.  Patient states medications is working well.     She was seen by a previous provider for a different concern and b/p was low. She was advised to hold HCTZ unless b/p is greater than 120/80.    Today her b/p is 166/79.          Subjective     History of Present Illness:  Yancy Mcdonnell is a 78 y.o. female who presents today for HTN check.   History of Present Illness  Yancy is here for HTN. She states that she is no longer taking the losartan and only taking metoprolol. Hydrochlorothiazide is being taken as needed. Her BP is running 130-140 over 60-70 at home. Pulse is averaging 70 bmps. Today in office pressure is 166/79 and reports that she has had her medication this morning.   Hypertension  This is a chronic problem. The current episode started more than 1 year ago. The problem is uncontrolled. Pertinent negatives include no blurred vision, chest pain, malaise/fatigue, palpitations or shortness of breath. Risk factors for coronary artery disease include dyslipidemia and family history. Current antihypertension treatment includes diuretics and beta blockers. The current treatment provides no improvement. There are no compliance problems.      Allergies   Allergen Reactions    Meperidine Unknown (See Comments) and Nausea And Vomiting     Abstracted from centricity.    Other Unknown (See Comments)     Sulfa (sulfadiazine)     Sulfa Antibiotics Unknown - High Severity and Anaphylaxis    Lisinopril Cough         Current Outpatient Medications:     amoxicillin-clavulanate (AUGMENTIN) 875-125 MG per tablet, Take 1 tablet by mouth Every 12 (Twelve) Hours for 10 days. Take with food, Disp: 20 tablet, Rfl: 0    fluticasone (FLONASE) 50 MCG/ACT nasal  spray, INJECT 2 SPRAYS INTO THE NOSTRILS EVERY DAY AS DIRECTED, Disp: 48 g, Rfl: 4    hydroCHLOROthiazide (HYDRODIURIL) 25 MG tablet, Take 1 tablet by mouth Daily. Indications: Edema (Patient taking differently: Take 1 tablet by mouth As Needed.), Disp: 30 tablet, Rfl: 0    levothyroxine (SYNTHROID, LEVOTHROID) 50 MCG tablet, Take 1 tablet by mouth Daily., Disp: 30 tablet, Rfl: 1    loratadine (CLARITIN) 10 MG tablet, TAKE 1 TABLET BY MOUTH DAILY, Disp: 90 tablet, Rfl: 1    losartan (COZAAR) 25 MG tablet, Take 1 tablet by mouth Daily. Indications: High Blood Pressure Disorder, Disp: 30 tablet, Rfl: 1    lysine 500 MG tablet, Take 1 tablet by mouth Daily As Needed. Stop now for surgery  Indications: Migraine Headache, Disp: , Rfl:     Magnesium 250 MG tablet, Take 1 tablet by mouth Daily. Indications: supplement, Disp: , Rfl:     metoprolol succinate XL (TOPROL-XL) 25 MG 24 hr tablet, Take 1 tablet by mouth Daily. Indications: High Blood Pressure Disorder, Disp: 30 tablet, Rfl: 1    Misc Natural Products (LUTEIN 20 PO), Take 1 tablet by mouth Daily. Indications: supplement, Disp: , Rfl:     mupirocin (BACTROBAN) 2 % ointment, Apply 1 application  topically to the appropriate area as directed 3 (Three) Times a Day., Disp: 30 g, Rfl: 1    NON FORMULARY, Apply 1 dose to eye(s) as directed by provider Daily. Sustain eye drops   Indications: dry eyes, Disp: , Rfl:     traMADol (ULTRAM) 50 MG tablet, Take 1 tablet by mouth Every 6 (Six) Hours As Needed for Moderate Pain or Severe Pain. Indications: Pain, Disp: 7 tablet, Rfl: 0    Past Medical History:   Diagnosis Date    Allergic rhinitis 06/02/2015    Ankle fracture, right 02/2022    Body mass index 29.0-29.9, adult 09/12/2018    Cervical spinal stenosis 08/07/2017    Cholelithiasis     Class 1 obesity due to excess calories with serious comorbidity and body mass index (BMI) of 30.0 to 30.9 in adult 10/14/2020    Closed bimalleolar fracture of right ankle 2/11/2022    DDD  (degenerative disc disease), cervical     DDD (degenerative disc disease), lumbar     DDD (degenerative disc disease), thoracic     Diverticulosis     Dyslipidemia 12/05/2012    Elevated brain natriuretic peptide (BNP) level 01/06/2017    Eustachian tube disorder, right 02/18/2019    Fall 02/2022    Fatigue 06/02/2015    Female cystocele 06/02/2015    Hiatal hernia     Hiatal hernia with gastroesophageal reflux 01/06/2017    History of blood clots 1980    leg s/p fx    History of DVT (deep vein thrombosis) 06/02/2015    History of herpes simplex infection 7/25/2020    Hyperlipidemia     Hypertension 06/10/2016    Left rib fracture 05/2017    11TH RIB     Migraine headache 06/02/2015    Mitral insufficiency 01/16/2017    MILD    Osteoarthritis 05/20/2014    Osteopenia 12/05/2012    Over weight 06/14/2018    Peripheral edema 01/05/2017    PFO (patent foramen ovale) 01/16/2017    Pulmonary nodule 05/04/2017    DR CHUN FOLLOWS    RHA (rheumatoid arthritis) 12/05/2012    Scleritis 06/02/2015    Scoliosis     Thrombophlebitis 2/19/2022    Acute right lower extremity superficial thrombophlebitis noted in the great saphenous (below knee) and varicosity (below knee).      Tricuspid insufficiency     MILD    Urinary urgency     Varicose veins of other specified sites     Venous insufficiency 09/12/2018       Review of Systems   Constitutional:  Positive for activity change and fatigue. Negative for appetite change, chills, fever, malaise/fatigue, unexpected weight gain and unexpected weight loss.   Eyes:  Negative for blurred vision.   Respiratory:  Negative for shortness of breath.    Cardiovascular:  Negative for chest pain and palpitations.   Gastrointestinal:  Negative for constipation, diarrhea, nausea and vomiting.   Musculoskeletal:  Positive for arthralgias and myalgias.   Skin:  Positive for color change, dry skin and wound.   Neurological:  Positive for dizziness and weakness. Negative for headache and confusion.  "  Hematological:  Bruises/bleeds easily.   Psychiatric/Behavioral: Negative.       Social History     Socioeconomic History    Marital status:    Tobacco Use    Smoking status: Never    Smokeless tobacco: Never   Vaping Use    Vaping Use: Never used   Substance and Sexual Activity    Alcohol use: Yes     Alcohol/week: 1.0 standard drink     Types: 1 Cans of beer per week    Drug use: No    Sexual activity: Defer       Family History   Problem Relation Age of Onset    Osteoporosis Mother     Stroke Mother 80    Dementia Mother     Heart disease Father     Hypertension Father     Heart disease Sister     Cancer Daughter         BREAST AND MELANOMA    Breast cancer Daughter 52    Cancer Son         MELANOMA    Cancer Grandson         MELANOMA           8/17/2023    10:41 AM   PHQ-2/PHQ-9 Depression Screening   Little Interest or Pleasure in Doing Things 0-->not at all   Feeling Down, Depressed or Hopeless 0-->not at all   PHQ-9: Brief Depression Severity Measure Score 0       Fall Risk Screen:  [facility] Fall Risk Assessment has not been completed.      Objective     /79 (BP Location: Left arm, Patient Position: Sitting, Cuff Size: Adult)   Pulse 69   Temp 97.7 øF (36.5 øC) (Infrared)   Resp 16   Ht 160 cm (63\")   Wt 75.2 kg (165 lb 12.8 oz)   SpO2 97%   BMI 29.37 kg/mý     BP Readings from Last 2 Encounters:   08/17/23 166/79   08/16/23 128/70       Wt Readings from Last 2 Encounters:   08/17/23 75.2 kg (165 lb 12.8 oz)   08/10/23 76.1 kg (167 lb 12.8 oz)       BMI is >= 25 and <30. (Overweight) The following options were offered after discussion;: exercise counseling/recommendations and nutrition counseling/recommendations         Physical Exam  Vitals reviewed. Exam conducted with a chaperone present (daughter and ).   Constitutional:       General: She is not in acute distress.     Appearance: Normal appearance. She is well-groomed and overweight. She is not ill-appearing, toxic-appearing or " diaphoretic.   HENT:      Head: Normocephalic and atraumatic.   Eyes:      General: No scleral icterus.  Neck:      Vascular: No carotid bruit.   Cardiovascular:      Rate and Rhythm: Normal rate and regular rhythm.      Heart sounds: Normal heart sounds.   Pulmonary:      Effort: Pulmonary effort is normal. No respiratory distress.      Breath sounds: Normal breath sounds and air entry.   Musculoskeletal:      Right lower le+ Edema present.      Left lower le+ Edema present.        Legs:    Feet:      Right foot:      Skin integrity: Dry skin present.      Toenail Condition: Right toenails are abnormally thick.      Left foot:      Skin integrity: Dry skin present.      Toenail Condition: Left toenails are abnormally thick.   Skin:     General: Skin is warm and dry.      Findings: Wound present. Macular rash: 1in x 1 in wound with granulation. no discharge/drainage noted..             Comments: Decreased pulses noted to left foot.  Skin discoloration noted to left foot.  Bilateral lower legs with edema, right greater than left.  Dry skin noted overall.  Thick toenails.    Neurological:      Mental Status: She is alert and oriented to person, place, and time. Mental status is at baseline.   Psychiatric:         Attention and Perception: Attention normal.         Mood and Affect: Mood normal.         Behavior: Behavior normal. Behavior is cooperative.     Derm Physical Exam  Result Review :         XR Shoulder 2+ View Right    Result Date: 2023  Impression: Impression: Right shoulder: Negative for fracture. Right humerus: Negative for fracture. Electronically Signed: [employer]  2023 7:34 PM EDT  Workstation ID: [id number]    XR Humerus Right    Result Date: 2023  Impression: Impression: Right shoulder: Negative for fracture. Right humerus: Negative for fracture. Electronically Signed: [employer]  2023 7:34 PM EDT  Workstation ID: [id number]    XR Chest 1 View    Result Date:  7/31/2023  Impression: Impression: 1. No acute process. 2. Large hiatal hernia. Electronically Signed: Alfonso Esteban  7/31/2023 7:27 PM EDT  Workstation ID: [id number]    XR Hip With or Without Pelvis 2 - 3 View Right    Result Date: 7/31/2023  Impression: Impression: Negative for fracture. Electronically Signed: Alfonso Esteban  7/31/2023 7:35 PM EDT  Workstation ID: [id number]        Assessment and Plan     Procedures  Plan    Diagnoses and all orders for this visit:    1. Decreased circulation (Primary)  -     US Ankle / Brachial Indices Extremity Limited  -     US Venous Doppler Lower Extremity Bilateral (duplex)    2. Primary hypertension  Comments:  139/71, 78  147/76, 72 quiet  140/83, 73 after pt    8/16at 0225 - 25 hctz de to swelling.  after 145/76 p 75  Orders:  -     metoprolol succinate XL (TOPROL-XL) 25 MG 24 hr tablet; Take 1 tablet by mouth Daily. Indications: High Blood Pressure Disorder  Dispense: 30 tablet; Refill: 1  -     losartan (COZAAR) 25 MG tablet; Take 1 tablet by mouth Daily. Indications: High Blood Pressure Disorder  Dispense: 30 tablet; Refill: 1    3. Bilateral lower extremity edema  -     US Venous Doppler Lower Extremity Bilateral (duplex)    4. Other specified symptoms and signs involving the circulatory and respiratory systems  Comments:  bilatreal lower extremity edema, decreased pulse in left, purplish discoloration to left foot toes and portion of foot.  Orders:  -     US Ankle / Brachial Indices Extremity Limited        Problem List Items Addressed This Visit          Active Problems    Primary hypertension    Relevant Medications    metoprolol succinate XL (TOPROL-XL) 25 MG 24 hr tablet    losartan (COZAAR) 25 MG tablet     Other Visit Diagnoses       Decreased circulation    -  Primary    Relevant Orders    US Ankle / Brachial Indices Extremity Limited    US Venous Doppler Lower Extremity Bilateral (duplex)    Bilateral lower extremity edema        Relevant Orders    US Venous Doppler  Lower Extremity Bilateral (duplex)    Other specified symptoms and signs involving the circulatory and respiratory systems        bilatreal lower extremity edema, decreased pulse in left, purplish discoloration to left foot toes and portion of foot.    Relevant Orders    US Ankle / Brachial Indices Extremity Limited             Follow Up   Wrapup Tab  Return in about 2 weeks (around 8/31/2023).   Patient Instructions   Continue hctz 25 mg daily  Losartan 25 mg daily  Decrease metoprolol to 25mg daily.    Hold Losartan if b/p lower than 120/80.        Patient was given instructions and counseling regarding her condition or for health maintenance advice. Please see specific information pulled into the AVS if appropriate.  Hand hygiene was performed during entrance to exam room and following assessment of patient. This document is intended for medical expert use only.     EMR Dragon/Transcription disclaimer:   Much of this encounter note is an electronic transcription/translation of spoken language to printed text. The electronic translation of spoken language may permit erroneous, or at times, nonsensical words or phrases to be inadvertently transcribed.      FELIPE Zuluaga, FNP-C  [address] [address]  [facility]  [address] [place] [address] [address]  [place] [address] [phone]

## 2023-08-17 ENCOUNTER — OFFICE VISIT (OUTPATIENT)
Dept: FAMILY MEDICINE CLINIC | Facility: CLINIC | Age: 78
End: 2023-08-17
Payer: MEDICARE

## 2023-08-17 VITALS
RESPIRATION RATE: 16 BRPM | DIASTOLIC BLOOD PRESSURE: 79 MMHG | HEIGHT: 63 IN | HEART RATE: 69 BPM | OXYGEN SATURATION: 97 % | WEIGHT: 165.8 LBS | BODY MASS INDEX: 29.38 KG/M2 | TEMPERATURE: 97.7 F | SYSTOLIC BLOOD PRESSURE: 166 MMHG

## 2023-08-17 DIAGNOSIS — R60.0 BILATERAL LOWER EXTREMITY EDEMA: ICD-10-CM

## 2023-08-17 DIAGNOSIS — R09.89 OTHER SPECIFIED SYMPTOMS AND SIGNS INVOLVING THE CIRCULATORY AND RESPIRATORY SYSTEMS: ICD-10-CM

## 2023-08-17 DIAGNOSIS — I99.9 DECREASED CIRCULATION: Primary | ICD-10-CM

## 2023-08-17 DIAGNOSIS — I10 PRIMARY HYPERTENSION: Chronic | ICD-10-CM

## 2023-08-17 RX ORDER — LOSARTAN POTASSIUM 25 MG/1
25 TABLET ORAL DAILY
Qty: 30 TABLET | Refills: 1 | Status: SHIPPED | OUTPATIENT
Start: 2023-08-17

## 2023-08-17 RX ORDER — METOPROLOL SUCCINATE 25 MG/1
25 TABLET, EXTENDED RELEASE ORAL DAILY
Qty: 30 TABLET | Refills: 1 | Status: SHIPPED | OUTPATIENT
Start: 2023-08-17 | End: 2023-08-21

## 2023-08-17 NOTE — HOME HEALTH
Pt discharged from OT services per pt request as pt has met all goals.  Pt and therapist in agreement with dc.      Pt denies any falls or med changes

## 2023-08-17 NOTE — PATIENT INSTRUCTIONS
Continue hctz 25 mg daily  Losartan 25 mg daily  Decrease metoprolol to 25mg daily.    Hold Losartan if b/p lower than 120/80.

## 2023-08-17 NOTE — HOME HEALTH
pt up with no new c/o's.   presents with her cane and is using at all times.  pt has been attempting hep review as able but admits limits.    pt reporting no med changes or complications     pt was compliant throughout PT session but struggled at times with weakness and related limitations.  pt was educated today on proper form with hep review needing cues to properly and fully contract/hold at end rom and to avoid compensatory rmovement.  pt was unsteady upon standing but was able to self correct.    assessed gait without the cane and pt was mostly steady but was encouragated to use the cane for safety         plan for next session-   cont PT with gait trg, hep reivew with advancement,    transfer/balance trg

## 2023-08-21 ENCOUNTER — HOME CARE VISIT (OUTPATIENT)
Dept: HOME HEALTH SERVICES | Facility: HOME HEALTHCARE | Age: 78
End: 2023-08-21
Payer: MEDICARE

## 2023-08-21 VITALS
HEART RATE: 89 BPM | OXYGEN SATURATION: 96 % | TEMPERATURE: 98 F | DIASTOLIC BLOOD PRESSURE: 70 MMHG | SYSTOLIC BLOOD PRESSURE: 140 MMHG

## 2023-08-21 DIAGNOSIS — I10 PRIMARY HYPERTENSION: Chronic | ICD-10-CM

## 2023-08-21 PROCEDURE — G0151 HHCP-SERV OF PT,EA 15 MIN: HCPCS

## 2023-08-21 RX ORDER — METOPROLOL SUCCINATE 25 MG/1
TABLET, EXTENDED RELEASE ORAL
Qty: 90 TABLET | Refills: 1 | Status: SHIPPED | OUTPATIENT
Start: 2023-08-21

## 2023-08-21 NOTE — HOME HEALTH
Huddle - 30 Day    Complete between days 21-25    Number of visits completed per discipline: 6 PT, 2 OT, 1 MSW  LUPA risk No    Assessment changes: 30 day assessment completed  Functional status changes patient progressing well    Progress/Barriers toward Goals: none  Do the goals require any revisions NO    Change in  Primary diagnosis/focus of care none    Changes to  Caregiver: None  Equipment: None  Environment: None

## 2023-08-23 ENCOUNTER — OFFICE VISIT (OUTPATIENT)
Dept: WOUND CARE | Facility: HOSPITAL | Age: 78
End: 2023-08-23
Payer: MEDICARE

## 2023-08-23 ENCOUNTER — HOME CARE VISIT (OUTPATIENT)
Dept: HOME HEALTH SERVICES | Facility: HOME HEALTHCARE | Age: 78
End: 2023-08-23
Payer: MEDICARE

## 2023-08-23 VITALS
SYSTOLIC BLOOD PRESSURE: 134 MMHG | TEMPERATURE: 97.4 F | OXYGEN SATURATION: 95 % | HEART RATE: 97 BPM | DIASTOLIC BLOOD PRESSURE: 68 MMHG | RESPIRATION RATE: 16 BRPM

## 2023-08-23 PROCEDURE — G0157 HHC PT ASSISTANT EA 15: HCPCS

## 2023-08-23 PROCEDURE — G0463 HOSPITAL OUTPT CLINIC VISIT: HCPCS

## 2023-08-24 NOTE — HOME HEALTH
pt up and is prepared for PT session,   feeling aylin and is motivated to improve and return to plf.    pt has been attempting hep review as tolerated but with continued limitations.   pt reports no med changes from previous PT session.          pt was challenged today to standing for extended periods of time,  needed cues to remain on task and to perform the activities correctly and with properly deep plb.    pt was educated on proper form with hep review,  needing cues to properly and fully contract/hold at end rom as able.  pt was educated today on proper lumbar stab techniques when standing and during gait trg.    pt was educated on proper use of the cane outdoors with cues to properly flex the hips and to heel strike as able       plan for next visit-  cont PT with gait trg,

## 2023-08-25 ENCOUNTER — HOME CARE VISIT (OUTPATIENT)
Dept: HOME HEALTH SERVICES | Facility: HOME HEALTHCARE | Age: 78
End: 2023-08-25
Payer: MEDICARE

## 2023-08-25 VITALS
OXYGEN SATURATION: 96 % | TEMPERATURE: 98 F | RESPIRATION RATE: 18 BRPM | SYSTOLIC BLOOD PRESSURE: 146 MMHG | HEART RATE: 88 BPM | DIASTOLIC BLOOD PRESSURE: 82 MMHG

## 2023-08-25 PROCEDURE — G0299 HHS/HOSPICE OF RN EA 15 MIN: HCPCS

## 2023-08-25 NOTE — HOME HEALTH
Routine Visit Note: Medications reviewed. Patient denies falls and pain. Wound present on right lower leg; no open skin present. Patient requested one more nursing visit after appt to wound center on wednesday.     Skill/education provided: Wound care completed. Vital signs stable. Education provided about wound healing and nursing schedule.     Patient/caregiver response: Patient verbilized understanding.     Plan for next visit: Vital signs, wound care, cardiopulmonary assessment, assess falls and pain, cp assess    Other pertinent info: na

## 2023-08-26 DIAGNOSIS — I10 PRIMARY HYPERTENSION: Chronic | ICD-10-CM

## 2023-08-26 PROBLEM — S81.801A LEG WOUND, RIGHT: Status: ACTIVE | Noted: 2023-08-26

## 2023-08-28 ENCOUNTER — HOME CARE VISIT (OUTPATIENT)
Dept: HOME HEALTH SERVICES | Facility: HOME HEALTHCARE | Age: 78
End: 2023-08-28
Payer: MEDICARE

## 2023-08-28 VITALS — RESPIRATION RATE: 16 BRPM | TEMPERATURE: 98 F | SYSTOLIC BLOOD PRESSURE: 143 MMHG | DIASTOLIC BLOOD PRESSURE: 80 MMHG

## 2023-08-28 PROCEDURE — G0157 HHC PT ASSISTANT EA 15: HCPCS

## 2023-08-28 RX ORDER — LOSARTAN POTASSIUM 25 MG/1
TABLET ORAL
Qty: 90 TABLET | Refills: 1 | Status: SHIPPED | OUTPATIENT
Start: 2023-08-28

## 2023-08-28 NOTE — HOME HEALTH
pt sitting up with no new c/o's.   presents with b compression to LEs and returns to wound clinic on 8/30.    RLE wound not visible due to dressing in place.      pt was unsteady upon standing and struggled at times to stand with noted weakness.  observed pts gait ability outdoors on the driveway,  pt did ok with this but struggled at times with noted limtations in the ability flex the hips.  pt was cued on proper form with hep review and needed cues to contract/hold and to aviod compensatory movement.    pt was educated to properly deep plb for energy conservation.   pt wa        plan for next session-  cont PT with gait trg, PRE's,   transfer trg,  balance trg and advance as able

## 2023-08-30 ENCOUNTER — OFFICE VISIT (OUTPATIENT)
Dept: WOUND CARE | Facility: HOSPITAL | Age: 78
End: 2023-08-30
Payer: MEDICARE

## 2023-08-30 ENCOUNTER — HOME CARE VISIT (OUTPATIENT)
Dept: HOME HEALTH SERVICES | Facility: HOME HEALTHCARE | Age: 78
End: 2023-08-30
Payer: MEDICARE

## 2023-08-30 VITALS
HEART RATE: 83 BPM | OXYGEN SATURATION: 95 % | DIASTOLIC BLOOD PRESSURE: 70 MMHG | TEMPERATURE: 98.4 F | SYSTOLIC BLOOD PRESSURE: 134 MMHG | RESPIRATION RATE: 15 BRPM

## 2023-08-30 PROCEDURE — G0157 HHC PT ASSISTANT EA 15: HCPCS

## 2023-08-31 ENCOUNTER — HOSPITAL ENCOUNTER (OUTPATIENT)
Dept: ULTRASOUND IMAGING | Facility: HOSPITAL | Age: 78
Discharge: HOME | End: 2023-08-31
Payer: MEDICARE

## 2023-08-31 PROCEDURE — 93922 UPR/L XTREMITY ART 2 LEVELS: CPT

## 2023-08-31 PROCEDURE — 93970 EXTREMITY STUDY: CPT

## 2023-08-31 NOTE — HOME HEALTH
pt up with no c/o's but is fatigued from earlier apt with the [facility].     pt reports she returns in 2 wks for a f/u.   RLE wound covered with a dressing and not observable.   pt reports she has had no med changes and no complications.       pt is improving,   displaying an improved ability to stand,  ambulate and perform PT hep with noted strength gains.  pt was minimally unsteady upon standing,  continues to struggle to stand and with repeated attempts needed.  pt was educated today on proper form with hep review, mostly to maintain correct form avoiding compensatory movement and to slow emmett for improved efficiency.           plan for next session- cont PT with gait trg, hep review, balance trg and transfer education

## 2023-09-01 ENCOUNTER — OFFICE VISIT (OUTPATIENT)
Dept: FAMILY MEDICINE CLINIC | Facility: CLINIC | Age: 78
End: 2023-09-01
Payer: MEDICARE

## 2023-09-01 ENCOUNTER — HOME CARE VISIT (OUTPATIENT)
Dept: HOME HEALTH SERVICES | Facility: HOME HEALTHCARE | Age: 78
End: 2023-09-01
Payer: MEDICARE

## 2023-09-01 VITALS
WEIGHT: 161.2 LBS | HEIGHT: 63 IN | DIASTOLIC BLOOD PRESSURE: 80 MMHG | SYSTOLIC BLOOD PRESSURE: 151 MMHG | OXYGEN SATURATION: 97 % | HEART RATE: 80 BPM | BODY MASS INDEX: 28.56 KG/M2 | TEMPERATURE: 97.8 F | RESPIRATION RATE: 20 BRPM

## 2023-09-01 VITALS
DIASTOLIC BLOOD PRESSURE: 78 MMHG | SYSTOLIC BLOOD PRESSURE: 138 MMHG | RESPIRATION RATE: 17 BRPM | TEMPERATURE: 98 F | OXYGEN SATURATION: 98 % | HEART RATE: 74 BPM

## 2023-09-01 DIAGNOSIS — I10 PRIMARY HYPERTENSION: Primary | ICD-10-CM

## 2023-09-01 DIAGNOSIS — S81.801S WOUND OF RIGHT LOWER EXTREMITY, SEQUELA: ICD-10-CM

## 2023-09-01 PROCEDURE — G0299 HHS/HOSPICE OF RN EA 15 MIN: HCPCS

## 2023-09-01 NOTE — PROGRESS NOTES
Office Note     Name: Yancy Mcdonnell    : 1945     MRN: [id number]     Chief Complaint  Hypertension    Subjective     History of Present Illness:  Yancy Mcdonnell is a 78 y.o. female who presents today for recheck and management of hypertension.  She feels well today.     She had vascular studies completed and both were negative.     Hypertension  This is a chronic problem. The current episode started more than 1 month ago. The problem has been gradually improving since onset. The problem is controlled. Pertinent negatives include no anxiety, blurred vision, chest pain, headaches, malaise/fatigue, neck pain, orthopnea, palpitations, peripheral edema, shortness of breath or sweats. There are no known risk factors for coronary artery disease. Current antihypertension treatment includes beta blockers. There are no compliance problems.  There is no history of angina, kidney disease, CAD/MI, CVA, heart failure, left ventricular hypertrophy, PVD or retinopathy. There is no history of chronic renal disease, coarctation of the aorta, hyperaldosteronism, hypercortisolism, hyperparathyroidism, a hypertension causing med, pheochromocytoma, renovascular disease, sleep apnea or a thyroid problem.     Allergies   Allergen Reactions    Meperidine Unknown (See Comments) and Nausea And Vomiting     Abstracted from centricity.    Other Unknown (See Comments)     Sulfa (sulfadiazine)     Sulfa Antibiotics Unknown - High Severity and Anaphylaxis    Lisinopril Cough         Current Outpatient Medications:     fluticasone (FLONASE) 50 MCG/ACT nasal spray, INJECT 2 SPRAYS INTO THE NOSTRILS EVERY DAY AS DIRECTED, Disp: 48 g, Rfl: 4    hydroCHLOROthiazide (HYDRODIURIL) 25 MG tablet, Take 1 tablet by mouth Daily. Indications: Edema (Patient taking differently: Take 1 tablet by mouth As Needed.), Disp: 30 tablet, Rfl: 0    levothyroxine (SYNTHROID, LEVOTHROID) 50 MCG tablet, Take 1 tablet by mouth Daily., Disp: 30 tablet, Rfl:  1    loratadine (CLARITIN) 10 MG tablet, TAKE 1 TABLET BY MOUTH DAILY, Disp: 90 tablet, Rfl: 1    losartan (COZAAR) 25 MG tablet, TAKE 1 TABLET BY MOUTH DAILY FOR HIGH BLOOD PRESSURE, Disp: 90 tablet, Rfl: 1    lysine 500 MG tablet, Take 1 tablet by mouth Daily As Needed. Stop now for surgery  Indications: Migraine Headache, Disp: , Rfl:     Magnesium 250 MG tablet, Take 1 tablet by mouth Daily. Indications: supplement, Disp: , Rfl:     metoprolol succinate XL (TOPROL-XL) 25 MG 24 hr tablet, TAKE 1 TABLET BY MOUTH DAILY FOR HIGH BLOOD PRESSURE, Disp: 90 tablet, Rfl: 1    Misc Natural Products (LUTEIN 20 PO), Take 1 tablet by mouth Daily. Indications: supplement, Disp: , Rfl:     mupirocin (BACTROBAN) 2 % ointment, Apply 1 application  topically to the appropriate area as directed 3 (Three) Times a Day., Disp: 30 g, Rfl: 1    NON FORMULARY, Apply 1 dose to eye(s) as directed by provider Daily. Sustain eye drops   Indications: dry eyes, Disp: , Rfl:     traMADol (ULTRAM) 50 MG tablet, Take 1 tablet by mouth Every 6 (Six) Hours As Needed for Moderate Pain or Severe Pain. Indications: Pain (Patient not taking: Reported on 9/1/2023), Disp: 7 tablet, Rfl: 0    Past Medical History:   Diagnosis Date    Allergic rhinitis 06/02/2015    Ankle fracture, right 02/2022    Body mass index 29.0-29.9, adult 09/12/2018    Cervical spinal stenosis 08/07/2017    Cholelithiasis     Class 1 obesity due to excess calories with serious comorbidity and body mass index (BMI) of 30.0 to 30.9 in adult 10/14/2020    Closed bimalleolar fracture of right ankle 2/11/2022    DDD (degenerative disc disease), cervical     DDD (degenerative disc disease), lumbar     DDD (degenerative disc disease), thoracic     Diverticulosis     Dyslipidemia 12/05/2012    Elevated brain natriuretic peptide (BNP) level 01/06/2017    Eustachian tube disorder, right 02/18/2019    Fall 02/2022    Fatigue 06/02/2015    Female cystocele 06/02/2015    Hiatal hernia      Hiatal hernia with gastroesophageal reflux 01/06/2017    History of blood clots 1980    leg s/p fx    History of DVT (deep vein thrombosis) 06/02/2015    History of herpes simplex infection 7/25/2020    Hyperlipidemia     Hypertension 06/10/2016    Left rib fracture 05/2017    11TH RIB     Migraine headache 06/02/2015    Mitral insufficiency 01/16/2017    MILD    Osteoarthritis 05/20/2014    Osteopenia 12/05/2012    Over weight 06/14/2018    Peripheral edema 01/05/2017    PFO (patent foramen ovale) 01/16/2017    Pulmonary nodule 05/04/2017    DR CHUN FOLLOWS    RHA (rheumatoid arthritis) 12/05/2012    Scleritis 06/02/2015    Scoliosis     Thrombophlebitis 2/19/2022    Acute right lower extremity superficial thrombophlebitis noted in the great saphenous (below knee) and varicosity (below knee).      Tricuspid insufficiency     MILD    Urinary urgency     Varicose veins of other specified sites     Venous insufficiency 09/12/2018       Review of Systems   Constitutional:  Negative for malaise/fatigue.   Eyes:  Negative for blurred vision.   Respiratory:  Negative for shortness of breath.    Cardiovascular:  Negative for chest pain, palpitations and orthopnea.   Musculoskeletal:  Negative for neck pain.   Skin:  Positive for wound (improving).     Social History     Socioeconomic History    Marital status:    Tobacco Use    Smoking status: Never    Smokeless tobacco: Never   Vaping Use    Vaping Use: Never used   Substance and Sexual Activity    Alcohol use: Yes     Alcohol/week: 1.0 standard drink     Types: 1 Cans of beer per week    Drug use: No    Sexual activity: Defer       Family History   Problem Relation Age of Onset    Osteoporosis Mother     Stroke Mother 80    Dementia Mother     Heart disease Father     Hypertension Father     Heart disease Sister     Cancer Daughter         BREAST AND MELANOMA    Breast cancer Daughter 52    Cancer Son         MELANOMA    Cancer Grandson         MELANOMA            "8/17/2023    10:41 AM   PHQ-2/PHQ-9 Depression Screening   Little Interest or Pleasure in Doing Things 0-->not at all   Feeling Down, Depressed or Hopeless 0-->not at all   PHQ-9: Brief Depression Severity Measure Score 0       Fall Risk Screen:  [facility] Fall Risk Assessment has not been completed.      Objective     /80   Pulse 80   Temp 97.8 øF (36.6 øC) (Infrared)   Resp 20   Ht 160 cm (62.99\")   Wt 73.1 kg (161 lb 3.2 oz)   SpO2 97%   BMI 28.56 kg/mý     BP Readings from Last 2 Encounters:   09/01/23 151/80   08/30/23 134/70       Wt Readings from Last 2 Encounters:   09/01/23 73.1 kg (161 lb 3.2 oz)   08/17/23 75.2 kg (165 lb 12.8 oz)                Physical Exam  Vitals and nursing note reviewed.   Constitutional:       General: She is not in acute distress.     Appearance: Normal appearance. She is well-groomed. She is not ill-appearing, toxic-appearing or diaphoretic.   HENT:      Head: Normocephalic and atraumatic.   Eyes:      General: Vision grossly intact.   Neck:      Vascular: No carotid bruit.   Cardiovascular:      Rate and Rhythm: Normal rate and regular rhythm.      Heart sounds: Normal heart sounds. No murmur heard.  Pulmonary:      Effort: Pulmonary effort is normal.      Breath sounds: Normal breath sounds and air entry.   Musculoskeletal:      Right lower leg: No edema.      Left lower leg: No edema.   Skin:     General: Skin is warm and dry.      Findings: Wound present.             Comments: Wound to right lower inner leg improving wound bed pink no eschar noted.   Neurological:      Mental Status: She is alert and oriented to person, place, and time. Mental status is at baseline.   Psychiatric:         Attention and Perception: Attention normal.         Mood and Affect: Mood and affect normal.         Behavior: Behavior normal. Behavior is cooperative.         Thought Content: Thought content normal.     Derm Physical Exam  Result Review :         US Ankle / Brachial Indices " Extremity Limited    Result Date: 9/1/2023  Impression: Impression: 1. No evidence of lower extremity arterial insufficiency Electronically Signed: Greg Atkinson MD  9/1/2023 9:40 AM EDT  Workstation ID: [id number]    US Venous Doppler Lower Extremity Bilateral (duplex)    Result Date: 8/31/2023  Impression: Impression: 1. No acute superficial or deep venous thrombosis of the right or left lower extremities. 2. There is an incidental Bakers cyst in the left popliteal fossa. Electronically Signed: Carlos Collado MD  8/31/2023 2:25 PM EDT  Workstation ID: [id number]        Assessment and Plan     Procedures  Plan    Diagnoses and all orders for this visit:    1. Primary hypertension (Primary)  Assessment & Plan:  B/P 130-140/70-80, one time 172/90 with recheck 128/79.   Bp 148/78.         2. Wound of right lower extremity, sequela  Comments:  Here with dressing off to view.        Problem List Items Addressed This Visit          Active Problems    Primary hypertension - Primary    Current Assessment & Plan     B/P 130-140/70-80, one time 172/90 with recheck 128/79.   Bp 148/78.            Leg wound, right    Overview     Home Health  M [facility] dressiing changes  Hydrofera Blue transfer, abd pad, 3 layer lite wrap base of toes to knee.   Aquaphor bilaterally              Follow Up   Wrapup Tab  Return for Next scheduled follow up.   Patient Instructions   Continue current plan of care as discussed.   Take medication as ordered (if applicable).    Practice good sleep hygiene.  Eat a well balanced diet with fresh fruit and vegetables.    Stay hydrated, drink water daily.      Limit sodium: salt, seasoned salt, garlic salt, onion salt, celery salt, etc.   Limit sweetened beverages, sodas, juices.    Bake, boil, broil or grill your food, avoid eating fried foods.   Regular exercise is important.  Incorporate weight or resistance into your routine.     Patient was given instructions and counseling regarding her condition or for  health maintenance advice. Please see specific information pulled into the AVS if appropriate.  Hand hygiene was performed during entrance to exam room and following assessment of patient. This document is intended for medical expert use only.     EMR Dragon/Transcription disclaimer:   Much of this encounter note is an electronic transcription/translation of spoken language to printed text. The electronic translation of spoken language may permit erroneous, or at times, nonsensical words or phrases to be inadvertently transcribed.      FELIPE Zuluaga, FNP-C  [facility] [address]  [facility]  [address] [place] [address] [address]  [place] [address] [phone]

## 2023-09-01 NOTE — HOME HEALTH
"  Eval Note    Patient's goal(s): \"My want my wound to be healed.\"    Services required to achieve goals: SN    Potential Issues for goal attainment: Unsteady gait and balace    Problems identified: Unsteady gait    Describe the Functional status and safety: Patient has cane for transfers and lives with spouse. Patient is complient with medications and dressing changes.    Describe any environmental issues: None    Any equipment needs: Suppilies for wound care    POC confirmed with Yancy Mcdonnell on date 9/1/23"

## 2023-09-05 ENCOUNTER — HOME CARE VISIT (OUTPATIENT)
Dept: HOME HEALTH SERVICES | Facility: HOME HEALTHCARE | Age: 78
End: 2023-09-05
Payer: MEDICARE

## 2023-09-05 ENCOUNTER — TELEPHONE (OUTPATIENT)
Dept: FAMILY MEDICINE CLINIC | Facility: CLINIC | Age: 78
End: 2023-09-05
Payer: MEDICARE

## 2023-09-05 VITALS
DIASTOLIC BLOOD PRESSURE: 72 MMHG | OXYGEN SATURATION: 95 % | HEART RATE: 85 BPM | SYSTOLIC BLOOD PRESSURE: 136 MMHG | RESPIRATION RATE: 16 BRPM | TEMPERATURE: 97.8 F

## 2023-09-05 PROCEDURE — G0157 HHC PT ASSISTANT EA 15: HCPCS

## 2023-09-05 NOTE — TELEPHONE ENCOUNTER
Caller: MARIEL    Relationship: Other    Best call back number: [phone]    What is the best time to reach you: ANYTIME 8-5 EST    Who are you requesting to speak with (clinical staff, provider,  specific staff member): MEDICAL ASSISTANT    What was the call regarding: [facility] IS CALLING IN TO GET SOME MORE INFORMATION ON PATIENT.     THEY NEED TO KNOW IF HER WOUND HAS BEEN DEBRIDED, DIAGNOSTIC CODE, AND A DEPTH MEASUREMENT FOR THE WOUND.     PLEASE CALL TO ADVISE

## 2023-09-06 ENCOUNTER — HOME CARE VISIT (OUTPATIENT)
Dept: HOME HEALTH SERVICES | Facility: HOME HEALTHCARE | Age: 78
End: 2023-09-06
Payer: MEDICARE

## 2023-09-06 VITALS
TEMPERATURE: 97.8 F | SYSTOLIC BLOOD PRESSURE: 120 MMHG | DIASTOLIC BLOOD PRESSURE: 72 MMHG | HEART RATE: 70 BPM | RESPIRATION RATE: 18 BRPM | OXYGEN SATURATION: 99 %

## 2023-09-06 PROCEDURE — G0299 HHS/HOSPICE OF RN EA 15 MIN: HCPCS

## 2023-09-06 PROCEDURE — G0151 HHCP-SERV OF PT,EA 15 MIN: HCPCS

## 2023-09-06 NOTE — TELEPHONE ENCOUNTER
Tried to call number and no way to get through   I have personally provided the amount of critical care time documented below concurrently with the resident/fellow.  This time excludes time spent on separate procedures and time spent teaching. I have reviewed the resident’s / fellow’s documentation and I agree with the history, exam, and assessment and plan of care.

## 2023-09-06 NOTE — HOME HEALTH
pt up and is ready for PT session with no new c/o's.     pt is motivated to improve and return to plf and reports she continues to advance with PT.    pt reports no med changes or complications     pt was limited in all areas with weakness and limited endurance  and fatigue.  pt  was educated on proper lumbar stab techniques and to fully contract/hold with hep review with proper deep plb for energy conservation.      pt was better able to perform ther ex with no mentioned c/o's. and improving strength and mobility.     pt was fatigued to end PT session but was pleased to have participated.        plan for next session-   cont PT with gait trg, hep review,     balance trg.

## 2023-09-06 NOTE — HOME HEALTH
Routine Visit Note: Medications reviewed. Appetite well. No issues with elimination. Patient denies falls.     Skill/education provided: Vital signs stable. Wound care completed. Education provided about wound care and blood pressure. Cardiopulmonary assessment. Patient denies pain.     Patient/caregiver response: Patient verbilized understanding.     Plan for next visit: Vital signs, wound care, cardiopulmonary assessment, cp assess, review medications, assess falls and pain    Other pertinent info: na

## 2023-09-08 ENCOUNTER — HOME CARE VISIT (OUTPATIENT)
Dept: HOME HEALTH SERVICES | Facility: HOME HEALTHCARE | Age: 78
End: 2023-09-08
Payer: MEDICARE

## 2023-09-09 DIAGNOSIS — I10 PRIMARY HYPERTENSION: Chronic | ICD-10-CM

## 2023-09-11 ENCOUNTER — HOME CARE VISIT (OUTPATIENT)
Dept: HOME HEALTH SERVICES | Facility: HOME HEALTHCARE | Age: 78
End: 2023-09-11
Payer: MEDICARE

## 2023-09-11 VITALS
OXYGEN SATURATION: 99 % | HEART RATE: 72 BPM | RESPIRATION RATE: 18 BRPM | SYSTOLIC BLOOD PRESSURE: 137 MMHG | DIASTOLIC BLOOD PRESSURE: 76 MMHG | TEMPERATURE: 98.4 F

## 2023-09-11 VITALS
OXYGEN SATURATION: 96 % | SYSTOLIC BLOOD PRESSURE: 132 MMHG | TEMPERATURE: 97.8 F | DIASTOLIC BLOOD PRESSURE: 33 MMHG | RESPIRATION RATE: 15 BRPM | HEART RATE: 83 BPM

## 2023-09-11 PROCEDURE — G0151 HHCP-SERV OF PT,EA 15 MIN: HCPCS

## 2023-09-11 PROCEDURE — G0299 HHS/HOSPICE OF RN EA 15 MIN: HCPCS

## 2023-09-11 RX ORDER — METOPROLOL SUCCINATE 25 MG/1
TABLET, EXTENDED RELEASE ORAL
Qty: 90 TABLET | Refills: 1 | OUTPATIENT
Start: 2023-09-11

## 2023-09-12 NOTE — HOME HEALTH
pt sitting up and is prepared for PT session-  feeling fair and is motivated to improve and return to plf.     pt feels she continues to improve with PT intervention and is returning to properly   pt reports she nearly fell on 9/9 while outdoors attempting to do yardwork.   pt lost her balance and and had to quickly sit.  pt denies any pain from this.          pt was limited today in all areas and work/rest ratio was 50/50.  pt was educated today on proper lumbar stab techniques and to avoid compensatory movement with hep review.  pt was minimally unsteady upon standing and continues to struglge to stand at times and tends to lose her balance posteriorly.  pt was given several cues today to fully contract/hold and to maintain proper plane of motion.       plan for next session-   assess progress to goals and cont/dc PT as needed

## 2023-09-13 ENCOUNTER — HOME CARE VISIT (OUTPATIENT)
Dept: HOME HEALTH SERVICES | Facility: HOME HEALTHCARE | Age: 78
End: 2023-09-13
Payer: MEDICARE

## 2023-09-13 ENCOUNTER — OFFICE VISIT (OUTPATIENT)
Dept: WOUND CARE | Facility: HOSPITAL | Age: 78
End: 2023-09-13
Payer: MEDICARE

## 2023-09-14 ENCOUNTER — HOME CARE VISIT (OUTPATIENT)
Dept: HOME HEALTH SERVICES | Facility: HOME HEALTHCARE | Age: 78
End: 2023-09-14
Payer: MEDICARE

## 2023-09-14 VITALS
HEART RATE: 78 BPM | DIASTOLIC BLOOD PRESSURE: 70 MMHG | TEMPERATURE: 98 F | SYSTOLIC BLOOD PRESSURE: 120 MMHG | OXYGEN SATURATION: 96 %

## 2023-09-14 PROCEDURE — G0151 HHCP-SERV OF PT,EA 15 MIN: HCPCS

## 2023-09-15 ENCOUNTER — OFFICE VISIT (OUTPATIENT)
Dept: FAMILY MEDICINE CLINIC | Facility: CLINIC | Age: 78
End: 2023-09-15
Payer: MEDICARE

## 2023-09-15 ENCOUNTER — HOME CARE VISIT (OUTPATIENT)
Dept: HOME HEALTH SERVICES | Facility: HOME HEALTHCARE | Age: 78
End: 2023-09-15
Payer: MEDICARE

## 2023-09-15 VITALS
HEART RATE: 84 BPM | OXYGEN SATURATION: 98 % | BODY MASS INDEX: 28.81 KG/M2 | SYSTOLIC BLOOD PRESSURE: 116 MMHG | RESPIRATION RATE: 18 BRPM | TEMPERATURE: 96.9 F | WEIGHT: 162.6 LBS | DIASTOLIC BLOOD PRESSURE: 74 MMHG | HEIGHT: 63 IN

## 2023-09-15 VITALS
RESPIRATION RATE: 18 BRPM | OXYGEN SATURATION: 99 % | HEART RATE: 74 BPM | SYSTOLIC BLOOD PRESSURE: 136 MMHG | TEMPERATURE: 98 F | DIASTOLIC BLOOD PRESSURE: 78 MMHG

## 2023-09-15 DIAGNOSIS — S20.221A CONTUSION OF RIGHT SIDE OF BACK, INITIAL ENCOUNTER: ICD-10-CM

## 2023-09-15 DIAGNOSIS — E66.3 OVERWEIGHT WITH BODY MASS INDEX (BMI) OF 28 TO 28.9 IN ADULT: ICD-10-CM

## 2023-09-15 DIAGNOSIS — R35.0 URINARY FREQUENCY: Primary | ICD-10-CM

## 2023-09-15 DIAGNOSIS — I10 PRIMARY HYPERTENSION: Chronic | ICD-10-CM

## 2023-09-15 LAB
BILIRUB BLD-MCNC: NEGATIVE MG/DL
CLARITY, POC: CLEAR
COLOR UR: YELLOW
GLUCOSE UR STRIP-MCNC: NEGATIVE MG/DL
KETONES UR QL: NEGATIVE
LEUKOCYTE EST, POC: ABNORMAL
NITRITE UR-MCNC: NEGATIVE MG/ML
PH UR: 6.5 [PH] (ref 5–8)
PROT UR STRIP-MCNC: ABNORMAL MG/DL
RBC # UR STRIP: ABNORMAL /UL
SP GR UR: 1.01 (ref 1–1.03)
UROBILINOGEN UR QL: NORMAL

## 2023-09-15 PROCEDURE — G0299 HHS/HOSPICE OF RN EA 15 MIN: HCPCS

## 2023-09-15 PROCEDURE — 81002 URINALYSIS NONAUTO W/O SCOPE: CPT | Performed by: FAMILY MEDICINE

## 2023-09-15 PROCEDURE — 3074F SYST BP LT 130 MM HG: CPT | Performed by: FAMILY MEDICINE

## 2023-09-15 PROCEDURE — 3078F DIAST BP <80 MM HG: CPT | Performed by: FAMILY MEDICINE

## 2023-09-15 PROCEDURE — 99213 OFFICE O/P EST LOW 20 MIN: CPT | Performed by: FAMILY MEDICINE

## 2023-09-15 RX ORDER — LOSARTAN POTASSIUM 25 MG/1
TABLET ORAL
Qty: 90 TABLET | Refills: 1 | OUTPATIENT
Start: 2023-09-15

## 2023-09-15 NOTE — PROGRESS NOTES
Subjective   Yancy Mcdonnell is a 78 y.o. female.   Chief Complaint   Patient presents with    Urinary Tract Infection       History of Present Illness  Pt fell, bruised back. Had a little urinary frequency a couple of days ago which has since resolved  No other sx   Urinary Frequency   This is a new problem. The current episode started in the past 7 days. The problem has been rapidly improving. The pain is at a severity of 0/10. There has been no fever. Associated symptoms include frequency (resolved).      The following portions of the patient's history were reviewed and updated as appropriate: allergies, current medications, past family history, past medical history, past social history, past surgical history, and problem list.    Patient Active Problem List   Diagnosis    Allergic rhinitis    Lung nodule    Dyslipidemia    History of thrombosis    Primary hypertension    Mitral valve insufficiency    Osteoarthritis    Osteopenia of multiple sites    Rheumatoid arthritis    Spinal stenosis of cervical region    Venous insufficiency    Gastro-esophageal reflux disease without esophagitis    History of herpes simplex infection    Overweight with body mass index (BMI) of 28 to 28.9 in adult    Elevated alkaline phosphatase level    Diaphragmatic hernia without obstruction or gangrene    Thrombophlebitis    H/O fall    Deviated septum    Abnormal urinalysis    Long term (current) use of aspirin    Urinary frequency    Stress incontinence of urine    Presence of pessary    Atrophic vaginitis    Difficulty swallowing    Subclinical hypothyroidism    Balance problem    Anemia    Diverticulitis    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 bleeding    Esophageal motility disorder    Pure hypercholesterolemia    Seasonal allergies    Thyroid disease    Back pain    Other diseases of stomach and duodenum    Leg wound, right       Current Outpatient Medications on File Prior to Visit   Medication Sig Dispense Refill     fluticasone (FLONASE) 50 MCG/ACT nasal spray INJECT 2 SPRAYS INTO THE NOSTRILS EVERY DAY AS DIRECTED 48 g 4    hydroCHLOROthiazide (HYDRODIURIL) 25 MG tablet Take 1 tablet by mouth Daily. Indications: Edema (Patient taking differently: Take 1 tablet by mouth As Needed.) 30 tablet 0    levothyroxine (SYNTHROID, LEVOTHROID) 50 MCG tablet Take 1 tablet by mouth Daily. 30 tablet 1    loratadine (CLARITIN) 10 MG tablet TAKE 1 TABLET BY MOUTH DAILY 90 tablet 1    losartan (COZAAR) 25 MG tablet TAKE 1 TABLET BY MOUTH DAILY FOR HIGH BLOOD PRESSURE 90 tablet 1    lysine 500 MG tablet Take 1 tablet by mouth Daily As Needed. Stop now for surgery  Indications: Migraine Headache      Magnesium 250 MG tablet Take 1 tablet by mouth Daily. Indications: supplement      metoprolol succinate XL (TOPROL-XL) 25 MG 24 hr tablet TAKE 1 TABLET BY MOUTH DAILY FOR HIGH BLOOD PRESSURE 90 tablet 1    Misc Natural Products (LUTEIN 20 PO) Take 1 tablet by mouth Daily. Indications: supplement      mupirocin (BACTROBAN) 2 % ointment Apply 1 application  topically to the appropriate area as directed 3 (Three) Times a Day. 30 g 1    NON FORMULARY Apply 1 dose to eye(s) as directed by provider Daily. Sustain eye drops   Indications: dry eyes      traMADol (ULTRAM) 50 MG tablet Take 1 tablet by mouth Every 6 (Six) Hours As Needed for Moderate Pain or Severe Pain. Indications: Pain (Patient not taking: Reported on 9/1/2023) 7 tablet 0     No current facility-administered medications on file prior to visit.     Current outpatient and discharge medications have been reconciled for the patient.  Reviewed by: uBcky Balbuena MD      Allergies   Allergen Reactions    Meperidine Unknown (See Comments) and Nausea And Vomiting     Abstracted from centricity.    Other Unknown (See Comments)     Sulfa (sulfadiazine)     Sulfa Antibiotics Unknown - High Severity and Anaphylaxis    Lisinopril Cough       Review of Systems   Genitourinary:  Positive for  "frequency (resolved).   All other systems reviewed and are negative.  I have reviewed and confirmed the accuracy of the ROS as documented by the MA/LPN/RN Bucky Balbuena MD    Objective   Visit Vitals  /74 (BP Location: Right arm, Patient Position: Sitting, Cuff Size: Large Adult)   Pulse 84   Temp 96.9 °F (36.1 °C) (Temporal)   Resp 18   Ht 160 cm (62.99\")   Wt 73.8 kg (162 lb 9.6 oz)   SpO2 98% Comment: room air   BMI 28.81 kg/m²      **  Physical Exam  Constitutional:       Appearance: She is well-developed.   HENT:      Head: Normocephalic and atraumatic.      Right Ear: External ear normal.      Left Ear: External ear normal.      Nose: Nose normal.   Eyes:      Pupils: Pupils are equal, round, and reactive to light.   Cardiovascular:      Rate and Rhythm: Normal rate and regular rhythm.      Heart sounds: Normal heart sounds.   Pulmonary:      Effort: Pulmonary effort is normal.      Breath sounds: Normal breath sounds.   Abdominal:      General: Bowel sounds are normal.      Palpations: Abdomen is soft.   Musculoskeletal:         General: Normal range of motion.      Cervical back: Normal range of motion and neck supple.        Back:       Comments: Some ecchymosis consistent with history of recent fall    Skin:     General: Skin is warm and dry.   Neurological:      Mental Status: She is alert and oriented to person, place, and time.   Psychiatric:         Behavior: Behavior normal.         Thought Content: Thought content normal.         Judgment: Judgment normal.     Physical Exam   Back         Diagnoses and all orders for this visit:    1. Urinary frequency (Primary)  -     POCT urinalysis dipstick, manual  -     Urine Culture - Urine, Urine, Random Void    2. Overweight with body mass index (BMI) of 28 to 28.9 in adult  Assessment & Plan:  Patient's (Body mass index is 28.81 kg/m².) indicates that they are overweight with health conditions that include dyslipidemias . Weight is unchanged. " BMI is is above average; BMI management plan is completed. We discussed portion control and increasing exercise.       3. Contusion of right side of back, initial encounter     Findings discussed. All questions answered.  Natural course and self-limited nature of this condition discussed.  Follow-up in approximately 3 days for reevaluation if not improved.  Follow-up sooner for worsening symptoms or any concerns.  Will check urine culture.   Follow-up for routine health maintenance as indicated.           Expected course, medications, and adverse effects discussed as appropriate.  Call or return if worsening or persistent symptoms.     This document is intended for medical professional use only.

## 2023-09-15 NOTE — ASSESSMENT & PLAN NOTE
Patient's (Body mass index is 28.81 kg/m².) indicates that they are overweight with health conditions that include dyslipidemias . Weight is unchanged. BMI is is above average; BMI management plan is completed. We discussed portion control and increasing exercise.

## 2023-09-15 NOTE — HOME HEALTH
Routine Visit Note: Medications reviewed. Appetite well. Patient has MD appt today for possible UTI. Patient went to the wound center on Wednesday and wanted to be discharged from them but the NP stated that it is home healths decision. Patient is to start outpatient therapy on the 21st. SN planning to go back Monday and Wednesday of next week to evaluate the wound to ensure it has healed properly. SN provided education about wound care and keeping legs wrapped.     Skill/education provided: Wound care completed. Vital signs stable. Education provided about scheduling and wound status. Cardiopulmonary assessment completed. CP assessed. Patient denies falls and pain.     Patient/caregiver response: Patient verbilized understanding.     Plan for next visit: Vital signs, wound care, cp assess, assess falls and pain, cardiopulmonary assessment, review medications    Other pertinent info: na

## 2023-09-16 DIAGNOSIS — E03.8 SUBCLINICAL HYPOTHYROIDISM: Chronic | ICD-10-CM

## 2023-09-18 ENCOUNTER — HOME CARE VISIT (OUTPATIENT)
Dept: HOME HEALTH SERVICES | Facility: HOME HEALTHCARE | Age: 78
End: 2023-09-18
Payer: MEDICARE

## 2023-09-18 VITALS
OXYGEN SATURATION: 99 % | SYSTOLIC BLOOD PRESSURE: 130 MMHG | TEMPERATURE: 98.2 F | DIASTOLIC BLOOD PRESSURE: 74 MMHG | HEART RATE: 80 BPM | RESPIRATION RATE: 18 BRPM

## 2023-09-18 DIAGNOSIS — E03.8 SUBCLINICAL HYPOTHYROIDISM: Chronic | ICD-10-CM

## 2023-09-18 PROCEDURE — G0299 HHS/HOSPICE OF RN EA 15 MIN: HCPCS

## 2023-09-18 RX ORDER — LEVOTHYROXINE SODIUM 0.05 MG/1
50 TABLET ORAL DAILY
Qty: 30 TABLET | Refills: 1 | Status: SHIPPED | OUTPATIENT
Start: 2023-09-18 | End: 2023-09-18

## 2023-09-18 RX ORDER — LEVOTHYROXINE SODIUM 0.05 MG/1
50 TABLET ORAL DAILY
Qty: 90 TABLET | Refills: 1 | Status: SHIPPED | OUTPATIENT
Start: 2023-09-18

## 2023-09-18 NOTE — HOME HEALTH
Routine Visit Note: Medications reviewed. Appetite well. No issues with elimination. Patient denies falls and pain.     Skill/education provided: Vital signs stable. Wound care completed. Education provided about wound healing and outpatient therapy. Cardiopulmonary assessment completed. CP assessed. Patient has cane at chairside.     Patient/caregiver response: Patient verbilized understanding.     Plan for next visit: Vital signs, wound care, cp assess, cardiopulmonary assessment, assess falls and pain, review medications    Other pertinent info: na

## 2023-09-20 ENCOUNTER — HOME CARE VISIT (OUTPATIENT)
Dept: HOME HEALTH SERVICES | Facility: HOME HEALTHCARE | Age: 78
End: 2023-09-20
Payer: MEDICARE

## 2023-09-20 VITALS
TEMPERATURE: 98.4 F | HEART RATE: 76 BPM | DIASTOLIC BLOOD PRESSURE: 80 MMHG | OXYGEN SATURATION: 97 % | SYSTOLIC BLOOD PRESSURE: 138 MMHG | RESPIRATION RATE: 18 BRPM

## 2023-09-20 PROCEDURE — G0299 HHS/HOSPICE OF RN EA 15 MIN: HCPCS

## 2023-09-20 NOTE — HOME HEALTH
Routine Visit Note: Medications reviewed. Patient denies falls and pain. Appetite well. No issues with elimination. Cane at chairside.     Skill/education provided: Vital signs stable. Wound care completed. Education provided about wound status and keeping leg wrapped. Patient tends to take wrap off after a day or so. Cardiopulmonary assessment completed. CP assessed.     Patient/caregiver response: Patient verbilized understanding.     Plan for next visit: Vital signs, wound care, cardiopulmonary assessment, review medications, cp assess, assess falls and pain    Other pertinent info: na

## 2023-09-21 ENCOUNTER — PATIENT MESSAGE (OUTPATIENT)
Dept: FAMILY MEDICINE CLINIC | Facility: CLINIC | Age: 78
End: 2023-09-21
Payer: MEDICARE

## 2023-09-21 DIAGNOSIS — R35.0 URINARY FREQUENCY: Primary | ICD-10-CM

## 2023-09-21 LAB
BACTERIA UR CULT: ABNORMAL
BACTERIA UR CULT: ABNORMAL
OTHER ANTIBIOTIC SUSC ISLT: ABNORMAL

## 2023-09-21 RX ORDER — CIPROFLOXACIN 500 MG/1
500 TABLET, FILM COATED ORAL 2 TIMES DAILY
Qty: 20 TABLET | Refills: 0 | Status: SHIPPED | OUTPATIENT
Start: 2023-09-21 | End: 2023-10-01

## 2023-09-22 ENCOUNTER — HOME CARE VISIT (OUTPATIENT)
Dept: HOME HEALTH SERVICES | Facility: HOME HEALTHCARE | Age: 78
End: 2023-09-22
Payer: MEDICARE

## 2023-09-22 VITALS
RESPIRATION RATE: 18 BRPM | DIASTOLIC BLOOD PRESSURE: 64 MMHG | SYSTOLIC BLOOD PRESSURE: 130 MMHG | TEMPERATURE: 98 F | OXYGEN SATURATION: 99 % | HEART RATE: 74 BPM

## 2023-09-22 PROCEDURE — G0299 HHS/HOSPICE OF RN EA 15 MIN: HCPCS

## 2023-09-22 NOTE — HOME HEALTH
Routine Visit Note: Patient had a fall yesterday. New skin tear present on right and left leg. Education provided about skin tears and safety around the home. Patient denies hitting her head when she fell. Medications reviewed. Appetite well. Patient was started on abx yesterday for a UTI (positive culture).    Skill/education provided: Vital signs stable. WOund care completed. Education provided about wounds and high risk medications. CP assessed. Cardiopulmonary assessment completed. Patient denies pain. Traumatic wound on left leg is healed.     Patient/caregiver response: Patient verbilized understanding.     Plan for next visit: Vital signs, assess falls and pain, wound care, cardiopulmonary assessment, cp assess, review medications    Other pertinent info: na

## 2023-09-25 ENCOUNTER — HOME CARE VISIT (OUTPATIENT)
Dept: HOME HEALTH SERVICES | Facility: HOME HEALTHCARE | Age: 78
End: 2023-09-25

## 2023-09-25 VITALS
TEMPERATURE: 97.9 F | RESPIRATION RATE: 17 BRPM | DIASTOLIC BLOOD PRESSURE: 78 MMHG | OXYGEN SATURATION: 97 % | HEART RATE: 58 BPM | SYSTOLIC BLOOD PRESSURE: 132 MMHG

## 2023-09-25 PROCEDURE — G0299 HHS/HOSPICE OF RN EA 15 MIN: HCPCS

## 2023-09-25 NOTE — HOME HEALTH
Routine Visit Note: Medications reviewed. Patient states that urinary issues have resolved. Education provided about finishing abx regimen. CP assessed. Patient denies falls and pain.     Skill/education provided: Vital signs stable. WOund care completed; wound is healed. Cardiopulmonary assessment completed. Education provided about wound healing and medications.     Patient/caregiver response: Patient verbilized understanding.     Plan for next visit: Vital signs, wound care, cp assess, assess falls and pain, review medications, cardiopulmonary assessment    Other pertinent info: na

## 2023-09-27 ENCOUNTER — TELEPHONE (OUTPATIENT)
Dept: FAMILY MEDICINE CLINIC | Facility: CLINIC | Age: 78
End: 2023-09-27

## 2023-09-27 ENCOUNTER — HOME CARE VISIT (OUTPATIENT)
Dept: HOME HEALTH SERVICES | Facility: HOME HEALTHCARE | Age: 78
End: 2023-09-27
Payer: MEDICARE

## 2023-09-27 VITALS
TEMPERATURE: 98 F | SYSTOLIC BLOOD PRESSURE: 126 MMHG | HEART RATE: 72 BPM | RESPIRATION RATE: 19 BRPM | OXYGEN SATURATION: 98 % | DIASTOLIC BLOOD PRESSURE: 64 MMHG

## 2023-09-27 PROCEDURE — G0299 HHS/HOSPICE OF RN EA 15 MIN: HCPCS

## 2023-09-27 NOTE — HOME HEALTH
Routine Visit Note: Medications reviewed. Patient denies falls and pain. CP assessed.     Skill/education provided: Vital signs stable. Legs wrapped; wounds are healed. Cardiopulmonary assessment completed. Education provided about wound healing and discharge next visit.    Patient/caregiver response: Patient verbilized understanding.     Plan for next visit: Vital signs, wrap legs, cp assess, review medications, cardiopulmonary assessment, discharge if goals are met.    Other pertinent info: na

## 2023-09-27 NOTE — TELEPHONE ENCOUNTER
Caller: Yancy Mcdonnell    Relationship: Self    Best call back number: [phone]    What is the medical concern/diagnosis: SEVERAL CANCEROUS SPOT ON FACE    What specialty or service is being requested: DERMATOLOGIST- DR CHAUDHRY    What is the provider, practice or medical service name: [facility]     What is the office location: [address] [place] [address]    What is the office phone number: [phone]    Any additional details:     PATIENT HAS BEEN ATTENDING THERE ALREADY AND NEED A UPDATED REFERRAL /RENEWAL

## 2023-09-28 DIAGNOSIS — L98.9 SKIN LESIONS: Primary | ICD-10-CM
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
## 2023-09-29 ENCOUNTER — HOME CARE VISIT (OUTPATIENT)
Dept: HOME HEALTH SERVICES | Facility: HOME HEALTHCARE | Age: 78
End: 2023-09-29
Payer: MEDICARE

## 2023-09-29 PROCEDURE — G0299 HHS/HOSPICE OF RN EA 15 MIN: HCPCS

## 2023-09-29 NOTE — Clinical Note
Discharge Summary/Summary of Care Provided: SN initially needed for wound dressing changes on RLE. Patient was provided with education and teaching on wound care. Wound has healed. Patient continues to wear compression stockings on BLE. Denies pain. Denies falls. Denies elimination issues.  Patient received home health for diagnosis: wound care, teaching and education regarding wound on RLE    Current level of functional ability: Alert, AOX4, ambulates with walker    Living arrangements: lives in a single-story e with spouse    Progress towards goals and/or Were all goals met? YES    If not all goals met, barriers that prevented patient from meeting goals: goals met    SDOH concerns (i.e. Caregiver availability, social isolation, environment, income, transportation access, food insecurity etc.) home in remote location    Follow-up appointment plans and community resources provided:

## 2023-09-30 VITALS
WEIGHT: 160 LBS | RESPIRATION RATE: 17 BRPM | BODY MASS INDEX: 28.35 KG/M2 | DIASTOLIC BLOOD PRESSURE: 74 MMHG | HEART RATE: 69 BPM | SYSTOLIC BLOOD PRESSURE: 135 MMHG | OXYGEN SATURATION: 96 % | HEIGHT: 63 IN

## 2023-09-30 NOTE — HOME HEALTH
THE FOLLOWING INSTRUCTIONS WERE REVIEWED UPON DISCHARGE:    Keep your appointments with all providers.    Call your doctor if you develop a new or worsening symptom, especially if you develop  a new or worsening symptom, including but not limited to, one-sided weakness, chest pain that radiates to the right side of the neck and arm with or without nausea, fever over 101, nausea, vomiting, difficulty with speach and/or swallowing, unexplained weakness, sudden confusion.    Continue to take the medications prescribed by your doctor. A medication list is attached. Be sure to keep them informed of all medications you take including over the counter herbal, vitamins/minerals and the ones you take only when needed.    Follow the diet as ordered by your doctor.     Continue with home program as instructed by therapist (handouts given).    Continue wound care as ordered.     Community resource referrals (list provided).     Other notes/instructions:     Instructions given to Patient and Caregiver    Instructions provided per Visit

## 2023-10-11 DIAGNOSIS — I10 PRIMARY HYPERTENSION: Chronic | ICD-10-CM

## 2023-10-11 RX ORDER — LOSARTAN POTASSIUM 25 MG/1
TABLET ORAL
Qty: 30 TABLET | Refills: 1 | Status: SHIPPED | OUTPATIENT
Start: 2023-10-11

## 2023-10-11 RX ORDER — LOSARTAN POTASSIUM 25 MG/1
TABLET ORAL
Qty: 90 TABLET | OUTPATIENT
Start: 2023-10-11

## 2023-11-13 RX ORDER — FLUTICASONE PROPIONATE 50 MCG
SPRAY, SUSPENSION (ML) NASAL
Qty: 48 G | Refills: 4 | Status: SHIPPED | OUTPATIENT
Start: 2023-11-13

## 2024-01-02 ENCOUNTER — DOCUMENTATION (OUTPATIENT)
Dept: PHYSICAL THERAPY | Facility: CLINIC | Age: 79
End: 2024-01-02
Payer: MEDICARE

## 2024-01-02 NOTE — PROGRESS NOTES
Discharge Summary  Discharge Summary from Physical Therapy Report      Dates  PT visit: 3/7/2023 - 6/26/2023  Number of Visits: 27     Discharge Status of Patient: See Progress Note dated 6/26/2023    Goals: Partially Met    Discharge Plan: Continue with current home exercise program as instructed  Per chart, pt fell in July and was then receiving home health services.    Comments : Pt was making good progress toward goals and was progressing well. She continued to have balance deficits.    Date of Discharge 1/2/2024        Griselda Weeks, PT  Physical Therapist  [place] Lic# [id number]

## 2024-02-09 RX ORDER — LORATADINE 10 MG/1
10 TABLET ORAL DAILY
Qty: 90 TABLET | Refills: 1 | OUTPATIENT
Start: 2024-02-09

## 2024-02-27 DIAGNOSIS — E03.8 SUBCLINICAL HYPOTHYROIDISM: Chronic | ICD-10-CM

## 2024-02-28 ENCOUNTER — OFFICE VISIT (OUTPATIENT)
Dept: PODIATRY | Facility: CLINIC | Age: 79
End: 2024-02-28
Payer: MEDICARE

## 2024-02-28 VITALS
OXYGEN SATURATION: 97 % | BODY MASS INDEX: 29.62 KG/M2 | HEIGHT: 63 IN | WEIGHT: 167.2 LBS | RESPIRATION RATE: 16 BRPM | HEART RATE: 71 BPM

## 2024-02-28 DIAGNOSIS — T84.84XA PAINFUL ORTHOPAEDIC HARDWARE: ICD-10-CM

## 2024-02-28 DIAGNOSIS — M25.571 ACUTE RIGHT ANKLE PAIN: Primary | ICD-10-CM

## 2024-02-28 RX ORDER — LORATADINE 10 MG/1
10 TABLET ORAL DAILY
Qty: 90 TABLET | Refills: 1 | OUTPATIENT
Start: 2024-02-28

## 2024-02-28 RX ORDER — LEVOTHYROXINE SODIUM 0.05 MG/1
50 TABLET ORAL DAILY
Qty: 90 TABLET | Refills: 1 | OUTPATIENT
Start: 2024-02-28

## 2024-02-28 RX ORDER — IMIQUIMOD 12.5 MG/.25G
CREAM TOPICAL
COMMUNITY
Start: 2023-11-16

## 2024-02-28 RX ORDER — OLMESARTAN MEDOXOMIL 20 MG/1
20 TABLET ORAL DAILY
COMMUNITY

## 2024-02-28 RX ORDER — KETOCONAZOLE 20 MG/G
CREAM TOPICAL
COMMUNITY
Start: 2024-01-16

## 2024-02-28 NOTE — PROGRESS NOTES
"02/28/2024  Foot and Ankle Surgery - Established Patient/Follow-up  Provider: Dr. Carlos Tobias DPM  Location: [facility]    Subjective:  Yancy Mcdonnell is a 78 y.o. female.     Chief Complaint   Patient presents with    Right Ankle - Follow-up, Pain    Follow-up     CHRIS Moreira md  10/3/2023 next visit 03/6/24       HPI: The patient is a 78-year-old female who is here for follow-up regarding the right ankle.    She was last seen in 2022 and is here for a new issue. She is wondering if the screw on the side of her ankle is back out. She states the area is \"so sore.\" She reports having discomfort for at least 6 months. She notes she feels a burning sensation when she is sleeping, which wakes her up. During the day, she is able to ambulate. She adds she only has pain when pressure is applied. She usually wears a sock and applies Aspercreme with lidocaine, which helps.    She has been doing well medically since her last visit. She has a history of falls and was told that she needs a neck surgery. She has spinal stenosis in her neck.     Allergies   Allergen Reactions    Meperidine Unknown (See Comments) and Nausea And Vomiting     Abstracted from centricity.    Other Unknown (See Comments)     Sulfa (sulfadiazine)     Sulfa Antibiotics Unknown - High Severity and Anaphylaxis    Lisinopril Cough       Current Outpatient Medications on File Prior to Visit   Medication Sig Dispense Refill    fluocinonide (LIDEX) 0.05 % cream       fluticasone (FLONASE) 50 MCG/ACT nasal spray SHAKE LIQUID AND USE 2 SPRAYS IN EACH NOSTRIL EVERY DAY AS DIRECTED 48 g 4    hydroCHLOROthiazide (HYDRODIURIL) 25 MG tablet Take 1 tablet by mouth Daily. Indications: Edema (Patient taking differently: Take 1 tablet by mouth As Needed.) 30 tablet 0    imiquimod (ALDARA) 5 % cream       ketoconazole (NIZORAL) 2 % cream APPLY TO FEET FROM ANKLE DOWN TWICE DAILY X8 WEEKS AS NEEDED      levothyroxine (SYNTHROID, LEVOTHROID) 50 MCG tablet " "TAKE 1 TABLET BY MOUTH DAILY 90 tablet 1    loratadine (CLARITIN) 10 MG tablet TAKE 1 TABLET BY MOUTH DAILY 90 tablet 1    lysine 500 MG tablet Take 1 tablet by mouth Daily As Needed. Stop now for surgery  Indications: Migraine Headache      Magnesium 250 MG tablet Take 1 tablet by mouth Daily. Indications: supplement      Misc Natural Products (LUTEIN 20 PO) Take 1 tablet by mouth Daily. Indications: supplement      NON FORMULARY Apply 1 dose to eye(s) as directed by provider Daily. Sustain eye drops   Indications: dry eyes      olmesartan (BENICAR) 20 MG tablet Take 1 tablet by mouth Daily.      losartan (COZAAR) 25 MG tablet TAKE 1 TABLET BY MOUTH DAILY FOR HIGH BLOOD PRESSURE 30 tablet 1    metoprolol succinate XL (TOPROL-XL) 25 MG 24 hr tablet TAKE 1 TABLET BY MOUTH DAILY FOR HIGH BLOOD PRESSURE 90 tablet 1    mupirocin (BACTROBAN) 2 % ointment Apply 1 application  topically to the appropriate area as directed 3 (Three) Times a Day. 30 g 1    traMADol (ULTRAM) 50 MG tablet Take 1 tablet by mouth Every 6 (Six) Hours As Needed for Moderate Pain or Severe Pain. Indications: Pain (Patient not taking: Reported on 2023) 7 tablet 0     No current facility-administered medications on file prior to visit.       Objective   Pulse 71   Resp 16   Ht 160 cm (62.99\")   Wt 75.8 kg (167 lb 3.2 oz)   SpO2 97%   BMI 29.63 kg/m²     Foot/Ankle Exam    GENERAL  Orientation:  AAOx3  Affect:  appropriate  Assistance:  wheelchair    VASCULAR     Right Foot Vascularity   Normal vascular exam    Dorsalis pedis:  2+  Posterior tibial:  2+  Skin temperature:  warm  Edema gradin+  CFT:  < 3 seconds  Pedal hair growth:  Present  Varicosities:  none and spider veins     NEUROLOGIC     Right Foot Neurologic   Light touch sensation: normal  Hot/Cold sensation: normal  Achilles reflex:  2+    MUSCULOSKELETAL     Right Foot Musculoskeletal   Tenderness:  anterior tibiotalar joint line tenderness and medial malleolus tenderness  "   Arch:  Normal    DERMATOLOGIC      Right Foot Dermatologic   Skin  Right foot skin is intact.      Right foot additional comments: Instability,  range of motion, and muscle strength testing deferred secondary to guarding.  No gross deformity.  No proximal fibular pain.  Neurovascular status is grossly intact to the digits.    02/28/2024  Mild discomfort involving the lateral malleolar region. Incision site is well healed. No open wound or signs of infection. No further concerns. Range of motion to the ankle is supple and nonantalgic.      Assessment & Plan   Diagnoses and all orders for this visit:    1. Acute right ankle pain (Primary)  -     XR Ankle 3+ View Right    2. Painful orthopaedic hardware    Patient is here for follow-up regarding the right ankle. Imaging x-ray of the right ankle was reviewed with the patient which shows the plates and screws are still in place. She has mild posttraumatic arthritis. I recommended offloading the area with a pillow at night. I explained if her symptoms worsen and she continues to have more problems, we can do surgery to remove the hardware. I explained if she starts to develop a wound or the area starts turning red she needs to give me a call.  Patient does not want to proceed with any type of surgery at this time.  She does not have any other complicating features.  The patient will follow up on an as needed basis. Greater than 20 minutes spent before, during, after evaluation for patient care.    Orders Placed This Encounter   Procedures    XR Ankle 3+ View Right     Order Specific Question:   Reason for Exam:     Answer:   right ankle pain WB rm 15     Order Specific Question:   Does this patient have a diabetic monitoring/medication delivering device on?     Answer:   No     Order Specific Question:   Release to patient     Answer:   Routine Release [[id number]]          Note is dictated utilizing voice recognition software. Unfortunately this leads to occasional  typographical errors. I apologize in advance if the situation occurs. If questions occur please do not hesitate to call our office.    Transcribed from ambient dictation for DEAN Tobias DPM by Karthik Rosen.  02/28/24   11:13 EST    Patient or patient representative verbalized consent to the visit recording.  I have personally performed the services described in this document as transcribed by the above individual, and it is both accurate and complete.

## 2024-03-05 ENCOUNTER — OFFICE (AMBULATORY)
Dept: URBAN - METROPOLITAN AREA CLINIC 64 | Facility: CLINIC | Age: 79
End: 2024-03-05
Payer: MEDICARE

## 2024-03-05 VITALS
DIASTOLIC BLOOD PRESSURE: 85 MMHG | WEIGHT: 166 LBS | HEIGHT: 63 IN | SYSTOLIC BLOOD PRESSURE: 132 MMHG | HEART RATE: 83 BPM

## 2024-03-05 DIAGNOSIS — R13.10 DYSPHAGIA, UNSPECIFIED: ICD-10-CM

## 2024-03-05 DIAGNOSIS — K21.9 GASTRO-ESOPHAGEAL REFLUX DISEASE WITHOUT ESOPHAGITIS: ICD-10-CM

## 2024-03-05 PROCEDURE — 99214 OFFICE O/P EST MOD 30 MIN: CPT

## 2024-03-05 RX ORDER — OMEPRAZOLE 20 MG/1
20 CAPSULE, DELAYED RELEASE ORAL
Qty: 90 | Refills: 3 | Status: ACTIVE
Start: 2024-03-05

## 2024-04-12 DIAGNOSIS — I10 PRIMARY HYPERTENSION: Chronic | ICD-10-CM

## 2024-04-12 RX ORDER — HYDROCHLOROTHIAZIDE 25 MG/1
25 TABLET ORAL DAILY
Qty: 90 TABLET | OUTPATIENT
Start: 2024-04-12

## 2024-05-03 ENCOUNTER — HOSPITAL ENCOUNTER (INPATIENT)
Facility: HOSPITAL | Age: 79
LOS: 3 days | Discharge: NURSING, REHAB OR LONG-TERM CARE | DRG: 481 | End: 2024-05-07
Attending: EMERGENCY MEDICINE | Admitting: INTERNAL MEDICINE
Payer: MEDICARE

## 2024-05-03 ENCOUNTER — APPOINTMENT (OUTPATIENT)
Dept: CT IMAGING | Facility: HOSPITAL | Age: 79
DRG: 481 | End: 2024-05-03
Payer: MEDICARE

## 2024-05-03 ENCOUNTER — ANESTHESIA EVENT (OUTPATIENT)
Dept: PERIOP | Facility: HOSPITAL | Age: 79
End: 2024-05-03
Payer: MEDICARE

## 2024-05-03 ENCOUNTER — APPOINTMENT (OUTPATIENT)
Dept: MRI IMAGING | Facility: HOSPITAL | Age: 79
DRG: 481 | End: 2024-05-03
Payer: MEDICARE

## 2024-05-03 ENCOUNTER — APPOINTMENT (OUTPATIENT)
Dept: GENERAL RADIOLOGY | Facility: HOSPITAL | Age: 79
DRG: 481 | End: 2024-05-03
Payer: MEDICARE

## 2024-05-03 DIAGNOSIS — S72.002A CLOSED FRACTURE OF LEFT HIP, INITIAL ENCOUNTER: Primary | ICD-10-CM

## 2024-05-03 PROBLEM — S72.009A HIP FRACTURE: Status: ACTIVE | Noted: 2024-05-03

## 2024-05-03 PROCEDURE — 99285 EMERGENCY DEPT VISIT HI MDM: CPT

## 2024-05-03 PROCEDURE — G0378 HOSPITAL OBSERVATION PER HR: HCPCS

## 2024-05-03 PROCEDURE — 25010000002 ONDANSETRON PER 1 MG: Performed by: PHYSICIAN ASSISTANT

## 2024-05-03 PROCEDURE — 73502 X-RAY EXAM HIP UNI 2-3 VIEWS: CPT

## 2024-05-03 PROCEDURE — 25010000002 MORPHINE PER 10 MG: Performed by: PHYSICIAN ASSISTANT

## 2024-05-03 PROCEDURE — 73718 MRI LOWER EXTREMITY W/O DYE: CPT

## 2024-05-03 PROCEDURE — 73700 CT LOWER EXTREMITY W/O DYE: CPT

## 2024-05-03 PROCEDURE — 72195 MRI PELVIS W/O DYE: CPT

## 2024-05-03 RX ORDER — OXYCODONE HYDROCHLORIDE 5 MG/1
5 TABLET ORAL EVERY 6 HOURS PRN
Status: DISCONTINUED | OUTPATIENT
Start: 2024-05-03 | End: 2024-05-08 | Stop reason: HOSPADM

## 2024-05-03 RX ORDER — LOSARTAN POTASSIUM 50 MG/1
50 TABLET ORAL
Status: DISCONTINUED | OUTPATIENT
Start: 2024-05-04 | End: 2024-05-08 | Stop reason: HOSPADM

## 2024-05-03 RX ORDER — OMEPRAZOLE 20 MG/1
20 CAPSULE, DELAYED RELEASE ORAL DAILY
COMMUNITY

## 2024-05-03 RX ORDER — ALUMINA, MAGNESIA, AND SIMETHICONE 2400; 2400; 240 MG/30ML; MG/30ML; MG/30ML
15 SUSPENSION ORAL EVERY 6 HOURS PRN
Status: DISCONTINUED | OUTPATIENT
Start: 2024-05-03 | End: 2024-05-08 | Stop reason: HOSPADM

## 2024-05-03 RX ORDER — PANTOPRAZOLE SODIUM 40 MG/1
40 TABLET, DELAYED RELEASE ORAL
Status: DISCONTINUED | OUTPATIENT
Start: 2024-05-04 | End: 2024-05-08 | Stop reason: HOSPADM

## 2024-05-03 RX ORDER — SODIUM CHLORIDE 0.9 % (FLUSH) 0.9 %
10 SYRINGE (ML) INJECTION AS NEEDED
Status: DISCONTINUED | OUTPATIENT
Start: 2024-05-03 | End: 2024-05-08 | Stop reason: HOSPADM

## 2024-05-03 RX ORDER — FLUTICASONE PROPIONATE 50 MCG
2 SPRAY, SUSPENSION (ML) NASAL DAILY
COMMUNITY

## 2024-05-03 RX ORDER — ONDANSETRON 2 MG/ML
4 INJECTION INTRAMUSCULAR; INTRAVENOUS EVERY 6 HOURS PRN
Status: DISCONTINUED | OUTPATIENT
Start: 2024-05-03 | End: 2024-05-08 | Stop reason: HOSPADM

## 2024-05-03 RX ORDER — HYDROCHLOROTHIAZIDE 25 MG/1
25 TABLET ORAL DAILY
Status: DISCONTINUED | OUTPATIENT
Start: 2024-05-04 | End: 2024-05-08 | Stop reason: HOSPADM

## 2024-05-03 RX ORDER — SODIUM CHLORIDE 9 MG/ML
40 INJECTION, SOLUTION INTRAVENOUS AS NEEDED
Status: DISCONTINUED | OUTPATIENT
Start: 2024-05-03 | End: 2024-05-08 | Stop reason: HOSPADM

## 2024-05-03 RX ORDER — ONDANSETRON 4 MG/1
4 TABLET, ORALLY DISINTEGRATING ORAL EVERY 6 HOURS PRN
Status: DISCONTINUED | OUTPATIENT
Start: 2024-05-03 | End: 2024-05-08 | Stop reason: HOSPADM

## 2024-05-03 RX ORDER — ACETAMINOPHEN 325 MG/1
650 TABLET ORAL EVERY 4 HOURS PRN
Status: DISCONTINUED | OUTPATIENT
Start: 2024-05-03 | End: 2024-05-08 | Stop reason: HOSPADM

## 2024-05-03 RX ORDER — SODIUM CHLORIDE 0.9 % (FLUSH) 0.9 %
10 SYRINGE (ML) INJECTION EVERY 12 HOURS SCHEDULED
Status: DISCONTINUED | OUTPATIENT
Start: 2024-05-03 | End: 2024-05-08 | Stop reason: HOSPADM

## 2024-05-03 RX ORDER — HYDROCHLOROTHIAZIDE 25 MG/1
25 TABLET ORAL DAILY
COMMUNITY

## 2024-05-03 RX ORDER — LEVOTHYROXINE SODIUM 0.05 MG/1
50 TABLET ORAL DAILY
Status: DISCONTINUED | OUTPATIENT
Start: 2024-05-04 | End: 2024-05-08 | Stop reason: HOSPADM

## 2024-05-03 RX ADMIN — Medication 10 ML: at 18:05

## 2024-05-03 RX ADMIN — ACETAMINOPHEN 650 MG: 325 TABLET, FILM COATED ORAL at 23:47

## 2024-05-03 RX ADMIN — MORPHINE SULFATE 4 MG: 4 INJECTION, SOLUTION INTRAMUSCULAR; INTRAVENOUS at 18:05

## 2024-05-03 RX ADMIN — ONDANSETRON 4 MG: 2 INJECTION INTRAMUSCULAR; INTRAVENOUS at 18:04

## 2024-05-03 RX ADMIN — Medication 10 ML: at 19:54

## 2024-05-03 NOTE — ED PROVIDER NOTES
Subjective   History of Present Illness  78-year-old female describes left hip pain after stumbling and falling last night.  States she was able to get back up in her home and ambulate however today she was leaned over and felt a sudden instability in her hip and pain and fell again and has some ongoing left hip pain and since that time is been unable to bear weight.  She reports no focal numbness or weakness.  Review of Systems    Past Medical History:   Diagnosis Date    Allergic rhinitis 06/02/2015    Ankle fracture, right 02/2022    Body mass index 29.0-29.9, adult 09/12/2018    Cervical spinal stenosis 08/07/2017    Cholelithiasis     Class 1 obesity due to excess calories with serious comorbidity and body mass index (BMI) of 30.0 to 30.9 in adult 10/14/2020    Closed bimalleolar fracture of right ankle 02/11/2022    DDD (degenerative disc disease), cervical     DDD (degenerative disc disease), lumbar     DDD (degenerative disc disease), thoracic     Difficulty walking     Poor balance    Diverticulosis     Dyslipidemia 12/05/2012    Elevated brain natriuretic peptide (BNP) level 01/06/2017    Eustachian tube disorder, right 02/18/2019    Fall 02/2022    Fatigue 06/02/2015    Female cystocele 06/02/2015    Hiatal hernia     Hiatal hernia with gastroesophageal reflux 01/06/2017    History of blood clots 1980    leg s/p fx    History of DVT (deep vein thrombosis) 06/02/2015    History of herpes simplex infection 07/25/2020    Hyperlipidemia     Hypertension 06/10/2016    Left rib fracture 05/2017    11TH RIB     Migraine headache 06/02/2015    Mitral insufficiency 01/16/2017    MILD    Osteoarthritis 05/20/2014    Osteopenia 12/05/2012    Over weight 06/14/2018    Peripheral edema 01/05/2017    PFO (patent foramen ovale) 01/16/2017    Pulmonary nodule 05/04/2017    DR CHUN FOLLOWS    RHA (rheumatoid arthritis) 12/05/2012    Scleritis 06/02/2015    Scoliosis     Thrombophlebitis 02/19/2022    Acute right lower  extremity superficial thrombophlebitis noted in the great saphenous (below knee) and varicosity (below knee).      Tricuspid insufficiency     MILD    Urinary urgency     Varicose veins of other specified sites     Venous insufficiency 09/12/2018       Allergies   Allergen Reactions    Meperidine Unknown (See Comments) and Nausea And Vomiting     Abstracted from centricity.    Other Unknown (See Comments)     Sulfa (sulfadiazine)     Sulfa Antibiotics Unknown - High Severity and Anaphylaxis    Lisinopril Cough       Past Surgical History:   Procedure Laterality Date    ANKLE OPEN REDUCTION INTERNAL FIXATION Right 02/18/2022    Procedure: ANKLE OPEN REDUCTION INTERNAL FIXATION;  Surgeon: DEAN Tobias DPM;  Location: [facility] MAIN [facility];  Service: Podiatry;  Laterality: Right;    COLONOSCOPY  07/20/2017    EGD/ COLONSCOPY LARGE HIATAL HERNIA. MILD DIVERTICULOSIS. OTHERWISE NORMAL.    ESOPHAGOSCOPY / EGD  07/28/2023    Dr. Davila    INGUINAL HERNIA REPAIR Right     ORIF ANKLE FRACTURE Right 02/18/2022    Dr. Tobias    TOTAL ABDOMINAL HYSTERECTOMY  1985    W/BSO WITH A/P REPAIR 1985 BENIGN REASONS DR. DRAKE       Family History   Problem Relation Age of Onset    Osteoporosis Mother     Stroke Mother 80    Dementia Mother     Heart disease Father     Hypertension Father     Heart disease Sister     Cancer Daughter         BREAST AND MELANOMA    Breast cancer Daughter 52    Cancer Son         MELANOMA    Cancer Grandson         MELANOMA       Social History     Socioeconomic History    Marital status:    Tobacco Use    Smoking status: Never     Passive exposure: Never    Smokeless tobacco: Never    Tobacco comments:     None   Vaping Use    Vaping status: Never Used   Substance and Sexual Activity    Alcohol use: Yes     Alcohol/week: 1.0 standard drink of alcohol     Types: 1 Cans of beer per week    Drug use: Never    Sexual activity: Not Currently     Prior to Admission medications    Medication Sig Start Date  End Date Taking? Authorizing Provider   fluocinonide (LIDEX) 0.05 % cream  11/16/23   Last Peñaloza MD   fluticasone (FLONASE) 50 MCG/ACT nasal spray SHAKE LIQUID AND USE 2 SPRAYS IN EACH NOSTRIL EVERY DAY AS DIRECTED 11/13/23   Alyse Mcnally APRN   hydroCHLOROthiazide (HYDRODIURIL) 25 MG tablet Take 1 tablet by mouth Daily. Indications: Edema  Patient taking differently: Take 1 tablet by mouth As Needed. 8/3/23   Alyse Mcnally APRN   imiquimod (ALDARA) 5 % cream  11/16/23   Last Peñaloza MD   ketoconazole (NIZORAL) 2 % cream APPLY TO FEET FROM ANKLE DOWN TWICE DAILY X8 WEEKS AS NEEDED 1/16/24   Last Peñaloza MD   levothyroxine (SYNTHROID, LEVOTHROID) 50 MCG tablet TAKE 1 TABLET BY MOUTH DAILY 9/18/23   Alyse Mcnally APRN   loratadine (CLARITIN) 10 MG tablet TAKE 1 TABLET BY MOUTH DAILY 8/14/23   Alyse Mcnally APRN   losartan (COZAAR) 25 MG tablet TAKE 1 TABLET BY MOUTH DAILY FOR HIGH BLOOD PRESSURE 10/11/23   Alyse Mcnally APRN   lysine 500 MG tablet Take 1 tablet by mouth Daily As Needed. Stop now for surgery  Indications: Migraine Headache 5/20/14   Munira So APRN   Magnesium 250 MG tablet Take 1 tablet by mouth Daily. Indications: supplement 8/2/23   Last Peñaloza MD   metoprolol succinate XL (TOPROL-XL) 25 MG 24 hr tablet TAKE 1 TABLET BY MOUTH DAILY FOR HIGH BLOOD PRESSURE 8/21/23   Alyse Mcnally APRN   [facility] Natural Products (LUTEIN 20 PO) Take 1 tablet by mouth Daily. Indications: supplement 8/2/23   Last Peñaloza MD   mupirocin (BACTROBAN) 2 % ointment Apply 1 application  topically to the appropriate area as directed 3 (Three) Times a Day. 8/3/23   Alyse Mcnally APRN   NON FORMULARY Apply 1 dose to eye(s) as directed by provider Daily. Sustain eye drops   Indications: dry eyes 8/2/23   Last Peñaloza MD   olmesartan (BENICAR) 20 MG tablet Take 1 tablet by mouth Daily.    Provider, MD Last   traMADol (ULTRAM) 50 MG tablet  "Take 1 tablet by mouth Every 6 (Six) Hours As Needed for Moderate Pain or Severe Pain. Indications: Pain  Patient not taking: Reported on 9/1/2023 8/3/23   Alyse Mcnally APRN     /74   Pulse 92   Temp 98 °F (36.7 °C) (Oral)   Resp 18   Ht 157.5 cm (62\")   Wt 81.6 kg (180 lb)   SpO2 98%   BMI 32.92 kg/m²         Objective   Physical Exam  General: Well-developed well-appearing, no acute distress, alert and appropriate  Eyes: Atraumatic  HEENT: Mucous membranes moist, no mucosal swelling, normocephalic, atraumatic  Neck: C-spine thoracic and lumbar spine nontender  Respirations: Respirations nonlabored, equal breath sounds bilaterally, clear lungs  Heart regular rate and rhythm, no murmurs rubs or gallops,   Abdomen soft nontender nondistended,   Extremities some discomfort with range of motion of the left hip, no palpable deformity there is some tenderness palpation of the lateral aspect of the left hip, no external evidence of trauma, pelvis stable and otherwise nontender, normal pulses and sensorimotor function distally, no upper extremity point tenderness  Neuro cranial nerves grossly intact, no focal limb deficits, GCS 15  Psych oriented, pleasant affect  Skin no rash, brisk cap refill  Procedures           ED Course      CT Lower Extremity Left Without Contrast    Result Date: 5/3/2024  Impression: 1.Transverse fracture of the greater trochanter without significant displacement at this time. 2.No definite fracture line through the intertrochanteric region or femoral neck on this exam. MRI would be most sensitive for a subtle nondisplaced fracture which could be occult on this exam. 3.Fluid in the greater trochanteric bursa, likely hematoma. 4.Moderate left hip osteoarthritis. 5.Advanced degenerative changes in the lower lumbar spine with anterolisthesis of L4 on L5 and suspected moderate to severe canal narrowing. Electronically Signed: Girish Olivas  5/3/2024 3:52 PM EDT  Workstation ID: " [id number]    XR Hip With or Without Pelvis 2 - 3 View Left    Result Date: 5/3/2024  Impression: 1.Left greater trochanteric fracture. CT might be useful to better assess. Electronically Signed: Jh Broussard MD  5/3/2024 12:49 PM EDT  Workstation ID: [id number]                                          Medical Decision Making  Patient was advised of findings.  X-ray and CT suggesting a greater trochanteric fracture and not through the femoral neck or intertrochanteric fracture.  Will have PT and Ortho consultation as well as plan for MRI evaluation to further rule out occult femoral neck fracture.  Patient was advised of findings and agreeable plan of observation and further evaluation.    Problems Addressed:  Closed fracture of left hip, initial encounter: complicated acute illness or injury    Amount and/or Complexity of Data Reviewed  Radiology: ordered and independent interpretation performed.     Details: My independent interpretation of x-ray image of the left hip and CT image of the left hip apparent greater trochanteric fracture    Risk  Decision regarding hospitalization.        Final diagnoses:   Closed fracture of left hip, initial encounter       ED Disposition  ED Disposition       ED Disposition   Decision to Admit    Condition   --    Comment   --               No follow-up provider specified.       Medication List      No changes were made to your prescriptions during this visit.            Elvin Cheng MD  05/03/24 [id number]

## 2024-05-03 NOTE — ED NOTES
"Nursing report ED to floor  Yancy Mcdonnell  78 y.o.  female    HPI:   Chief Complaint   Patient presents with    Hip Pain    Fall       Admitting doctor:   Elvin Cheng MD    Admitting diagnosis:   The encounter diagnosis was Closed fracture of left hip, initial encounter.    Code status:   Current Code Status       Date Active Code Status Order ID Comments User Context       Prior            Allergies:   Meperidine, Other, Sulfa antibiotics, and Lisinopril    Isolation:  No active isolations     Fall Risk:  Fall Risk Assessment was completed, and patient is at high risk for falls.   Predictive Model Details         27 (Low) Factor Value    Calculated 5/3/2024 17:02 Age 78    Risk of Fall Model Musculoskeletal Assessment X     Active Peripheral IV Present     Imaging order in this encounter Present     Respiratory Rate 18     Magnesium not on file     Gulshan Scale not on file     Diastolic BP 74     Calcium not on file     Albumin not on file     Days after Admission 0.224     Total Bilirubin not on file     Chloride not on file     Potassium not on file     Creatinine not on file     ALT not on file         Weight:       05/03/24  1138   Weight: 81.6 kg (180 lb)       Intake and Output  No intake or output data in the 24 hours ending 05/03/24 1702    Diet:        Most recent vitals:   Vitals:    05/03/24 1138   BP: 174/74   Pulse: 92   Resp: 18   Temp: 98 °F (36.7 °C)   TempSrc: Oral   SpO2: 98%   Weight: 81.6 kg (180 lb)   Height: 157.5 cm (62\")       Active LDAs/IV Access:   Lines, Drains & Airways       Active LDAs       Name Placement date Placement time Site Days    Peripheral IV 05/03/24 1657 Right Antecubital 05/03/24 1657  Antecubital  less than 1                    Skin Condition:   Skin Assessments (last day)       None             Labs (abnormal labs have a star):   Labs Reviewed - No data to display    LOC: Person, Place, Time, and Situation    Telemetry:  Observation Unit    Cardiac Monitoring " Ordered: no    EKG:   No orders to display       Medications Given in the ED:   Medications - No data to display    Imaging results:  CT Lower Extremity Left Without Contrast    Result Date: 5/3/2024  Impression: 1.Transverse fracture of the greater trochanter without significant displacement at this time. 2.No definite fracture line through the intertrochanteric region or femoral neck on this exam. MRI would be most sensitive for a subtle nondisplaced fracture which could be occult on this exam. 3.Fluid in the greater trochanteric bursa, likely hematoma. 4.Moderate left hip osteoarthritis. 5.Advanced degenerative changes in the lower lumbar spine with anterolisthesis of L4 on L5 and suspected moderate to severe canal narrowing. Electronically Signed: Girish Olivas  5/3/2024 3:52 PM EDT  Workstation ID: [id number]    XR Hip With or Without Pelvis 2 - 3 View Left    Result Date: 5/3/2024  Impression: 1.Left greater trochanteric fracture. CT might be useful to better assess. Electronically Signed: Jh Broussard MD  5/3/2024 12:49 PM EDT  Workstation ID: [id number]     Social issues:   Social History     Socioeconomic History    Marital status:    Tobacco Use    Smoking status: Never     Passive exposure: Never    Smokeless tobacco: Never    Tobacco comments:     None   Vaping Use    Vaping status: Never Used   Substance and Sexual Activity    Alcohol use: Yes     Alcohol/week: 1.0 standard drink of alcohol     Types: 1 Cans of beer per week    Drug use: Never    Sexual activity: Not Currently       NIH Stroke Scale:  Interval: (not recorded)  1a. Level of Consciousness: (not recorded)  1b. LOC Questions: (not recorded)  1c. LOC Commands: (not recorded)  2. Best Gaze: (not recorded)  3. Visual: (not recorded)  4. Facial Palsy: (not recorded)  5a. Motor Arm, Left: (not recorded)  5b. Motor Arm, Right: (not recorded)  6a. Motor Leg, Left: (not recorded)  6b. Motor Leg, Right: (not recorded)  7. Limb Ataxia: (not  recorded)  8. Sensory: (not recorded)  9. Best Language: (not recorded)  10. Dysarthria: (not recorded)  11. Extinction and Inattention (formerly Neglect): (not recorded)    Total (NIH Stroke Scale): (not recorded)     Additional notable assessment information:       Nursing report ED to floor:        Girish England RN   05/03/24 17:02 EDT

## 2024-05-03 NOTE — LETTER
EMS Transport Request  For use at [facility], [place], [place], [place], and [place] only   Patient Name: Yancy Mcdonnell : 1945   Weight:81.6 kg (180 lb) Pick-up Location: [facility] BLS/ALS: BLS/ALS: BLS   Insurance: [employer] MEDICARE REPLACEMENT Auth End Date: 24   Pre-Cert #: D/C Summary complete:    Destination: Other [facility], [facility]   Contact Precautions: None   Equipment (O2, Fluids, etc.): None   Arrive By Date/Time: 24 Stretcher/WC: Wheelchair   CM Requesting: Megan Maya RN Ext: [phone]   Notes/Medical Necessity: WC Van - WILL CALL     ______________________________________________________________________    *Only 2 patient bags OR 1 carry-on size bag are permitted.  Wheelchairs and walkers CANNOT transported with the patient. Acknowledge: Yes

## 2024-05-04 ENCOUNTER — APPOINTMENT (OUTPATIENT)
Dept: GENERAL RADIOLOGY | Facility: HOSPITAL | Age: 79
DRG: 481 | End: 2024-05-04
Payer: MEDICARE

## 2024-05-04 ENCOUNTER — ANESTHESIA (OUTPATIENT)
Dept: PERIOP | Facility: HOSPITAL | Age: 79
End: 2024-05-04
Payer: MEDICARE

## 2024-05-04 LAB
ANION GAP SERPL CALCULATED.3IONS-SCNC: 8 MMOL/L (ref 5–15)
BASOPHILS # BLD AUTO: 0.02 10*3/MM3 (ref 0–0.2)
BASOPHILS NFR BLD AUTO: 0.2 % (ref 0–1.5)
BUN SERPL-MCNC: 18 MG/DL (ref 8–23)
BUN/CREAT SERPL: 24 (ref 7–25)
CALCIUM SPEC-SCNC: 8.9 MG/DL (ref 8.6–10.5)
CHLORIDE SERPL-SCNC: 107 MMOL/L (ref 98–107)
CO2 SERPL-SCNC: 26 MMOL/L (ref 22–29)
CREAT SERPL-MCNC: 0.75 MG/DL (ref 0.57–1)
DEPRECATED RDW RBC AUTO: 48.6 FL (ref 37–54)
EGFRCR SERPLBLD CKD-EPI 2021: 81.1 ML/MIN/1.73
EOSINOPHIL # BLD AUTO: 0.1 10*3/MM3 (ref 0–0.4)
EOSINOPHIL NFR BLD AUTO: 1 % (ref 0.3–6.2)
ERYTHROCYTE [DISTWIDTH] IN BLOOD BY AUTOMATED COUNT: 14.3 % (ref 12.3–15.4)
GLUCOSE SERPL-MCNC: 96 MG/DL (ref 65–99)
HCT VFR BLD AUTO: 36.4 % (ref 34–46.6)
HGB BLD-MCNC: 11.2 G/DL (ref 12–15.9)
IMM GRANULOCYTES # BLD AUTO: 0.04 10*3/MM3 (ref 0–0.05)
IMM GRANULOCYTES NFR BLD AUTO: 0.4 % (ref 0–0.5)
LYMPHOCYTES # BLD AUTO: 1.07 10*3/MM3 (ref 0.7–3.1)
LYMPHOCYTES NFR BLD AUTO: 11.2 % (ref 19.6–45.3)
MCH RBC QN AUTO: 28.5 PG (ref 26.6–33)
MCHC RBC AUTO-ENTMCNC: 30.8 G/DL (ref 31.5–35.7)
MCV RBC AUTO: 92.6 FL (ref 79–97)
MONOCYTES # BLD AUTO: 0.88 10*3/MM3 (ref 0.1–0.9)
MONOCYTES NFR BLD AUTO: 9.2 % (ref 5–12)
NEUTROPHILS NFR BLD AUTO: 7.48 10*3/MM3 (ref 1.7–7)
NEUTROPHILS NFR BLD AUTO: 78 % (ref 42.7–76)
NRBC BLD AUTO-RTO: 0 /100 WBC (ref 0–0.2)
PLATELET # BLD AUTO: 312 10*3/MM3 (ref 140–450)
PMV BLD AUTO: 9.5 FL (ref 6–12)
POTASSIUM SERPL-SCNC: 3.9 MMOL/L (ref 3.5–5.2)
RBC # BLD AUTO: 3.93 10*6/MM3 (ref 3.77–5.28)
SODIUM SERPL-SCNC: 141 MMOL/L (ref 136–145)
TSH SERPL DL<=0.05 MIU/L-ACNC: 2.46 UIU/ML (ref 0.27–4.2)
WBC NRBC COR # BLD AUTO: 9.59 10*3/MM3 (ref 3.4–10.8)

## 2024-05-04 PROCEDURE — 25010000002 DEXAMETHASONE PER 1 MG: Performed by: ANESTHESIOLOGIST ASSISTANT

## 2024-05-04 PROCEDURE — 25010000002 SUGAMMADEX 200 MG/2ML SOLUTION: Performed by: ANESTHESIOLOGIST ASSISTANT

## 2024-05-04 PROCEDURE — 73501 X-RAY EXAM HIP UNI 1 VIEW: CPT

## 2024-05-04 PROCEDURE — 80048 BASIC METABOLIC PNL TOTAL CA: CPT | Performed by: PHYSICIAN ASSISTANT

## 2024-05-04 PROCEDURE — 85025 COMPLETE CBC W/AUTO DIFF WBC: CPT | Performed by: PHYSICIAN ASSISTANT

## 2024-05-04 PROCEDURE — 25010000002 FENTANYL CITRATE (PF) 50 MCG/ML SOLUTION: Performed by: ANESTHESIOLOGIST ASSISTANT

## 2024-05-04 PROCEDURE — C1713 ANCHOR/SCREW BN/BN,TIS/BN: HCPCS | Performed by: ORTHOPAEDIC SURGERY

## 2024-05-04 PROCEDURE — 0QS736Z REPOSITION LEFT UPPER FEMUR WITH INTRAMEDULLARY INTERNAL FIXATION DEVICE, PERCUTANEOUS APPROACH: ICD-10-PCS | Performed by: ORTHOPAEDIC SURGERY

## 2024-05-04 PROCEDURE — 25010000002 PROPOFOL 200 MG/20ML EMULSION: Performed by: ANESTHESIOLOGIST ASSISTANT

## 2024-05-04 PROCEDURE — 25010000002 CEFAZOLIN PER 500 MG: Performed by: ORTHOPAEDIC SURGERY

## 2024-05-04 PROCEDURE — 84443 ASSAY THYROID STIM HORMONE: CPT | Performed by: NURSE PRACTITIONER

## 2024-05-04 PROCEDURE — C1769 GUIDE WIRE: HCPCS | Performed by: ORTHOPAEDIC SURGERY

## 2024-05-04 PROCEDURE — 25010000002 CEFAZOLIN PER 500 MG: Performed by: ANESTHESIOLOGIST ASSISTANT

## 2024-05-04 PROCEDURE — 25010000002 MORPHINE PER 10 MG: Performed by: ORTHOPAEDIC SURGERY

## 2024-05-04 PROCEDURE — 25010000002 ROPIVACAINE PER 1 MG: Performed by: ANESTHESIOLOGY

## 2024-05-04 PROCEDURE — 25010000002 ONDANSETRON PER 1 MG: Performed by: ANESTHESIOLOGIST ASSISTANT

## 2024-05-04 PROCEDURE — 25810000003 LACTATED RINGERS PER 1000 ML: Performed by: ANESTHESIOLOGY

## 2024-05-04 PROCEDURE — 76000 FLUOROSCOPY <1 HR PHYS/QHP: CPT

## 2024-05-04 DEVICE — TRIGEN INTERTAN 10S 10MM X 18CM 125DEGREE
Type: IMPLANTABLE DEVICE | Site: FEMUR | Status: FUNCTIONAL
Brand: TRIGEN

## 2024-05-04 DEVICE — INTERTAN LAG/COMPRESSION SCREW KIT                                    95MM / 90MM
Type: IMPLANTABLE DEVICE | Site: FEMUR | Status: FUNCTIONAL
Brand: TRIGEN

## 2024-05-04 DEVICE — TRIGEN LOW PROFILE SCREW 5.0MM X 32.5MM
Type: IMPLANTABLE DEVICE | Site: FEMUR | Status: FUNCTIONAL
Brand: TRIGEN

## 2024-05-04 RX ORDER — ONDANSETRON 2 MG/ML
4 INJECTION INTRAMUSCULAR; INTRAVENOUS ONCE AS NEEDED
Status: DISCONTINUED | OUTPATIENT
Start: 2024-05-04 | End: 2024-05-04 | Stop reason: HOSPADM

## 2024-05-04 RX ORDER — ACETAMINOPHEN 325 MG/1
650 TABLET ORAL ONCE AS NEEDED
Status: DISCONTINUED | OUTPATIENT
Start: 2024-05-04 | End: 2024-05-04 | Stop reason: HOSPADM

## 2024-05-04 RX ORDER — DEXAMETHASONE SODIUM PHOSPHATE 4 MG/ML
INJECTION, SOLUTION INTRA-ARTICULAR; INTRALESIONAL; INTRAMUSCULAR; INTRAVENOUS; SOFT TISSUE AS NEEDED
Status: DISCONTINUED | OUTPATIENT
Start: 2024-05-04 | End: 2024-05-04 | Stop reason: SURG

## 2024-05-04 RX ORDER — ROPIVACAINE HYDROCHLORIDE 5 MG/ML
INJECTION, SOLUTION EPIDURAL; INFILTRATION; PERINEURAL
Status: COMPLETED | OUTPATIENT
Start: 2024-05-04 | End: 2024-05-04

## 2024-05-04 RX ORDER — FENTANYL CITRATE 50 UG/ML
25 INJECTION, SOLUTION INTRAMUSCULAR; INTRAVENOUS
Status: DISCONTINUED | OUTPATIENT
Start: 2024-05-04 | End: 2024-05-04 | Stop reason: HOSPADM

## 2024-05-04 RX ORDER — DIPHENHYDRAMINE HCL 25 MG
25 CAPSULE ORAL
Status: DISCONTINUED | OUTPATIENT
Start: 2024-05-04 | End: 2024-05-04 | Stop reason: HOSPADM

## 2024-05-04 RX ORDER — ACETAMINOPHEN 500 MG
1000 TABLET ORAL ONCE
Status: COMPLETED | OUTPATIENT
Start: 2024-05-04 | End: 2024-05-04

## 2024-05-04 RX ORDER — SODIUM CHLORIDE 0.9 % (FLUSH) 0.9 %
10 SYRINGE (ML) INJECTION EVERY 12 HOURS SCHEDULED
Status: DISCONTINUED | OUTPATIENT
Start: 2024-05-04 | End: 2024-05-04 | Stop reason: HOSPADM

## 2024-05-04 RX ORDER — PROPOFOL 10 MG/ML
INJECTION, EMULSION INTRAVENOUS AS NEEDED
Status: DISCONTINUED | OUTPATIENT
Start: 2024-05-04 | End: 2024-05-04 | Stop reason: SURG

## 2024-05-04 RX ORDER — ROCURONIUM BROMIDE 10 MG/ML
INJECTION, SOLUTION INTRAVENOUS AS NEEDED
Status: DISCONTINUED | OUTPATIENT
Start: 2024-05-04 | End: 2024-05-04 | Stop reason: SURG

## 2024-05-04 RX ORDER — LIDOCAINE HYDROCHLORIDE 10 MG/ML
INJECTION, SOLUTION EPIDURAL; INFILTRATION; INTRACAUDAL; PERINEURAL AS NEEDED
Status: DISCONTINUED | OUTPATIENT
Start: 2024-05-04 | End: 2024-05-04 | Stop reason: SURG

## 2024-05-04 RX ORDER — SODIUM CHLORIDE, SODIUM LACTATE, POTASSIUM CHLORIDE, CALCIUM CHLORIDE 600; 310; 30; 20 MG/100ML; MG/100ML; MG/100ML; MG/100ML
9 INJECTION, SOLUTION INTRAVENOUS CONTINUOUS PRN
Status: DISCONTINUED | OUTPATIENT
Start: 2024-05-04 | End: 2024-05-08 | Stop reason: HOSPADM

## 2024-05-04 RX ORDER — MIDAZOLAM HYDROCHLORIDE 1 MG/ML
0.5 INJECTION INTRAMUSCULAR; INTRAVENOUS
Status: DISCONTINUED | OUTPATIENT
Start: 2024-05-04 | End: 2024-05-04 | Stop reason: HOSPADM

## 2024-05-04 RX ORDER — TRANEXAMIC ACID 10 MG/ML
INJECTION, SOLUTION INTRAVENOUS AS NEEDED
Status: DISCONTINUED | OUTPATIENT
Start: 2024-05-04 | End: 2024-05-04 | Stop reason: SURG

## 2024-05-04 RX ORDER — DIPHENHYDRAMINE HYDROCHLORIDE 50 MG/ML
12.5 INJECTION INTRAMUSCULAR; INTRAVENOUS
Status: DISCONTINUED | OUTPATIENT
Start: 2024-05-04 | End: 2024-05-04 | Stop reason: HOSPADM

## 2024-05-04 RX ORDER — DEXMEDETOMIDINE HYDROCHLORIDE 100 UG/ML
INJECTION, SOLUTION INTRAVENOUS AS NEEDED
Status: DISCONTINUED | OUTPATIENT
Start: 2024-05-04 | End: 2024-05-04 | Stop reason: SURG

## 2024-05-04 RX ORDER — LABETALOL HYDROCHLORIDE 5 MG/ML
5 INJECTION, SOLUTION INTRAVENOUS
Status: DISCONTINUED | OUTPATIENT
Start: 2024-05-04 | End: 2024-05-04 | Stop reason: HOSPADM

## 2024-05-04 RX ORDER — OXYCODONE HYDROCHLORIDE 5 MG/1
7.5 TABLET ORAL EVERY 4 HOURS PRN
Status: DISCONTINUED | OUTPATIENT
Start: 2024-05-04 | End: 2024-05-04 | Stop reason: HOSPADM

## 2024-05-04 RX ORDER — ACETAMINOPHEN 650 MG/1
650 SUPPOSITORY RECTAL EVERY 4 HOURS PRN
Status: DISCONTINUED | OUTPATIENT
Start: 2024-05-04 | End: 2024-05-04 | Stop reason: HOSPADM

## 2024-05-04 RX ORDER — NALOXONE HCL 0.4 MG/ML
0.4 VIAL (ML) INJECTION AS NEEDED
Status: DISCONTINUED | OUTPATIENT
Start: 2024-05-04 | End: 2024-05-04 | Stop reason: HOSPADM

## 2024-05-04 RX ORDER — OXYCODONE HYDROCHLORIDE 5 MG/1
5 TABLET ORAL ONCE AS NEEDED
Status: DISCONTINUED | OUTPATIENT
Start: 2024-05-04 | End: 2024-05-04 | Stop reason: HOSPADM

## 2024-05-04 RX ORDER — ONDANSETRON 2 MG/ML
INJECTION INTRAMUSCULAR; INTRAVENOUS AS NEEDED
Status: DISCONTINUED | OUTPATIENT
Start: 2024-05-04 | End: 2024-05-04 | Stop reason: SURG

## 2024-05-04 RX ORDER — FLUMAZENIL 0.1 MG/ML
0.5 INJECTION INTRAVENOUS AS NEEDED
Status: DISCONTINUED | OUTPATIENT
Start: 2024-05-04 | End: 2024-05-04 | Stop reason: HOSPADM

## 2024-05-04 RX ORDER — IPRATROPIUM BROMIDE AND ALBUTEROL SULFATE 2.5; .5 MG/3ML; MG/3ML
3 SOLUTION RESPIRATORY (INHALATION) ONCE AS NEEDED
Status: DISCONTINUED | OUTPATIENT
Start: 2024-05-04 | End: 2024-05-04 | Stop reason: HOSPADM

## 2024-05-04 RX ORDER — TRANEXAMIC ACID 10 MG/ML
1000 INJECTION, SOLUTION INTRAVENOUS ONCE
Status: DISCONTINUED | OUTPATIENT
Start: 2024-05-04 | End: 2024-05-04 | Stop reason: HOSPADM

## 2024-05-04 RX ORDER — EPHEDRINE SULFATE 5 MG/ML
5 INJECTION INTRAVENOUS ONCE AS NEEDED
Status: DISCONTINUED | OUTPATIENT
Start: 2024-05-04 | End: 2024-05-04 | Stop reason: HOSPADM

## 2024-05-04 RX ORDER — SODIUM CHLORIDE 0.9 % (FLUSH) 0.9 %
10 SYRINGE (ML) INJECTION AS NEEDED
Status: DISCONTINUED | OUTPATIENT
Start: 2024-05-04 | End: 2024-05-04 | Stop reason: HOSPADM

## 2024-05-04 RX ORDER — PROMETHAZINE HYDROCHLORIDE 25 MG/1
25 TABLET ORAL ONCE AS NEEDED
Status: DISCONTINUED | OUTPATIENT
Start: 2024-05-04 | End: 2024-05-04 | Stop reason: HOSPADM

## 2024-05-04 RX ORDER — HYDRALAZINE HYDROCHLORIDE 20 MG/ML
5 INJECTION INTRAMUSCULAR; INTRAVENOUS
Status: DISCONTINUED | OUTPATIENT
Start: 2024-05-04 | End: 2024-05-04 | Stop reason: HOSPADM

## 2024-05-04 RX ORDER — FENTANYL CITRATE 50 UG/ML
50 INJECTION, SOLUTION INTRAMUSCULAR; INTRAVENOUS
Status: DISCONTINUED | OUTPATIENT
Start: 2024-05-04 | End: 2024-05-04 | Stop reason: HOSPADM

## 2024-05-04 RX ORDER — PHENYLEPHRINE HCL IN 0.9% NACL 1 MG/10 ML
SYRINGE (ML) INTRAVENOUS AS NEEDED
Status: DISCONTINUED | OUTPATIENT
Start: 2024-05-04 | End: 2024-05-04 | Stop reason: SURG

## 2024-05-04 RX ORDER — DIPHENHYDRAMINE HYDROCHLORIDE 50 MG/ML
12.5 INJECTION INTRAMUSCULAR; INTRAVENOUS ONCE AS NEEDED
Status: DISCONTINUED | OUTPATIENT
Start: 2024-05-04 | End: 2024-05-04 | Stop reason: HOSPADM

## 2024-05-04 RX ORDER — ASPIRIN 81 MG/1
81 TABLET, CHEWABLE ORAL 2 TIMES DAILY
Status: DISCONTINUED | OUTPATIENT
Start: 2024-05-04 | End: 2024-05-08 | Stop reason: HOSPADM

## 2024-05-04 RX ORDER — OXYCODONE HCL 10 MG/1
10 TABLET, FILM COATED, EXTENDED RELEASE ORAL ONCE
Status: COMPLETED | OUTPATIENT
Start: 2024-05-04 | End: 2024-05-04

## 2024-05-04 RX ORDER — PROMETHAZINE HYDROCHLORIDE 25 MG/1
25 SUPPOSITORY RECTAL ONCE AS NEEDED
Status: DISCONTINUED | OUTPATIENT
Start: 2024-05-04 | End: 2024-05-04 | Stop reason: HOSPADM

## 2024-05-04 RX ADMIN — ONDANSETRON 4 MG: 2 INJECTION INTRAMUSCULAR; INTRAVENOUS at 10:55

## 2024-05-04 RX ADMIN — ROCURONIUM 30 MG: 50 INJECTION, SOLUTION INTRAVENOUS at 10:27

## 2024-05-04 RX ADMIN — ASPIRIN 81 MG CHEWABLE TABLET 81 MG: 81 TABLET CHEWABLE at 21:01

## 2024-05-04 RX ADMIN — ROPIVACAINE HYDROCHLORIDE 30 ML: 5 INJECTION EPIDURAL; INFILTRATION; PERINEURAL at 09:26

## 2024-05-04 RX ADMIN — ACETAMINOPHEN 1000 MG: 500 TABLET, FILM COATED ORAL at 08:37

## 2024-05-04 RX ADMIN — Medication 150 MCG: at 10:36

## 2024-05-04 RX ADMIN — DEXAMETHASONE SODIUM PHOSPHATE 8 MG: 4 INJECTION, SOLUTION INTRAMUSCULAR; INTRAVENOUS at 10:42

## 2024-05-04 RX ADMIN — PROPOFOL 120 MCG/KG/MIN: 10 INJECTION, EMULSION INTRAVENOUS at 10:31

## 2024-05-04 RX ADMIN — TRANEXAMIC ACID 1000 MG: 10 INJECTION, SOLUTION INTRAVENOUS at 10:22

## 2024-05-04 RX ADMIN — OXYCODONE HYDROCHLORIDE 10 MG: 10 TABLET, FILM COATED, EXTENDED RELEASE ORAL at 08:37

## 2024-05-04 RX ADMIN — Medication 10 ML: at 21:02

## 2024-05-04 RX ADMIN — SODIUM CHLORIDE 2000 MG: 900 INJECTION INTRAVENOUS at 17:42

## 2024-05-04 RX ADMIN — LIDOCAINE HYDROCHLORIDE 30 MG: 10 INJECTION, SOLUTION EPIDURAL; INFILTRATION; INTRACAUDAL; PERINEURAL at 10:27

## 2024-05-04 RX ADMIN — PROPOFOL 150 MCG/KG/MIN: 10 INJECTION, EMULSION INTRAVENOUS at 10:35

## 2024-05-04 RX ADMIN — FENTANYL CITRATE 50 MCG: 50 INJECTION, SOLUTION INTRAMUSCULAR; INTRAVENOUS at 11:37

## 2024-05-04 RX ADMIN — SODIUM CHLORIDE, SODIUM LACTATE, POTASSIUM CHLORIDE, AND CALCIUM CHLORIDE: .6; .31; .03; .02 INJECTION, SOLUTION INTRAVENOUS at 10:21

## 2024-05-04 RX ADMIN — Medication 200 MCG: at 10:57

## 2024-05-04 RX ADMIN — MORPHINE SULFATE 4 MG: 4 INJECTION, SOLUTION INTRAMUSCULAR; INTRAVENOUS at 21:31

## 2024-05-04 RX ADMIN — SUGAMMADEX 200 MG: 100 INJECTION, SOLUTION INTRAVENOUS at 10:58

## 2024-05-04 RX ADMIN — OXYCODONE 5 MG: 5 TABLET ORAL at 17:42

## 2024-05-04 RX ADMIN — FENTANYL CITRATE 50 MCG: 50 INJECTION, SOLUTION INTRAMUSCULAR; INTRAVENOUS at 11:19

## 2024-05-04 RX ADMIN — DEXMEDETOMIDINE HYDROCHLORIDE 40 MCG: 100 INJECTION, SOLUTION INTRAVENOUS at 09:26

## 2024-05-04 RX ADMIN — PROPOFOL 120 MG: 10 INJECTION, EMULSION INTRAVENOUS at 10:27

## 2024-05-04 RX ADMIN — CEFAZOLIN 2 G: 10 INJECTION, POWDER, FOR SOLUTION INTRAVENOUS at 10:23

## 2024-05-04 RX ADMIN — Medication 150 MCG: at 10:46

## 2024-05-04 NOTE — OP NOTE
Intertrochanteric/Subtrochanteric Fracture Operative Note  Dr. APOLINAR Rodriguez II  [phone]    PATIENT NAME: Yancy Mcdonnell  MRN: [id number]  : 1945 AGE: 78 y.o. GENDER: female  DATE OF OPERATION: 2024  PREOPERATIVE DIAGNOSIS: Hip Fracture  POSTOPERATIVE DIAGNOSIS: Hip Fracture  OPERATION PERFORMED: Left Intramedullary Nailing of the Intertrochanteric Hip Fracture.  SURGEON: Jeremy Rodriguez MD  Circulator: Cecilia Morales RN  Radiology Technologist: Merry Campos  Scrub Person: Asmita Kirkpatrick  Vendor Representative: Angela Landon  ANESTHESIA: General  ASSISTANT: None   ESTIMATED BLOOD LOSS: Minimal  SPONGE AND NEEDLE COUNT: Correct  INDICATIONS: This patient was found to have an Intertrochanteric Hip Fracture after evaluation in the ER. An orthopaedic consult was placed for management. A discussion of operative versus nonoperative treatment was had with the patient and/or family. They elected to undergo intramedullary nailing of the hip fracture. The risks of surgery were discussed and included the risk of anesthesia, nonunion, malunion, infection, damage to neurovascular structures, implant loosening/fialure, fracture, hardware prominence, the need for further procedures, medical complications, and others. No guarantees were made. The patient wished to proceed with surgery and a surgical consent was signed.  COMPONENTS:   [employer] InterTAN nail    PERTINENT FINDINGS: Hip Fracture    DETAILS OF PROCEDURE:   The patient was met in the preoperative area. The site was marked. The consent and H&P were reviewed. The patient was then wheeled back to the operative suite and underwent anesthesia. The [place] table boots were secured to the patients’ feet. The patient was moved onto the [place] table and secured in the supine position. The perineal post was inserted and the boots were secured into the leg holders. Surgical alcohol was used to thoroughly clean the operative  area.    REDUCTION  Fluoroscopy was used to visualize the fracture on both an AP and lateral.  Traction, rotation, flexion/extension, abduction/abduction was used as needed to adequately reduce the hip fracture.    The hip and leg were then prepped in the normal sterile fashion, which included Chloroprep and multiple layers of sterile drapes. A surgical timeout was performed in which administration of preoperative antibiotics and the surgical site were confirmed.    A small incision was made just proximal to the greater trochanter and a starting pin was drilled into the tip of the greater trochanter using fluoroscopy to confirm proper location. The opening reamer was then used to open the canal down to the lesser trochanter, visualizing propper position again using fluoroscopy.     OPEN REDUCTION  As fluoroscopic images on both an AP and lateral demonstrated adequate reduction of the fracture, no open techniques needed to be utilized for this case.     NAIL INSERTION  SHORT NAIL: A short nail was then inserted into the femur through the hole made by the opening reamer. The lag screw and compression screw were placed into the femoral head after being drilled and measured, again using fluoroscopy to place the screws in optimal position within the femoral head on both the AP and Lateral views, close to center/center position. Traction was released at this point and the fracture was compressed through the nail. Finally the short nail was secured with one distal interlocking screw which was placed using the jig assembly for the short nail. Final X-Rays showed the fracture reduction to have been maintained and the implant in optimal position.     The wounds were irrigated and closed in layers.  The wound was injected with an analgesic coctail.  A sterile dressing was then placed over the incisions. The patient was moved from the [place] table to the [place] [facility] where the boots were removed. The patient was taken to the recovery  "room in stable condition. There were no complications and the patient tolerated the procedure well.    R \"Kenny\" Michael MEDINA MD  Orthopaedic Surgery  [facility]  [phone]    "

## 2024-05-04 NOTE — H&P
[facility]    Patient Name: Yancy Mcdonnell  : 1945  MRN: [id number]  Primary Care Physician: Antelmo Moreira MD  Date of admission: 5/3/2024     Patient Care Team:  Antelmo Moreira MD as PCP - General (Radiology)  Dr. AMITA Ross) (Ophthalmology)  Banet, Duane Edward, MD as Consulting Physician (Dermatology)  Edwni Banks MD as Consulting Physician (Otolaryngology)  Ruslan Davila MD as Consulting Physician (Gastroenterology)  DEAN Tobias DPM as Consulting Physician (Podiatry)          Subjective   History Present Illness     Chief Complaint:   Chief Complaint   Patient presents with    Hip Pain    Fall     Fall, hip pain    Hip Pain     Fall      ED  78-year-old female describes left hip pain after stumbling and falling last night. States she was able to get back up in her home and ambulate however today she was leaned over and felt a sudden instability in her hip and pain and fell again and has some ongoing left hip pain and since that time is been unable to bear weight. She reports no focal numbness or weakness.     Observation 24  Pt concurs with er hpi. Mri hip showing femur fracture. Ortho surgery consulted, surgery planned for today.     Review of Systems   Musculoskeletal:         Left hip pain             Personal History     Past Medical History:   Past Medical History:   Diagnosis Date    Allergic rhinitis 2015    Ankle fracture, right 2022    Body mass index 29.0-29.9, adult 2018    Cervical spinal stenosis 2017    Cholelithiasis     Class 1 obesity due to excess calories with serious comorbidity and body mass index (BMI) of 30.0 to 30.9 in adult 10/14/2020    Closed bimalleolar fracture of right ankle 2022    DDD (degenerative disc disease), cervical     DDD (degenerative disc disease), lumbar     DDD (degenerative disc disease), thoracic     Difficulty walking     Poor balance    Diverticulosis     Dyslipidemia 2012     Elevated brain natriuretic peptide (BNP) level 01/06/2017    Eustachian tube disorder, right 02/18/2019    Fall 02/2022    Fatigue 06/02/2015    Female cystocele 06/02/2015    Hiatal hernia     Hiatal hernia with gastroesophageal reflux 01/06/2017    History of blood clots 1980    leg s/p fx    History of DVT (deep vein thrombosis) 06/02/2015    History of herpes simplex infection 07/25/2020    Hyperlipidemia     Hypertension 06/10/2016    Left rib fracture 05/2017    11TH RIB     Migraine headache 06/02/2015    Mitral insufficiency 01/16/2017    MILD    Osteoarthritis 05/20/2014    Osteopenia 12/05/2012    Over weight 06/14/2018    Peripheral edema 01/05/2017    PFO (patent foramen ovale) 01/16/2017    Pulmonary nodule 05/04/2017    DR CHUN FOLLOWS    RHA (rheumatoid arthritis) 12/05/2012    Scleritis 06/02/2015    Scoliosis     Thrombophlebitis 02/19/2022    Acute right lower extremity superficial thrombophlebitis noted in the great saphenous (below knee) and varicosity (below knee).      Tricuspid insufficiency     MILD    Urinary urgency     Varicose veins of other specified sites     Venous insufficiency 09/12/2018       Surgical History:      Past Surgical History:   Procedure Laterality Date    ANKLE OPEN REDUCTION INTERNAL FIXATION Right 02/18/2022    Procedure: ANKLE OPEN REDUCTION INTERNAL FIXATION;  Surgeon: DEAN Tobias DPM;  Location: [facility] MAIN OR;  Service: Podiatry;  Laterality: Right;    COLONOSCOPY  07/20/2017    EGD/ COLONSCOPY LARGE HIATAL HERNIA. MILD DIVERTICULOSIS. OTHERWISE NORMAL.    ESOPHAGOSCOPY / EGD  07/28/2023    Dr. Davila    INGUINAL HERNIA REPAIR Right     ORIF ANKLE FRACTURE Right 02/18/2022    Dr. Tobias    TOTAL ABDOMINAL HYSTERECTOMY  1985    W/BSO WITH A/P REPAIR 1985 BENIGN REASONS DR. DRAKE           Family History: family history includes Breast cancer (age of onset: 52) in her daughter; Cancer in her daughter, grandson, and son; Dementia in her mother; Heart disease  in her father and sister; Hypertension in her father; Osteoporosis in her mother; Stroke (age of onset: 80) in her mother. Otherwise pertinent FHx was reviewed and unremarkable.     Social History:  reports that she has never smoked. She has never been exposed to tobacco smoke. She has never used smokeless tobacco. She reports current alcohol use of about 1.0 standard drink of alcohol per week. She reports that she does not use drugs.      Medications:  Prior to Admission medications    Medication Sig Start Date End Date Taking? Authorizing Provider   hydroCHLOROthiazide 25 MG tablet Take 1 tablet by mouth Daily.   Yes Last Peñaloza MD   levothyroxine (SYNTHROID, LEVOTHROID) 50 MCG tablet TAKE 1 TABLET BY MOUTH DAILY 9/18/23  Yes Alyse Mcnally APRN   loratadine (CLARITIN) 10 MG tablet TAKE 1 TABLET BY MOUTH DAILY 8/14/23  Yes Alyse Mcnally APRN   lysine 500 MG tablet Take 1 tablet by mouth Daily As Needed. Stop now for surgery  Indications: Migraine Headache 5/20/14  Yes Munira So APRN   Magnesium 250 MG tablet Take 1 tablet by mouth Daily. Indications: supplement 8/2/23  Yes Lsat Peñaloza MD Misc Natural Products (LUTEIN 20 PO) Take 1 tablet by mouth Daily. Indications: supplement 8/2/23  Yes Last Peñaloza MD   olmesartan (BENICAR) 20 MG tablet Take 1 tablet by mouth Daily.   Yes Last Peñaloza MD   omeprazole (priLOSEC) 20 MG capsule Take 1 capsule by mouth Daily.   Yes Last Peñaloza MD   fluticasone (FLONASE) 50 MCG/ACT nasal spray 2 sprays into the nostril(s) as directed by provider Daily.    Last Peñaloza MD       Allergies:    Allergies   Allergen Reactions    Meperidine Unknown (See Comments) and Nausea And Vomiting     Abstracted from centricity.    Other Unknown (See Comments)     Sulfa (sulfadiazine)     Sulfa Antibiotics Unknown - High Severity and Anaphylaxis    Lisinopril Cough       Objective   Objective     Vital Signs  Temp:  [97.4 °F  (36.3 °C)-98 °F (36.7 °C)] 97.4 °F (36.3 °C)  Heart Rate:  [82-97] 94  Resp:  [17-18] 18  BP: (130-174)/(51-74) 130/51  SpO2:  [91 %-98 %] 91 %  on   ;   Device (Oxygen Therapy): room air  Body mass index is 32.92 kg/m².    Physical Exam  Constitutional:       Appearance: Normal appearance.   Cardiovascular:      Rate and Rhythm: Normal rate and regular rhythm.      Pulses: Normal pulses.      Heart sounds: Normal heart sounds.   Pulmonary:      Effort: Pulmonary effort is normal.      Breath sounds: Normal breath sounds.   Musculoskeletal:      Comments: Pain with palp or rom in left hip/thigh   Neurological:      General: No focal deficit present.      Mental Status: She is alert and oriented to person, place, and time.   Psychiatric:         Mood and Affect: Mood normal.         Behavior: Behavior normal.       Results Review:  I have personally reviewed most recent lab results and radiology images and interpretations and agree with findings, most notably: cbc, bmp, xr hip, ct hip, mri pelvis, femur.    Results from last 7 days   Lab Units 05/04/24  0419   WBC 10*3/mm3 9.59   HEMOGLOBIN g/dL 11.2*   HEMATOCRIT % 36.4   PLATELETS 10*3/mm3 312     Results from last 7 days   Lab Units 05/04/24  0419   SODIUM mmol/L 141   POTASSIUM mmol/L 3.9   CHLORIDE mmol/L 107   CO2 mmol/L 26.0   BUN mg/dL 18   CREATININE mg/dL 0.75   GLUCOSE mg/dL 96   CALCIUM mg/dL 8.9     Estimated Creatinine Clearance: 60.2 mL/min (by C-G formula based on SCr of 0.75 mg/dL).  Brief Urine Lab Results  (Last result in the past 365 days)        Color   Clarity   Blood   Leuk Est   Nitrite   Protein   CREAT   Urine HCG        09/15/23 1052 Yellow   Clear   Trace   500 June/ul   Negative   Trace                   Microbiology Results (last 10 days)       ** No results found for the last 240 hours. **            ECG/EMG Results (most recent)       None            Results for orders placed in visit on 03/17/22    Duplex Venous Lower Extremity - Right  CAR    Interpretation Summary  · Normal right lower extremity venous duplex scan.          MRI Pelvis Without Contrast    Result Date: 5/3/2024  Impression: 1.Mildly comminuted fracture of the left greater trochanter with primary transverse fracture line. No significant displacement at this time. 2.Fracture line extends into the upper-posterior intertrochanteric region. This appears incomplete on these images, as above. Orthopedic consultation is advised for treatment considerations. 3.No other definite fracture is visualized at this time. 4.Edema surrounds the trochanteric fracture with hematoma in the trochanteric bursa. 5.Bilateral gluteal tendinopathy and suspected partial tears. 6.Moderate hip and right knee osteoarthritis. Electronically Signed: Girish Olivas  5/3/2024 10:36 PM EDT  Workstation ID: [id number]    MRI Femur Left Without Contrast    Result Date: 5/3/2024  Impression: 1.Mildly comminuted fracture of the left greater trochanter with primary transverse fracture line. No significant displacement at this time. 2.Fracture line extends into the upper-posterior intertrochanteric region. This appears incomplete on these images, as above. Orthopedic consultation is advised for treatment considerations. 3.No other definite fracture is visualized at this time. 4.Edema surrounds the trochanteric fracture with hematoma in the trochanteric bursa. 5.Bilateral gluteal tendinopathy and suspected partial tears. 6.Moderate hip and right knee osteoarthritis. Electronically Signed: Girish Olivas  5/3/2024 10:36 PM EDT  Workstation ID: [id number]    CT Lower Extremity Left Without Contrast    Result Date: 5/3/2024  Impression: 1.Transverse fracture of the greater trochanter without significant displacement at this time. 2.No definite fracture line through the intertrochanteric region or femoral neck on this exam. MRI would be most sensitive for a subtle nondisplaced fracture which could be occult on this exam. 3.Fluid in the  greater trochanteric bursa, likely hematoma. 4.Moderate left hip osteoarthritis. 5.Advanced degenerative changes in the lower lumbar spine with anterolisthesis of L4 on L5 and suspected moderate to severe canal narrowing. Electronically Signed: Girish Olivas  5/3/2024 3:52 PM EDT  Workstation ID: [id number]    XR Hip With or Without Pelvis 2 - 3 View Left    Result Date: 5/3/2024  Impression: 1.Left greater trochanteric fracture. CT might be useful to better assess. Electronically Signed: Jh Broussard MD  5/3/2024 12:49 PM EDT  Workstation ID: [id number]       Estimated Creatinine Clearance: 60.2 mL/min (by C-G formula based on SCr of 0.75 mg/dL).    Assessment & Plan   Assessment/Plan       Active Hospital Problems    Diagnosis  POA    **Hip fracture [S72.009A]  Yes      Resolved Hospital Problems   No resolved problems to display.       Left Greater trochanter fracture   - cbc, bmp unremarkable  - left hip xray reviewed and showing left greater trochanteric fracture  - ct left lower extremity showing transverse fracture of greater trochanter without significant displacement at this time.No definite fracture line through the intertrochanteric region or femoral neck on this exam.   - Mri pelvis and left femur reviewed and showing mildly comminuted fracture of the left greater trochanter with primary transverse fracture line. No significant displacement at this time. Fracture line extends into the upper-posterior upper-posterior intertrochanteric region. This appears incomplete on these images. No other definite fracture is visualized at this time. Edema surrounds the trochanteric fracture with hematoma in the trochanteric bursa. Bilateral gluteal tendinopathy and suspected partial tears. Moderate hip and right knee osteoarthritis   - orthopedic surgery consulted and recommends surgical repair    Hypertension  -well Controlled   BP Readings from Last 1 Encounters:   05/04/24 116/59     - Continue hctz, benicar  - Monitor  while admitted       Hypothyroidism  Cont synthroid      VTE Prophylaxis -   Mechanical Order History:        Ordered        05/03/24 1732  Place Sequential Compression Device  Once            05/03/24 1732  Maintain Sequential Compression Device  Continuous                          Pharmalogical Order History:       None            CODE STATUS:    There are no questions and answers to display.       This patient has been examined wearing personal protective equipment.     I discussed the patient's findings and my recommendations with patient and nursing staff.      Signature:Electronically signed by Yoselin Swanson PA-C, 05/04/24, 8:00 AM EDT.

## 2024-05-04 NOTE — PLAN OF CARE
Goal Outcome Evaluation:  Plan of Care Reviewed With: patient        Progress: no change  Outcome Evaluation: Pt had mri  done last night, awaiting possible surg intervention today. pt complaint of headaches , tylenol given , pt voices relief and rested during shift. Will cont to monitor.

## 2024-05-04 NOTE — ANESTHESIA PROCEDURE NOTES
Airway  Urgency: elective    Date/Time: 5/4/2024 10:29 AM  End Time:5/4/2024 10:29 AM  Airway not difficult    General Information and Staff    Patient location during procedure: OR  Anesthesiologist: Sky Baugh MD  CRNA/CAA: Whitney Bales CAA    Indications and Patient Condition  Indications for airway management: airway protection    Preoxygenated: yes  Mask difficulty assessment: 2 - vent by mask + OA or adjuvant +/- NMBA    Final Airway Details  Final airway type: endotracheal airway      Successful airway: ETT  Cuffed: yes   Successful intubation technique: direct laryngoscopy and video laryngoscopy  Facilitating devices/methods: intubating stylet  Endotracheal tube insertion site: oral  Blade: Rea  Blade size: 3  ETT size (mm): 7.0  Cormack-Lehane Classification: grade I - full view of glottis  Placement verified by: chest auscultation, capnometry and palpation of cuff   Measured from: lips  ETT/EBT  to lips (cm): 21  Number of attempts at approach: 1  Assessment: lips, teeth, and gum same as pre-op and atraumatic intubation

## 2024-05-04 NOTE — CONSULTS
[facility] Medicine Services  Consult Note    Patient Name: Yancy Mcdonnell  : 1945  MRN: [id number]  Primary Care Physician:  Antelmo Moreira MD  Referring Physician: No Known Provider  Date of admission: 5/3/2024  Date and Time of Care: 2024 at 1340    Inpatient Hospitalist Consult  Consult performed by: Laisha Dubon APRN  Consult ordered by: Yoselin Swanson PA-C            Reason for Consult/ Chief Complaint: medical management    Consult Requested By: JEET Romero    Subjective:     History of Present Illness:   79 yo female with PMH of hypothyroidism, HTN, RA, GERD, recurrent UTI, cervical stenosis, presented to ED on 5/3/2024 for left hip pain post fall on . Lives at home with , uses cane for ambulation. States fell at home, was able to ambulate post fall but ongoing left hip pain and progressive inability to bear weight. Found to have mildly comminuted fracture left greater trochanter with primary transverse fracture extending into intertrochanteric region. Was admitted with ortho consult with Dr. Rodriguez. On  underwent left IM nailing of intertrochanteric hip fracture by Dr Rodriguez.     Upon evaluation, examined post surgery. Awake, alert, resting in bed. Dressing to left lateral upper leg dry, intact. Pedal pulses weak but palpable to LLE, able to move toes.       Review of Systems:   Review of Systems   Musculoskeletal:         Denies post op pain to LLE.    Neurological:  Positive for weakness.       Personal History:     Past Medical History:   Diagnosis Date    Allergic rhinitis 2015    Ankle fracture, right 2022    Body mass index 29.0-29.9, adult 2018    Cervical spinal stenosis 2017    Cholelithiasis     Class 1 obesity due to excess calories with serious comorbidity and body mass index (BMI) of 30.0 to 30.9 in adult 10/14/2020    Closed bimalleolar fracture of right ankle 2022    DDD (degenerative disc disease), cervical      DDD (degenerative disc disease), lumbar     DDD (degenerative disc disease), thoracic     Difficulty walking     Poor balance    Diverticulosis     Dyslipidemia 12/05/2012    Elevated brain natriuretic peptide (BNP) level 01/06/2017    Eustachian tube disorder, right 02/18/2019    Fall 02/2022    Fatigue 06/02/2015    Female cystocele 06/02/2015    Hiatal hernia     Hiatal hernia with gastroesophageal reflux 01/06/2017    History of blood clots 1980    leg s/p fx    History of DVT (deep vein thrombosis) 06/02/2015    History of herpes simplex infection 07/25/2020    Hyperlipidemia     Hypertension 06/10/2016    Left rib fracture 05/2017    11TH RIB     Migraine headache 06/02/2015    Mitral insufficiency 01/16/2017    MILD    Osteoarthritis 05/20/2014    Osteopenia 12/05/2012    Over weight 06/14/2018    Peripheral edema 01/05/2017    PFO (patent foramen ovale) 01/16/2017    Pulmonary nodule 05/04/2017    DR CHUN FOLLOWS    RHA (rheumatoid arthritis) 12/05/2012    Scleritis 06/02/2015    Scoliosis     Thrombophlebitis 02/19/2022    Acute right lower extremity superficial thrombophlebitis noted in the great saphenous (below knee) and varicosity (below knee).      Tricuspid insufficiency     MILD    Urinary urgency     Varicose veins of other specified sites     Venous insufficiency 09/12/2018       Past Surgical History:   Procedure Laterality Date    ANKLE OPEN REDUCTION INTERNAL FIXATION Right 02/18/2022    Procedure: ANKLE OPEN REDUCTION INTERNAL FIXATION;  Surgeon: DEAN Tobias DPM;  Location: [facility] MAIN OR;  Service: Podiatry;  Laterality: Right;    COLONOSCOPY  07/20/2017    EGD/ COLONSCOPY LARGE HIATAL HERNIA. MILD DIVERTICULOSIS. OTHERWISE NORMAL.    ESOPHAGOSCOPY / EGD  07/28/2023    Dr. Davila    INGUINAL HERNIA REPAIR Right     ORIF ANKLE FRACTURE Right 02/18/2022    Dr. Tobias    TOTAL ABDOMINAL HYSTERECTOMY  1985    W/BSO WITH A/P REPAIR 1985 BENIGN REASONS DR. DRAKE       Family History: family  history includes Breast cancer (age of onset: 52) in her daughter; Cancer in her daughter, grandson, and son; Dementia in her mother; Heart disease in her father and sister; Hypertension in her father; Osteoporosis in her mother; Stroke (age of onset: 80) in her mother. Otherwise pertinent FHx was reviewed and not pertinent to current issue.    Social History:  reports that she has never smoked. She has never been exposed to tobacco smoke. She has never used smokeless tobacco. She reports current alcohol use of about 1.0 standard drink of alcohol per week. She reports that she does not use drugs.    Home Medications:   Magnesium, Misc Natural Products, fluticasone, hydroCHLOROthiazide, levothyroxine, loratadine, lysine, olmesartan, and omeprazole    Allergies:  Allergies   Allergen Reactions    Meperidine Unknown (See Comments) and Nausea And Vomiting     Abstracted from centricity.    Other Unknown (See Comments)     Sulfa (sulfadiazine)     Sulfa Antibiotics Unknown - High Severity and Anaphylaxis    Lisinopril Cough         Objective:     Vital Signs  Temp:  [97.4 °F (36.3 °C)-98 °F (36.7 °C)] 98 °F (36.7 °C)  Heart Rate:  [82-97] 85  Resp:  [12-25] 15  BP: ()/(45-70) 116/59  Flow (L/min):  [4] 4   Body mass index is 32.92 kg/m².    Physical Exam  Physical Exam  Constitutional:       Appearance: Normal appearance.   HENT:      Head: Normocephalic and atraumatic.      Mouth/Throat:      Mouth: Mucous membranes are moist.   Eyes:      Extraocular Movements: Extraocular movements intact.      Pupils: Pupils are equal, round, and reactive to light.   Cardiovascular:      Rate and Rhythm: Normal rate and regular rhythm.      Pulses: Normal pulses.      Heart sounds: Normal heart sounds. Murmur heard.   Pulmonary:      Effort: Pulmonary effort is normal.      Breath sounds: Normal breath sounds.   Abdominal:      General: Bowel sounds are normal.      Palpations: Abdomen is soft.   Musculoskeletal:      Comments:  Dressing dry, intact to left lateral upper leg  Able to move left toes, neurovascular checks intact LLE   Skin:     General: Skin is warm and dry.   Neurological:      Mental Status: She is alert and oriented to person, place, and time.         Scheduled Meds   aspirin, 81 mg, Oral, BID  ceFAZolin, 2,000 mg, Intravenous, Q8H  hydroCHLOROthiazide, 25 mg, Oral, Daily  levothyroxine, 50 mcg, Oral, Daily  losartan, 50 mg, Oral, Q24H  pantoprazole, 40 mg, Oral, Q AM  sodium chloride, 10 mL, Intravenous, Q12H       PRN Meds     acetaminophen    aluminum-magnesium hydroxide-simethicone    lactated ringers    Morphine    ondansetron ODT **OR** ondansetron    oxyCODONE    phenylephrine    sodium chloride    sodium chloride   Infusions  lactated ringers, 9 mL/hr, Last Rate: 100 mL/hr (05/04/24 1021)  phenylephrine, 0.5-3 mcg/kg/min          Diagnostic Data    Results from last 7 days   Lab Units 05/04/24  0419   WBC 10*3/mm3 9.59   HEMOGLOBIN g/dL 11.2*   HEMATOCRIT % 36.4   PLATELETS 10*3/mm3 312   GLUCOSE mg/dL 96   CREATININE mg/dL 0.75   BUN mg/dL 18   SODIUM mmol/L 141   POTASSIUM mmol/L 3.9   ANION GAP mmol/L 8.0       XR Hip With or Without Pelvis 1 View Left    Result Date: 5/4/2024  Impression: Patient is status post internal fixation of the left proximal femur. Adjacent postoperative subcutaneous emphysema is noted. Skin staples are seen. Bones are demineralized. The visualized portion of the bony pelvis, right hip, and bilateral sacroiliac joints are unremarkable. Electronically Signed: Milton Cabral MD  5/4/2024 12:01 PM EDT  Workstation ID: [id number]    MRI Pelvis Without Contrast    Result Date: 5/3/2024  Impression: 1.Mildly comminuted fracture of the left greater trochanter with primary transverse fracture line. No significant displacement at this time. 2.Fracture line extends into the upper-posterior intertrochanteric region. This appears incomplete on these images, as above. Orthopedic consultation is  advised for treatment considerations. 3.No other definite fracture is visualized at this time. 4.Edema surrounds the trochanteric fracture with hematoma in the trochanteric bursa. 5.Bilateral gluteal tendinopathy and suspected partial tears. 6.Moderate hip and right knee osteoarthritis. Electronically Signed: Girish Olivas  5/3/2024 10:36 PM EDT  Workstation ID: [id number]    MRI Femur Left Without Contrast    Result Date: 5/3/2024  Impression: 1.Mildly comminuted fracture of the left greater trochanter with primary transverse fracture line. No significant displacement at this time. 2.Fracture line extends into the upper-posterior intertrochanteric region. This appears incomplete on these images, as above. Orthopedic consultation is advised for treatment considerations. 3.No other definite fracture is visualized at this time. 4.Edema surrounds the trochanteric fracture with hematoma in the trochanteric bursa. 5.Bilateral gluteal tendinopathy and suspected partial tears. 6.Moderate hip and right knee osteoarthritis. Electronically Signed: Girish Olivas  5/3/2024 10:36 PM EDT  Workstation ID: [id number]    CT Lower Extremity Left Without Contrast    Result Date: 5/3/2024  Impression: 1.Transverse fracture of the greater trochanter without significant displacement at this time. 2.No definite fracture line through the intertrochanteric region or femoral neck on this exam. MRI would be most sensitive for a subtle nondisplaced fracture which could be occult on this exam. 3.Fluid in the greater trochanteric bursa, likely hematoma. 4.Moderate left hip osteoarthritis. 5.Advanced degenerative changes in the lower lumbar spine with anterolisthesis of L4 on L5 and suspected moderate to severe canal narrowing. Electronically Signed: Girish Olivas  5/3/2024 3:52 PM EDT  Workstation ID: [id number]    XR Hip With or Without Pelvis 2 - 3 View Left    Result Date: 5/3/2024  Impression: 1.Left greater trochanteric fracture. CT might be useful  to better assess. Electronically Signed: Jh Broussard MD  5/3/2024 12:49 PM EDT  Workstation ID: [id number]       I reviewed the patient's new clinical results.    Assessment/Plan:     Active and Resolved Problems  Active Hospital Problems    Diagnosis  POA    **Hip fracture [S72.009A]  Yes      Resolved Hospital Problems   No resolved problems to display.     Ground level fall resulting in left hip fracture:  -fall on 5/2 with progressive left hip pain and inability to walk, ED visit on 5/3  -5/4 s/p left IM nailing of intertrochanteric hip fracture by Dr Rodriguez.   -postop orders per Dr. Rodriguez  -neurovascular checks LLE  -PT/OT evaluation/treatment  -case management consult for discharge planning. May need rehab  -fall precautions.     Hypothyroidism:  -TSH pending  -continue levothyroxine.    HTN:  -BP controlled  -trend BP  -continue losartan, HCTZ    GERD:  Protonix for GI PPx              DVT prophylaxis:  Mechanical DVT prophylaxis orders are present.         Code status is   There are no questions and answers to display.       Plan for disposition:return home versus rehab in 3 days    Time: 30 minutes        Signature: Electronically signed by FELIPE Hammonds, 05/04/24, 13:51 EDT.  [facility]

## 2024-05-04 NOTE — ANESTHESIA POSTPROCEDURE EVALUATION
Patient: Yancy Mcdonnell    Procedure Summary       Date: 05/04/24 Room / Location: [facility] OR 12 / [facility] MAIN OR    Anesthesia Start: 1021 Anesthesia Stop: 1107    Procedure: HIP INTERTROCHANTERIC NAILING (Left: Thigh) Diagnosis:       Closed fracture of left hip, initial encounter      (Closed fracture of left hip, initial encounter [S72.002A])    Surgeons: Jeremy Rodriguez II, MD Provider: Sky Baugh MD    Anesthesia Type: ERAS Protocol, general with block ASA Status: 3            Anesthesia Type: ERAS Protocol, general with block    Vitals  Vitals Value Taken Time   /59 05/04/24 1153   Temp 98 °F (36.7 °C) 05/04/24 1107   Pulse 80 05/04/24 1158   Resp 15 05/04/24 1152   SpO2 97 % 05/04/24 1158   Vitals shown include unfiled device data.        Post Anesthesia Care and Evaluation    Patient location during evaluation: PACU  Patient participation: complete - patient participated  Level of consciousness: awake  Pain scale: See nurse's notes for pain score.  Pain management: adequate    Airway patency: patent  Anesthetic complications: No anesthetic complications  PONV Status: none  Cardiovascular status: acceptable  Respiratory status: acceptable and spontaneous ventilation  Hydration status: acceptable    Comments: Patient seen and examined postoperatively; vital signs stable; SpO2 greater than or equal to 90%; cardiopulmonary status stable; nausea/vomiting adequately controlled; pain adequately controlled; no apparent anesthesia complications; patient discharged from anesthesia care when discharge criteria were met

## 2024-05-04 NOTE — ANESTHESIA PREPROCEDURE EVALUATION
Anesthesia Evaluation     Patient summary reviewed and Nursing notes reviewed   no history of anesthetic complications:   NPO Solid Status: > 8 hours  NPO Liquid Status: > 2 hours           Airway   Dental      Pulmonary    Cardiovascular     ECG reviewed    (+) hypertension, valvular problems/murmurs MR and TI, DVT, hyperlipidemia      Neuro/Psych  (+) headaches  GI/Hepatic/Renal/Endo    (+) obesity, hiatal hernia, GERD, thyroid problem hypothyroidism    Musculoskeletal     (+) back pain  Abdominal    Substance History      OB/GYN          Other   arthritis, autoimmune disease rheumatoid arthritis,     ROS/Med Hx Other: Additional History:  Allergies, PFO, femoral neck fx, rib fx, varicose veins, venous insufficiency, Eustachian tube disorder, fatigue, pulmonary nodule, edema, thrombophlebitis, leg wound, diverticulitis, balance trouble, diaphragmatic hernia, dysphagia, esophageal motility d/o    PSH:  INGUINAL HERNIA REPAIR COLONOSCOPY  TOTAL ABDOMINAL HYSTERECTOMY ORIF ANKLE FRACTURE  ANKLE OPEN REDUCTION INTERNAL FIXATION ESOPHAGOSCOPY / EGD              Anesthesia Plan    ASA 3     ERAS Protocol and general with block   total IV anesthesia  (Patient identified; pre-operative vital signs, all relevant labs/studies, complete medical/surgical/anesthetic history, full medication list, full allergy list, and NPO status obtained/reviewed; physical assessment performed; anesthetic options, side effects, potential complications, risks, and benefits discussed; questions answered; written anesthesia consent obtained; patient cleared for procedure; anesthesia machine and equipment checked and functioning)  intravenous induction     Anesthetic plan, risks, benefits, and alternatives have been provided, discussed and informed consent has been obtained with: patient.    Plan discussed with CRNA and CAA.    CODE STATUS:

## 2024-05-04 NOTE — CONSULTS
Orthopaedic Surgery  Consult Note  Dr. APOLINAR Rodriguez II  [phone]    HPI:  Patient is a 78 y.o. Not  or  female who presents with left hip pain after a fall.  She presented to hospital where x-rays were taken that demonstrated a left greater trochanteric hip fracture.  She underwent CT scan which was nondiagnostic and eventually an MRI was ordered which demonstrated a nondisplaced intertrochanteric fracture.  She complains of sharp pain in the left hip worse with any activity made better with bedrest and pain medication.  She has been unable to mobilize since the fall.    MEDICAL HISTORY  Past Medical History:   Diagnosis Date    Allergic rhinitis 06/02/2015    Ankle fracture, right 02/2022    Body mass index 29.0-29.9, adult 09/12/2018    Cervical spinal stenosis 08/07/2017    Cholelithiasis     Class 1 obesity due to excess calories with serious comorbidity and body mass index (BMI) of 30.0 to 30.9 in adult 10/14/2020    Closed bimalleolar fracture of right ankle 02/11/2022    DDD (degenerative disc disease), cervical     DDD (degenerative disc disease), lumbar     DDD (degenerative disc disease), thoracic     Difficulty walking     Poor balance    Diverticulosis     Dyslipidemia 12/05/2012    Elevated brain natriuretic peptide (BNP) level 01/06/2017    Eustachian tube disorder, right 02/18/2019    Fall 02/2022    Fatigue 06/02/2015    Female cystocele 06/02/2015    Hiatal hernia     Hiatal hernia with gastroesophageal reflux 01/06/2017    History of blood clots 1980    leg s/p fx    History of DVT (deep vein thrombosis) 06/02/2015    History of herpes simplex infection 07/25/2020    Hyperlipidemia     Hypertension 06/10/2016    Left rib fracture 05/2017    11TH RIB     Migraine headache 06/02/2015    Mitral insufficiency 01/16/2017    MILD    Osteoarthritis 05/20/2014    Osteopenia 12/05/2012    Over weight 06/14/2018    Peripheral edema 01/05/2017    PFO (patent foramen ovale) 01/16/2017     Pulmonary nodule 05/04/2017    DR CHNU FOLLOWS    RHA (rheumatoid arthritis) 12/05/2012    Scleritis 06/02/2015    Scoliosis     Thrombophlebitis 02/19/2022    Acute right lower extremity superficial thrombophlebitis noted in the great saphenous (below knee) and varicosity (below knee).      Tricuspid insufficiency     MILD    Urinary urgency     Varicose veins of other specified sites     Venous insufficiency 09/12/2018     Past Surgical History:   Procedure Laterality Date    ANKLE OPEN REDUCTION INTERNAL FIXATION Right 02/18/2022    Procedure: ANKLE OPEN REDUCTION INTERNAL FIXATION;  Surgeon: DEAN Tobias DPM;  Location: [facility] MAIN OR;  Service: Podiatry;  Laterality: Right;    COLONOSCOPY  07/20/2017    EGD/ COLONSCOPY LARGE HIATAL HERNIA. MILD DIVERTICULOSIS. OTHERWISE NORMAL.    ESOPHAGOSCOPY / EGD  07/28/2023    Dr. Davila    INGUINAL HERNIA REPAIR Right     ORIF ANKLE FRACTURE Right 02/18/2022    Dr. Tobias    TOTAL ABDOMINAL HYSTERECTOMY  1985    W/BSO WITH A/P REPAIR 1985 BENIGN REASONS DR. DRAKE     Prior to Admission medications    Medication Sig Start Date End Date Taking? Authorizing Provider   hydroCHLOROthiazide 25 MG tablet Take 1 tablet by mouth Daily.   Yes Last Peñaloza MD   levothyroxine (SYNTHROID, LEVOTHROID) 50 MCG tablet TAKE 1 TABLET BY MOUTH DAILY 9/18/23  Yes Alyse Mcnally APRN   loratadine (CLARITIN) 10 MG tablet TAKE 1 TABLET BY MOUTH DAILY 8/14/23  Yes Alyse Mcnally APRN   lysine 500 MG tablet Take 1 tablet by mouth Daily As Needed. Stop now for surgery  Indications: Migraine Headache 5/20/14  Yes Munira So APRN   Magnesium 250 MG tablet Take 1 tablet by mouth Daily. Indications: supplement 8/2/23  Yes Last Peñaloza MD   Misc Natural Products (LUTEIN 20 PO) Take 1 tablet by mouth Daily. Indications: supplement 8/2/23  Yes Last Peñaloza MD   olmesartan (BENICAR) 20 MG tablet Take 1 tablet by mouth Daily.   Yes Last Peñaloza MD  "  omeprazole (priLOSEC) 20 MG capsule Take 1 capsule by mouth Daily.   Yes Provider, Historical, MD   fluticasone (FLONASE) 50 MCG/ACT nasal spray 2 sprays into the nostril(s) as directed by provider Daily.    Provider, Historical, MD     Allergies   Allergen Reactions    Meperidine Unknown (See Comments) and Nausea And Vomiting     Abstracted from centricity.    Other Unknown (See Comments)     Sulfa (sulfadiazine)     Sulfa Antibiotics Unknown - High Severity and Anaphylaxis    Lisinopril Cough     Most Recent Immunizations   Administered Date(s) Administered    COVID-19 (PFIZER) Purple Cap Monovalent 10/28/2021    FLUAD TRI 65YR+ 09/16/2019    Fluad Quad 65+ 09/14/2020    Fluzone High Dose =>65 Years ([employer] ONLY) 11/16/2021    Fluzone High-Dose 65+yrs 09/30/2022    Hepatitis A 12/19/2018    Pneumococcal Conjugate 13-Valent (PCV13) 10/09/2015    Pneumococcal Polysaccharide (PPSV23) 09/10/2010    Tdap 01/05/2017    Zoster, Unspecified 12/30/2009     Social History     Tobacco Use    Smoking status: Never     Passive exposure: Never    Smokeless tobacco: Never    Tobacco comments:     None   Substance Use Topics    Alcohol use: Yes     Alcohol/week: 1.0 standard drink of alcohol     Types: 1 Cans of beer per week      Social History     Substance and Sexual Activity   Drug Use Never       VITALS: /51 (BP Location: Left arm, Patient Position: Lying)   Pulse 94   Temp 97.4 °F (36.3 °C) (Oral)   Resp 18   Ht 157.5 cm (62\")   Wt 81.6 kg (180 lb)   SpO2 91%   BMI 32.92 kg/m²  Body mass index is 32.92 kg/m².    PHYSICAL EXAM:   CONSTITUTIONAL: No acute distress  LUNGS: Equal chest rise, no shortness of air  CARDIOVASCULAR: palpable peripheral pulses  SKIN: no skin lesions in the area examined  LYMPH: no lymphadenopathy in the area examined  EXTREMITY: Left Lower Extremity  Tenderness to Palpation: Tenderness to palpation at the hip and pain with rotation  Gross Deformity: No Gross Deformity  Pulses:  Brisk " Capillary Refill  Sensation: Intact to Saphenous, Sural, Deep Peroneal, Superficial Peroneal, and Tibial Nerves and grossly throughout extremity  Motor: 5/5 EHL/FHL/TA/GS motor complexes    RADIOLOGY REVIEW:   MRI Pelvis Without Contrast    Result Date: 5/3/2024  Impression: 1.Mildly comminuted fracture of the left greater trochanter with primary transverse fracture line. No significant displacement at this time. 2.Fracture line extends into the upper-posterior intertrochanteric region. This appears incomplete on these images, as above. Orthopedic consultation is advised for treatment considerations. 3.No other definite fracture is visualized at this time. 4.Edema surrounds the trochanteric fracture with hematoma in the trochanteric bursa. 5.Bilateral gluteal tendinopathy and suspected partial tears. 6.Moderate hip and right knee osteoarthritis. Electronically Signed: Girish Olivas  5/3/2024 10:36 PM EDT  Workstation ID: [id number]    MRI Femur Left Without Contrast    Result Date: 5/3/2024  Impression: 1.Mildly comminuted fracture of the left greater trochanter with primary transverse fracture line. No significant displacement at this time. 2.Fracture line extends into the upper-posterior intertrochanteric region. This appears incomplete on these images, as above. Orthopedic consultation is advised for treatment considerations. 3.No other definite fracture is visualized at this time. 4.Edema surrounds the trochanteric fracture with hematoma in the trochanteric bursa. 5.Bilateral gluteal tendinopathy and suspected partial tears. 6.Moderate hip and right knee osteoarthritis. Electronically Signed: Girish Olivas  5/3/2024 10:36 PM EDT  Workstation ID: [id number]    CT Lower Extremity Left Without Contrast    Result Date: 5/3/2024  Impression: 1.Transverse fracture of the greater trochanter without significant displacement at this time. 2.No definite fracture line through the intertrochanteric region or femoral neck on this  exam. MRI would be most sensitive for a subtle nondisplaced fracture which could be occult on this exam. 3.Fluid in the greater trochanteric bursa, likely hematoma. 4.Moderate left hip osteoarthritis. 5.Advanced degenerative changes in the lower lumbar spine with anterolisthesis of L4 on L5 and suspected moderate to severe canal narrowing. Electronically Signed: Girish Olivas  5/3/2024 3:52 PM EDT  Workstation ID: [id number]    XR Hip With or Without Pelvis 2 - 3 View Left    Result Date: 5/3/2024  Impression: 1.Left greater trochanteric fracture. CT might be useful to better assess. Electronically Signed: Jh Broussard MD  5/3/2024 12:49 PM EDT  Workstation ID: [id number]     LABS:   Results for the past 24 hours:   Recent Results (from the past 24 hour(s))   Basic Metabolic Panel    Collection Time: 05/04/24  4:19 AM    Specimen: Blood   Result Value Ref Range    Glucose 96 65 - 99 mg/dL    BUN 18 8 - 23 mg/dL    Creatinine 0.75 0.57 - 1.00 mg/dL    Sodium 141 136 - 145 mmol/L    Potassium 3.9 3.5 - 5.2 mmol/L    Chloride 107 98 - 107 mmol/L    CO2 26.0 22.0 - 29.0 mmol/L    Calcium 8.9 8.6 - 10.5 mg/dL    BUN/Creatinine Ratio 24.0 7.0 - 25.0    Anion Gap 8.0 5.0 - 15.0 mmol/L    eGFR 81.1 >60.0 mL/min/1.73   CBC Auto Differential    Collection Time: 05/04/24  4:19 AM    Specimen: Blood   Result Value Ref Range    WBC 9.59 3.40 - 10.80 10*3/mm3    RBC 3.93 3.77 - 5.28 10*6/mm3    Hemoglobin 11.2 (L) 12.0 - 15.9 g/dL    Hematocrit 36.4 34.0 - 46.6 %    MCV 92.6 79.0 - 97.0 fL    MCH 28.5 26.6 - 33.0 pg    MCHC 30.8 (L) 31.5 - 35.7 g/dL    RDW 14.3 12.3 - 15.4 %    RDW-SD 48.6 37.0 - 54.0 fl    MPV 9.5 6.0 - 12.0 fL    Platelets 312 140 - 450 10*3/mm3    Neutrophil % 78.0 (H) 42.7 - 76.0 %    Lymphocyte % 11.2 (L) 19.6 - 45.3 %    Monocyte % 9.2 5.0 - 12.0 %    Eosinophil % 1.0 0.3 - 6.2 %    Basophil % 0.2 0.0 - 1.5 %    Immature Grans % 0.4 0.0 - 0.5 %    Neutrophils, Absolute 7.48 (H) 1.70 - 7.00 10*3/mm3     "Lymphocytes, Absolute 1.07 0.70 - 3.10 10*3/mm3    Monocytes, Absolute 0.88 0.10 - 0.90 10*3/mm3    Eosinophils, Absolute 0.10 0.00 - 0.40 10*3/mm3    Basophils, Absolute 0.02 0.00 - 0.20 10*3/mm3    Immature Grans, Absolute 0.04 0.00 - 0.05 10*3/mm3    nRBC 0.0 0.0 - 0.2 /100 WBC       IMPRESSION:  Patient is a 78 y.o. Not  or  female with left nondisplaced greater trochanteric fracture seen clearly on MRI    PLAN:   Admited to: Elvin Cheng MD  Disposition: We discussed treatment options for the left hip.  We discussed risk benefits and alternatives to surgery along with nonsurgical options.  Patient is agreeable to left hip intertrochanteric nailing for intertrochanteric fracture.  She agrees that this will help speed up her healing process and prevent fracture propagation and displacement.  This will allow for easier mobilization and a much better outcome.  Surgery is scheduled for today.    R \"Kenny\" Michael MEDINA MD  Orthopaedic Surgery  [facility]  [phone] - [place] Office  [phone] - [place] Office            "

## 2024-05-04 NOTE — ANESTHESIA PROCEDURE NOTES
Peripheral Block    Pre-sedation assessment completed: 5/4/2024 9:00 AM    Patient reassessed immediately prior to procedure    Patient location during procedure: pre-op  Reason for block: procedure for pain, at surgeon's request, post-op pain management and secondary anesthetic  Performed by  Anesthesiologist: Sky Baugh MD  Assisted by: Janelle Osuna RN  Preanesthetic Checklist  Completed: patient identified, IV checked, site marked, risks and benefits discussed, surgical consent, monitors and equipment checked, pre-op evaluation and timeout performed  Prep:  Pt Position: supine  Sterile barriers:cap, gloves, mask and washed/disinfected hands  Prep: ChloraPrep  Patient monitoring: blood pressure monitoring, continuous pulse oximetry and EKG  Procedure    Sedation: no  Performed under: local infiltration  Guidance:ultrasound guided and landmark technique    ULTRASOUND INTERPRETATION.  Using ultrasound guidance a 20 G gauge needle was placed in close proximity to the femoral nerve, at which point, under ultrasound guidance anesthetic was injected in the area of the nerve and spread of the anesthesia was seen on ultrasound in close proximity thereto.  There were no abnormalities seen on ultrasound; a digital image was taken; and the patient tolerated the procedure with no complications. Images:still images obtained, printed/placed on chart    Laterality:left  Block Type:fascia iliaca compartment  Injection Technique:single-shot  Needle Type:echogenic  Needle Gauge:20 G  Resistance on Injection: less than 15 psi    Medications Used: ropivacaine (NAROPIN) 0.5 % injection - Perineural   30 mL - 5/4/2024 9:26:00 AM      Medications  Preservative Free Saline:30ml    Post Assessment  Injection Assessment: negative aspiration for heme, no paresthesia on injection and incremental injection  Patient Tolerance:comfortable throughout block  Complications:no  Additional Notes  Pre-procedure:  Peripheral nerve  block performed preoperatively prior to the start of anesthesia time at the request of the patient and the surgeon for the management of postoperative acute surgical pain as well as for a secondary intraoperative anesthetic (general anesthesia is the primary intraoperative anesthetic); patient identified; pre-procedure vital signs, all relevant labs/studies, complete medical/surgical/anesthetic history, full medication list, full allergy list, and NPO status obtained/reviewed; physical assessment performed; anesthetic options, side effects, potential complications, risks, and benefits discussed; questions answered; patient wishes to proceed with the procedure; written anesthesia procedure consent obtained; patient cleared for procedure; time out performed; IV access in situ    Procedure:  ASA monitor placed; supplemental oxygen provided; patient positioned; hand hygiene performed; sterile technique maintained throughout the procedure; sterile prep applied; insertion site determined by anatomical landmarks, palpation, and ultrasound imaging; live ultrasound guidance throughout the procedure; target nerves/landmarks identified on live ultrasound; skin and subcutaneous tissues numbed by injection of 1% lidocaine; 4 inch 20G Preventice Ultra 360 Insulated Echogenic Needle used; realtime needle advancement and placement in the target anatomic plane witnessed on live ultrasound; negative aspiration prior to injection; correct needle placement confirmed on live ultrasound by local anesthetic spread in the correct anatomic plane; local anesthetic mixture injected with negative aspiration prior to each injection and after each 1-5 mL injected; needle withdrawn; dressing applied; ultrasound image printed and placed in the patient's permanent medical record    Post-procedure:  Peripheral nerve block placed successfully; good block; no apparent complications; minimal estimated blood loss; vital signs stable throughout; see  nurse's notes for vitals; transported to the OR, general anesthesia induced, and surgery started

## 2024-05-05 LAB
ANION GAP SERPL CALCULATED.3IONS-SCNC: 6 MMOL/L (ref 5–15)
BASOPHILS # BLD AUTO: 0.02 10*3/MM3 (ref 0–0.2)
BASOPHILS NFR BLD AUTO: 0.2 % (ref 0–1.5)
BUN SERPL-MCNC: 21 MG/DL (ref 8–23)
BUN/CREAT SERPL: 21 (ref 7–25)
CALCIUM SPEC-SCNC: 8.6 MG/DL (ref 8.6–10.5)
CHLORIDE SERPL-SCNC: 100 MMOL/L (ref 98–107)
CO2 SERPL-SCNC: 27 MMOL/L (ref 22–29)
CREAT SERPL-MCNC: 1 MG/DL (ref 0.57–1)
DEPRECATED RDW RBC AUTO: 49.1 FL (ref 37–54)
EGFRCR SERPLBLD CKD-EPI 2021: 57.4 ML/MIN/1.73
EOSINOPHIL # BLD AUTO: 0.01 10*3/MM3 (ref 0–0.4)
EOSINOPHIL NFR BLD AUTO: 0.1 % (ref 0.3–6.2)
ERYTHROCYTE [DISTWIDTH] IN BLOOD BY AUTOMATED COUNT: 14.2 % (ref 12.3–15.4)
GLUCOSE SERPL-MCNC: 144 MG/DL (ref 65–99)
HCT VFR BLD AUTO: 34.7 % (ref 34–46.6)
HGB BLD-MCNC: 10.6 G/DL (ref 12–15.9)
IMM GRANULOCYTES # BLD AUTO: 0.06 10*3/MM3 (ref 0–0.05)
IMM GRANULOCYTES NFR BLD AUTO: 0.5 % (ref 0–0.5)
LYMPHOCYTES # BLD AUTO: 1.33 10*3/MM3 (ref 0.7–3.1)
LYMPHOCYTES NFR BLD AUTO: 11.1 % (ref 19.6–45.3)
MCH RBC QN AUTO: 28.8 PG (ref 26.6–33)
MCHC RBC AUTO-ENTMCNC: 30.5 G/DL (ref 31.5–35.7)
MCV RBC AUTO: 94.3 FL (ref 79–97)
MONOCYTES # BLD AUTO: 1.02 10*3/MM3 (ref 0.1–0.9)
MONOCYTES NFR BLD AUTO: 8.5 % (ref 5–12)
NEUTROPHILS NFR BLD AUTO: 79.6 % (ref 42.7–76)
NEUTROPHILS NFR BLD AUTO: 9.51 10*3/MM3 (ref 1.7–7)
NRBC BLD AUTO-RTO: 0 /100 WBC (ref 0–0.2)
PLATELET # BLD AUTO: 331 10*3/MM3 (ref 140–450)
PMV BLD AUTO: 9.6 FL (ref 6–12)
POTASSIUM SERPL-SCNC: 4 MMOL/L (ref 3.5–5.2)
RBC # BLD AUTO: 3.68 10*6/MM3 (ref 3.77–5.28)
SODIUM SERPL-SCNC: 133 MMOL/L (ref 136–145)
WBC NRBC COR # BLD AUTO: 11.95 10*3/MM3 (ref 3.4–10.8)

## 2024-05-05 PROCEDURE — 25010000002 MORPHINE PER 10 MG: Performed by: HOSPITALIST

## 2024-05-05 PROCEDURE — 80048 BASIC METABOLIC PNL TOTAL CA: CPT | Performed by: ORTHOPAEDIC SURGERY

## 2024-05-05 PROCEDURE — 25010000002 CEFAZOLIN PER 500 MG: Performed by: ORTHOPAEDIC SURGERY

## 2024-05-05 PROCEDURE — 85025 COMPLETE CBC W/AUTO DIFF WBC: CPT | Performed by: ORTHOPAEDIC SURGERY

## 2024-05-05 PROCEDURE — 97162 PT EVAL MOD COMPLEX 30 MIN: CPT

## 2024-05-05 PROCEDURE — 97166 OT EVAL MOD COMPLEX 45 MIN: CPT

## 2024-05-05 RX ORDER — BISACODYL 5 MG/1
5 TABLET, DELAYED RELEASE ORAL DAILY PRN
Status: DISCONTINUED | OUTPATIENT
Start: 2024-05-05 | End: 2024-05-08 | Stop reason: HOSPADM

## 2024-05-05 RX ORDER — OXYCODONE HYDROCHLORIDE 5 MG/1
10 TABLET ORAL EVERY 4 HOURS PRN
Status: DISCONTINUED | OUTPATIENT
Start: 2024-05-05 | End: 2024-05-08 | Stop reason: HOSPADM

## 2024-05-05 RX ORDER — AMOXICILLIN 250 MG
2 CAPSULE ORAL 2 TIMES DAILY PRN
Status: DISCONTINUED | OUTPATIENT
Start: 2024-05-05 | End: 2024-05-08 | Stop reason: HOSPADM

## 2024-05-05 RX ORDER — BISACODYL 10 MG
10 SUPPOSITORY, RECTAL RECTAL DAILY PRN
Status: DISCONTINUED | OUTPATIENT
Start: 2024-05-05 | End: 2024-05-08 | Stop reason: HOSPADM

## 2024-05-05 RX ORDER — POLYETHYLENE GLYCOL 3350 17 G/17G
17 POWDER, FOR SOLUTION ORAL 2 TIMES DAILY
Status: DISCONTINUED | OUTPATIENT
Start: 2024-05-05 | End: 2024-05-08 | Stop reason: HOSPADM

## 2024-05-05 RX ADMIN — Medication 10 ML: at 20:08

## 2024-05-05 RX ADMIN — ASPIRIN 81 MG CHEWABLE TABLET 81 MG: 81 TABLET CHEWABLE at 20:08

## 2024-05-05 RX ADMIN — SODIUM CHLORIDE 2000 MG: 900 INJECTION INTRAVENOUS at 03:09

## 2024-05-05 RX ADMIN — OXYCODONE 5 MG: 5 TABLET ORAL at 03:09

## 2024-05-05 RX ADMIN — OXYCODONE 5 MG: 5 TABLET ORAL at 09:30

## 2024-05-05 RX ADMIN — POLYETHYLENE GLYCOL 3350 17 G: 17 POWDER, FOR SOLUTION ORAL at 20:08

## 2024-05-05 RX ADMIN — OXYCODONE 10 MG: 5 TABLET ORAL at 13:36

## 2024-05-05 RX ADMIN — PANTOPRAZOLE SODIUM 40 MG: 40 TABLET, DELAYED RELEASE ORAL at 05:03

## 2024-05-05 RX ADMIN — SODIUM CHLORIDE 2000 MG: 900 INJECTION INTRAVENOUS at 09:30

## 2024-05-05 RX ADMIN — MORPHINE SULFATE 4 MG: 4 INJECTION, SOLUTION INTRAMUSCULAR; INTRAVENOUS at 15:58

## 2024-05-05 RX ADMIN — ASPIRIN 81 MG CHEWABLE TABLET 81 MG: 81 TABLET CHEWABLE at 09:30

## 2024-05-05 RX ADMIN — LEVOTHYROXINE SODIUM 50 MCG: 0.05 TABLET ORAL at 05:03

## 2024-05-05 NOTE — THERAPY EVALUATION
Patient Name: Yancy Mcdonnell  : 1945    MRN: [id number]                              Today's Date: 2024       Admit Date: 5/3/2024    Visit Dx:     ICD-10-CM ICD-9-CM   1. Closed fracture of left hip, initial encounter  S72.002A 820.8     Patient Active Problem List   Diagnosis    Allergic rhinitis    Lung nodule    Dyslipidemia    History of thrombosis    Primary hypertension    Mitral valve insufficiency    Osteoarthritis    Osteopenia of multiple sites    Rheumatoid arthritis    Spinal stenosis of cervical region    Venous insufficiency    Gastro-esophageal reflux disease without esophagitis    History of herpes simplex infection    Overweight with body mass index (BMI) of 28 to 28.9 in adult    Elevated alkaline phosphatase level    Diaphragmatic hernia without obstruction or gangrene    Thrombophlebitis    H/O fall    Deviated septum    Abnormal urinalysis    Long term (current) use of aspirin    Urinary frequency    Stress incontinence of urine    Presence of pessary    Atrophic vaginitis    Difficulty swallowing    Subclinical hypothyroidism    Balance problem    Anemia    Diverticulitis    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 bleeding    Esophageal motility disorder    Pure hypercholesterolemia    Seasonal allergies    Thyroid disease    Back pain    Other diseases of stomach and duodenum    Leg wound, right    Hip fracture     Past Medical History:   Diagnosis Date    Allergic rhinitis 2015    Ankle fracture, right 2022    Body mass index 29.0-29.9, adult 2018    Cervical spinal stenosis 2017    Cholelithiasis     Class 1 obesity due to excess calories with serious comorbidity and body mass index (BMI) of 30.0 to 30.9 in adult 10/14/2020    Closed bimalleolar fracture of right ankle 2022    DDD (degenerative disc disease), cervical     DDD (degenerative disc disease), lumbar     DDD (degenerative disc disease), thoracic     Difficulty walking     Poor  balance    Diverticulosis     Dyslipidemia 12/05/2012    Elevated brain natriuretic peptide (BNP) level 01/06/2017    Eustachian tube disorder, right 02/18/2019    Fall 02/2022    Fatigue 06/02/2015    Female cystocele 06/02/2015    Hiatal hernia     Hiatal hernia with gastroesophageal reflux 01/06/2017    History of blood clots 1980    leg s/p fx    History of DVT (deep vein thrombosis) 06/02/2015    History of herpes simplex infection 07/25/2020    Hyperlipidemia     Hypertension 06/10/2016    Left rib fracture 05/2017    11TH RIB     Migraine headache 06/02/2015    Mitral insufficiency 01/16/2017    MILD    Osteoarthritis 05/20/2014    Osteopenia 12/05/2012    Over weight 06/14/2018    Peripheral edema 01/05/2017    PFO (patent foramen ovale) 01/16/2017    Pulmonary nodule 05/04/2017    DR CHUN FOLLOWS    RHA (rheumatoid arthritis) 12/05/2012    Scleritis 06/02/2015    Scoliosis     Thrombophlebitis 02/19/2022    Acute right lower extremity superficial thrombophlebitis noted in the great saphenous (below knee) and varicosity (below knee).      Tricuspid insufficiency     MILD    Urinary urgency     Varicose veins of other specified sites     Venous insufficiency 09/12/2018     Past Surgical History:   Procedure Laterality Date    ANKLE OPEN REDUCTION INTERNAL FIXATION Right 02/18/2022    Procedure: ANKLE OPEN REDUCTION INTERNAL FIXATION;  Surgeon: DEAN Tobias DPM;  Location: [facility] MAIN OR;  Service: Podiatry;  Laterality: Right;    COLONOSCOPY  07/20/2017    EGD/ COLONSCOPY LARGE HIATAL HERNIA. MILD DIVERTICULOSIS. OTHERWISE NORMAL.    ESOPHAGOSCOPY / EGD  07/28/2023    Dr. Davila    INGUINAL HERNIA REPAIR Right     ORIF ANKLE FRACTURE Right 02/18/2022    Dr. Tobias    TOTAL ABDOMINAL HYSTERECTOMY  1985    W/BSO WITH A/P REPAIR 1985 BENIGN REASONS DR. DRAKE      General Information       Row Name 05/05/24 5441          Physical Therapy Time and Intention    Document Type evaluation  -EL     Mode of  Treatment individual therapy;physical therapy  -       Row Name 05/05/24 1841          General Information    Prior Level of Function independent:;all household mobility;ADL's  -[facility] Name 05/05/24 1841          Living Environment    People in Home spouse  reports spouse is unable to provide much assistance.  -       Row Name 05/05/24 1841          Cognition    Orientation Status (Cognition) oriented x 4  -       Row Name 05/05/24 1841          Safety Issues, Functional Mobility    Impairments Affecting Function (Mobility) balance;endurance/activity tolerance;pain  -               User Key  (r) = Recorded By, (t) = Taken By, (c) = Cosigned By      Initials Name Provider Type    Contreras Daily PT Physical Therapist                   Mobility       Row Name 05/05/24 0805          Bed Mobility    Bed Mobility supine-sit  -EL     Supine-Sit Faulk (Bed Mobility) 2 person assist;moderate assist (50% patient effort)  -EL     Assistive Device (Bed Mobility) bed rails;draw sheet;head of bed elevated  -[facility] Name 05/05/24 0805          Bed-Chair Transfer    Bed-Chair Faulk (Transfers) 2 person assist;moderate assist (50% patient effort)  -EL     Assistive Device (Bed-Chair Transfers) walker, front-wheeled  -[facility] Name 05/05/24 0805          Sit-Stand Transfer    Sit-Stand Faulk (Transfers) 2 person assist;moderate assist (50% patient effort)  -EL     Assistive Device (Sit-Stand Transfers) walker, front-wheeled  -[facility] Name 05/05/24 0805          Gait/Stairs (Locomotion)    Patient was able to Ambulate no, other medical factors prevent ambulation  -EL     Reason Patient was unable to Ambulate Uncontrolled Pain;Limited Motor Function related to Block  Mild effects remained from nerve block  -               User Key  (r) = Recorded By, (t) = Taken By, (c) = Cosigned By      Initials Name Provider Type    Contreras Daily PT Physical Therapist                    Obj/Interventions       [facility] Name 05/05/24 1843          Range of Motion Comprehensive    General Range of Motion lower extremity range of motion deficits identified  -EL     Comment, General Range of Motion Painful in LLE limited AROM approx 50%  -[facility] Name 05/05/24 1843          Strength Comprehensive (MMT)    General Manual Muscle Testing (MMT) Assessment lower extremity strength deficits identified  -EL     Comment, General Manual Muscle Testing (MMT) Assessment RLE 3+/5, LLE 2/5 gross  -EL       [facility] Name 05/05/24 1843          Balance    Balance Assessment sitting static balance;standing static balance;standing dynamic balance  -EL     Static Sitting Balance contact guard  -EL     Static Standing Balance moderate assist  -EL     Dynamic Standing Balance moderate assist;2-person assist  -EL       [facility] Name 05/05/24 1843          Sensory Assessment (Somatosensory)    Sensory Assessment (Somatosensory) other (see comments)  remaining sensation defiict from nerve block  -EL               User Key  (r) = Recorded By, (t) = Taken By, (c) = Cosigned By      Initials Name Provider Type    EL Contreras Muse, PT Physical Therapist                   Goals/Plan       Row Name 05/05/24 1850          Bed Mobility Goal 1 (PT)    Activity/Assistive Device (Bed Mobility Goal 1, PT) bed mobility activities, all  -EL     [place] Level/Cues Needed (Bed Mobility Goal 1, PT) modified independence  -EL     Time Frame (Bed Mobility Goal 1, PT) long term goal (LTG);2 weeks  -[facility] Name 05/05/24 1850          Transfer Goal 1 (PT)    Activity/Assistive Device (Transfer Goal 1, PT) sit-to-stand/stand-to-sit;walker, rolling  -EL     [place] Level/Cues Needed (Transfer Goal 1, PT) modified independence  -EL     Time Frame (Transfer Goal 1, PT) long term goal (LTG);2 weeks  -[facility] Name 05/05/24 1850          Gait Training Goal 1 (PT)    Activity/Assistive Device (Gait Training Goal 1, PT) gait (walking locomotion)   -EL     [place] Level (Gait Training Goal 1, PT) modified independence  -EL     Distance (Gait Training Goal 1, PT) 150  -EL     Time Frame (Gait Training Goal 1, PT) long term goal (LTG);2 weeks  -EL       Row Name 05/05/24 9220          Therapy Assessment/Plan (PT)    Planned Therapy Interventions (PT) balance training;bed mobility training;gait training;home exercise program;ROM (range of motion);patient/family education;stair training;strengthening;stretching;transfer training  -EL               User Key  (r) = Recorded By, (t) = Taken By, (c) = Cosigned By      Initials Name Provider Type    Contreras Daily, PT Physical Therapist                   Clinical Impression       Row Name 05/05/24 1844          Pain Scale: FACES Pre/Post-Treatment    Pain: FACES Scale, Pretreatment 2-->hurts little bit  -EL     Posttreatment Pain Rating 6-->hurts even more  -EL     Pain Location - Side/Orientation Left  -EL     Pain Location - hip  -EL       Row Name 05/05/24 1846          Plan of Care Review    Plan of Care Reviewed With patient  -EL     Outcome Evaluation Pt is a 77 YO F admitted with L hip frx from fall, POD IM nailing and is to be WBAT. Pt reports she lives home with spouse, but states he is unable to provide much assistance. Pt is concerned for her animals, because she does not know if he will be able to care for them. States she has children in the area as well. Pt this date in significant pain, and has lingering effects from nerve block but was willing to attempt moblity. Pt requring Ax2 for bed mobility and transferring him to bedside chair. Pt is well below functional mobility and recommendaiton is SNF pending progress.  -EL       Row Name 05/05/24 8691          Therapy Assessment/Plan (PT)    Rehab Potential (PT) good, to achieve stated therapy goals  -EL     Criteria for Skilled Interventions Met (PT) yes  -EL     Therapy Frequency (PT) 5 times/wk  -EL     Predicted Duration of Therapy Intervention (PT)  Until d/c  -EL       Row Name 05/05/24 1844          Vital Signs    O2 Delivery Pre Treatment room air  -EL     O2 Delivery Intra Treatment room air  -EL     O2 Delivery Post Treatment room air  -EL     Pre Patient Position Supine  -EL     Intra Patient Position Standing  -EL     Post Patient Position Sitting  -EL       Row Name 05/05/24 1844          Positioning and Restraints    Pre-Treatment Position in bed  -EL     Post Treatment Position chair  -EL     In Chair notified nsg;reclined;call light within reach;encouraged to call for assist;exit alarm on  -EL               User Key  (r) = Recorded By, (t) = Taken By, (c) = Cosigned By      Initials Name Provider Type    Contreras Daily, PT Physical Therapist                   Outcome Measures       Row Name 05/05/24 1850 05/05/24 1238       How much help from another person do you currently need...    Turning from your back to your side while in flat bed without using bedrails? 3  -EL 2  -MH    Moving from lying on back to sitting on the side of a flat bed without bedrails? 2  -EL 2  -MH    Moving to and from a bed to a chair (including a wheelchair)? 2  -EL 2  -MH    Standing up from a chair using your arms (e.g., wheelchair, bedside chair)? 2  -EL 2  -MH    Climbing 3-5 steps with a railing? 1  -EL 1  -MH    To walk in hospital room? 1  -EL 1  -MH    AM-PAC 6 Clicks Score (PT) 11  -EL 10  -MH    Highest Level of Mobility Goal 4 --> Transfer to chair/commode  -EL 4 --> Transfer to chair/commode  -MH      Row Name 05/05/24 1850          Functional Assessment    Outcome Measure Options AM-PAC 6 Clicks Basic Mobility (PT)  -EL               User Key  (r) = Recorded By, (t) = Taken By, (c) = Cosigned By      Initials Name Provider Type    Barbara Murillo OT Occupational Therapist    Contreras Daily, PT Physical Therapist                                 Physical Therapy Education       Title: PT OT SLP Therapies (In Progress)       Topic: Physical Therapy (Done)        Point: Mobility training (Done)       Learning Progress Summary             Patient Acceptance, E,TB, VU by  at 5/5/2024 1851                         Point: Precautions (Done)       Learning Progress Summary             Patient Acceptance, E,TB, VU by  at 5/5/2024 1851                                         User Key       Initials Effective Dates Name Provider Type Discipline     06/23/20 -  Contreras Muse, PT Physical Therapist PT                  PT Recommendation and Plan  Planned Therapy Interventions (PT): balance training, bed mobility training, gait training, home exercise program, ROM (range of motion), patient/family education, stair training, strengthening, stretching, transfer training  Plan of Care Reviewed With: patient  Outcome Evaluation: Pt is a 77 YO F admitted with L hip frx from fall, POD IM nailing and is to be WBAT. Pt reports she lives home with spouse, but states he is unable to provide much assistance. Pt is concerned for her animals, because she does not know if he will be able to care for them. States she has children in the area as well. Pt this date in significant pain, and has lingering effects from nerve block but was willing to attempt moblity. Pt requring Ax2 for bed mobility and transferring him to bedside chair. Pt is well below functional mobility and recommendaiton is SNF pending progress.     Time Calculation:   PT Evaluation Complexity  History, PT Evaluation Complexity: 1-2 personal factors and/or comorbidities  Examination of Body Systems (PT Eval Complexity): total of 3 or more elements  Clinical Presentation (PT Evaluation Complexity): evolving  Clinical Decision Making (PT Evaluation Complexity): moderate complexity  Overall Complexity (PT Evaluation Complexity): moderate complexity     PT Charges       Row Name 05/05/24 1851             Time Calculation    Start Time 0805  -EL      Stop Time 0838  -      Time Calculation (min) 33 min  -EL      PT Received On 05/05/24   -WINSOME      PT - Next Appointment 05/06/24  -WINSOME      PT Goal Re-Cert Due Date 05/19/24  -WINSOME                User Key  (r) = Recorded By, (t) = Taken By, (c) = Cosigned By      Initials Name Provider Type    Contreras Daily PT Physical Therapist                  Therapy Charges for Today       Code Description Service Date Service Provider Modifiers Qty    78212894431 HC PT EVAL MOD COMPLEXITY 4 5/5/2024 Contreras Muse, PT GP 1            PT G-Codes  Outcome Measure Options: AM-PAC 6 Clicks Basic Mobility (PT)  AM-PAC 6 Clicks Score (PT): 11  PT Discharge Summary  Anticipated Discharge Disposition (PT): skilled nursing facility    Contreras Muse PT  5/5/2024

## 2024-05-05 NOTE — PROGRESS NOTES
[facility] MEDICINE SERVICE  DAILY PROGRESS NOTE    NAME: Yancy Mcdonnell  : 1945  MRN: [id number]      LOS: 1 day     PROVIDER OF SERVICE: David Brand MD    Chief Complaint: Hip fracture    Subjective:     Interval History:  History taken from: patient chart family RN  Pain controlled. Family at bedside     Review of Systems:   Review of Systems  All negative except as above   Objective:     Vital Signs  Temp:  [97.3 °F (36.3 °C)-97.8 °F (36.6 °C)] 97.4 °F (36.3 °C)  Heart Rate:  [] 87  Resp:  [14-19] 15  BP: (103-133)/(48-64) 109/48  Flow (L/min):  [2] 2   Body mass index is 32.92 kg/m².    Physical Exam  Physical Exam  AOx3 NAD  RRR S1-S2 audible  Lungs with fair air entry  Abdomen soft nontender nondistended  Left hip CDI  Scheduled Meds   aspirin, 81 mg, Oral, BID  [Held by provider] hydroCHLOROthiazide, 25 mg, Oral, Daily  levothyroxine, 50 mcg, Oral, Daily  [Held by provider] losartan, 50 mg, Oral, Q24H  pantoprazole, 40 mg, Oral, Q AM  sodium chloride, 10 mL, Intravenous, Q12H       PRN Meds     acetaminophen    aluminum-magnesium hydroxide-simethicone    lactated ringers    Morphine    ondansetron ODT **OR** ondansetron    oxyCODONE    phenylephrine    sodium chloride    sodium chloride   Infusions  lactated ringers, 9 mL/hr, Last Rate: 100 mL/hr (24 1021)  phenylephrine, 0.5-3 mcg/kg/min          Diagnostic Data    Results from last 7 days   Lab Units 24  0342   WBC 10*3/mm3 11.95*   HEMOGLOBIN g/dL 10.6*   HEMATOCRIT % 34.7   PLATELETS 10*3/mm3 331   GLUCOSE mg/dL 144*   CREATININE mg/dL 1.00   BUN mg/dL 21   SODIUM mmol/L 133*   POTASSIUM mmol/L 4.0   ANION GAP mmol/L 6.0       XR Hip With or Without Pelvis 1 View Left    Result Date: 2024  Impression: Patient is status post internal fixation of the left proximal femur. Adjacent postoperative subcutaneous emphysema is noted. Skin staples are seen. Bones are demineralized. The visualized portion of the bony  pelvis, right hip, and bilateral sacroiliac joints are unremarkable. Electronically Signed: Milton Cabral MD  5/4/2024 12:01 PM EDT  Workstation ID: [id number]    MRI Pelvis Without Contrast    Result Date: 5/3/2024  Impression: 1.Mildly comminuted fracture of the left greater trochanter with primary transverse fracture line. No significant displacement at this time. 2.Fracture line extends into the upper-posterior intertrochanteric region. This appears incomplete on these images, as above. Orthopedic consultation is advised for treatment considerations. 3.No other definite fracture is visualized at this time. 4.Edema surrounds the trochanteric fracture with hematoma in the trochanteric bursa. 5.Bilateral gluteal tendinopathy and suspected partial tears. 6.Moderate hip and right knee osteoarthritis. Electronically Signed: Girish Olivas  5/3/2024 10:36 PM EDT  Workstation ID: [id number]    MRI Femur Left Without Contrast    Result Date: 5/3/2024  Impression: 1.Mildly comminuted fracture of the left greater trochanter with primary transverse fracture line. No significant displacement at this time. 2.Fracture line extends into the upper-posterior intertrochanteric region. This appears incomplete on these images, as above. Orthopedic consultation is advised for treatment considerations. 3.No other definite fracture is visualized at this time. 4.Edema surrounds the trochanteric fracture with hematoma in the trochanteric bursa. 5.Bilateral gluteal tendinopathy and suspected partial tears. 6.Moderate hip and right knee osteoarthritis. Electronically Signed: Girish Olivas  5/3/2024 10:36 PM EDT  Workstation ID: [id number]    CT Lower Extremity Left Without Contrast    Result Date: 5/3/2024  Impression: 1.Transverse fracture of the greater trochanter without significant displacement at this time. 2.No definite fracture line through the intertrochanteric region or femoral neck on this exam. MRI would be most sensitive for a subtle  nondisplaced fracture which could be occult on this exam. 3.Fluid in the greater trochanteric bursa, likely hematoma. 4.Moderate left hip osteoarthritis. 5.Advanced degenerative changes in the lower lumbar spine with anterolisthesis of L4 on L5 and suspected moderate to severe canal narrowing. Electronically Signed: Girsih Deisy  5/3/2024 3:52 PM EDT  Workstation ID: [id number]    XR Hip With or Without Pelvis 2 - 3 View Left    Result Date: 5/3/2024  Impression: 1.Left greater trochanteric fracture. CT might be useful to better assess. Electronically Signed: Jh Broussard MD  5/3/2024 12:49 PM EDT  Workstation ID: [id number]       I reviewed the patient's new clinical results.  I reviewed the patient's new imaging results and agree with the interpretation.    Assessment/Plan:     Active and Resolved Problems  Active Hospital Problems    Diagnosis  POA    **Hip fracture [S72.009A]  Yes      Resolved Hospital Problems   No resolved problems to display.       78-year-old female with history of hypertension, hypothyroidism, RA, GERD, cervical stenosis admitted to [facility] 5/3/2024 under orthopedics  after sustaining a fall at home.  Found to have left greater trochanteric fracture.  Medicine consulted for medical management    #Fall complicated byleft intertrochanteric fracture   Status post left intramedullary nailing of intertrochanteric hip fracture 5/4  Aspirin 81 twice daily per Ortho  Oxycodone as needed for pain.  IV morphine only for breakthrough  Aggressive bowel regimen  PT/OT    #Hypothyroidism  Continue home dose of levothyroxine     #Hypertension  Hold losartan and thiazide given soft blood pressure  Resume as blood pressure permits     #GERD  Continue PPI    #Acute blood loss anemia on chronic anemia  Monitor H&H  Transfuse PRBCs to keep hemoglobin more than 7    Will continue to follow-up    DVT prophylaxis:  Mechanical DVT prophylaxis orders are present.         Code status is   There are no questions  and answers to display.         Time: 30 minutes    Signature: Electronically signed by David Brand MD, 05/05/24, 12:05 EDT.  [facility] Hospitalist Team

## 2024-05-05 NOTE — PLAN OF CARE
Goal Outcome Evaluation:  Plan of Care Reviewed With: patient        Progress: improving  Outcome Evaluation: Pt is 79 y/o post fall resulting in hip Fx. She is POD 1 IM hip nailing and WBAT to her left surgical leg. Pt lives alone and is (I) at baseline but will need rehab now. Getting OOB to the chair this date required 2-person assist and LB ADL currently reqiring total assist but pt can feed, groom self with setup. Anticipate good progress. Recomend SNF at d/c.      Anticipated Discharge Disposition (OT): skilled nursing facility

## 2024-05-05 NOTE — PLAN OF CARE
Called and spoke to patient, she wants to know when she should be scheduled for her annual physical.   She is open to an office visit, telephone or video.    Patient states she is on an oral chemo and IV    Has osteoporosis, wants to know what she should be taking.   Goal Outcome Evaluation:  Plan of Care Reviewed With: patient        Progress: improving  Outcome Evaluation: Pt pain managed with meds per MAR. WBAT on the left leg. VSS and call light within reach. Plan ongoing.

## 2024-05-05 NOTE — THERAPY EVALUATION
Patient Name: Yancy Mcdonnell  : 1945    MRN: [id number]                              Today's Date: 2024       Admit Date: 5/3/2024    Visit Dx:     ICD-10-CM ICD-9-CM   1. Closed fracture of left hip, initial encounter  S72.002A 820.8     Patient Active Problem List   Diagnosis    Allergic rhinitis    Lung nodule    Dyslipidemia    History of thrombosis    Primary hypertension    Mitral valve insufficiency    Osteoarthritis    Osteopenia of multiple sites    Rheumatoid arthritis    Spinal stenosis of cervical region    Venous insufficiency    Gastro-esophageal reflux disease without esophagitis    History of herpes simplex infection    Overweight with body mass index (BMI) of 28 to 28.9 in adult    Elevated alkaline phosphatase level    Diaphragmatic hernia without obstruction or gangrene    Thrombophlebitis    H/O fall    Deviated septum    Abnormal urinalysis    Long term (current) use of aspirin    Urinary frequency    Stress incontinence of urine    Presence of pessary    Atrophic vaginitis    Difficulty swallowing    Subclinical hypothyroidism    Balance problem    Anemia    Diverticulitis    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 bleeding    Esophageal motility disorder    Pure hypercholesterolemia    Seasonal allergies    Thyroid disease    Back pain    Other diseases of stomach and duodenum    Leg wound, right    Hip fracture     Past Medical History:   Diagnosis Date    Allergic rhinitis 2015    Ankle fracture, right 2022    Body mass index 29.0-29.9, adult 2018    Cervical spinal stenosis 2017    Cholelithiasis     Class 1 obesity due to excess calories with serious comorbidity and body mass index (BMI) of 30.0 to 30.9 in adult 10/14/2020    Closed bimalleolar fracture of right ankle 2022    DDD (degenerative disc disease), cervical     DDD (degenerative disc disease), lumbar     DDD (degenerative disc disease), thoracic     Difficulty walking     Poor  balance    Diverticulosis     Dyslipidemia 12/05/2012    Elevated brain natriuretic peptide (BNP) level 01/06/2017    Eustachian tube disorder, right 02/18/2019    Fall 02/2022    Fatigue 06/02/2015    Female cystocele 06/02/2015    Hiatal hernia     Hiatal hernia with gastroesophageal reflux 01/06/2017    History of blood clots 1980    leg s/p fx    History of DVT (deep vein thrombosis) 06/02/2015    History of herpes simplex infection 07/25/2020    Hyperlipidemia     Hypertension 06/10/2016    Left rib fracture 05/2017    11TH RIB     Migraine headache 06/02/2015    Mitral insufficiency 01/16/2017    MILD    Osteoarthritis 05/20/2014    Osteopenia 12/05/2012    Over weight 06/14/2018    Peripheral edema 01/05/2017    PFO (patent foramen ovale) 01/16/2017    Pulmonary nodule 05/04/2017    DR CHUN FOLLOWS    RHA (rheumatoid arthritis) 12/05/2012    Scleritis 06/02/2015    Scoliosis     Thrombophlebitis 02/19/2022    Acute right lower extremity superficial thrombophlebitis noted in the great saphenous (below knee) and varicosity (below knee).      Tricuspid insufficiency     MILD    Urinary urgency     Varicose veins of other specified sites     Venous insufficiency 09/12/2018     Past Surgical History:   Procedure Laterality Date    ANKLE OPEN REDUCTION INTERNAL FIXATION Right 02/18/2022    Procedure: ANKLE OPEN REDUCTION INTERNAL FIXATION;  Surgeon: DEAN Tobias DPM;  Location: [facility] MAIN OR;  Service: Podiatry;  Laterality: Right;    COLONOSCOPY  07/20/2017    EGD/ COLONSCOPY LARGE HIATAL HERNIA. MILD DIVERTICULOSIS. OTHERWISE NORMAL.    ESOPHAGOSCOPY / EGD  07/28/2023    Dr. Davila    INGUINAL HERNIA REPAIR Right     ORIF ANKLE FRACTURE Right 02/18/2022    Dr. Tobias    TOTAL ABDOMINAL HYSTERECTOMY  1985    W/BSO WITH A/P REPAIR 1985 BENIGN REASONS DR. DRAKE      General Information       Row Name 05/05/24 1223          General Information    Prior Level of Function independent:;all household  mobility;ADL's  -     Barriers to Rehab medically complex  -       Row Name 05/05/24 1225          Living Environment    People in Home alone  -       Row Name 05/05/24 1225          Cognition    Orientation Status (Cognition) oriented x 4  -       Row Name 05/05/24 1225          Safety Issues, Functional Mobility    Impairments Affecting Function (Mobility) balance;endurance/activity tolerance;pain  -               User Key  (r) = Recorded By, (t) = Taken By, (c) = Cosigned By      Initials Name Provider Type     Barbara Barboza OT Occupational Therapist                     Mobility/ADL's       Row Name 05/05/24 1228          Bed Mobility    Bed Mobility supine-sit  -     Supine-Sit [place] (Bed Mobility) 2 person assist;moderate assist (50% patient effort)  -     Assistive Device (Bed Mobility) bed rails;draw sheet;head of bed elevated  -[facility] Name 05/05/24 1228          Transfers    Transfers bed-chair transfer  -       Row Name 05/05/24 1228          Bed-Chair Transfer    Bed-Chair [place] (Transfers) 2 person assist;moderate assist (50% patient effort)  -     Assistive Device (Bed-Chair Transfers) walker, front-wheeled  -       Row Name 05/05/24 1228          Functional Mobility    Functional Mobility- Comment made ambulatory transfer with difficulty  -       Row Name 05/05/24 1228          Activities of Daily Living    BADL Assessment/Intervention lower body dressing;toileting  -[facility] Name 05/05/24 1228          Lower Body Dressing Assessment/Training    [place] Level (Lower Body Dressing) lower body dressing skills;dependent (less than 25% patient effort)  -       Row Name 05/05/24 1228          Toileting Assessment/Training    [place] Level (Toileting) toileting skills;dependent (less than 25% patient effort)  -               User Key  (r) = Recorded By, (t) = Taken By, (c) = Cosigned By      Initials Name Provider Type    Barbara Murillo OT  Occupational Therapist                   Obj/Interventions       Row Name 05/05/24 1229          Sensory Assessment (Somatosensory)    Sensory Subjective Reports numbness  -       Row Name 05/05/24 1229          Vision Assessment/Intervention    Visual Impairment/Limitations corrective lenses for reading  -[facility] Name 05/05/24 1229          Range of Motion Comprehensive    Comment, General Range of Motion surgical leg impaired due to pain, swelling 50%  -[facility] Name 05/05/24 1229          Strength Comprehensive (MMT)    Comment, General Manual Muscle Testing (MMT) Assessment BUE 3+/5, LLE grossly 2-/5, RLE 3+/5  -               User Key  (r) = Recorded By, (t) = Taken By, (c) = Cosigned By      Initials Name Provider Type     Barbara Barboza OT Occupational Therapist                   Goals/Plan       [facility] Name 05/05/24 1237          Bed Mobility Goal 1 (OT)    Activity/Assistive Device (Bed Mobility Goal 1, OT) bed mobility activities, all  -     [place] Level/Cues Needed (Bed Mobility Goal 1, OT) minimum assist (75% or more patient effort)  -     Time Frame (Bed Mobility Goal 1, OT) 2 weeks  -[facility] Name 05/05/24 1237          Transfer Goal 1 (OT)    Activity/Assistive Device (Transfer Goal 1, OT) transfers, all  -     [place] Level/Cues Needed (Transfer Goal 1, OT) minimum assist (75% or more patient effort)  -     Time Frame (Transfer Goal 1, OT) 2 weeks  -[facility] Name 05/05/24 1237          Toileting Goal 1 (OT)    Activity/Device (Toileting Goal 1, OT) toileting skills, all  -     [place] Level/Cues Needed (Toileting Goal 1, OT) minimum assist (75% or more patient effort)  -     Time Frame (Toileting Goal 1, OT) 2 weeks  -               User Key  (r) = Recorded By, (t) = Taken By, (c) = Cosigned By      Initials Name Provider Type    Barbara Murillo OT Occupational Therapist                   Clinical Impression       Row Name 05/05/24 1234           Pain Assessment    Additional Documentation Pain Scale: FACES Pre/Post-Treatment (Group)  -       Row Name 05/05/24 1234          Pain Scale: FACES Pre/Post-Treatment    Pain: FACES Scale, Pretreatment 2-->hurts little bit  -     Posttreatment Pain Rating 6-->hurts even more  -     Pain Location - Side/Orientation Left  -     Pain Location lower  -     Pain Location - extremity  -       Row Name 05/05/24 1234          Plan of Care Review    Plan of Care Reviewed With patient  -     Progress improving  -     Outcome Evaluation Pt is 77 y/o post fall resulting in hip Fx. She is POD 1 IM hip nailing and WBAT to her left surgical leg. Pt lives alone and is (I) at baseline but will need rehab now. Getting OOB to the chair this date required 2-person assist and LB ADL currently reqiring total assist but pt can feed, groom self with setup. Anticipate good progress. Recomend SNF at d/c.  -       Row Name 05/05/24 1234          Therapy Assessment/Plan (OT)    Rehab Potential (OT) good, to achieve stated therapy goals  -     Criteria for Skilled Therapeutic Interventions Met (OT) skilled treatment is necessary  -     Therapy Frequency (OT) 5 times/wk  -     Predicted Duration of Therapy Intervention (OT) until d/c  -       Row Name 05/05/24 1234          Therapy Plan Review/Discharge Plan (OT)    Anticipated Discharge Disposition (OT) skilled nursing facility  -       Row Name 05/05/24 1234          Vital Signs    O2 Delivery Pre Treatment room air  -     O2 Delivery Intra Treatment room air  -     O2 Delivery Post Treatment room air  -     Pre Patient Position Supine  -     Intra Patient Position Standing  -     Post Patient Position Sitting  -       Row Name 05/05/24 1234          Positioning and Restraints    Pre-Treatment Position in bed  -     Post Treatment Position chair  -     In Chair notified nsg;call light within reach;encouraged to call for assist;exit alarm on;legs  elevated  -               User Key  (r) = Recorded By, (t) = Taken By, (c) = Cosigned By      Initials Name Provider Type    Barbara Murillo OT Occupational Therapist                   Outcome Measures       Row Name 05/05/24 1238 05/05/24 0309       How much help from another person do you currently need...    Turning from your back to your side while in flat bed without using bedrails? 2  -MH 2  -KW    Moving from lying on back to sitting on the side of a flat bed without bedrails? 2  -MH 2  -KW    Moving to and from a bed to a chair (including a wheelchair)? 2  -MH 2  -KW    Standing up from a chair using your arms (e.g., wheelchair, bedside chair)? 2  - 2  -KW    Climbing 3-5 steps with a railing? 1  -MH 1  -KW    To walk in hospital room? 1  -MH 2  -KW    AM-PAC 6 Clicks Score (PT) 10  -MH 11  -KW    Highest Level of Mobility Goal 4 --> Transfer to chair/commode  -MH 4 --> Transfer to chair/commode  -KW      Row Name 05/05/24 0227          How much help from another person do you currently need...    Turning from your back to your side while in flat bed without using bedrails? 2  -VB     Moving from lying on back to sitting on the side of a flat bed without bedrails? 2  -VB     Moving to and from a bed to a chair (including a wheelchair)? 2  -VB     Standing up from a chair using your arms (e.g., wheelchair, bedside chair)? 1  -VB     Climbing 3-5 steps with a railing? 1  -VB     To walk in hospital room? 1  -VB     AM-PAC 6 Clicks Score (PT) 9  -VB     Highest Level of Mobility Goal 3 --> Sit at edge of bed  -VB               User Key  (r) = Recorded By, (t) = Taken By, (c) = Cosigned By      Initials Name Provider Type    Barbara Murillo OT Occupational Therapist    Dee Dee Jimenez LPN Licensed Nurse    Hanane Steinberg RN Registered Nurse                    Occupational Therapy Education       Title: PT OT SLP Therapies (In Progress)       Topic: Occupational Therapy (In Progress)        Point: ADL training (Done)       Description:   Instruct learner(s) on proper safety adaptation and remediation techniques during self care or transfers.   Instruct in proper use of assistive devices.                  Learning Progress Summary             Patient Acceptance, E,TB,D, VU,DU,NR by  at 5/5/2024 1238                         Point: Home exercise program (Not Started)       Description:   Instruct learner(s) on appropriate technique for monitoring, assisting and/or progressing therapeutic exercises/activities.                  Learner Progress:  Not documented in this visit.              Point: Precautions (Done)       Description:   Instruct learner(s) on prescribed precautions during self-care and functional transfers.                  Learning Progress Summary             Patient Acceptance, E,TB,D, VU,DU,NR by  at 5/5/2024 1238                         Point: Body mechanics (Done)       Description:   Instruct learner(s) on proper positioning and spine alignment during self-care, functional mobility activities and/or exercises.                  Learning Progress Summary             Patient Acceptance, E,TB,D, VU,DU,NR by  at 5/5/2024 1238                                         User Key       Initials Effective Dates Name Provider Type Discipline     06/16/21 -  Barbara Barboza OT Occupational Therapist OT                  OT Recommendation and Plan  Therapy Frequency (OT): 5 times/wk  Plan of Care Review  Plan of Care Reviewed With: patient  Progress: improving  Outcome Evaluation: Pt is 77 y/o post fall resulting in hip Fx. She is POD 1 IM hip nailing and WBAT to her left surgical leg. Pt lives alone and is (I) at baseline but will need rehab now. Getting OOB to the chair this date required 2-person assist and LB ADL currently reqiring total assist but pt can feed, groom self with setup. Anticipate good progress. Recomend SNF at d/c.     Time Calculation:         Time Calculation- OT        Row Name 05/05/24 1238             Time Calculation- OT    OT Start Time 0805  -      OT Stop Time 0838  -      OT Time Calculation (min) 33 min  -      Total Timed Code Minutes- OT 0 minute(s)  -      OT Received On 05/05/24  -      OT - Next Appointment 05/06/24  -      OT Goal Re-Cert Due Date 05/19/24  -                User Key  (r) = Recorded By, (t) = Taken By, (c) = Cosigned By      Initials Name Provider Type     Barbara Barboza OT Occupational Therapist                  Therapy Charges for Today       Code Description Service Date Service Provider Modifiers Qty    [id number] HC OT EVAL MOD COMPLEXITY 3 5/5/2024 Barbara Barboza OT GO 1                 Barbara Barboza OT  5/5/2024

## 2024-05-05 NOTE — PROGRESS NOTES
Patient is postop day 1 hip intertrochanteric nailing.  Overall she is doing fine.  No major concerns at this time.  Continue mobilization and physical therapy.  Plan will be discharged to rehab when appropriate.  Follow-up in the office in 3 weeks

## 2024-05-05 NOTE — PLAN OF CARE
Goal Outcome Evaluation:  Plan of Care Reviewed With: patient           Outcome Evaluation: Pt is a 77 YO F admitted with L hip frx from fall, POD IM nailing and is to be WBAT. Pt reports she lives home with spouse, but states he is unable to provide much assistance. Pt is concerned for her animals, because she does not know if he will be able to care for them. States she has children in the area as well. Pt this date in significant pain, and has lingering effects from nerve block but was willing to attempt moblity. Pt requring Ax2 for bed mobility and transferring him to bedside chair. Pt is well below functional mobility and recommendaiton is SNF pending progress.      Anticipated Discharge Disposition (PT): skilled nursing facility

## 2024-05-05 NOTE — PLAN OF CARE
Goal Outcome Evaluation:    Patient able to make needs known. Patient sat up in chair today, asst x2 with walker and gait belt. Treating pain per MAR. Family at bedside participating in care.

## 2024-05-06 LAB
ANION GAP SERPL CALCULATED.3IONS-SCNC: 9 MMOL/L (ref 5–15)
BASOPHILS # BLD AUTO: 0.03 10*3/MM3 (ref 0–0.2)
BASOPHILS NFR BLD AUTO: 0.3 % (ref 0–1.5)
BUN SERPL-MCNC: 23 MG/DL (ref 8–23)
BUN/CREAT SERPL: 33.3 (ref 7–25)
CALCIUM SPEC-SCNC: 8.2 MG/DL (ref 8.6–10.5)
CHLORIDE SERPL-SCNC: 106 MMOL/L (ref 98–107)
CO2 SERPL-SCNC: 25 MMOL/L (ref 22–29)
CREAT SERPL-MCNC: 0.69 MG/DL (ref 0.57–1)
DEPRECATED RDW RBC AUTO: 48.2 FL (ref 37–54)
EGFRCR SERPLBLD CKD-EPI 2021: 88.4 ML/MIN/1.73
EOSINOPHIL # BLD AUTO: 0.14 10*3/MM3 (ref 0–0.4)
EOSINOPHIL NFR BLD AUTO: 1.6 % (ref 0.3–6.2)
ERYTHROCYTE [DISTWIDTH] IN BLOOD BY AUTOMATED COUNT: 14.3 % (ref 12.3–15.4)
GLUCOSE SERPL-MCNC: 121 MG/DL (ref 65–99)
HCT VFR BLD AUTO: 32.1 % (ref 34–46.6)
HGB BLD-MCNC: 10.1 G/DL (ref 12–15.9)
IMM GRANULOCYTES # BLD AUTO: 0.06 10*3/MM3 (ref 0–0.05)
IMM GRANULOCYTES NFR BLD AUTO: 0.7 % (ref 0–0.5)
LYMPHOCYTES # BLD AUTO: 1.37 10*3/MM3 (ref 0.7–3.1)
LYMPHOCYTES NFR BLD AUTO: 15.6 % (ref 19.6–45.3)
MCH RBC QN AUTO: 29.3 PG (ref 26.6–33)
MCHC RBC AUTO-ENTMCNC: 31.5 G/DL (ref 31.5–35.7)
MCV RBC AUTO: 93 FL (ref 79–97)
MONOCYTES # BLD AUTO: 0.92 10*3/MM3 (ref 0.1–0.9)
MONOCYTES NFR BLD AUTO: 10.4 % (ref 5–12)
NEUTROPHILS NFR BLD AUTO: 6.29 10*3/MM3 (ref 1.7–7)
NEUTROPHILS NFR BLD AUTO: 71.4 % (ref 42.7–76)
NRBC BLD AUTO-RTO: 0 /100 WBC (ref 0–0.2)
PLATELET # BLD AUTO: 314 10*3/MM3 (ref 140–450)
PMV BLD AUTO: 9.8 FL (ref 6–12)
POTASSIUM SERPL-SCNC: 4.1 MMOL/L (ref 3.5–5.2)
RBC # BLD AUTO: 3.45 10*6/MM3 (ref 3.77–5.28)
SODIUM SERPL-SCNC: 140 MMOL/L (ref 136–145)
WBC NRBC COR # BLD AUTO: 8.81 10*3/MM3 (ref 3.4–10.8)

## 2024-05-06 PROCEDURE — 97535 SELF CARE MNGMENT TRAINING: CPT

## 2024-05-06 PROCEDURE — 97112 NEUROMUSCULAR REEDUCATION: CPT

## 2024-05-06 PROCEDURE — 80048 BASIC METABOLIC PNL TOTAL CA: CPT | Performed by: ORTHOPAEDIC SURGERY

## 2024-05-06 PROCEDURE — 97116 GAIT TRAINING THERAPY: CPT

## 2024-05-06 PROCEDURE — 85025 COMPLETE CBC W/AUTO DIFF WBC: CPT | Performed by: ORTHOPAEDIC SURGERY

## 2024-05-06 PROCEDURE — 97530 THERAPEUTIC ACTIVITIES: CPT

## 2024-05-06 RX ORDER — ASPIRIN 81 MG/1
81 TABLET, CHEWABLE ORAL 2 TIMES DAILY
Start: 2024-05-06 | End: 2024-06-03

## 2024-05-06 RX ORDER — OXYCODONE HYDROCHLORIDE 10 MG/1
10 TABLET ORAL EVERY 6 HOURS PRN
Start: 2024-05-06 | End: 2024-05-07

## 2024-05-06 RX ORDER — POLYETHYLENE GLYCOL 3350 17 G/17G
17 POWDER, FOR SOLUTION ORAL 2 TIMES DAILY
Start: 2024-05-06 | End: 2024-05-20

## 2024-05-06 RX ADMIN — POLYETHYLENE GLYCOL 3350 17 G: 17 POWDER, FOR SOLUTION ORAL at 09:30

## 2024-05-06 RX ADMIN — ASPIRIN 81 MG CHEWABLE TABLET 81 MG: 81 TABLET CHEWABLE at 19:42

## 2024-05-06 RX ADMIN — LEVOTHYROXINE SODIUM 50 MCG: 0.05 TABLET ORAL at 05:21

## 2024-05-06 RX ADMIN — POLYETHYLENE GLYCOL 3350 17 G: 17 POWDER, FOR SOLUTION ORAL at 19:42

## 2024-05-06 RX ADMIN — PANTOPRAZOLE SODIUM 40 MG: 40 TABLET, DELAYED RELEASE ORAL at 05:21

## 2024-05-06 RX ADMIN — ASPIRIN 81 MG CHEWABLE TABLET 81 MG: 81 TABLET CHEWABLE at 09:31

## 2024-05-06 RX ADMIN — ACETAMINOPHEN 650 MG: 325 TABLET, FILM COATED ORAL at 19:42

## 2024-05-06 RX ADMIN — OXYCODONE 5 MG: 5 TABLET ORAL at 17:08

## 2024-05-06 RX ADMIN — ACETAMINOPHEN 650 MG: 325 TABLET, FILM COATED ORAL at 09:30

## 2024-05-06 RX ADMIN — Medication 10 ML: at 09:31

## 2024-05-06 RX ADMIN — OXYCODONE 10 MG: 5 TABLET ORAL at 09:31

## 2024-05-06 NOTE — PLAN OF CARE
Goal Outcome Evaluation:  Plan of Care Reviewed With: patient, daughter        Progress: improving     Pt ambulated 10 feet today with PT/OT. Ambulated to chair from bed, and bedside commode with gait belt, walker an 2 assistive persons. Pt's pain has decreased today, only has pain/discomfort with ambulation. Plan to discharge to rehab facility today. Plan of care still ongoing.

## 2024-05-06 NOTE — PLAN OF CARE
"Assessment: Yancy Mcdonnell presents with ADL impairments affecting function including balance, endurance / activity tolerance, pain, and strength. Demonstrated functioning below baseline abilities indicate the need for continued skilled intervention while inpatient. Tolerating session today without incident. Will continue to follow and progress as tolerated.      Plan/Recommendations:   Moderate Intensity Therapy recommended post-acute care. This is recommended as therapy feels the patient would require 3-4 days per week and wouldn't tolerate \"3 hour daily\" rehab intensity. SNF would be the preferred choice. If the patient does not agree to SNF, arrange HH or OP depending on home bound status. If patient is medically complex, consider LTACH.. Pt requires no DME at discharge.      Pt desires Skilled Rehab placement at discharge. Pt cooperative; agreeable to therapeutic recommendations and plan of care.     "

## 2024-05-06 NOTE — THERAPY TREATMENT NOTE
"Subjective: Pt agreeable to therapeutic plan of care.  Cognition: oriented to Person, Place, Time, and Situation    Objective:     Bed Mobility: N/A or Not attempted. In chair on arrival  Functional Transfers: Min-A and Assist x 2     Balance: supported, static, dynamic, and standing Min-A and with rolling walker  Functional Ambulation: Min-A, Assist x 2, and with rolling walker    Grooming: SBA  ADL Position: supported sitting  ADL Comments: Patient completed hair care from seated position        Vitals: WNL    Pain: 5 VAS  Location: Incisional hip  Interventions for pain: Repositioned and Increased Activity  Education: Provided education on the importance of mobility in the acute care setting, Verbal/Tactile Cues, ADL training, and Transfer Training      Assessment: Yancy Mcdonnell presents with ADL impairments affecting function including balance, endurance / activity tolerance, pain, and strength. Demonstrated functioning below baseline abilities indicate the need for continued skilled intervention while inpatient. Tolerating session today without incident. Will continue to follow and progress as tolerated.     Plan/Recommendations:   Moderate Intensity Therapy recommended post-acute care. This is recommended as therapy feels the patient would require 3-4 days per week and wouldn't tolerate \"3 hour daily\" rehab intensity. SNF would be the preferred choice. If the patient does not agree to SNF, arrange HH or OP depending on home bound status. If patient is medically complex, consider LTACH.. Pt requires no DME at discharge.     Pt desires Skilled Rehab placement at discharge. Pt cooperative; agreeable to therapeutic recommendations and plan of care.     Modified Ryan: N/A = No pre-op stroke/TIA    Post-Tx Position: Up in Chair, Alarms activated, and Call light and personal items within reach  PPE: gloves    "

## 2024-05-06 NOTE — PROGRESS NOTES
[facility] MEDICINE SERVICE  DAILY PROGRESS NOTE    NAME: Yancy Mcdonnell  : 1945  MRN: [id number]      LOS: 2 days     PROVIDER OF SERVICE: David Brand MD    Chief Complaint: Hip fracture    Subjective:     Interval History:  History taken from: patient chart RN  Pain controlled    Review of Systems:   Review of Systems  All negative except as above  Objective:     Vital Signs  Temp:  [97.6 °F (36.4 °C)-98.6 °F (37 °C)] 97.6 °F (36.4 °C)  Heart Rate:  [] 99  Resp:  [14-17] 17  BP: (106-117)/(63-72) 111/72   Body mass index is 32.92 kg/m².    Physical Exam  Physical Exam  AOx3 NAD  RRR S1 and S2 audible  Lungs with fair air entry  Abdomen soft nontender nondistended  Left hip CDI  Scheduled Meds   aspirin, 81 mg, Oral, BID  [Held by provider] hydroCHLOROthiazide, 25 mg, Oral, Daily  levothyroxine, 50 mcg, Oral, Daily  [Held by provider] losartan, 50 mg, Oral, Q24H  pantoprazole, 40 mg, Oral, Q AM  polyethylene glycol, 17 g, Oral, BID  sodium chloride, 10 mL, Intravenous, Q12H       PRN Meds     acetaminophen    aluminum-magnesium hydroxide-simethicone    senna-docusate sodium **AND** polyethylene glycol **AND** bisacodyl **AND** bisacodyl    lactated ringers    Morphine    ondansetron ODT **OR** ondansetron    oxyCODONE    oxyCODONE    phenylephrine    sodium chloride    sodium chloride   Infusions  lactated ringers, 9 mL/hr, Last Rate: 100 mL/hr (24 1021)  phenylephrine, 0.5-3 mcg/kg/min          Diagnostic Data    Results from last 7 days   Lab Units 24  0246   WBC 10*3/mm3 8.81   HEMOGLOBIN g/dL 10.1*   HEMATOCRIT % 32.1*   PLATELETS 10*3/mm3 314   GLUCOSE mg/dL 121*   CREATININE mg/dL 0.69   BUN mg/dL 23   SODIUM mmol/L 140   POTASSIUM mmol/L 4.1   ANION GAP mmol/L 9.0       No radiology results for the last day      I reviewed the patient's new clinical results.  I reviewed the patient's new imaging results and agree with the interpretation.    Assessment/Plan:      Active and Resolved Problems  Active Hospital Problems    Diagnosis  POA    **Hip fracture [S72.009A]  Yes      Resolved Hospital Problems   No resolved problems to display.       78-year-old female with history of hypertension, hypothyroidism, RA, GERD, cervical stenosis admitted to [facility] 5/3/2024 under orthopedics  after sustaining a fall at home.  Found to have left greater trochanteric fracture.  Medicine consulted for medical management     #Fall complicated by left intertrochanteric fracture   Status post left intramedullary nailing of intertrochanteric hip fracture 5/4  Aspirin 81 twice daily per Ortho  Oxycodone as needed for pain.   Continue Aggressive bowel regimen  PT/OT>Rehab  Out patient follow up with ortho in 3 weeks      #Hypothyroidism  Continue home dose of levothyroxine     #Hypertension  Hold losartan and thiazide given soft blood pressure  Resume as blood pressure permits-outpatient     #GERD  Continue PPI     #Acute blood loss anemia on chronic anemia  H&H stable  Transfuse PRBCs to keep hemoglobin more than 7  Repeat H&H in 5 days at rehab    DVT prophylaxis:  Mechanical DVT prophylaxis orders are present.         Code status is   There are no questions and answers to display.       Plan for disposition: Pending placement    Time: 30 minutes    Signature: Electronically signed by David Brand MD, 05/06/24, 17:07 EDT.  [facility] Hospitalist Team

## 2024-05-06 NOTE — DISCHARGE PLACEMENT REQUEST
"Yancy Mcdonnell (78 y.o. Female)       Date of Birth   1945    Social Security Number       Address   [address] [place] [address] [address] [place] [facility]    Home Phone   [phone]    MRN   [id number]       Holiness   Tenriism    Marital Status                               Admission Date   5/3/24    Admission Type   Emergency    Admitting Provider   Sowmya Eisenberg MD    Attending Provider   David Brand MD    Department, Room/Bed   [facility] SURGICAL INPATIENT,        Discharge Date       Discharge Disposition   Skilled Nursing Facility (DC - External)    Discharge Destination                                 Attending Provider: David Brand MD    Allergies: Meperidine, Other, Sulfa Antibiotics, Lisinopril    Isolation: None   Infection: None   Code Status: Prior    Ht: 157.5 cm (62\")   Wt: 81.6 kg (180 lb)    Admission Cmt: None   Principal Problem: Hip fracture [S72.009A]                   Active Insurance as of 5/3/2024       Primary Coverage       Payor Plan Insurance Group Employer/Plan Group    AETNA MEDICARE REPLACEMENT [place] MEDIC[place] [id number]       Payor Plan Address Payor Plan Phone Number Payor Plan Fax Number Effective Dates    [address] [phone]  2024 - None Entered    [place] [address]         Subscriber Name Subscriber Birth Date Member ID       YANCY MCDONNELL 1945 [id number]                     Emergency Contacts        (Rel.) Home Phone Work Phone Mobile Phone    KIRA MCDONNELL (Spouse) [phone] -- [phone]    JP KING (Daughter) -- -- [phone]                 History & Physical        Yoselin Swanson PA-C at 24 075       Attestation signed by Elvin Cheng MD at 24 1329    PA documentation reviewed                [facility]    Patient Name: Yancy Mcdonnell  : 1945  MRN: [id number]  Primary Care Physician: Antelmo Moreira MD  Date of admission: " 5/3/2024     Patient Care Team:  Antelmo Moreira MD as PCP - General (Radiology)  Dr. AMITA Palma & Vandana) (Ophthalmology)  Banet, Duane Edward, MD as Consulting Physician (Dermatology)  Edwin Banks MD as Consulting Physician (Otolaryngology)  Ruslan Davila MD as Consulting Physician (Gastroenterology)  DEAN Tobias DPM as Consulting Physician (Podiatry)          Subjective   History Present Illness     Chief Complaint:   Chief Complaint   Patient presents with    Hip Pain    Fall     Fall, hip pain    Hip Pain     Fall      ED  78-year-old female describes left hip pain after stumbling and falling last night. States she was able to get back up in her home and ambulate however today she was leaned over and felt a sudden instability in her hip and pain and fell again and has some ongoing left hip pain and since that time is been unable to bear weight. She reports no focal numbness or weakness.     Observation 5/4/24  Pt concurs with er hpi. Mri hip showing femur fracture. Ortho surgery consulted, surgery planned for today.     Review of Systems   Musculoskeletal:         Left hip pain             Personal History     Past Medical History:   Past Medical History:   Diagnosis Date    Allergic rhinitis 06/02/2015    Ankle fracture, right 02/2022    Body mass index 29.0-29.9, adult 09/12/2018    Cervical spinal stenosis 08/07/2017    Cholelithiasis     Class 1 obesity due to excess calories with serious comorbidity and body mass index (BMI) of 30.0 to 30.9 in adult 10/14/2020    Closed bimalleolar fracture of right ankle 02/11/2022    DDD (degenerative disc disease), cervical     DDD (degenerative disc disease), lumbar     DDD (degenerative disc disease), thoracic     Difficulty walking     Poor balance    Diverticulosis     Dyslipidemia 12/05/2012    Elevated brain natriuretic peptide (BNP) level 01/06/2017    Eustachian tube disorder, right 02/18/2019    Fall 02/2022    Fatigue 06/02/2015    Female  cystocele 06/02/2015    Hiatal hernia     Hiatal hernia with gastroesophageal reflux 01/06/2017    History of blood clots 1980    leg s/p fx    History of DVT (deep vein thrombosis) 06/02/2015    History of herpes simplex infection 07/25/2020    Hyperlipidemia     Hypertension 06/10/2016    Left rib fracture 05/2017    11TH RIB     Migraine headache 06/02/2015    Mitral insufficiency 01/16/2017    MILD    Osteoarthritis 05/20/2014    Osteopenia 12/05/2012    Over weight 06/14/2018    Peripheral edema 01/05/2017    PFO (patent foramen ovale) 01/16/2017    Pulmonary nodule 05/04/2017    DR CHUN FOLLOWS    RHA (rheumatoid arthritis) 12/05/2012    Scleritis 06/02/2015    Scoliosis     Thrombophlebitis 02/19/2022    Acute right lower extremity superficial thrombophlebitis noted in the great saphenous (below knee) and varicosity (below knee).      Tricuspid insufficiency     MILD    Urinary urgency     Varicose veins of other specified sites     Venous insufficiency 09/12/2018       Surgical History:      Past Surgical History:   Procedure Laterality Date    ANKLE OPEN REDUCTION INTERNAL FIXATION Right 02/18/2022    Procedure: ANKLE OPEN REDUCTION INTERNAL FIXATION;  Surgeon: DEAN Tobias DPM;  Location: [facility] MAIN OR;  Service: Podiatry;  Laterality: Right;    COLONOSCOPY  07/20/2017    EGD/ COLONSCOPY LARGE HIATAL HERNIA. MILD DIVERTICULOSIS. OTHERWISE NORMAL.    ESOPHAGOSCOPY / EGD  07/28/2023    Dr. Davila    INGUINAL HERNIA REPAIR Right     ORIF ANKLE FRACTURE Right 02/18/2022    Dr. Tobias    TOTAL ABDOMINAL HYSTERECTOMY  1985    W/BSO WITH A/P REPAIR 1985 BENIGN REASONS DR. DRAKE           Family History: family history includes Breast cancer (age of onset: 52) in her daughter; Cancer in her daughter, grandson, and son; Dementia in her mother; Heart disease in her father and sister; Hypertension in her father; Osteoporosis in her mother; Stroke (age of onset: 80) in her mother. Otherwise pertinent FHx was  reviewed and unremarkable.     Social History:  reports that she has never smoked. She has never been exposed to tobacco smoke. She has never used smokeless tobacco. She reports current alcohol use of about 1.0 standard drink of alcohol per week. She reports that she does not use drugs.      Medications:  Prior to Admission medications    Medication Sig Start Date End Date Taking? Authorizing Provider   hydroCHLOROthiazide 25 MG tablet Take 1 tablet by mouth Daily.   Yes Last Peñaloza MD   levothyroxine (SYNTHROID, LEVOTHROID) 50 MCG tablet TAKE 1 TABLET BY MOUTH DAILY 9/18/23  Yes Alyse Mcnally APRN   loratadine (CLARITIN) 10 MG tablet TAKE 1 TABLET BY MOUTH DAILY 8/14/23  Yes Alyse Mcnally APRN   lysine 500 MG tablet Take 1 tablet by mouth Daily As Needed. Stop now for surgery  Indications: Migraine Headache 5/20/14  Yes Munira So APRN   Magnesium 250 MG tablet Take 1 tablet by mouth Daily. Indications: supplement 8/2/23  Yes Last Peñaloza MD   Misc Natural Products (LUTEIN 20 PO) Take 1 tablet by mouth Daily. Indications: supplement 8/2/23  Yes Last Peñaloza MD   olmesartan (BENICAR) 20 MG tablet Take 1 tablet by mouth Daily.   Yes Last Peñaloza MD   omeprazole (priLOSEC) 20 MG capsule Take 1 capsule by mouth Daily.   Yes Last Peñaloza MD   fluticasone (FLONASE) 50 MCG/ACT nasal spray 2 sprays into the nostril(s) as directed by provider Daily.    Last Peñaloza MD       Allergies:    Allergies   Allergen Reactions    Meperidine Unknown (See Comments) and Nausea And Vomiting     Abstracted from centricity.    Other Unknown (See Comments)     Sulfa (sulfadiazine)     Sulfa Antibiotics Unknown - High Severity and Anaphylaxis    Lisinopril Cough       Objective   Objective     Vital Signs  Temp:  [97.4 °F (36.3 °C)-98 °F (36.7 °C)] 97.4 °F (36.3 °C)  Heart Rate:  [82-97] 94  Resp:  [17-18] 18  BP: (130-174)/(51-74) 130/51  SpO2:  [91 %-98 %] 91 %  on   ;    Device (Oxygen Therapy): room air  Body mass index is 32.92 kg/m².    Physical Exam  Constitutional:       Appearance: Normal appearance.   Cardiovascular:      Rate and Rhythm: Normal rate and regular rhythm.      Pulses: Normal pulses.      Heart sounds: Normal heart sounds.   Pulmonary:      Effort: Pulmonary effort is normal.      Breath sounds: Normal breath sounds.   Musculoskeletal:      Comments: Pain with palp or rom in left hip/thigh   Neurological:      General: No focal deficit present.      Mental Status: She is alert and oriented to person, place, and time.   Psychiatric:         Mood and Affect: Mood normal.         Behavior: Behavior normal.       Results Review:  I have personally reviewed most recent lab results and radiology images and interpretations and agree with findings, most notably: cbc, bmp, xr hip, ct hip, mri pelvis, femur.    Results from last 7 days   Lab Units 05/04/24  0419   WBC 10*3/mm3 9.59   HEMOGLOBIN g/dL 11.2*   HEMATOCRIT % 36.4   PLATELETS 10*3/mm3 312     Results from last 7 days   Lab Units 05/04/24  0419   SODIUM mmol/L 141   POTASSIUM mmol/L 3.9   CHLORIDE mmol/L 107   CO2 mmol/L 26.0   BUN mg/dL 18   CREATININE mg/dL 0.75   GLUCOSE mg/dL 96   CALCIUM mg/dL 8.9     Estimated Creatinine Clearance: 60.2 mL/min (by C-G formula based on SCr of 0.75 mg/dL).  Brief Urine Lab Results  (Last result in the past 365 days)        Color   Clarity   Blood   Leuk Est   Nitrite   Protein   CREAT   Urine HCG        09/15/23 1052 Yellow   Clear   Trace   500 June/ul   Negative   Trace                   Microbiology Results (last 10 days)       ** No results found for the last 240 hours. **            ECG/EMG Results (most recent)       None            Results for orders placed in visit on 03/17/22    Duplex Venous Lower Extremity - Right CAR    Interpretation Summary  · Normal right lower extremity venous duplex scan.          MRI Pelvis Without Contrast    Result Date:  5/3/2024  Impression: 1.Mildly comminuted fracture of the left greater trochanter with primary transverse fracture line. No significant displacement at this time. 2.Fracture line extends into the upper-posterior intertrochanteric region. This appears incomplete on these images, as above. Orthopedic consultation is advised for treatment considerations. 3.No other definite fracture is visualized at this time. 4.Edema surrounds the trochanteric fracture with hematoma in the trochanteric bursa. 5.Bilateral gluteal tendinopathy and suspected partial tears. 6.Moderate hip and right knee osteoarthritis. Electronically Signed: Girish Olivas  5/3/2024 10:36 PM EDT  Workstation ID: [id number]    MRI Femur Left Without Contrast    Result Date: 5/3/2024  Impression: 1.Mildly comminuted fracture of the left greater trochanter with primary transverse fracture line. No significant displacement at this time. 2.Fracture line extends into the upper-posterior intertrochanteric region. This appears incomplete on these images, as above. Orthopedic consultation is advised for treatment considerations. 3.No other definite fracture is visualized at this time. 4.Edema surrounds the trochanteric fracture with hematoma in the trochanteric bursa. 5.Bilateral gluteal tendinopathy and suspected partial tears. 6.Moderate hip and right knee osteoarthritis. Electronically Signed: Girish Olivas  5/3/2024 10:36 PM EDT  Workstation ID: [id number]    CT Lower Extremity Left Without Contrast    Result Date: 5/3/2024  Impression: 1.Transverse fracture of the greater trochanter without significant displacement at this time. 2.No definite fracture line through the intertrochanteric region or femoral neck on this exam. MRI would be most sensitive for a subtle nondisplaced fracture which could be occult on this exam. 3.Fluid in the greater trochanteric bursa, likely hematoma. 4.Moderate left hip osteoarthritis. 5.Advanced degenerative changes in the lower lumbar  spine with anterolisthesis of L4 on L5 and suspected moderate to severe canal narrowing. Electronically Signed: Girish Olivas  5/3/2024 3:52 PM EDT  Workstation ID: [id number]    XR Hip With or Without Pelvis 2 - 3 View Left    Result Date: 5/3/2024  Impression: 1.Left greater trochanteric fracture. CT might be useful to better assess. Electronically Signed: Jh Broussard MD  5/3/2024 12:49 PM EDT  Workstation ID: [id number]       Estimated Creatinine Clearance: 60.2 mL/min (by C-G formula based on SCr of 0.75 mg/dL).    Assessment & Plan   Assessment/Plan       Active Hospital Problems    Diagnosis  POA    **Hip fracture [S72.009A]  Yes      Resolved Hospital Problems   No resolved problems to display.       Left Greater trochanter fracture   - cbc, bmp unremarkable  - left hip xray reviewed and showing left greater trochanteric fracture  - ct left lower extremity showing transverse fracture of greater trochanter without significant displacement at this time.No definite fracture line through the intertrochanteric region or femoral neck on this exam.   - Mri pelvis and left femur reviewed and showing mildly comminuted fracture of the left greater trochanter with primary transverse fracture line. No significant displacement at this time. Fracture line extends into the upper-posterior upper-posterior intertrochanteric region. This appears incomplete on these images. No other definite fracture is visualized at this time. Edema surrounds the trochanteric fracture with hematoma in the trochanteric bursa. Bilateral gluteal tendinopathy and suspected partial tears. Moderate hip and right knee osteoarthritis   - orthopedic surgery consulted and recommends surgical repair    Hypertension  -well Controlled   BP Readings from Last 1 Encounters:   05/04/24 116/59     - Continue hctz, benicar  - Monitor while admitted       Hypothyroidism  Cont synthroid      VTE Prophylaxis -   Mechanical Order History:        Ordered         24 1732  Place Sequential Compression Device  Once            24 1732  Maintain Sequential Compression Device  Continuous                          Pharmalogical Order History:       None            CODE STATUS:    There are no questions and answers to display.       This patient has been examined wearing personal protective equipment.     I discussed the patient's findings and my recommendations with patient and nursing staff.      Signature:Electronically signed by Yoselin Swanson PA-C, 24, 8:00 AM EDT.             Electronically signed by Elvin Cheng MD at 24 1329          Physician Progress Notes (most recent note)        David Brand MD at 24 1205              [facility] MEDICINE SERVICE  DAILY PROGRESS NOTE    NAME: Yancy Mcdonnell  : 1945  MRN: [id number]      LOS: 1 day     PROVIDER OF SERVICE: David Brand MD    Chief Complaint: Hip fracture    Subjective:     Interval History:  History taken from: patient chart family RN  Pain controlled. Family at bedside     Review of Systems:   Review of Systems  All negative except as above   Objective:     Vital Signs  Temp:  [97.3 °F (36.3 °C)-97.8 °F (36.6 °C)] 97.4 °F (36.3 °C)  Heart Rate:  [] 87  Resp:  [14-19] 15  BP: (103-133)/(48-64) 109/48  Flow (L/min):  [2] 2   Body mass index is 32.92 kg/m².    Physical Exam  Physical Exam  AOx3 NAD  RRR S1-S2 audible  Lungs with fair air entry  Abdomen soft nontender nondistended  Left hip CDI  Scheduled Meds   aspirin, 81 mg, Oral, BID  [Held by provider] hydroCHLOROthiazide, 25 mg, Oral, Daily  levothyroxine, 50 mcg, Oral, Daily  [Held by provider] losartan, 50 mg, Oral, Q24H  pantoprazole, 40 mg, Oral, Q AM  sodium chloride, 10 mL, Intravenous, Q12H       PRN Meds     acetaminophen    aluminum-magnesium hydroxide-simethicone    lactated ringers    Morphine    ondansetron ODT **OR** ondansetron    oxyCODONE    phenylephrine    sodium chloride    sodium  chloride   Infusions  lactated ringers, 9 mL/hr, Last Rate: 100 mL/hr (05/04/24 1021)  phenylephrine, 0.5-3 mcg/kg/min          Diagnostic Data    Results from last 7 days   Lab Units 05/05/24  0342   WBC 10*3/mm3 11.95*   HEMOGLOBIN g/dL 10.6*   HEMATOCRIT % 34.7   PLATELETS 10*3/mm3 331   GLUCOSE mg/dL 144*   CREATININE mg/dL 1.00   BUN mg/dL 21   SODIUM mmol/L 133*   POTASSIUM mmol/L 4.0   ANION GAP mmol/L 6.0       XR Hip With or Without Pelvis 1 View Left    Result Date: 5/4/2024  Impression: Patient is status post internal fixation of the left proximal femur. Adjacent postoperative subcutaneous emphysema is noted. Skin staples are seen. Bones are demineralized. The visualized portion of the bony pelvis, right hip, and bilateral sacroiliac joints are unremarkable. Electronically Signed: Milton Cabral MD  5/4/2024 12:01 PM EDT  Workstation ID: [id number]    MRI Pelvis Without Contrast    Result Date: 5/3/2024  Impression: 1.Mildly comminuted fracture of the left greater trochanter with primary transverse fracture line. No significant displacement at this time. 2.Fracture line extends into the upper-posterior intertrochanteric region. This appears incomplete on these images, as above. Orthopedic consultation is advised for treatment considerations. 3.No other definite fracture is visualized at this time. 4.Edema surrounds the trochanteric fracture with hematoma in the trochanteric bursa. 5.Bilateral gluteal tendinopathy and suspected partial tears. 6.Moderate hip and right knee osteoarthritis. Electronically Signed: Girish Olivas  5/3/2024 10:36 PM EDT  Workstation ID: [id number]    MRI Femur Left Without Contrast    Result Date: 5/3/2024  Impression: 1.Mildly comminuted fracture of the left greater trochanter with primary transverse fracture line. No significant displacement at this time. 2.Fracture line extends into the upper-posterior intertrochanteric region. This appears incomplete on these images, as above.  Orthopedic consultation is advised for treatment considerations. 3.No other definite fracture is visualized at this time. 4.Edema surrounds the trochanteric fracture with hematoma in the trochanteric bursa. 5.Bilateral gluteal tendinopathy and suspected partial tears. 6.Moderate hip and right knee osteoarthritis. Electronically Signed: Girish Johnsonmaritza  5/3/2024 10:36 PM EDT  Workstation ID: [id number]    CT Lower Extremity Left Without Contrast    Result Date: 5/3/2024  Impression: 1.Transverse fracture of the greater trochanter without significant displacement at this time. 2.No definite fracture line through the intertrochanteric region or femoral neck on this exam. MRI would be most sensitive for a subtle nondisplaced fracture which could be occult on this exam. 3.Fluid in the greater trochanteric bursa, likely hematoma. 4.Moderate left hip osteoarthritis. 5.Advanced degenerative changes in the lower lumbar spine with anterolisthesis of L4 on L5 and suspected moderate to severe canal narrowing. Electronically Signed: Girish Deisy  5/3/2024 3:52 PM EDT  Workstation ID: [id number]    XR Hip With or Without Pelvis 2 - 3 View Left    Result Date: 5/3/2024  Impression: 1.Left greater trochanteric fracture. CT might be useful to better assess. Electronically Signed: Jh Broussard MD  5/3/2024 12:49 PM EDT  Workstation ID: [id number]       I reviewed the patient's new clinical results.  I reviewed the patient's new imaging results and agree with the interpretation.    Assessment/Plan:     Active and Resolved Problems  Active Hospital Problems    Diagnosis  POA    **Hip fracture [S72.009A]  Yes      Resolved Hospital Problems   No resolved problems to display.       78-year-old female with history of hypertension, hypothyroidism, RA, GERD, cervical stenosis admitted to [facility] 5/3/2024 under orthopedics  after sustaining a fall at home.  Found to have left greater trochanteric fracture.  Medicine consulted for medical  management    #Fall complicated byleft intertrochanteric fracture   Status post left intramedullary nailing of intertrochanteric hip fracture   Aspirin 81 twice daily per Ortho  Oxycodone as needed for pain.  IV morphine only for breakthrough  Aggressive bowel regimen  PT/OT    #Hypothyroidism  Continue home dose of levothyroxine     #Hypertension  Hold losartan and thiazide given soft blood pressure  Resume as blood pressure permits     #GERD  Continue PPI    #Acute blood loss anemia on chronic anemia  Monitor H&H  Transfuse PRBCs to keep hemoglobin more than 7    Will continue to follow-up    DVT prophylaxis:  Mechanical DVT prophylaxis orders are present.         Code status is   There are no questions and answers to display.         Time: 30 minutes    Signature: Electronically signed by David Brand MD, 24, 12:05 EDT.  [facility] Hospitalist Team      Electronically signed by David Brand MD at 24 1211          Consult Notes (most recent note)        Laisha Dubon APRN at 24 1351        Consult Orders    1. Inpatient Hospitalist Consult [[id number]] ordered by Yoselin Swanson PA-C              Attestation signed by Sowmya Eisenberg MD at 24 5955    I have reviewed this documentation and agree.                      [facility] Medicine Services  Consult Note    Patient Name: Yancy Mcdonnell  : 1945  MRN: [id number]  Primary Care Physician:  Antelmo Moreira MD  Referring Physician: No Known Provider  Date of admission: 5/3/2024  Date and Time of Care: 2024 at 1340    Inpatient Hospitalist Consult  Consult performed by: Laisha Dubon APRN  Consult ordered by: Yoselin Swanson PA-C            Reason for Consult/ Chief Complaint: medical management    Consult Requested By: JEET Romero    Subjective:     History of Present Illness:   79 yo female with PMH of hypothyroidism, HTN, RA, GERD, recurrent UTI, cervical stenosis, presented to ED  on 5/3/2024 for left hip pain post fall on 5/2. Lives at home with , uses cane for ambulation. States fell at home, was able to ambulate post fall but ongoing left hip pain and progressive inability to bear weight. Found to have mildly comminuted fracture left greater trochanter with primary transverse fracture extending into intertrochanteric region. Was admitted with ortho consult with Dr. Rodriguez. On 5/4 underwent left IM nailing of intertrochanteric hip fracture by Dr Rodriguez.     Upon evaluation, examined post surgery. Awake, alert, resting in bed. Dressing to left lateral upper leg dry, intact. Pedal pulses weak but palpable to LLE, able to move toes.       Review of Systems:   Review of Systems   Musculoskeletal:         Denies post op pain to LLE.    Neurological:  Positive for weakness.       Personal History:     Past Medical History:   Diagnosis Date    Allergic rhinitis 06/02/2015    Ankle fracture, right 02/2022    Body mass index 29.0-29.9, adult 09/12/2018    Cervical spinal stenosis 08/07/2017    Cholelithiasis     Class 1 obesity due to excess calories with serious comorbidity and body mass index (BMI) of 30.0 to 30.9 in adult 10/14/2020    Closed bimalleolar fracture of right ankle 02/11/2022    DDD (degenerative disc disease), cervical     DDD (degenerative disc disease), lumbar     DDD (degenerative disc disease), thoracic     Difficulty walking     Poor balance    Diverticulosis     Dyslipidemia 12/05/2012    Elevated brain natriuretic peptide (BNP) level 01/06/2017    Eustachian tube disorder, right 02/18/2019    Fall 02/2022    Fatigue 06/02/2015    Female cystocele 06/02/2015    Hiatal hernia     Hiatal hernia with gastroesophageal reflux 01/06/2017    History of blood clots 1980    leg s/p fx    History of DVT (deep vein thrombosis) 06/02/2015    History of herpes simplex infection 07/25/2020    Hyperlipidemia     Hypertension 06/10/2016    Left rib fracture 05/2017    11TH RIB      Migraine headache 06/02/2015    Mitral insufficiency 01/16/2017    MILD    Osteoarthritis 05/20/2014    Osteopenia 12/05/2012    Over weight 06/14/2018    Peripheral edema 01/05/2017    PFO (patent foramen ovale) 01/16/2017    Pulmonary nodule 05/04/2017    DR CHUN FOLLOWS    RHA (rheumatoid arthritis) 12/05/2012    Scleritis 06/02/2015    Scoliosis     Thrombophlebitis 02/19/2022    Acute right lower extremity superficial thrombophlebitis noted in the great saphenous (below knee) and varicosity (below knee).      Tricuspid insufficiency     MILD    Urinary urgency     Varicose veins of other specified sites     Venous insufficiency 09/12/2018       Past Surgical History:   Procedure Laterality Date    ANKLE OPEN REDUCTION INTERNAL FIXATION Right 02/18/2022    Procedure: ANKLE OPEN REDUCTION INTERNAL FIXATION;  Surgeon: DEAN Tobias DPM;  Location: [facility] MAIN OR;  Service: Podiatry;  Laterality: Right;    COLONOSCOPY  07/20/2017    EGD/ COLONSCOPY LARGE HIATAL HERNIA. MILD DIVERTICULOSIS. OTHERWISE NORMAL.    ESOPHAGOSCOPY / EGD  07/28/2023    Dr. Davila    INGUINAL HERNIA REPAIR Right     ORIF ANKLE FRACTURE Right 02/18/2022    Dr. Tobias    TOTAL ABDOMINAL HYSTERECTOMY  1985    W/BSO WITH A/P REPAIR 1985 BENIGN REASONS DR. DRAKE       Family History: family history includes Breast cancer (age of onset: 52) in her daughter; Cancer in her daughter, grandson, and son; Dementia in her mother; Heart disease in her father and sister; Hypertension in her father; Osteoporosis in her mother; Stroke (age of onset: 80) in her mother. Otherwise pertinent FHx was reviewed and not pertinent to current issue.    Social History:  reports that she has never smoked. She has never been exposed to tobacco smoke. She has never used smokeless tobacco. She reports current alcohol use of about 1.0 standard drink of alcohol per week. She reports that she does not use drugs.    Home Medications:   Magnesium, Misc Natural Products,  fluticasone, hydroCHLOROthiazide, levothyroxine, loratadine, lysine, olmesartan, and omeprazole    Allergies:  Allergies   Allergen Reactions    Meperidine Unknown (See Comments) and Nausea And Vomiting     Abstracted from centricity.    Other Unknown (See Comments)     Sulfa (sulfadiazine)     Sulfa Antibiotics Unknown - High Severity and Anaphylaxis    Lisinopril Cough         Objective:     Vital Signs  Temp:  [97.4 °F (36.3 °C)-98 °F (36.7 °C)] 98 °F (36.7 °C)  Heart Rate:  [82-97] 85  Resp:  [12-25] 15  BP: ()/(45-70) 116/59  Flow (L/min):  [4] 4   Body mass index is 32.92 kg/m².    Physical Exam  Physical Exam  Constitutional:       Appearance: Normal appearance.   HENT:      Head: Normocephalic and atraumatic.      Mouth/Throat:      Mouth: Mucous membranes are moist.   Eyes:      Extraocular Movements: Extraocular movements intact.      Pupils: Pupils are equal, round, and reactive to light.   Cardiovascular:      Rate and Rhythm: Normal rate and regular rhythm.      Pulses: Normal pulses.      Heart sounds: Normal heart sounds. Murmur heard.   Pulmonary:      Effort: Pulmonary effort is normal.      Breath sounds: Normal breath sounds.   Abdominal:      General: Bowel sounds are normal.      Palpations: Abdomen is soft.   Musculoskeletal:      Comments: Dressing dry, intact to left lateral upper leg  Able to move left toes, neurovascular checks intact LLE   Skin:     General: Skin is warm and dry.   Neurological:      Mental Status: She is alert and oriented to person, place, and time.         Scheduled Meds   aspirin, 81 mg, Oral, BID  ceFAZolin, 2,000 mg, Intravenous, Q8H  hydroCHLOROthiazide, 25 mg, Oral, Daily  levothyroxine, 50 mcg, Oral, Daily  losartan, 50 mg, Oral, Q24H  pantoprazole, 40 mg, Oral, Q AM  sodium chloride, 10 mL, Intravenous, Q12H       PRN Meds     acetaminophen    aluminum-magnesium hydroxide-simethicone    lactated ringers    Morphine    ondansetron ODT **OR** ondansetron     oxyCODONE    phenylephrine    sodium chloride    sodium chloride   Infusions  lactated ringers, 9 mL/hr, Last Rate: 100 mL/hr (05/04/24 1021)  phenylephrine, 0.5-3 mcg/kg/min          Diagnostic Data    Results from last 7 days   Lab Units 05/04/24  0419   WBC 10*3/mm3 9.59   HEMOGLOBIN g/dL 11.2*   HEMATOCRIT % 36.4   PLATELETS 10*3/mm3 312   GLUCOSE mg/dL 96   CREATININE mg/dL 0.75   BUN mg/dL 18   SODIUM mmol/L 141   POTASSIUM mmol/L 3.9   ANION GAP mmol/L 8.0       XR Hip With or Without Pelvis 1 View Left    Result Date: 5/4/2024  Impression: Patient is status post internal fixation of the left proximal femur. Adjacent postoperative subcutaneous emphysema is noted. Skin staples are seen. Bones are demineralized. The visualized portion of the bony pelvis, right hip, and bilateral sacroiliac joints are unremarkable. Electronically Signed: Milton Cabral MD  5/4/2024 12:01 PM EDT  Workstation ID: [id number]    MRI Pelvis Without Contrast    Result Date: 5/3/2024  Impression: 1.Mildly comminuted fracture of the left greater trochanter with primary transverse fracture line. No significant displacement at this time. 2.Fracture line extends into the upper-posterior intertrochanteric region. This appears incomplete on these images, as above. Orthopedic consultation is advised for treatment considerations. 3.No other definite fracture is visualized at this time. 4.Edema surrounds the trochanteric fracture with hematoma in the trochanteric bursa. 5.Bilateral gluteal tendinopathy and suspected partial tears. 6.Moderate hip and right knee osteoarthritis. Electronically Signed: Girish Olivas  5/3/2024 10:36 PM EDT  Workstation ID: [id number]    MRI Femur Left Without Contrast    Result Date: 5/3/2024  Impression: 1.Mildly comminuted fracture of the left greater trochanter with primary transverse fracture line. No significant displacement at this time. 2.Fracture line extends into the upper-posterior intertrochanteric region.  This appears incomplete on these images, as above. Orthopedic consultation is advised for treatment considerations. 3.No other definite fracture is visualized at this time. 4.Edema surrounds the trochanteric fracture with hematoma in the trochanteric bursa. 5.Bilateral gluteal tendinopathy and suspected partial tears. 6.Moderate hip and right knee osteoarthritis. Electronically Signed: Girish Deisy  5/3/2024 10:36 PM EDT  Workstation ID: [id number]    CT Lower Extremity Left Without Contrast    Result Date: 5/3/2024  Impression: 1.Transverse fracture of the greater trochanter without significant displacement at this time. 2.No definite fracture line through the intertrochanteric region or femoral neck on this exam. MRI would be most sensitive for a subtle nondisplaced fracture which could be occult on this exam. 3.Fluid in the greater trochanteric bursa, likely hematoma. 4.Moderate left hip osteoarthritis. 5.Advanced degenerative changes in the lower lumbar spine with anterolisthesis of L4 on L5 and suspected moderate to severe canal narrowing. Electronically Signed: Girish Olivas  5/3/2024 3:52 PM EDT  Workstation ID: [id number]    XR Hip With or Without Pelvis 2 - 3 View Left    Result Date: 5/3/2024  Impression: 1.Left greater trochanteric fracture. CT might be useful to better assess. Electronically Signed: Jh Broussard MD  5/3/2024 12:49 PM EDT  Workstation ID: [id number]       I reviewed the patient's new clinical results.    Assessment/Plan:     Active and Resolved Problems  Active Hospital Problems    Diagnosis  POA    **Hip fracture [S72.009A]  Yes      Resolved Hospital Problems   No resolved problems to display.     Ground level fall resulting in left hip fracture:  -fall on 5/2 with progressive left hip pain and inability to walk, ED visit on 5/3  -5/4 s/p left IM nailing of intertrochanteric hip fracture by Dr Rodriguez.   -postop orders per Dr. Rodriguez  -neurovascular checks LLE  -PT/OT evaluation/treatment  -case  management consult for discharge planning. May need rehab  -fall precautions.     Hypothyroidism:  -TSH pending  -continue levothyroxine.    HTN:  -BP controlled  -trend BP  -continue losartan, HCTZ    GERD:  Protonix for GI PPx              DVT prophylaxis:  Mechanical DVT prophylaxis orders are present.         Code status is   There are no questions and answers to display.       Plan for disposition:return home versus rehab in 3 days    Time: 30 minutes        Signature: Electronically signed by FELIPE Hammonds, 24, 13:51 EDT.  [facility] Hospitalist Team    Electronically signed by Sowmya Eisenberg MD at 24 1748          Physical Therapy Notes (most recent note)        Contreras Muse, PT at 24 1851  Version 1 of 1         Patient Name: Yancy Mcdonnell  : 1945    MRN: [id number]                              Today's Date: 2024       Admit Date: 5/3/2024    Visit Dx:     ICD-10-CM ICD-9-CM   1. Closed fracture of left hip, initial encounter  S72.002A 820.8     Patient Active Problem List   Diagnosis    Allergic rhinitis    Lung nodule    Dyslipidemia    History of thrombosis    Primary hypertension    Mitral valve insufficiency    Osteoarthritis    Osteopenia of multiple sites    Rheumatoid arthritis    Spinal stenosis of cervical region    Venous insufficiency    Gastro-esophageal reflux disease without esophagitis    History of herpes simplex infection    Overweight with body mass index (BMI) of 28 to 28.9 in adult    Elevated alkaline phosphatase level    Diaphragmatic hernia without obstruction or gangrene    Thrombophlebitis    H/O fall    Deviated septum    Abnormal urinalysis    Long term (current) use of aspirin    Urinary frequency    Stress incontinence of urine    Presence of pessary    Atrophic vaginitis    Difficulty swallowing    Subclinical hypothyroidism    Balance problem    Anemia    Diverticulitis    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 bleeding     Esophageal motility disorder    Pure hypercholesterolemia    Seasonal allergies    Thyroid disease    Back pain    Other diseases of stomach and duodenum    Leg wound, right    Hip fracture     Past Medical History:   Diagnosis Date    Allergic rhinitis 06/02/2015    Ankle fracture, right 02/2022    Body mass index 29.0-29.9, adult 09/12/2018    Cervical spinal stenosis 08/07/2017    Cholelithiasis     Class 1 obesity due to excess calories with serious comorbidity and body mass index (BMI) of 30.0 to 30.9 in adult 10/14/2020    Closed bimalleolar fracture of right ankle 02/11/2022    DDD (degenerative disc disease), cervical     DDD (degenerative disc disease), lumbar     DDD (degenerative disc disease), thoracic     Difficulty walking     Poor balance    Diverticulosis     Dyslipidemia 12/05/2012    Elevated brain natriuretic peptide (BNP) level 01/06/2017    Eustachian tube disorder, right 02/18/2019    Fall 02/2022    Fatigue 06/02/2015    Female cystocele 06/02/2015    Hiatal hernia     Hiatal hernia with gastroesophageal reflux 01/06/2017    History of blood clots 1980    leg s/p fx    History of DVT (deep vein thrombosis) 06/02/2015    History of herpes simplex infection 07/25/2020    Hyperlipidemia     Hypertension 06/10/2016    Left rib fracture 05/2017    11TH RIB     Migraine headache 06/02/2015    Mitral insufficiency 01/16/2017    MILD    Osteoarthritis 05/20/2014    Osteopenia 12/05/2012    Over weight 06/14/2018    Peripheral edema 01/05/2017    PFO (patent foramen ovale) 01/16/2017    Pulmonary nodule 05/04/2017    DR CHUN FOLLOWS    RHA (rheumatoid arthritis) 12/05/2012    Scleritis 06/02/2015    Scoliosis     Thrombophlebitis 02/19/2022    Acute right lower extremity superficial thrombophlebitis noted in the great saphenous (below knee) and varicosity (below knee).      Tricuspid insufficiency     MILD    Urinary urgency     Varicose veins of other specified sites     Venous insufficiency  09/12/2018     Past Surgical History:   Procedure Laterality Date    ANKLE OPEN REDUCTION INTERNAL FIXATION Right 02/18/2022    Procedure: ANKLE OPEN REDUCTION INTERNAL FIXATION;  Surgeon: DEAN Tobias DPM;  Location: [facility] MAIN OR;  Service: Podiatry;  Laterality: Right;    COLONOSCOPY  07/20/2017    EGD/ COLONSCOPY LARGE HIATAL HERNIA. MILD DIVERTICULOSIS. OTHERWISE NORMAL.    ESOPHAGOSCOPY / EGD  07/28/2023    Dr. Davila    INGUINAL HERNIA REPAIR Right     ORIF ANKLE FRACTURE Right 02/18/2022    Dr. Tobias    TOTAL ABDOMINAL HYSTERECTOMY  1985    W/BSO WITH A/P REPAIR 1985 BENIGN REASONS DR. DRAKE      General Information       Row Name 05/05/24 1841          Physical Therapy Time and Intention    Document Type evaluation  -     Mode of Treatment individual therapy;physical therapy  -       Row Name 05/05/24 1841          General Information    Prior Level of Function independent:;all household mobility;ADL's  -       Row Name 05/05/24 1841          Living Environment    People in Home spouse  reports spouse is unable to provide much assistance.  -       Row Name 05/05/24 1841          Cognition    Orientation Status (Cognition) oriented x 4  -       Row Name 05/05/24 1841          Safety Issues, Functional Mobility    Impairments Affecting Function (Mobility) balance;endurance/activity tolerance;pain  -               User Key  (r) = Recorded By, (t) = Taken By, (c) = Cosigned By      Initials Name Provider Type    Contreras Daily PT Physical Therapist                   Mobility       Row Name 05/05/24 0805          Bed Mobility    Bed Mobility supine-sit  -EL     Supine-Sit Roy (Bed Mobility) 2 person assist;moderate assist (50% patient effort)  -     Assistive Device (Bed Mobility) bed rails;draw sheet;head of bed elevated  -       Row Name 05/05/24 0805          Bed-Chair Transfer    Bed-Chair Roy (Transfers) 2 person assist;moderate assist (50% patient effort)  -EL      Assistive Device (Bed-Chair Transfers) walker, front-wheeled  -       Row Name 05/05/24 0805          Sit-Stand Transfer    Sit-Stand [place] (Transfers) 2 person assist;moderate assist (50% patient effort)  -     Assistive Device (Sit-Stand Transfers) walker, front-wheeled  -       Row Name 05/05/24 0805          Gait/Stairs (Locomotion)    Patient was able to Ambulate no, other medical factors prevent ambulation  -EL     Reason Patient was unable to Ambulate Uncontrolled Pain;Limited Motor Function related to Block  Mild effects remained from nerve block  -               User Key  (r) = Recorded By, (t) = Taken By, (c) = Cosigned By      Initials Name Provider Type    Contreras Daily PT Physical Therapist                   Obj/Interventions       Row Name 05/05/24 1843          Range of Motion Comprehensive    General Range of Motion lower extremity range of motion deficits identified  -EL     Comment, General Range of Motion Painful in LLE limited AROM approx 50%  -       Row Name 05/05/24 1843          Strength Comprehensive (MMT)    General Manual Muscle Testing (MMT) Assessment lower extremity strength deficits identified  -EL     Comment, General Manual Muscle Testing (MMT) Assessment RLE 3+/5, LLE 2/5 gross  -       Row Name 05/05/24 1843          Balance    Balance Assessment sitting static balance;standing static balance;standing dynamic balance  -EL     Static Sitting Balance contact guard  -EL     Static Standing Balance moderate assist  -EL     Dynamic Standing Balance moderate assist;2-person assist  -       Row Name 05/05/24 1843          Sensory Assessment (Somatosensory)    Sensory Assessment (Somatosensory) other (see comments)  remaining sensation defiict from nerve block  -               User Key  (r) = Recorded By, (t) = Taken By, (c) = Cosigned By      Initials Name Provider Type    Contreras Daily PT Physical Therapist                   Goals/Plan       Row Name 05/05/24  1850          Bed Mobility Goal 1 (PT)    Activity/Assistive Device (Bed Mobility Goal 1, PT) bed mobility activities, all  -EL     [place] Level/Cues Needed (Bed Mobility Goal 1, PT) modified independence  -EL     Time Frame (Bed Mobility Goal 1, PT) long term goal (LTG);2 weeks  -EL       Row Name 05/05/24 1850          Transfer Goal 1 (PT)    Activity/Assistive Device (Transfer Goal 1, PT) sit-to-stand/stand-to-sit;walker, rolling  -EL     [place] Level/Cues Needed (Transfer Goal 1, PT) modified independence  -EL     Time Frame (Transfer Goal 1, PT) long term goal (LTG);2 weeks  -EL       Row Name 05/05/24 1850          Gait Training Goal 1 (PT)    Activity/Assistive Device (Gait Training Goal 1, PT) gait (walking locomotion)  -EL     [place] Level (Gait Training Goal 1, PT) modified independence  -EL     Distance (Gait Training Goal 1, PT) 150  -EL     Time Frame (Gait Training Goal 1, PT) long term goal (LTG);2 weeks  -       Row Name 05/05/24 1850          Therapy Assessment/Plan (PT)    Planned Therapy Interventions (PT) balance training;bed mobility training;gait training;home exercise program;ROM (range of motion);patient/family education;stair training;strengthening;stretching;transfer training  -EL               User Key  (r) = Recorded By, (t) = Taken By, (c) = Cosigned By      Initials Name Provider Type    Contreras Daily, PT Physical Therapist                   Clinical Impression       Row Name 05/05/24 1844          Pain Scale: FACES Pre/Post-Treatment    Pain: FACES Scale, Pretreatment 2-->hurts little bit  -EL     Posttreatment Pain Rating 6-->hurts even more  -EL     Pain Location - Side/Orientation Left  -EL     Pain Location - hip  -EL       Row Name 05/05/24 1844          Plan of Care Review    Plan of Care Reviewed With patient  -EL     Outcome Evaluation Pt is a 77 YO F admitted with L hip frx from fall, POD IM nailing and is to be WBAT. Pt reports she lives home with spouse,  but states he is unable to provide much assistance. Pt is concerned for her animals, because she does not know if he will be able to care for them. States she has children in the area as well. Pt this date in significant pain, and has lingering effects from nerve block but was willing to attempt moblity. Pt requring Ax2 for bed mobility and transferring him to bedside chair. Pt is well below functional mobility and recommendaiton is SNF pending progress.  -EL       Row Name 05/05/24 1844          Therapy Assessment/Plan (PT)    Rehab Potential (PT) good, to achieve stated therapy goals  -EL     Criteria for Skilled Interventions Met (PT) yes  -EL     Therapy Frequency (PT) 5 times/wk  -EL     Predicted Duration of Therapy Intervention (PT) Until d/c  -EL       Row Name 05/05/24 1844          Vital Signs    O2 Delivery Pre Treatment room air  -EL     O2 Delivery Intra Treatment room air  -EL     O2 Delivery Post Treatment room air  -EL     Pre Patient Position Supine  -EL     Intra Patient Position Standing  -EL     Post Patient Position Sitting  -EL       Row Name 05/05/24 1844          Positioning and Restraints    Pre-Treatment Position in bed  -EL     Post Treatment Position chair  -EL     In Chair notified nsg;reclined;call light within reach;encouraged to call for assist;exit alarm on  -EL               User Key  (r) = Recorded By, (t) = Taken By, (c) = Cosigned By      Initials Name Provider Type    Contreras Daily, PT Physical Therapist                   Outcome Measures       Row Name 05/05/24 1850 05/05/24 1238       How much help from another person do you currently need...    Turning from your back to your side while in flat bed without using bedrails? 3  -EL 2  -MH    Moving from lying on back to sitting on the side of a flat bed without bedrails? 2  -EL 2  -MH    Moving to and from a bed to a chair (including a wheelchair)? 2  -EL 2  -MH    Standing up from a chair using your arms (e.g., wheelchair,  bedside chair)? 2  -EL 2  -MH    Climbing 3-5 steps with a railing? 1  -EL 1  -MH    To walk in hospital room? 1  -EL 1  -MH    AM-PAC 6 Clicks Score (PT) 11  -EL 10  -MH    Highest Level of Mobility Goal 4 --> Transfer to chair/commode  -EL 4 --> Transfer to chair/commode  -      Row Name 05/05/24 1850          Functional Assessment    Outcome Measure Options AM-PAC 6 Clicks Basic Mobility (PT)  -EL               User Key  (r) = Recorded By, (t) = Taken By, (c) = Cosigned By      Initials Name Provider Type     Barbara Barboza, OT Occupational Therapist    Contreras Daily, PT Physical Therapist                                 Physical Therapy Education       Title: PT OT SLP Therapies (In Progress)       Topic: Physical Therapy (Done)       Point: Mobility training (Done)       Learning Progress Summary             Patient Acceptance, E,TB, VU by WINSOME at 5/5/2024 1851                         Point: Precautions (Done)       Learning Progress Summary             Patient Acceptance, E,TB, VU by WINSOME at 5/5/2024 1851                                         User Key       Initials Effective Dates Name Provider Type Discipline     06/23/20 -  Contreras Muse, PT Physical Therapist PT                  PT Recommendation and Plan  Planned Therapy Interventions (PT): balance training, bed mobility training, gait training, home exercise program, ROM (range of motion), patient/family education, stair training, strengthening, stretching, transfer training  Plan of Care Reviewed With: patient  Outcome Evaluation: Pt is a 79 YO F admitted with L hip frx from fall, POD IM nailing and is to be WBAT. Pt reports she lives home with spouse, but states he is unable to provide much assistance. Pt is concerned for her animals, because she does not know if he will be able to care for them. States she has children in the area as well. Pt this date in significant pain, and has lingering effects from nerve block but was willing to attempt moblity.  Pt requring Ax2 for bed mobility and transferring him to bedside chair. Pt is well below functional mobility and recommendaiton is SNF pending progress.     Time Calculation:   PT Evaluation Complexity  History, PT Evaluation Complexity: 1-2 personal factors and/or comorbidities  Examination of Body Systems (PT Eval Complexity): total of 3 or more elements  Clinical Presentation (PT Evaluation Complexity): evolving  Clinical Decision Making (PT Evaluation Complexity): moderate complexity  Overall Complexity (PT Evaluation Complexity): moderate complexity     PT Charges       Row Name 24 1851             Time Calculation    Start Time 08  -EL      Stop Time 08  -EL      Time Calculation (min) 33 min  -EL      PT Received On 24  -EL      PT - Next Appointment 24  -EL      PT Goal Re-Cert Due Date 24  -EL                User Key  (r) = Recorded By, (t) = Taken By, (c) = Cosigned By      Initials Name Provider Type    EL Contreras Muse, PT Physical Therapist                  Therapy Charges for Today       Code Description Service Date Service Provider Modifiers Qty    16238636050 HC PT EVAL MOD COMPLEXITY 4 2024 Contreras Muse, PT GP 1            PT G-Codes  Outcome Measure Options: AM-PAC 6 Clicks Basic Mobility (PT)  AM-PAC 6 Clicks Score (PT): 11  PT Discharge Summary  Anticipated Discharge Disposition (PT): skilled nursing facility    Contreras Muse PT  2024      Electronically signed by Contreras Muse, PT at 24 1851          Occupational Therapy Notes (most recent note)        Barbara Barboza, OT at 24 1239          Patient Name: Yancy Mcdonnell  : 1945    MRN: [id number]                              Today's Date: 2024       Admit Date: 5/3/2024    Visit Dx:     ICD-10-CM ICD-9-CM   1. Closed fracture of left hip, initial encounter  S72.002A 820.8     Patient Active Problem List   Diagnosis    Allergic rhinitis    Lung nodule    Dyslipidemia    History of thrombosis     Primary hypertension    Mitral valve insufficiency    Osteoarthritis    Osteopenia of multiple sites    Rheumatoid arthritis    Spinal stenosis of cervical region    Venous insufficiency    Gastro-esophageal reflux disease without esophagitis    History of herpes simplex infection    Overweight with body mass index (BMI) of 28 to 28.9 in adult    Elevated alkaline phosphatase level    Diaphragmatic hernia without obstruction or gangrene    Thrombophlebitis    H/O fall    Deviated septum    Abnormal urinalysis    Long term (current) use of aspirin    Urinary frequency    Stress incontinence of urine    Presence of pessary    Atrophic vaginitis    Difficulty swallowing    Subclinical hypothyroidism    Balance problem    Anemia    Diverticulitis    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 bleeding    Esophageal motility disorder    Pure hypercholesterolemia    Seasonal allergies    Thyroid disease    Back pain    Other diseases of stomach and duodenum    Leg wound, right    Hip fracture     Past Medical History:   Diagnosis Date    Allergic rhinitis 06/02/2015    Ankle fracture, right 02/2022    Body mass index 29.0-29.9, adult 09/12/2018    Cervical spinal stenosis 08/07/2017    Cholelithiasis     Class 1 obesity due to excess calories with serious comorbidity and body mass index (BMI) of 30.0 to 30.9 in adult 10/14/2020    Closed bimalleolar fracture of right ankle 02/11/2022    DDD (degenerative disc disease), cervical     DDD (degenerative disc disease), lumbar     DDD (degenerative disc disease), thoracic     Difficulty walking     Poor balance    Diverticulosis     Dyslipidemia 12/05/2012    Elevated brain natriuretic peptide (BNP) level 01/06/2017    Eustachian tube disorder, right 02/18/2019    Fall 02/2022    Fatigue 06/02/2015    Female cystocele 06/02/2015    Hiatal hernia     Hiatal hernia with gastroesophageal reflux 01/06/2017    History of blood clots 1980    leg s/p fx    History of DVT (deep  vein thrombosis) 06/02/2015    History of herpes simplex infection 07/25/2020    Hyperlipidemia     Hypertension 06/10/2016    Left rib fracture 05/2017    11TH RIB     Migraine headache 06/02/2015    Mitral insufficiency 01/16/2017    MILD    Osteoarthritis 05/20/2014    Osteopenia 12/05/2012    Over weight 06/14/2018    Peripheral edema 01/05/2017    PFO (patent foramen ovale) 01/16/2017    Pulmonary nodule 05/04/2017    DR CHUN FOLLOWS    RHA (rheumatoid arthritis) 12/05/2012    Scleritis 06/02/2015    Scoliosis     Thrombophlebitis 02/19/2022    Acute right lower extremity superficial thrombophlebitis noted in the great saphenous (below knee) and varicosity (below knee).      Tricuspid insufficiency     MILD    Urinary urgency     Varicose veins of other specified sites     Venous insufficiency 09/12/2018     Past Surgical History:   Procedure Laterality Date    ANKLE OPEN REDUCTION INTERNAL FIXATION Right 02/18/2022    Procedure: ANKLE OPEN REDUCTION INTERNAL FIXATION;  Surgeon: DEAN Tobias DPM;  Location: [facility] MAIN OR;  Service: Podiatry;  Laterality: Right;    COLONOSCOPY  07/20/2017    EGD/ COLONSCOPY LARGE HIATAL HERNIA. MILD DIVERTICULOSIS. OTHERWISE NORMAL.    ESOPHAGOSCOPY / EGD  07/28/2023    Dr. Davila    INGUINAL HERNIA REPAIR Right     ORIF ANKLE FRACTURE Right 02/18/2022    Dr. Tobias    TOTAL ABDOMINAL HYSTERECTOMY  1985    W/BSO WITH A/P REPAIR 1985 BENIGN REASONS DR. DRAKE      General Information       Row Name 05/05/24 1225          General Information    Prior Level of Function independent:;all household mobility;ADL's  -     Barriers to Rehab medically complex  -       Row Name 05/05/24 1225          Living Environment    People in Home alone  -       Row Name 05/05/24 1225          Cognition    Orientation Status (Cognition) oriented x 4  -       Row Name 05/05/24 1225          Safety Issues, Functional Mobility    Impairments Affecting Function (Mobility)  balance;endurance/activity tolerance;pain  -               User Key  (r) = Recorded By, (t) = Taken By, (c) = Cosigned By      Initials Name Provider Type     Barbara Barboza OT Occupational Therapist                     Mobility/ADL's       Row Name 05/05/24 1228          Bed Mobility    Bed Mobility supine-sit  -     Supine-Sit Glen (Bed Mobility) 2 person assist;moderate assist (50% patient effort)  -     Assistive Device (Bed Mobility) bed rails;draw sheet;head of bed elevated  -       Row Name 05/05/24 1228          Transfers    Transfers bed-chair transfer  -       Row Name 05/05/24 1228          Bed-Chair Transfer    Bed-Chair Glen (Transfers) 2 person assist;moderate assist (50% patient effort)  -     Assistive Device (Bed-Chair Transfers) walker, front-wheeled  -[facility] Name 05/05/24 1228          Functional Mobility    Functional Mobility- Comment made ambulatory transfer with difficulty  -[facility] Name 05/05/24 1228          Activities of Daily Living    BADL Assessment/Intervention lower body dressing;toileting  -[facility] Name 05/05/24 1228          Lower Body Dressing Assessment/Training    Glen Level (Lower Body Dressing) lower body dressing skills;dependent (less than 25% patient effort)  -[facility] Name 05/05/24 1228          Toileting Assessment/Training    Glen Level (Toileting) toileting skills;dependent (less than 25% patient effort)  -               User Key  (r) = Recorded By, (t) = Taken By, (c) = Cosigned By      Initials Name Provider Type     Barbara Barboza OT Occupational Therapist                   Obj/Interventions       Row Name 05/05/24 1229          Sensory Assessment (Somatosensory)    Sensory Subjective Reports numbness  -       Row Name 05/05/24 1229          Vision Assessment/Intervention    Visual Impairment/Limitations corrective lenses for reading  -       Row Name 05/05/24 1229          Range of Motion  Comprehensive    Comment, General Range of Motion surgical leg impaired due to pain, swelling 50%  -       Row Name 05/05/24 1229          Strength Comprehensive (MMT)    Comment, General Manual Muscle Testing (MMT) Assessment BUE 3+/5, LLE grossly 2-/5, RLE 3+/5  -               User Key  (r) = Recorded By, (t) = Taken By, (c) = Cosigned By      Initials Name Provider Type     Barbara Barboza, OT Occupational Therapist                   Goals/Plan       [facility] Name 05/05/24 1237          Bed Mobility Goal 1 (OT)    Activity/Assistive Device (Bed Mobility Goal 1, OT) bed mobility activities, all  -     Ross Level/Cues Needed (Bed Mobility Goal 1, OT) minimum assist (75% or more patient effort)  -     Time Frame (Bed Mobility Goal 1, OT) 2 weeks  -MH       Row Name 05/05/24 1237          Transfer Goal 1 (OT)    Activity/Assistive Device (Transfer Goal 1, OT) transfers, all  -     Ross Level/Cues Needed (Transfer Goal 1, OT) minimum assist (75% or more patient effort)  -     Time Frame (Transfer Goal 1, OT) 2 weeks  -MH       Row Name 05/05/24 1237          Toileting Goal 1 (OT)    Activity/Device (Toileting Goal 1, OT) toileting skills, all  -     Ross Level/Cues Needed (Toileting Goal 1, OT) minimum assist (75% or more patient effort)  -     Time Frame (Toileting Goal 1, OT) 2 weeks  -               User Key  (r) = Recorded By, (t) = Taken By, (c) = Cosigned By      Initials Name Provider Type     Barbara Barboza, OT Occupational Therapist                   Clinical Impression       [facility] Name 05/05/24 1234          Pain Assessment    Additional Documentation Pain Scale: FACES Pre/Post-Treatment (Group)  -[facility] Name 05/05/24 1234          Pain Scale: FACES Pre/Post-Treatment    Pain: FACES Scale, Pretreatment 2-->hurts little bit  -     Posttreatment Pain Rating 6-->hurts even more  -     Pain Location - Side/Orientation Left  -     Pain Location lower  -      Pain Location - extremity  -       Row Name 05/05/24 1234          Plan of Care Review    Plan of Care Reviewed With patient  -     Progress improving  -     Outcome Evaluation Pt is 77 y/o post fall resulting in hip Fx. She is POD 1 IM hip nailing and WBAT to her left surgical leg. Pt lives alone and is (I) at baseline but will need rehab now. Getting OOB to the chair this date required 2-person assist and LB ADL currently reqiring total assist but pt can feed, groom self with setup. Anticipate good progress. Recomend SNF at d/c.  -       Row Name 05/05/24 1234          Therapy Assessment/Plan (OT)    Rehab Potential (OT) good, to achieve stated therapy goals  -     Criteria for Skilled Therapeutic Interventions Met (OT) skilled treatment is necessary  -     Therapy Frequency (OT) 5 times/wk  -     Predicted Duration of Therapy Intervention (OT) until d/c  -       Row Name 05/05/24 1234          Therapy Plan Review/Discharge Plan (OT)    Anticipated Discharge Disposition (OT) skilled nursing facility  -       Row Name 05/05/24 1234          Vital Signs    O2 Delivery Pre Treatment room air  -     O2 Delivery Intra Treatment room air  -     O2 Delivery Post Treatment room air  -     Pre Patient Position Supine  -     Intra Patient Position Standing  -     Post Patient Position Sitting  -       Row Name 05/05/24 1234          Positioning and Restraints    Pre-Treatment Position in bed  -     Post Treatment Position chair  -     In Chair notified nsg;call light within reach;encouraged to call for assist;exit alarm on;legs elevated  -               User Key  (r) = Recorded By, (t) = Taken By, (c) = Cosigned By      Initials Name Provider Type     Barbara Barboza, OT Occupational Therapist                   Outcome Measures       Row Name 05/05/24 1238 05/05/24 0309       How much help from another person do you currently need...    Turning from your back to your side while in flat  bed without using bedrails? 2  -MH 2  -KW    Moving from lying on back to sitting on the side of a flat bed without bedrails? 2  -MH 2  -KW    Moving to and from a bed to a chair (including a wheelchair)? 2  -MH 2  -KW    Standing up from a chair using your arms (e.g., wheelchair, bedside chair)? 2  -MH 2  -KW    Climbing 3-5 steps with a railing? 1  - 1  -KW    To walk in hospital room? 1  - 2  -KW    AM-PAC 6 Clicks Score (PT) 10  - 11  -KW    Highest Level of Mobility Goal 4 --> Transfer to chair/commode  -MH 4 --> Transfer to chair/commode  -KW      Row Name 05/05/24 0227          How much help from another person do you currently need...    Turning from your back to your side while in flat bed without using bedrails? 2  -VB     Moving from lying on back to sitting on the side of a flat bed without bedrails? 2  -VB     Moving to and from a bed to a chair (including a wheelchair)? 2  -VB     Standing up from a chair using your arms (e.g., wheelchair, bedside chair)? 1  -VB     Climbing 3-5 steps with a railing? 1  -VB     To walk in hospital room? 1  -VB     AM-PAC 6 Clicks Score (PT) 9  -VB     Highest Level of Mobility Goal 3 --> Sit at edge of bed  -VB               User Key  (r) = Recorded By, (t) = Taken By, (c) = Cosigned By      Initials Name Provider Type     Barbara Barboza, SAI Occupational Therapist    Dee Dee Jimenez LPN Licensed Nurse    Hanane Steinberg RN Registered Nurse                    Occupational Therapy Education       Title: PT OT SLP Therapies (In Progress)       Topic: Occupational Therapy (In Progress)       Point: ADL training (Done)       Description:   Instruct learner(s) on proper safety adaptation and remediation techniques during self care or transfers.   Instruct in proper use of assistive devices.                  Learning Progress Summary             Patient Acceptance, E,TB,D, VU,DU,NR by  at 5/5/2024 5538                         Point: Home exercise  program (Not Started)       Description:   Instruct learner(s) on appropriate technique for monitoring, assisting and/or progressing therapeutic exercises/activities.                  Learner Progress:  Not documented in this visit.              Point: Precautions (Done)       Description:   Instruct learner(s) on prescribed precautions during self-care and functional transfers.                  Learning Progress Summary             Patient Acceptance, E,TB,D, VU,DU,NR by  at 5/5/2024 1238                         Point: Body mechanics (Done)       Description:   Instruct learner(s) on proper positioning and spine alignment during self-care, functional mobility activities and/or exercises.                  Learning Progress Summary             Patient Acceptance, E,TB,D, VU,DU,NR by  at 5/5/2024 1238                                         User Key       Initials Effective Dates Name Provider Type Discipline     06/16/21 -  Barbara Barboza, SAI Occupational Therapist OT                  OT Recommendation and Plan  Therapy Frequency (OT): 5 times/wk  Plan of Care Review  Plan of Care Reviewed With: patient  Progress: improving  Outcome Evaluation: Pt is 79 y/o post fall resulting in hip Fx. She is POD 1 IM hip nailing and WBAT to her left surgical leg. Pt lives alone and is (I) at baseline but will need rehab now. Getting OOB to the chair this date required 2-person assist and LB ADL currently reqiring total assist but pt can feed, groom self with setup. Anticipate good progress. Recomend SNF at d/c.     Time Calculation:         Time Calculation- OT       Row Name 05/05/24 1238             Time Calculation-     OT Start Time 0805  -      OT Stop Time 0838  -      OT Time Calculation (min) 33 min  -      Total Timed Code Minutes- OT 0 minute(s)  -      OT Received On 05/05/24  -      OT - Next Appointment 05/06/24  -      OT Goal Re-Cert Due Date 05/19/24  -                User Key  (r) = Recorded  By, (t) = Taken By, (c) = Cosigned By      Initials Name Provider Type     Barbara Barboza OT Occupational Therapist                  Therapy Charges for Today       Code Description Service Date Service Provider Modifiers Qty    [id number] HC OT EVAL MOD COMPLEXITY 3 5/5/2024 Barbara Barboza OT GO 1                 Barbara Barboza OT  5/5/2024    Electronically signed by Barbara Barboza OT at 05/05/24 3779

## 2024-05-06 NOTE — DISCHARGE PLACEMENT REQUEST
"Yancy Mcdonnell (78 y.o. Female)       Date of Birth   1945    Social Security Number       Address   [facility] [address] [place] [address] [place] [facility]    Home Phone   [phone]    MRN   [id number]       Scientology   Buddhist    Marital Status                               Admission Date   5/3/24    Admission Type   Emergency    Admitting Provider   Sowmya Eisenberg MD    Attending Provider   David Brand MD    Department, Room/Bed   [facility] SURGICAL INPATIENT, 4126/1       Discharge Date       Discharge Disposition       Discharge Destination                                 Attending Provider: David Brand MD    Allergies: Meperidine, Other, Sulfa Antibiotics, Lisinopril    Isolation: None   Infection: None   Code Status: Prior    Ht: 157.5 cm (62\")   Wt: 81.6 kg (180 lb)    Admission Cmt: None   Principal Problem: Hip fracture [S72.009A]                   Active Insurance as of 5/3/2024       Primary Coverage       Payor Plan Insurance Group Employer/Plan Group    MEDICARE MEDICARE A & B        Payor Plan Address Payor Plan Phone Number Payor Plan Fax Number Effective Dates    [address] [phone]  6/1/2009 - None Entered    [facility] [id number]         Subscriber Name Subscriber Birth Date Member ID       YANCY MCDONNELL 1945 [id number]               Secondary Coverage       Payor Plan Insurance Group Employer/Plan Group    AETNA COMMERCIAL AETNA   [id number]       Payor Plan Address Payor Plan Phone Number Payor Plan Fax Number Effective Dates    [address] [phone]  1/1/2024 - None Entered    [facility] [id number]         Subscriber Name Subscriber Birth Date Member ID       YANCY MCDONNELL 1945 [id number]                     Emergency Contacts        (Rel.) Home Phone Work Phone Mobile Phone    KIRA MCDONNELL (Spouse) [phone] -- [phone]    JP KING (Daughter) -- -- [phone]                "

## 2024-05-06 NOTE — PLAN OF CARE
Goal Outcome Evaluation: Pt been in bed this shift. Assisting with repositioning. WBAT to LLE .  No c/o so far this shift. Plan of care ongoing.

## 2024-05-06 NOTE — CASE MANAGEMENT/SOCIAL WORK
Discharge Planning Assessment   [place]     Patient Name: Yancy Mcdonnell  MRN: [id number]  Today's Date: 5/6/2024    Admit Date: 5/3/2024    Plan: Plan to dc to [place] [facility], accepted.  No Precert required.  No PASRR required but approved if needed.   Discharge Needs Assessment       Row Name 05/06/24 1421       Living Environment    People in Home spouse    Name(s) of People in Home Braydon - spouse    Current Living Arrangements home    Potentially Unsafe Housing Conditions none    In the past 12 months has the electric, gas, oil, or water company threatened to shut off services in your home? No    Primary Care Provided by self    Provides Primary Care For no one    Family Caregiver if Needed spouse    Family Caregiver Names Braydon - spouse    Quality of Family Relationships helpful;involved;supportive    Able to Return to Prior Arrangements yes       Resource/Environmental Concerns    Resource/Environmental Concerns none    Transportation Concerns none       Transportation Needs    In the past 12 months, has lack of transportation kept you from medical appointments or from getting medications? no    In the past 12 months, has lack of transportation kept you from meetings, work, or from getting things needed for daily living? No       Food Insecurity    Within the past 12 months, you worried that your food would run out before you got the money to buy more. Never true    Within the past 12 months, the food you bought just didn't last and you didn't have money to get more. Never true       Transition Planning    Patient/Family Anticipates Transition to home with family    Patient/Family Anticipated Services at Transition none    Transportation Anticipated car, drives self;family or friend will provide       Discharge Needs Assessment    Readmission Within the Last 30 Days no previous admission in last 30 days    Equipment Currently Used at Home cane, straight;walker, rolling;commode;wheelchair    Concerns to be Addressed  discharge planning    Anticipated Changes Related to Illness none    Equipment Needed After Discharge none    Outpatient/Agency/Support Group Needs inpatient rehabilitation facility    Discharge Facility/Level of Care Needs rehabilitation facility                   Discharge Plan       Row Name 05/06/24 3557       Plan    Plan Plan to [place], accepted.  No Precert required.  No PASRR required but approved if needed.    Patient/Family in Agreement with Plan yes    Plan Comments Patient lives at home with spouse Braydon.  Patient will need transport at discharge. Patient performs IADLs but does have a cane, RW, BSC and WC at home if needed. PCP and pharmacy confirmed. Agreeable to M2B.  Denies financial assistance needs for medication and/or food. Denies any current HH, Caregiver, or rehab services.  Plan to dc today, [place] planning on WC Van transport 4:30pm today 5/6.                  Continued Care and Services - Admitted Since 5/3/2024       Destination Coordination complete.      Service Provider Request Status Selected Services Address Phone Fax Patient Preferred    [facility]  Selected Inpatient Rehabilitation [address] [place] [address] [phone] --                  Expected Discharge Date and Time       Expected Discharge Date Expected Discharge Time    May 6, 2024            Demographic Summary       Row Name 05/06/24 1421       General Information    Admission Type inpatient    Arrived From emergency department    Required Notices Provided Important Message from Medicare    Referral Source admission list    Reason for Consult discharge planning    Preferred Language English                   Functional Status       Row Name 05/06/24 1421       Functional Status    Usual Activity Tolerance good    Current Activity Tolerance moderate       Functional Status, IADL    Medications independent    Meal Preparation independent    Housekeeping independent     Laundry independent    Shopping independent       Mental Status    General Appearance WDL WDL       Mental Status Summary    Recent Changes in Mental Status/Cognitive Functioning no changes                  Patient Forms       Row Name 05/06/24 1409       Patient Forms    Important Message from Medicare (IMM) Delivered  IMM reviewed, signed, copy given 5/6    Delivered to Support person  spouse    Method of delivery In person                      TERI Maya RN  SIPS/ICU   O: [phone]  C: [phone]  [email or web address]

## 2024-05-06 NOTE — PLAN OF CARE
Assessment: Yancy Mcdonnell is POD#2 s/p L IM nailing for hip frx and presents with functional mobility impairments which indicate the need for skilled intervention. Pt with flat affect, soft speech, and slowness of movement. Pt with heavy eyelids, but denying symptoms and BP WNL. Pt required minAx2 for 2x STS with RW. Pt with significant posterior lean, requiring verbal and tactile cues for correcting balance. Pt ambulated x 8 ft, initially requiring Shasta x 2 for weight shifting and progressing the RW, but when given a goal to walk to, pt able to improve emmett and required min A of 1. Tolerating session today without incident. Will continue to follow and progress as tolerated.

## 2024-05-06 NOTE — THERAPY TREATMENT NOTE
"Subjective: Pt agreeable to therapeutic plan of care.    Objective:     Bed mobility - N/A or Not attempted.  Transfers - Min-A, Assist x 2, and with rolling walker  Ambulation - 8 feet Min-A, Assist x 2, and with rolling walker, progressed to CGA, but pt required Shasta with maneuvering the RW.    Therapeutic Exercise - 10 Reps B LE AROM supported sitting / chair    Vitals: WNL    Pain: 5 VAS   Location: incisional hip  Intervention for pain: Repositioned, Increased Activity, and Therapeutic Presence    Education: Provided education on the importance of mobility in the acute care setting, Verbal/Tactile Cues, Transfer Training, Gait Training, Energy conservation strategies, and Post-Op Precautions    Assessment: Yancy Mcdonnell is POD#2 s/p L IM nailing for hip frx and presents with functional mobility impairments which indicate the need for skilled intervention. Pt with flat affect, soft speech, and slowness of movement. Pt with heavy eyelids, but denying symptoms and BP WNL. Pt required minAx2 for 2x STS with RW. Pt with significant posterior lean, requiring verbal and tactile cues for correcting balance. Pt ambulated x 8 ft, initially requiring Shasta x 2 for weight shifting and progressing the RW, but when given a goal to walk to, pt able to improve emmett and required min A of 1. Tolerating session today without incident. Will continue to follow and progress as tolerated.     Plan/Recommendations:   If medically appropriate, Moderate Intensity Therapy recommended post-acute care. This is recommended as therapy feels the patient would require 3-4 days per week and wouldn't tolerate \"3 hour daily\" rehab intensity. SNF would be the preferred choice. If the patient does not agree to SNF, arrange HH or OP depending on home bound status. If patient is medically complex, consider LTACH. Pt requires no DME at discharge.     Pt desires Skilled Rehab placement at discharge. Pt cooperative; agreeable to therapeutic " recommendations and plan of care.         Basic Mobility 6-click:  Rollin = Total, A lot = 2, A little = 3; 4 = None  Supine>Sit:   1 = Total, A lot = 2, A little = 3; 4 = None   Sit>Stand with arms:  1 = Total, A lot = 2, A little = 3; 4 = None  Bed>Chair:   1 = Total, A lot = 2, A little = 3; 4 = None  Ambulate in room:  1 = Total, A lot = 2, A little = 3; 4 = None  3-5 Steps with railin = Total, A lot = 2, A little = 3; 4 = None  Score: 15    Modified [place]: N/A = No pre-op stroke/TIA    Post-Tx Position: Up in Chair, Alarms activated, and Call light and personal items within reach  PPE: gloves

## 2024-05-06 NOTE — CASE MANAGEMENT/SOCIAL WORK
Continued Stay Note   Connor     Patient Name: Yancy Mcdonnell  MRN: [id number]  Today's Date: 5/6/2024    Admit Date: 5/3/2024    Plan: Plan to dc to [facility], accepted.  Precert required, started by [facility] 5/6, pending.  PASRR approved.   Discharge Plan       Row Name 05/06/24 2055       Plan    Plan Plan to dc to [facility], accepted.  Precert required, started by [facility] 5/6, pending.  PASRR approved.    Plan Comments Due to an error in insurance that was entered, [place] declined patient needs.  CM spoke with patient and , agreeable to [facility], referral in basket, liaison notified, accepted.  Precert started by [facility] 5/6, pending.  Bed ready 5/7.  Floor RN and MD updated.      Row Name 05/06/24 1750       Plan    Plan Plan to dc to [facility], accepted.  No Precert required.  No PASRR required but approved if needed.    Patient/Family in Agreement with Plan yes    Plan Comments Patient lives at home with spouse Braydon.  Patient will need transport at discharge. Patient performs IADLs but does have a cane, RW, BSC and WC at home if needed. PCP and pharmacy confirmed. Agreeable to [facility].  Denies financial assistance needs for medication and/or food. Denies any current HH, Caregiver, or rehab services.  Plan to dc today, [place] planning on WC Van transport 4:30pm today 5/6.               Expected Discharge Date and Time       Expected Discharge Date Expected Discharge Time    May 6, 2024               TERI Maya RN  SIPS/ICU   O: [phone]  C: [phone]  [email or web address]

## 2024-05-06 NOTE — SIGNIFICANT NOTE
05/06/24 1631   Post Acute Pre-Cert Documentation   Request Submitted by Facility - Type: Hospital   Post-Acute Authorization Type Submitted: SNF   Date Post Acute Pre-Cert Inititated per Facility 05/06/24   Verification from Payer Yes   Accepting Facility [facility] Discharge Date Requested 05/06/24   Had Accepting Facility at Time of Submission Yes   Response Communicated to:    Authorization Number: [id number]   Post Acute Pre-Cert Initiated Comment CM submitted pre-cert via [employer] portal for [facility]. Pre-cert pending as of 5/6. Auth ID [id number]. [facility]

## 2024-05-06 NOTE — DISCHARGE SUMMARY
[facility] Medicine Services  Discharge Summary    Date of Service: 24  Patient Name: Yancy Mcdonnell  : 1945  MRN: [id number]    Date of Admission: 5/3/2024  Discharge Diagnosis: #Fall complicated by left intertrochanteric fracture, acute blood loss anemia on chronic anemia  Date of Discharge:  24  Primary Care Physician: Antelmo Moreira MD      Presenting Problem:   Hip fracture [S72.009A]  Closed fracture of left hip, initial encounter [S72.002A]    Active and Resolved Hospital Problems:  Active Hospital Problems    Diagnosis POA    **Hip fracture [S72.009A] Yes      Resolved Hospital Problems   No resolved problems to display.         Hospital Course     HPI:  Admitting team HPI   78-year-old female describes left hip pain after stumbling and falling last night. States she was able to get back up in her home and ambulate however today she was leaned over and felt a sudden instability in her hip and pain and fell again and has some ongoing left hip pain and since that time is been unable to bear weight. She reports no focal numbness or weakness.     Hospital Course:  78-year-old female with history of hypertension, hypothyroidism, RA, GERD, cervical stenosis admitted to [facility] 5/3/2024 under orthopedics  after sustaining a fall at home.  Found to have left greater trochanteric fracture.  Medicine consulted for medical management     #Fall complicated by left intertrochanteric fracture   Status post left intramedullary nailing of intertrochanteric hip fracture   Aspirin 81 twice daily per Ortho  Oxycodone as needed for pain.   Continue Aggressive bowel regimen  PT/OT>Rehab  Out patient follow up with ortho in 3 weeks      #Hypothyroidism  Continue home dose of levothyroxine     #Hypertension  Hold losartan and thiazide given soft blood pressure  Resume as blood pressure permits-outpatient     #GERD  Continue PPI     #Acute blood loss anemia on chronic anemia  H&H  stable  Transfuse PRBCs to keep hemoglobin more than 7  Repeat H&H in 5 days at rehab      DISCHARGE Follow Up Recommendations for labs and diagnostics: Outpatient follow-up with orthopedics in 3 weeks.  Repeat H&H in 5 days at rehab      Reasons For Change In Medications and Indications for New Medications:      Day of Discharge     Vital Signs:  Temp:  [97.6 °F (36.4 °C)-98.6 °F (37 °C)] 97.6 °F (36.4 °C)  Heart Rate:  [] 99  Resp:  [14-19] 17  BP: (106-117)/(63-72) 111/72    Physical Exam:  Physical Exam AOx3 NAD  RRR S1-S2 audible  Lungs with fair air entry  Abdomen soft nontender nondistended  Left hip CDI      Pertinent  and/or Most Recent Results     LAB RESULTS:      Lab 05/06/24  0246 05/05/24  0342 05/04/24 0419   WBC 8.81 11.95* 9.59   HEMOGLOBIN 10.1* 10.6* 11.2*   HEMATOCRIT 32.1* 34.7 36.4   PLATELETS 314 331 312   NEUTROS ABS 6.29 9.51* 7.48*   IMMATURE GRANS (ABS) 0.06* 0.06* 0.04   LYMPHS ABS 1.37 1.33 1.07   MONOS ABS 0.92* 1.02* 0.88   EOS ABS 0.14 0.01 0.10   MCV 93.0 94.3 92.6         Lab 05/06/24  0246 05/05/24  0342 05/04/24  0419   SODIUM 140 133* 141   POTASSIUM 4.1 4.0 3.9   CHLORIDE 106 100 107   CO2 25.0 27.0 26.0   ANION GAP 9.0 6.0 8.0   BUN 23 21 18   CREATININE 0.69 1.00 0.75   EGFR 88.4 57.4* 81.1   GLUCOSE 121* 144* 96   CALCIUM 8.2* 8.6 8.9   TSH  --   --  2.460                         Brief Urine Lab Results  (Last result in the past 365 days)        Color   Clarity   Blood   Leuk Est   Nitrite   Protein   CREAT   Urine HCG        09/15/23 1052 Yellow   Clear   Trace   500 June/ul   Negative   Trace                 Microbiology Results (last 10 days)       ** No results found for the last 240 hours. **            XR Hip With or Without Pelvis 1 View Left    Result Date: 5/4/2024  Impression: Impression: Patient is status post internal fixation of the left proximal femur. Adjacent postoperative subcutaneous emphysema is noted. Skin staples are seen. Bones are demineralized.  The visualized portion of the bony pelvis, right hip, and bilateral sacroiliac joints are unremarkable. Electronically Signed: Milton Cabral MD  5/4/2024 12:01 PM EDT  Workstation ID: [id number]    MRI Pelvis Without Contrast    Result Date: 5/3/2024  Impression: Impression: 1.Mildly comminuted fracture of the left greater trochanter with primary transverse fracture line. No significant displacement at this time. 2.Fracture line extends into the upper-posterior intertrochanteric region. This appears incomplete on these images, as above. Orthopedic consultation is advised for treatment considerations. 3.No other definite fracture is visualized at this time. 4.Edema surrounds the trochanteric fracture with hematoma in the trochanteric bursa. 5.Bilateral gluteal tendinopathy and suspected partial tears. 6.Moderate hip and right knee osteoarthritis. Electronically Signed: Girish Olivas  5/3/2024 10:36 PM EDT  Workstation ID: [id number]    MRI Femur Left Without Contrast    Result Date: 5/3/2024  Impression: Impression: 1.Mildly comminuted fracture of the left greater trochanter with primary transverse fracture line. No significant displacement at this time. 2.Fracture line extends into the upper-posterior intertrochanteric region. This appears incomplete on these images, as above. Orthopedic consultation is advised for treatment considerations. 3.No other definite fracture is visualized at this time. 4.Edema surrounds the trochanteric fracture with hematoma in the trochanteric bursa. 5.Bilateral gluteal tendinopathy and suspected partial tears. 6.Moderate hip and right knee osteoarthritis. Electronically Signed: Girish Olivas  5/3/2024 10:36 PM EDT  Workstation ID: [id number]    CT Lower Extremity Left Without Contrast    Result Date: 5/3/2024  Impression: Impression: 1.Transverse fracture of the greater trochanter without significant displacement at this time. 2.No definite fracture line through the intertrochanteric region  or femoral neck on this exam. MRI would be most sensitive for a subtle nondisplaced fracture which could be occult on this exam. 3.Fluid in the greater trochanteric bursa, likely hematoma. 4.Moderate left hip osteoarthritis. 5.Advanced degenerative changes in the lower lumbar spine with anterolisthesis of L4 on L5 and suspected moderate to severe canal narrowing. Electronically Signed: Girish Olivas  5/3/2024 3:52 PM EDT  Workstation ID: [id number]    XR Hip With or Without Pelvis 2 - 3 View Left    Result Date: 5/3/2024  Impression: Impression: 1.Left greater trochanteric fracture. CT might be useful to better assess. Electronically Signed: Jh Broussard MD  5/3/2024 12:49 PM EDT  Workstation ID: [id number]     Results for orders placed in visit on 03/17/22    Duplex Venous Lower Extremity - Right CAR    Interpretation Summary  · Normal right lower extremity venous duplex scan.      Results for orders placed in visit on 03/17/22    Duplex Venous Lower Extremity - Right CAR    Interpretation Summary  · Normal right lower extremity venous duplex scan.          Labs Pending at Discharge:      Procedures Performed  Procedure(s):  HIP INTERTROCHANTERIC NAILING         Consults:   Consults       Date and Time Order Name Status Description    5/4/2024 12:28 PM Inpatient Hospitalist Consult Completed     5/3/2024  5:33 PM Inpatient Orthopedic Surgery Consult                Discharge Details        Discharge Medications        ASK your doctor about these medications        Instructions Start Date   fluticasone 50 MCG/ACT nasal spray  Commonly known as: FLONASE  Ask about: Which instructions should I use?   2 sprays, Nasal, Daily      hydroCHLOROthiazide 25 MG tablet  Ask about: Which instructions should I use?   25 mg, Oral, Daily      levothyroxine 50 MCG tablet  Commonly known as: SYNTHROID, LEVOTHROID   50 mcg, Oral, Daily      loratadine 10 MG tablet  Commonly known as: CLARITIN   10 mg, Oral, Daily      LUTEIN 20 PO   1  tablet, Oral, Daily      lysine 500 MG tablet   1 tablet, Oral, Daily PRN, Stop now for surgery      Magnesium 250 MG tablet   1 tablet, Oral, Daily      olmesartan 20 MG tablet  Commonly known as: BENICAR   20 mg, Oral, Daily      omeprazole 20 MG capsule  Commonly known as: priLOSEC   20 mg, Oral, Daily               Allergies   Allergen Reactions    Meperidine Unknown (See Comments) and Nausea And Vomiting     Abstracted from centricity.    Other Unknown (See Comments)     Sulfa (sulfadiazine)     Sulfa Antibiotics Unknown - High Severity and Anaphylaxis    Lisinopril Cough         Discharge Disposition: Rehab      Diet:  Hospital:  Diet Order   Procedures    Diet: Cardiac; Healthy Heart (2-3 Na+); Fluid Consistency: Thin (IDDSI 0)         Discharge Activity:         CODE STATUS:  There are no questions and answers to display.         No future appointments.        Time spent on Discharge including face to face service:  >30 minutes    Signature: Electronically signed by David Brand MD, 05/06/24, 13:11 EDT.  [facility] Hospitalist Team

## 2024-05-07 VITALS
RESPIRATION RATE: 18 BRPM | BODY MASS INDEX: 33.13 KG/M2 | TEMPERATURE: 98 F | OXYGEN SATURATION: 95 % | HEIGHT: 62 IN | HEART RATE: 85 BPM | SYSTOLIC BLOOD PRESSURE: 130 MMHG | DIASTOLIC BLOOD PRESSURE: 70 MMHG | WEIGHT: 180 LBS

## 2024-05-07 LAB
DEPRECATED RDW RBC AUTO: 47.6 FL (ref 37–54)
ERYTHROCYTE [DISTWIDTH] IN BLOOD BY AUTOMATED COUNT: 14 % (ref 12.3–15.4)
HCT VFR BLD AUTO: 32 % (ref 34–46.6)
HGB BLD-MCNC: 10.1 G/DL (ref 12–15.9)
MCH RBC QN AUTO: 29.4 PG (ref 26.6–33)
MCHC RBC AUTO-ENTMCNC: 31.6 G/DL (ref 31.5–35.7)
MCV RBC AUTO: 93 FL (ref 79–97)
PLATELET # BLD AUTO: 325 10*3/MM3 (ref 140–450)
PMV BLD AUTO: 10 FL (ref 6–12)
RBC # BLD AUTO: 3.44 10*6/MM3 (ref 3.77–5.28)
WBC NRBC COR # BLD AUTO: 8.75 10*3/MM3 (ref 3.4–10.8)

## 2024-05-07 PROCEDURE — 97110 THERAPEUTIC EXERCISES: CPT

## 2024-05-07 PROCEDURE — 97116 GAIT TRAINING THERAPY: CPT

## 2024-05-07 PROCEDURE — 85027 COMPLETE CBC AUTOMATED: CPT | Performed by: HOSPITALIST

## 2024-05-07 RX ORDER — OXYCODONE HYDROCHLORIDE 10 MG/1
10 TABLET ORAL EVERY 6 HOURS PRN
Qty: 12 TABLET | Refills: 0 | Status: SHIPPED | OUTPATIENT
Start: 2024-05-07 | End: 2024-05-10

## 2024-05-07 RX ADMIN — ASPIRIN 81 MG CHEWABLE TABLET 81 MG: 81 TABLET CHEWABLE at 08:18

## 2024-05-07 RX ADMIN — ACETAMINOPHEN 650 MG: 325 TABLET, FILM COATED ORAL at 16:27

## 2024-05-07 RX ADMIN — ACETAMINOPHEN 650 MG: 325 TABLET, FILM COATED ORAL at 08:26

## 2024-05-07 RX ADMIN — POLYETHYLENE GLYCOL 3350 17 G: 17 POWDER, FOR SOLUTION ORAL at 08:18

## 2024-05-07 RX ADMIN — LEVOTHYROXINE SODIUM 50 MCG: 0.05 TABLET ORAL at 06:20

## 2024-05-07 RX ADMIN — PANTOPRAZOLE SODIUM 40 MG: 40 TABLET, DELAYED RELEASE ORAL at 06:20

## 2024-05-07 NOTE — PROGRESS NOTES
[facility] MEDICINE SERVICE  DAILY PROGRESS NOTE    NAME: Yancy Mcdonnell  : 1945  MRN: [id number]      LOS: 3 days     PROVIDER OF SERVICE: David Brand MD    Chief Complaint: Hip fracture    Subjective:     Interval History:  History taken from: patient chart RN  No new symptoms     Review of Systems:   Review of Systems  All negative except as above   Objective:     Vital Signs  Temp:  [97.5 °F (36.4 °C)-98.8 °F (37.1 °C)] 97.5 °F (36.4 °C)  Heart Rate:  [] 99  Resp:  [20-22] 22  BP: (126-153)/(71-79) 126/71   Body mass index is 32.92 kg/m².    Physical Exam  Physical Exam  AOx3 NAD  RRR S1 and S2 audible  Lungs with fair air entry  Abdomen soft nontender nondistended  Left hip CDI  Scheduled Meds   aspirin, 81 mg, Oral, BID  [Held by provider] hydroCHLOROthiazide, 25 mg, Oral, Daily  levothyroxine, 50 mcg, Oral, Daily  [Held by provider] losartan, 50 mg, Oral, Q24H  pantoprazole, 40 mg, Oral, Q AM  polyethylene glycol, 17 g, Oral, BID  sodium chloride, 10 mL, Intravenous, Q12H       PRN Meds     acetaminophen    aluminum-magnesium hydroxide-simethicone    senna-docusate sodium **AND** polyethylene glycol **AND** bisacodyl **AND** bisacodyl    lactated ringers    Morphine    ondansetron ODT **OR** ondansetron    oxyCODONE    oxyCODONE    phenylephrine    sodium chloride    sodium chloride   Infusions  lactated ringers, 9 mL/hr, Last Rate: 100 mL/hr (24 1021)  phenylephrine, 0.5-3 mcg/kg/min          Diagnostic Data    Results from last 7 days   Lab Units 24  0331 24  0246   WBC 10*3/mm3 8.75 8.81   HEMOGLOBIN g/dL 10.1* 10.1*   HEMATOCRIT % 32.0* 32.1*   PLATELETS 10*3/mm3 325 314   GLUCOSE mg/dL  --  121*   CREATININE mg/dL  --  0.69   BUN mg/dL  --  23   SODIUM mmol/L  --  140   POTASSIUM mmol/L  --  4.1   ANION GAP mmol/L  --  9.0       No radiology results for the last day      I reviewed the patient's new clinical results.  I reviewed the patient's new imaging  results and agree with the interpretation.    Assessment/Plan:     Active and Resolved Problems  Active Hospital Problems    Diagnosis  POA    **Hip fracture [S72.009A]  Yes      Resolved Hospital Problems   No resolved problems to display.       78-year-old female with history of hypertension, hypothyroidism, RA, GERD, cervical stenosis admitted to [facility] 5/3/2024 under orthopedics  after sustaining a fall at home.  Found to have left greater trochanteric fracture.  Medicine consulted for medical management     #Fall complicated by left intertrochanteric fracture   Status post left intramedullary nailing of intertrochanteric hip fracture 5/4  Aspirin 81 twice daily per Ortho  Oxycodone as needed for pain.   Continue Aggressive bowel regimen  PT/OT>Rehab  Out patient follow up with ortho in 3 weeks      #Hypothyroidism  Continue home dose of levothyroxine     #Hypertension  Hold losartan and thiazide given soft blood pressure  Resume as blood pressure permits-outpatient     #GERD  Continue PPI     #Acute blood loss anemia on chronic anemia  H&H stable  Transfuse PRBCs to keep hemoglobin more than 7  Repeat H&H in 5 days at rehab    Will hold off on checking daily routine blood work    DVT prophylaxis:  Mechanical DVT prophylaxis orders are present.         Code status is   There are no questions and answers to display.       Plan for disposition: Pending placement    Time: 30 minutes    Signature: Electronically signed by David Brand MD, 05/07/24, 13:25 EDT.  [facility] Hospitalist Team

## 2024-05-07 NOTE — CASE MANAGEMENT/SOCIAL WORK
Continued Stay Note   Connor     Patient Name: Yancy Mcdonnell  MRN: [id number]  Today's Date: 5/7/2024    Admit Date: 5/3/2024    Plan: Plan to dc to [facility], accepted. Precert required, started by [facility] 5/6, pending. PASRR approved.   Discharge Plan       Row Name 05/07/24 1429       Plan    Plan Plan to dc to [facility], accepted. Precert required, started by [facility] 5/6, pending. PASRR approved.    Plan Comments Precert still pending, Floor Rn updated.               Expected Discharge Date and Time       Expected Discharge Date Expected Discharge Time    May 7, 2024               TERI Maya RN  SIPS/ICU   O: [phone]  C: [phone]  [email or web address]

## 2024-05-07 NOTE — SIGNIFICANT NOTE
Case Management/Social Work    Patient Name:  Yancy Mcdonnell  YOB: 1945  MRN: [id number]  Admit Date:  5/3/2024           05/07/24 1540   Post Acute Pre-Cert Documentation   Date Post Acute Pre-Cert Completed 05/07/24   All Clinicals Submitted? Yes   Response Received from Insurance? Approval   Post Acute Pre-Cert Initiated Comment MARY verified SNF precert approval via [employer]. Reference number [id number]. Valid 5/7-5/19. CM made aware.           Electronically signed by:  Sravan Santoyo CMA  05/07/24 15:42 EDT    Sravan Santoyo  Case Management Associate  [address] [address]  P: [phone]  F: [phone]

## 2024-05-07 NOTE — PLAN OF CARE
Goal Outcome Evaluation:  Plan of Care Reviewed With: patient        Progress: improving     VSS. Patient has ambulated in room from bed, chair and bedside commode with 2 assist, gait belt and walker. Pt had one large bowel movement this morning. Has done better with ambulation today. Pain increases with activity, gave prescribed pain meds as directed. Plan is to d/c today to [facility].

## 2024-05-07 NOTE — CASE MANAGEMENT/SOCIAL WORK
Continued Stay Note   Connor     Patient Name: Yancy Mcdonnell  MRN: [id number]  Today's Date: 5/7/2024    Admit Date: 5/3/2024    Plan: D/C to [facility], accepted. Precert approved 5/7-5/19. PASRR approved.   Discharge Plan       Row Name 05/07/24 1919       Plan    Plan D/C to [facility], accepted. Precert approved 5/7-5/19. PASRR approved.    Plan Comments Family agrees to transport pt. to SNF this evening. RN on floor aware.                    Expected Discharge Date and Time       Expected Discharge Date Expected Discharge Time    May 7, 2024             Kristine Olivo RN    Office: [phone]  Fax: [phone]  [email or web address]

## 2024-05-07 NOTE — PLAN OF CARE
Goal Outcome Evaluation:         Yancy Mcdonnell presents with functional mobility impairments which indicate the need for skilled intervention. Pt with improved tolerance to ROM and exercises LLE, improving L quad strength.  Still slow with mobility but improved WB and advancement of LLE this date. Tolerating session today without incident. Will continue to follow and progress as tolerated.

## 2024-05-07 NOTE — DISCHARGE SUMMARY
[facility] Medicine [facility]  Discharge Summary    Date of Service: 24  Patient Name: Yancy Mcdonnell  : 1945  MRN: [id number]    Date of Admission: 5/3/2024  Discharge Diagnosis: #Fall complicated by left intertrochanteric fracture   #Acute blood loss anemia on chronic anemia   Date of Discharge:  24  Primary Care Physician: Antelmo Moreira MD      Presenting Problem:   Hip fracture [S72.009A]  Closed fracture of left hip, initial encounter [S72.002A]    Active and Resolved Hospital Problems:  Active Hospital Problems    Diagnosis POA    **Hip fracture [S72.009A] Yes      Resolved Hospital Problems   No resolved problems to display.         Hospital Course     HPI:  Admitting team HPI   78-year-old female describes left hip pain after stumbling and falling last night. States she was able to get back up in her home and ambulate however today she was leaned over and felt a sudden instability in her hip and pain and fell again and has some ongoing left hip pain and since that time is been unable to bear weight. She reports no focal numbness or weakness.     Hospital Course:  78-year-old female with history of hypertension, hypothyroidism, RA, GERD, cervical stenosis admitted to [facility] 5/3/2024 under orthopedics  after sustaining a fall at home.  Found to have left greater trochanteric fracture.  Medicine consulted for medical management     #Fall complicated by left intertrochanteric fracture   Status post left intramedullary nailing of intertrochanteric hip fracture   Aspirin 81 twice daily per Ortho  Oxycodone as needed for pain.   Continue Aggressive bowel regimen  PT/OT>Rehab  Out patient follow up with ortho in 3 weeks      #Hypothyroidism  Continue home dose of levothyroxine     #Hypertension  Hold losartan and thiazide given soft blood pressure  Resume as blood pressure permits-outpatient     #GERD  Continue PPI     #Acute blood loss anemia on chronic anemia  H&H  stable  Transfuse PRBCs to keep hemoglobin more than 7  Repeat H&H in 5 days at rehab        DISCHARGE Follow Up Recommendations for labs and diagnostics: f/u with ortho. Recheck h/h in 5 days       Reasons For Change In Medications and Indications for New Medications:      Day of Discharge     Vital Signs:  Temp:  [97.5 °F (36.4 °C)-98.8 °F (37.1 °C)] 98 °F (36.7 °C)  Heart Rate:  [] 85  Resp:  [18-22] 18  BP: (126-153)/(70-79) 130/70    Physical Exam:  Physical Exam AOx3 NAD  RRR S1 and S2 audible  Lungs with fair air entry  Abdomen soft nontender nondistended  Left hip CDI      Pertinent  and/or Most Recent Results     LAB RESULTS:      Lab 05/07/24  0331 05/06/24  0246 05/05/24  0342 05/04/24 0419   WBC 8.75 8.81 11.95* 9.59   HEMOGLOBIN 10.1* 10.1* 10.6* 11.2*   HEMATOCRIT 32.0* 32.1* 34.7 36.4   PLATELETS 325 314 331 312   NEUTROS ABS  --  6.29 9.51* 7.48*   IMMATURE GRANS (ABS)  --  0.06* 0.06* 0.04   LYMPHS ABS  --  1.37 1.33 1.07   MONOS ABS  --  0.92* 1.02* 0.88   EOS ABS  --  0.14 0.01 0.10   MCV 93.0 93.0 94.3 92.6         Lab 05/06/24  0246 05/05/24  0342 05/04/24 0419   SODIUM 140 133* 141   POTASSIUM 4.1 4.0 3.9   CHLORIDE 106 100 107   CO2 25.0 27.0 26.0   ANION GAP 9.0 6.0 8.0   BUN 23 21 18   CREATININE 0.69 1.00 0.75   EGFR 88.4 57.4* 81.1   GLUCOSE 121* 144* 96   CALCIUM 8.2* 8.6 8.9   TSH  --   --  2.460                         Brief Urine Lab Results  (Last result in the past 365 days)        Color   Clarity   Blood   Leuk Est   Nitrite   Protein   CREAT   Urine HCG        09/15/23 1052 Yellow   Clear   Trace   500 June/ul   Negative   Trace                 Microbiology Results (last 10 days)       ** No results found for the last 240 hours. **            XR Hip With or Without Pelvis 1 View Left    Result Date: 5/4/2024  Impression: Impression: Patient is status post internal fixation of the left proximal femur. Adjacent postoperative subcutaneous emphysema is noted. Skin staples are  seen. Bones are demineralized. The visualized portion of the bony pelvis, right hip, and bilateral sacroiliac joints are unremarkable. Electronically Signed: Milton Cabral MD  5/4/2024 12:01 PM EDT  Workstation ID: [id number]    MRI Pelvis Without Contrast    Result Date: 5/3/2024  Impression: Impression: 1.Mildly comminuted fracture of the left greater trochanter with primary transverse fracture line. No significant displacement at this time. 2.Fracture line extends into the upper-posterior intertrochanteric region. This appears incomplete on these images, as above. Orthopedic consultation is advised for treatment considerations. 3.No other definite fracture is visualized at this time. 4.Edema surrounds the trochanteric fracture with hematoma in the trochanteric bursa. 5.Bilateral gluteal tendinopathy and suspected partial tears. 6.Moderate hip and right knee osteoarthritis. Electronically Signed: Girish Olivas  5/3/2024 10:36 PM EDT  Workstation ID: [id number]    MRI Femur Left Without Contrast    Result Date: 5/3/2024  Impression: Impression: 1.Mildly comminuted fracture of the left greater trochanter with primary transverse fracture line. No significant displacement at this time. 2.Fracture line extends into the upper-posterior intertrochanteric region. This appears incomplete on these images, as above. Orthopedic consultation is advised for treatment considerations. 3.No other definite fracture is visualized at this time. 4.Edema surrounds the trochanteric fracture with hematoma in the trochanteric bursa. 5.Bilateral gluteal tendinopathy and suspected partial tears. 6.Moderate hip and right knee osteoarthritis. Electronically Signed: Girish Olivas  5/3/2024 10:36 PM EDT  Workstation ID: [id number]    CT Lower Extremity Left Without Contrast    Result Date: 5/3/2024  Impression: Impression: 1.Transverse fracture of the greater trochanter without significant displacement at this time. 2.No definite fracture line  through the intertrochanteric region or femoral neck on this exam. MRI would be most sensitive for a subtle nondisplaced fracture which could be occult on this exam. 3.Fluid in the greater trochanteric bursa, likely hematoma. 4.Moderate left hip osteoarthritis. 5.Advanced degenerative changes in the lower lumbar spine with anterolisthesis of L4 on L5 and suspected moderate to severe canal narrowing. Electronically Signed: Girish Olivas  5/3/2024 3:52 PM EDT  Workstation ID: [id number]    XR Hip With or Without Pelvis 2 - 3 View Left    Result Date: 5/3/2024  Impression: Impression: 1.Left greater trochanteric fracture. CT might be useful to better assess. Electronically Signed: Jh Broussard MD  5/3/2024 12:49 PM EDT  Workstation ID: [id number]     Results for orders placed in visit on 03/17/22    Duplex Venous Lower Extremity - Right CAR    Interpretation Summary  · Normal right lower extremity venous duplex scan.      Results for orders placed in visit on 03/17/22    Duplex Venous Lower Extremity - Right CAR    Interpretation Summary  · Normal right lower extremity venous duplex scan.          Labs Pending at Discharge:      Procedures Performed  Procedure(s):  HIP INTERTROCHANTERIC NAILING         Consults:   Consults       Date and Time Order Name Status Description    5/4/2024 12:28 PM Inpatient Hospitalist Consult Completed     5/3/2024  5:33 PM Inpatient Orthopedic Surgery Consult                Discharge Details        Discharge Medications        New Medications        Instructions Start Date   aspirin 81 MG chewable tablet   81 mg, Oral, 2 Times Daily      oxyCODONE 10 MG tablet  Commonly known as: ROXICODONE   10 mg, Oral, Every 6 Hours PRN      polyethylene glycol 17 g packet  Commonly known as: MIRALAX   17 g, Oral, 2 Times Daily             Continue These Medications        Instructions Start Date   fluticasone 50 MCG/ACT nasal spray  Commonly known as: FLONASE   2 sprays, Nasal, Daily      levothyroxine 50  MCG tablet  Commonly known as: SYNTHROID, LEVOTHROID   50 mcg, Oral, Daily      loratadine 10 MG tablet  Commonly known as: CLARITIN   10 mg, Oral, Daily      LUTEIN 20 PO   1 tablet, Oral, Daily      lysine 500 MG tablet   1 tablet, Oral, Daily PRN, Stop now for surgery      Magnesium 250 MG tablet   1 tablet, Oral, Daily      omeprazole 20 MG capsule  Commonly known as: priLOSEC   20 mg, Oral, Daily             Stop These Medications      hydroCHLOROthiazide 25 MG tablet     olmesartan 20 MG tablet  Commonly known as: BENICAR              Allergies   Allergen Reactions    Meperidine Unknown (See Comments) and Nausea And Vomiting     Abstracted from centricity.    Other Unknown (See Comments)     Sulfa (sulfadiazine)     Sulfa Antibiotics Unknown - High Severity and Anaphylaxis    Lisinopril Cough         Discharge Disposition:   Skilled Nursing Facility (DC - External)    Diet:  Hospital:  Diet Order   Procedures    Diet: Cardiac; Healthy Heart (2-3 Na+); Fluid Consistency: Thin (IDDSI 0)         Discharge Activity:   Activity Instructions    Weight bearing as tolerated             CODE STATUS:  There are no questions and answers to display.         No future appointments.        Time spent on Discharge including face to face service:  >30 minutes    Signature: Electronically signed by David Brand MD, 05/07/24, 15:54 EDT.  [facility] Hospitalist Team

## 2024-05-07 NOTE — CASE MANAGEMENT/SOCIAL WORK
Continued Stay Note  [facility]     Patient Name: Yancy Mcdonnell  MRN: [id number]  Today's Date: 5/7/2024    Admit Date: 5/3/2024    Plan: Plan to dc to [facility], accepted. Precert approved 5/7-5/19. PASRR approved.   Discharge Plan       Row Name 05/07/24 1547       Plan    Plan Plan to dc to [facility], accepted. Precert approved 5/7-5/19. PASRR approved.    Plan Comments Precert approved, bed ready, will need transport.  Floor RN and MD updated.      Row Name 05/07/24 1429       Plan    Plan Plan to dc to [facility], accepted. Precert required, started by [facility] 5/6, pending. PASRR approved.    Plan Comments Precert still pending, Floor Rn updated.                 Expected Discharge Date and Time       Expected Discharge Date Expected Discharge Time    May 7, 2024               TERI Maya RN  SIPS/ICU   O: [phone]  C: [phone]  [email or web address]

## 2024-05-07 NOTE — THERAPY TREATMENT NOTE
"Subjective: Pt agreeable to therapeutic plan of care.    Objective:   Pt sitting up in recliner on arrival     WB'ing status: L Lower Extremity Weight Bearing As Tolerated      Therapeutic Exercise: 10 Reps B LE AROM, AP, QS, GS, heel slides, ab/add, LAQs    Precautions: High falls risk    Bed mobility - N/A or Not attempted.  Transfers - Min-A  Pt with posterior lean, and leaning back against chair on initial standing, min A x2 for support and verbal cues to regain balance.     Ambulation - 20 feet Min-A, Assist x 2, and with rolling walker  Gait:  Slow gait speed, able to advance LLE this date with cues, short step length, step to gait pattern.   Vitals: WNL    Pain: 3 VAS   Location: RLE  Intervention for pain: Repositioned and Therapeutic Presence    Education: Transfer Training and Gait Training    Assessment: Yancy Mcdonnell presents with functional mobility impairments which indicate the need for skilled intervention. Pt with improved tolerance to ROM and exercises LLE, improving L quad strength.  Still slow with mobility but improved WB and advancement of LLE this date. Tolerating session today without incident. Will continue to follow and progress as tolerated.     Plan/Recommendations:   If medically appropriate, Moderate Intensity Therapy recommended post-acute care. This is recommended as therapy feels the patient would require 3-4 days per week and wouldn't tolerate \"3 hour daily\" rehab intensity. SNF would be the preferred choice. If the patient does not agree to SNF, arrange HH or OP depending on home bound status. If patient is medically complex, consider LTACH. Pt requires no DME at discharge.     Pt desires Skilled Rehab placement at discharge. Pt cooperative; agreeable to therapeutic recommendations and plan of care.         Basic Mobility 6-click:  Rollin = Total, A lot = 2, A little = 3; 4 = None  Supine>Sit:   1 = Total, A lot = 2, A little = 3; 4 = None   Sit>Stand with arms:  1 = " Total, A lot = 2, A little = 3; 4 = None  Bed>Chair:   1 = Total, A lot = 2, A little = 3; 4 = None  Ambulate in room:  1 = Total, A lot = 2, A little = 3; 4 = None  3-5 Steps with railin = Total, A lot = 2, A little = 3; 4 = None  Score: 17    Modified Butler: N/A = No pre-op stroke/TIA    Post-Tx Position: Up in Chair, Alarms activated, and Call light and personal items within reach  PPE: gloves and surgical mask

## 2024-05-07 NOTE — CASE MANAGEMENT/SOCIAL WORK
Continued Stay Note  [facility]     Patient Name: Yancy Mcdonnell  MRN: [id number]  Today's Date: 5/7/2024    Admit Date: 5/3/2024    Plan: Plan to dc to [facility], accepted. Precert approved 5/7-5/19. PASRR approved.   Discharge Plan       Row Name 05/07/24 1557       Plan    Plan Comments  Van request submitted as WILL CALL, floor Rn updated.      Row Name 05/07/24 1547       Plan    Plan Plan to dc to [facility], accepted. Precert approved 5/7-5/19. PASRR approved.    Plan Comments Precert approved, bed ready, will need transport.  Floor RN and MD updated.      Row Name 05/07/24 1421       Plan    Plan Plan to dc to [facility], accepted. Precert required, started by [facility] 5/6, pending. PASRR approved.    Plan Comments Precert still pending, Floor Rn updated.               Expected Discharge Date and Time       Expected Discharge Date Expected Discharge Time    May 7, 2024               TERI Maya RN  SIPS/ICU   O: [phone]  C: [phone]  [email or web address]

## 2024-05-08 NOTE — CASE MANAGEMENT/SOCIAL WORK
Case Management Discharge Note      Final Note: SNF- [facility]         Selected Continued Care - Discharged on 5/7/2024 Admission date: 5/3/2024 - Discharge disposition: Skilled Nursing Facility (DC - External)      Destination Coordination complete.      Service Provider Selected Services Address Phone Fax Patient Preferred    [facility] [address] [place] [address] [phone] --               Transportation Services  W/C Van: Caesar Wright    Final Discharge Disposition Code: 03 - skilled nursing facility (SNF)      TERI Maya RN  SIPS/ICU   O: [phone]  C: [phone]  [email or web address]

## 2024-05-08 NOTE — PAYOR COMM NOTE
"Yancy Mcdonnell (78 y.o. Female)  1945  Ref #[id number]   DC Notice - Pt dc'd 24 to SNF    CARLEE GELLER LPN UR  Utilization Review Nurse  [facility]  Direct & confidential phone # [phone]  Fax # [phone]        Date of Birth   1945    Social Security Number       Address   [address] [place] [address] [address] [place] [facility]    Home Phone   [phone]    MRN   [id number]       Yarsanism   Mandaen    Marital Status                               Admission Date   5/3/24    Admission Type   Emergency    Admitting Provider   Sowmya Eisenberg MD    Attending Provider       Department, Room/Bed   [facility] SURGICAL INPATIENT,        Discharge Date   2024    Discharge Disposition   Skilled Nursing Facility (DC - External)    Discharge Destination                                 Attending Provider: (none)   Allergies: Meperidine, Other, Sulfa Antibiotics, Lisinopril    Isolation: None   Infection: None   Code Status: Prior    Ht: 157.5 cm (62\")   Wt: 81.6 kg (180 lb)    Admission Cmt: None   Principal Problem: Hip fracture [S72.009A]                   Active Insurance as of 5/3/2024       Primary Coverage       Payor Plan Insurance Group Employer/Plan Group    AETNA MEDICARE REPLACEMENT [place] MED[place] [id number]       Payor Plan Address Payor Plan Phone Number Payor Plan Fax Number Effective Dates    [address] [phone]  2024 - None Entered    [facility] [id number]         Subscriber Name Subscriber Birth Date Member ID       YANCY MCDONNELL 1945 [id number]                     Emergency Contacts        (Rel.) Home Phone Work Phone Mobile Phone    KIRA MCDONNELL (Spouse) [phone] -- [phone]    JP KING (Daughter) -- -- [phone]                 Discharge Summary        David Brand MD at 24 [address] [place] [address] Medicine Services  Discharge Summary    Date of " Service: 24  Patient Name: Yancy Mcdonnell  : 1945  MRN: [id number]    Date of Admission: 5/3/2024  Discharge Diagnosis: #Fall complicated by left intertrochanteric fracture   #Acute blood loss anemia on chronic anemia   Date of Discharge:  24  Primary Care Physician: Antelmo Moreira MD      Presenting Problem:   Hip fracture [S72.009A]  Closed fracture of left hip, initial encounter [S72.002A]    Active and Resolved Hospital Problems:  Active Hospital Problems    Diagnosis POA    **Hip fracture [S72.009A] Yes      Resolved Hospital Problems   No resolved problems to display.         Hospital Course     HPI:  Admitting team HPI   78-year-old female describes left hip pain after stumbling and falling last night. States she was able to get back up in her home and ambulate however today she was leaned over and felt a sudden instability in her hip and pain and fell again and has some ongoing left hip pain and since that time is been unable to bear weight. She reports no focal numbness or weakness.     Hospital Course:  78-year-old female with history of hypertension, hypothyroidism, RA, GERD, cervical stenosis admitted to [facility] 5/3/2024 under orthopedics  after sustaining a fall at home.  Found to have left greater trochanteric fracture.  Medicine consulted for medical management     #Fall complicated by left intertrochanteric fracture   Status post left intramedullary nailing of intertrochanteric hip fracture   Aspirin 81 twice daily per Ortho  Oxycodone as needed for pain.   Continue Aggressive bowel regimen  PT/OT>Rehab  Out patient follow up with ortho in 3 weeks      #Hypothyroidism  Continue home dose of levothyroxine     #Hypertension  Hold losartan and thiazide given soft blood pressure  Resume as blood pressure permits-outpatient     #GERD  Continue PPI     #Acute blood loss anemia on chronic anemia  H&H stable  Transfuse PRBCs to keep hemoglobin more than 7  Repeat H&H in 5 days at  rehab        DISCHARGE Follow Up Recommendations for labs and diagnostics: f/u with ortho. Recheck h/h in 5 days       Reasons For Change In Medications and Indications for New Medications:      Day of Discharge     Vital Signs:  Temp:  [97.5 °F (36.4 °C)-98.8 °F (37.1 °C)] 98 °F (36.7 °C)  Heart Rate:  [] 85  Resp:  [18-22] 18  BP: (126-153)/(70-79) 130/70    Physical Exam:  Physical Exam AOx3 NAD  RRR S1 and S2 audible  Lungs with fair air entry  Abdomen soft nontender nondistended  Left hip CDI      Pertinent  and/or Most Recent Results     LAB RESULTS:      Lab 05/07/24  0331 05/06/24  0246 05/05/24  0342 05/04/24 0419   WBC 8.75 8.81 11.95* 9.59   HEMOGLOBIN 10.1* 10.1* 10.6* 11.2*   HEMATOCRIT 32.0* 32.1* 34.7 36.4   PLATELETS 325 314 331 312   NEUTROS ABS  --  6.29 9.51* 7.48*   IMMATURE GRANS (ABS)  --  0.06* 0.06* 0.04   LYMPHS ABS  --  1.37 1.33 1.07   MONOS ABS  --  0.92* 1.02* 0.88   EOS ABS  --  0.14 0.01 0.10   MCV 93.0 93.0 94.3 92.6         Lab 05/06/24  0246 05/05/24 0342 05/04/24 0419   SODIUM 140 133* 141   POTASSIUM 4.1 4.0 3.9   CHLORIDE 106 100 107   CO2 25.0 27.0 26.0   ANION GAP 9.0 6.0 8.0   BUN 23 21 18   CREATININE 0.69 1.00 0.75   EGFR 88.4 57.4* 81.1   GLUCOSE 121* 144* 96   CALCIUM 8.2* 8.6 8.9   TSH  --   --  2.460                         Brief Urine Lab Results  (Last result in the past 365 days)        Color   Clarity   Blood   Leuk Est   Nitrite   Protein   CREAT   Urine HCG        09/15/23 1052 Yellow   Clear   Trace   500 Juen/ul   Negative   Trace                 Microbiology Results (last 10 days)       ** No results found for the last 240 hours. **            XR Hip With or Without Pelvis 1 View Left    Result Date: 5/4/2024  Impression: Impression: Patient is status post internal fixation of the left proximal femur. Adjacent postoperative subcutaneous emphysema is noted. Skin staples are seen. Bones are demineralized. The visualized portion of the bony pelvis, right  hip, and bilateral sacroiliac joints are unremarkable. Electronically Signed: Milton Cabral MD  5/4/2024 12:01 PM EDT  Workstation ID: [id number]    MRI Pelvis Without Contrast    Result Date: 5/3/2024  Impression: Impression: 1.Mildly comminuted fracture of the left greater trochanter with primary transverse fracture line. No significant displacement at this time. 2.Fracture line extends into the upper-posterior intertrochanteric region. This appears incomplete on these images, as above. Orthopedic consultation is advised for treatment considerations. 3.No other definite fracture is visualized at this time. 4.Edema surrounds the trochanteric fracture with hematoma in the trochanteric bursa. 5.Bilateral gluteal tendinopathy and suspected partial tears. 6.Moderate hip and right knee osteoarthritis. Electronically Signed: Girish Olivas  5/3/2024 10:36 PM EDT  Workstation ID: [id number]    MRI Femur Left Without Contrast    Result Date: 5/3/2024  Impression: Impression: 1.Mildly comminuted fracture of the left greater trochanter with primary transverse fracture line. No significant displacement at this time. 2.Fracture line extends into the upper-posterior intertrochanteric region. This appears incomplete on these images, as above. Orthopedic consultation is advised for treatment considerations. 3.No other definite fracture is visualized at this time. 4.Edema surrounds the trochanteric fracture with hematoma in the trochanteric bursa. 5.Bilateral gluteal tendinopathy and suspected partial tears. 6.Moderate hip and right knee osteoarthritis. Electronically Signed: Girish Olivas  5/3/2024 10:36 PM EDT  Workstation ID: [id number]    CT Lower Extremity Left Without Contrast    Result Date: 5/3/2024  Impression: Impression: 1.Transverse fracture of the greater trochanter without significant displacement at this time. 2.No definite fracture line through the intertrochanteric region or femoral neck on this exam. MRI would be most  sensitive for a subtle nondisplaced fracture which could be occult on this exam. 3.Fluid in the greater trochanteric bursa, likely hematoma. 4.Moderate left hip osteoarthritis. 5.Advanced degenerative changes in the lower lumbar spine with anterolisthesis of L4 on L5 and suspected moderate to severe canal narrowing. Electronically Signed: Girish Olivas  5/3/2024 3:52 PM EDT  Workstation ID: [id number]    XR Hip With or Without Pelvis 2 - 3 View Left    Result Date: 5/3/2024  Impression: Impression: 1.Left greater trochanteric fracture. CT might be useful to better assess. Electronically Signed: Jh Broussard MD  5/3/2024 12:49 PM EDT  Workstation ID: [id number]     Results for orders placed in visit on 03/17/22    Duplex Venous Lower Extremity - Right CAR    Interpretation Summary  · Normal right lower extremity venous duplex scan.      Results for orders placed in visit on 03/17/22    Duplex Venous Lower Extremity - Right CAR    Interpretation Summary  · Normal right lower extremity venous duplex scan.          Labs Pending at Discharge:      Procedures Performed  Procedure(s):  HIP INTERTROCHANTERIC NAILING         Consults:   Consults       Date and Time Order Name Status Description    5/4/2024 12:28 PM Inpatient Hospitalist Consult Completed     5/3/2024  5:33 PM Inpatient Orthopedic Surgery Consult                Discharge Details        Discharge Medications        New Medications        Instructions Start Date   aspirin 81 MG chewable tablet   81 mg, Oral, 2 Times Daily      oxyCODONE 10 MG tablet  Commonly known as: ROXICODONE   10 mg, Oral, Every 6 Hours PRN      polyethylene glycol 17 g packet  Commonly known as: MIRALAX   17 g, Oral, 2 Times Daily             Continue These Medications        Instructions Start Date   fluticasone 50 MCG/ACT nasal spray  Commonly known as: FLONASE   2 sprays, Nasal, Daily      levothyroxine 50 MCG tablet  Commonly known as: SYNTHROID, LEVOTHROID   50 mcg, Oral, Daily       loratadine 10 MG tablet  Commonly known as: CLARITIN   10 mg, Oral, Daily      LUTEIN 20 PO   1 tablet, Oral, Daily      lysine 500 MG tablet   1 tablet, Oral, Daily PRN, Stop now for surgery      Magnesium 250 MG tablet   1 tablet, Oral, Daily      omeprazole 20 MG capsule  Commonly known as: priLOSEC   20 mg, Oral, Daily             Stop These Medications      hydroCHLOROthiazide 25 MG tablet     olmesartan 20 MG tablet  Commonly known as: BENICAR              Allergies   Allergen Reactions    Meperidine Unknown (See Comments) and Nausea And Vomiting     Abstracted from centricity.    Other Unknown (See Comments)     Sulfa (sulfadiazine)     Sulfa Antibiotics Unknown - High Severity and Anaphylaxis    Lisinopril Cough         Discharge Disposition:   Skilled Nursing Facility (DC - External)    Diet:  Hospital:  Diet Order   Procedures    Diet: Cardiac; Healthy Heart (2-3 Na+); Fluid Consistency: Thin (IDDSI 0)         Discharge Activity:   Activity Instructions    Weight bearing as tolerated             CODE STATUS:  There are no questions and answers to display.         No future appointments.        Time spent on Discharge including face to face service:  >30 minutes    Signature: Electronically signed by David Brand MD, 05/07/24, 15:54 EDT.  [facility] Hospitalist Team     Electronically signed by David Brand MD at 05/07/24 0660

## 2024-05-14 NOTE — PROGRESS NOTES
"Enter Query Response Below      Query Response: Hb baseline 12.2 decreased to to 10.1. acute blood loss anemia during OR. Hx of chronic anemia              If applicable, please update the problem list.   Patient: Yancy Mcdonnell        : 1945  Account: [id number]           Admit Date: 5/3/2024        How to Respond to this query:       a. Click New Note     b. Answer query within the yellow box.                c. Update the Problem List, if applicable.      If you have any questions about this query contact me at: [email or web address]     Dr. Brand,     78 yr female noted with documentation of \"Acute blood loss anemia on chronic anemia\" in progress notes and discharge summary. On admit, H&H- 11.2/36.4 decreasing to 10./32.0. No PRBC transfused.    After study, was Acute blood loss anemia on chronic anemia clinically supported during this admission?    Acute blood loss anemia on chronic anemia was supported with additional clinical indicators:____________  Acute blood loss anemia on chronic anemia was not supported  Other- specify_____________  Unable to determine    By submitting this query, we are merely seeking further clarification of documentation to accurately reflect all conditions that you are monitoring, evaluating, treating or that extend the hospitalization or utilize additional resources of care. Please utilize your independent clinical judgment when addressing the question(s) above.     This query and your response, once completed, will be entered into the legal medical record.    Sincerely,  Thania ESPINOSA Rn  Clinical Documentation Integrity Program   "

## 2024-06-13 ENCOUNTER — OFFICE VISIT (OUTPATIENT)
Dept: ORTHOPEDIC SURGERY | Facility: CLINIC | Age: 79
End: 2024-06-13
Payer: MEDICARE

## 2024-06-13 VITALS
HEART RATE: 66 BPM | RESPIRATION RATE: 20 BRPM | BODY MASS INDEX: 33.13 KG/M2 | HEIGHT: 62 IN | OXYGEN SATURATION: 98 % | WEIGHT: 180 LBS

## 2024-06-13 DIAGNOSIS — M25.511 CHRONIC RIGHT SHOULDER PAIN: Primary | ICD-10-CM

## 2024-06-13 DIAGNOSIS — G89.29 CHRONIC RIGHT SHOULDER PAIN: Primary | ICD-10-CM

## 2024-06-13 RX ORDER — LIDOCAINE HYDROCHLORIDE 10 MG/ML
2 INJECTION, SOLUTION EPIDURAL; INFILTRATION; INTRACAUDAL; PERINEURAL
Status: COMPLETED | OUTPATIENT
Start: 2024-06-13 | End: 2024-06-13

## 2024-06-13 RX ORDER — TRIAMCINOLONE ACETONIDE 40 MG/ML
80 INJECTION, SUSPENSION INTRA-ARTICULAR; INTRAMUSCULAR
Status: COMPLETED | OUTPATIENT
Start: 2024-06-13 | End: 2024-06-13

## 2024-06-13 RX ADMIN — TRIAMCINOLONE ACETONIDE 80 MG: 40 INJECTION, SUSPENSION INTRA-ARTICULAR; INTRAMUSCULAR at 22:33

## 2024-06-13 RX ADMIN — LIDOCAINE HYDROCHLORIDE 2 ML: 10 INJECTION, SOLUTION EPIDURAL; INFILTRATION; INTRACAUDAL; PERINEURAL at 22:33

## 2024-06-13 NOTE — PROGRESS NOTES
"   Patient ID: Yancy Mcdonnell is a right handed 79 y.o. female presents with her daughter, Wanda, for follow-up and further evaluation on chronic RIGHT shoulder pain.Her last steroid injection to the right shoulder was 8/10/2023 by Dr. Loaiza. However, most recently she fell and experienced hip fracture on 5/3/2024. Reports performing inpatient and outpatient PT, which required her to use wheelchair regularly. States this has irritated right shoulder pain. Provocative: forking food/eating, internal rotation, overhead pressing in Rehab, abduction and external rotation. Treatments: rest, activity modifications.  And prior steroid injection with Dr. Loaiza.    Objective:  Pulse 66   Resp 20   Ht 157.5 cm (62\")   Wt 81.6 kg (180 lb)   SpO2 98%   BMI 32.92 kg/m²     Physical Examination:    Right shoulder:  Intact skin. Moderate warmth, moderate effusion  Tenderness to palpation over the anterior joint line, bicipital groove and lateral aspect of the proximal scapula  Passive forward flexion 120, abduction 90, ER 20, active IR S1  Pain and weakness with Addison and supraspinatus/Sampson testing  Positive speed    Range of motion to the elbow wrist and digits grossly intact  Radial pulse palpable, capillary refill less than 2 seconds all digits    Imaging:   XR Shoulder 2+ View Right (06/13/2024 10:28)       Assessment:    Diagnoses and all orders for this visit:    1. Chronic right shoulder pain (Primary)  -     XR Shoulder 2+ View Right    Plan: Recommend intra-articular and subacromial steroid injection to the right shoulder today provide improved function with ADLs and her rehab and subjective pain relief.  The risk and benefits of this injection were discussed and patient wishes to proceed.     Large Joint Arthrocentesis: R subacromial bursa  Date/Time: 6/13/2024 10:33 PM  Consent given by: patient  Site marked: site marked  Timeout: Immediately prior to procedure a time out was called to verify the correct patient, " procedure, equipment, support staff and site/side marked as required   Supporting Documentation  Indications: pain   Procedure Details  Location: shoulder - R subacromial bursa  Preparation: Patient was prepped and draped in the usual sterile fashion  Needle size: 25 G  Approach: posterior  Medications administered: 80 mg triamcinolone acetonide 40 MG/ML; 2 mL lidocaine PF 1% 1 %  Patient tolerance: patient tolerated the procedure well with no immediate complications        Disclaimer: Part of this note may be an electronic transcription/translation of spoken language to printed text using the Dragon Dictation System

## 2024-06-20 ENCOUNTER — OFFICE VISIT (OUTPATIENT)
Dept: ORTHOPEDIC SURGERY | Facility: CLINIC | Age: 79
End: 2024-06-20
Payer: MEDICARE

## 2024-06-20 VITALS — HEIGHT: 62 IN | WEIGHT: 180 LBS | HEART RATE: 94 BPM | BODY MASS INDEX: 33.13 KG/M2

## 2024-06-20 DIAGNOSIS — M25.512 ACUTE PAIN OF LEFT SHOULDER: Primary | ICD-10-CM

## 2024-06-20 DIAGNOSIS — M19.012 ARTHRITIS OF LEFT GLENOHUMERAL JOINT: ICD-10-CM

## 2024-06-20 PROCEDURE — 1160F RVW MEDS BY RX/DR IN RCRD: CPT | Performed by: PHYSICIAN ASSISTANT

## 2024-06-20 PROCEDURE — 20610 DRAIN/INJ JOINT/BURSA W/O US: CPT | Performed by: PHYSICIAN ASSISTANT

## 2024-06-20 PROCEDURE — 99213 OFFICE O/P EST LOW 20 MIN: CPT | Performed by: PHYSICIAN ASSISTANT

## 2024-06-20 PROCEDURE — 1159F MED LIST DOCD IN RCRD: CPT | Performed by: PHYSICIAN ASSISTANT

## 2024-06-20 RX ORDER — POTASSIUM CHLORIDE 750 MG/1
1 TABLET, FILM COATED, EXTENDED RELEASE ORAL DAILY
COMMUNITY
Start: 2024-06-13

## 2024-06-20 RX ADMIN — TRIAMCINOLONE ACETONIDE 40 MG: 40 INJECTION, SUSPENSION INTRA-ARTICULAR; INTRAMUSCULAR at 11:52

## 2024-06-20 RX ADMIN — LIDOCAINE HYDROCHLORIDE 2 ML: 10 INJECTION, SOLUTION EPIDURAL; INFILTRATION; INTRACAUDAL; PERINEURAL at 11:52

## 2024-06-24 RX ORDER — LIDOCAINE HYDROCHLORIDE 10 MG/ML
2 INJECTION, SOLUTION EPIDURAL; INFILTRATION; INTRACAUDAL; PERINEURAL
Status: COMPLETED | OUTPATIENT
Start: 2024-06-20 | End: 2024-06-20

## 2024-06-24 RX ORDER — TRIAMCINOLONE ACETONIDE 40 MG/ML
40 INJECTION, SUSPENSION INTRA-ARTICULAR; INTRAMUSCULAR
Status: COMPLETED | OUTPATIENT
Start: 2024-06-20 | End: 2024-06-20

## 2024-07-28 NOTE — ASSESSMENT & PLAN NOTE
Discussed possible causes.   Level is coming down, approaching normal. Negative GGT and isoenzymes.   Recheck in 3-6 months.    oral

## 2024-09-17 ENCOUNTER — TELEPHONE (OUTPATIENT)
Dept: FAMILY MEDICINE CLINIC | Facility: CLINIC | Age: 79
End: 2024-09-17
Payer: MEDICARE

## 2025-01-28 ENCOUNTER — OFFICE (AMBULATORY)
Dept: URBAN - METROPOLITAN AREA CLINIC 64 | Facility: CLINIC | Age: 80
End: 2025-01-28
Payer: MEDICARE

## 2025-01-28 VITALS
HEART RATE: 103 BPM | HEIGHT: 63 IN | SYSTOLIC BLOOD PRESSURE: 123 MMHG | DIASTOLIC BLOOD PRESSURE: 84 MMHG | WEIGHT: 161 LBS

## 2025-01-28 DIAGNOSIS — M48.00 SPINAL STENOSIS, SITE UNSPECIFIED: ICD-10-CM

## 2025-01-28 DIAGNOSIS — K21.9 GASTRO-ESOPHAGEAL REFLUX DISEASE WITHOUT ESOPHAGITIS: ICD-10-CM

## 2025-01-28 DIAGNOSIS — R29.6 REPEATED FALLS: ICD-10-CM

## 2025-01-28 DIAGNOSIS — R13.10 DYSPHAGIA, UNSPECIFIED: ICD-10-CM

## 2025-01-28 DIAGNOSIS — K44.9 DIAPHRAGMATIC HERNIA WITHOUT OBSTRUCTION OR GANGRENE: ICD-10-CM

## 2025-01-28 PROCEDURE — 99214 OFFICE O/P EST MOD 30 MIN: CPT

## 2025-03-12 ENCOUNTER — APPOINTMENT (OUTPATIENT)
Dept: GENERAL RADIOLOGY | Facility: HOSPITAL | Age: 80
End: 2025-03-12
Payer: MEDICARE

## 2025-03-12 ENCOUNTER — HOSPITAL ENCOUNTER (EMERGENCY)
Facility: HOSPITAL | Age: 80
Discharge: HOME | End: 2025-03-12
Payer: MEDICARE

## 2025-03-12 ENCOUNTER — APPOINTMENT (OUTPATIENT)
Dept: CT IMAGING | Facility: HOSPITAL | Age: 80
End: 2025-03-12
Payer: MEDICARE

## 2025-03-12 VITALS
TEMPERATURE: 97.4 F | HEART RATE: 93 BPM | RESPIRATION RATE: 16 BRPM | SYSTOLIC BLOOD PRESSURE: 166 MMHG | DIASTOLIC BLOOD PRESSURE: 83 MMHG | OXYGEN SATURATION: 94 % | BODY MASS INDEX: 32.92 KG/M2 | HEIGHT: 62 IN

## 2025-03-12 DIAGNOSIS — S09.90XA MILD CLOSED HEAD INJURY, INITIAL ENCOUNTER: ICD-10-CM

## 2025-03-12 DIAGNOSIS — M25.551 RIGHT HIP PAIN: Primary | ICD-10-CM

## 2025-03-12 DIAGNOSIS — S00.03XA CONTUSION OF SCALP, INITIAL ENCOUNTER: ICD-10-CM
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
## 2025-03-12 PROCEDURE — 73502 X-RAY EXAM HIP UNI 2-3 VIEWS: CPT

## 2025-03-12 PROCEDURE — 97530 THERAPEUTIC ACTIVITIES: CPT | Performed by: PHYSICAL THERAPIST

## 2025-03-12 PROCEDURE — 99284 EMERGENCY DEPT VISIT MOD MDM: CPT

## 2025-03-12 PROCEDURE — 70450 CT HEAD/BRAIN W/O DYE: CPT

## 2025-03-12 PROCEDURE — 72125 CT NECK SPINE W/O DYE: CPT

## 2025-03-12 PROCEDURE — 97162 PT EVAL MOD COMPLEX 30 MIN: CPT | Performed by: PHYSICAL THERAPIST

## 2025-03-12 RX ORDER — LOSARTAN POTASSIUM 100 MG/1
100 TABLET ORAL DAILY
COMMUNITY

## 2025-03-12 RX ORDER — TRAMADOL HYDROCHLORIDE 50 MG/1
50 TABLET ORAL EVERY 6 HOURS PRN
COMMUNITY

## 2025-03-12 RX ORDER — TRAMADOL HYDROCHLORIDE 50 MG/1
50 TABLET ORAL EVERY 6 HOURS PRN
Status: DISCONTINUED | OUTPATIENT
Start: 2025-03-12 | End: 2025-03-12 | Stop reason: HOSPADM

## 2025-03-12 RX ORDER — ONDANSETRON 4 MG/1
4 TABLET, ORALLY DISINTEGRATING ORAL EVERY 8 HOURS PRN
COMMUNITY

## 2025-03-12 RX ORDER — TRAMADOL HYDROCHLORIDE 50 MG/1
50 TABLET ORAL EVERY 6 HOURS PRN
Qty: 20 TABLET | Refills: 0 | Status: SHIPPED | OUTPATIENT
Start: 2025-03-12

## 2025-03-12 RX ADMIN — TRAMADOL HYDROCHLORIDE 50 MG: 50 TABLET, COATED ORAL at 11:08

## 2025-03-12 NOTE — SIGNIFICANT NOTE
03/12/25 1636   OTHER   Discipline occupational therapist   Rehab Time/Intention   Session Not Performed   (Per RN, OT no longer needed at this time. OT will sign off. Please re-order if needed)   Recommendation   OT - Next Appointment 03/13/25        5

## 2025-03-12 NOTE — ED PROVIDER NOTES
Subjective   History of Present Illness  Patient is an 79-year-old female with past medical history significant for multiple fractures, DVT, hypertension, moderate mitral insufficiency, RA presenting to the ED for evaluation of right hip pain after fall last night.  Patient was transported via EMS from the left wrist.  She reports that she hit her head without loss of consciousness.  EMS reports that she normally uses a wheelchair for mobility but that she uses a walker for transfers.  Last night she lost her balance while transferring and fell, hitting the back of her head on the wall.        Review of Systems   Musculoskeletal:  Positive for gait problem.       Past Medical History:   Diagnosis Date    Allergic rhinitis 06/02/2015    Ankle fracture, right 02/2022    Body mass index 29.0-29.9, adult 09/12/2018    Cervical spinal stenosis 08/07/2017    Cholelithiasis     Class 1 obesity due to excess calories with serious comorbidity and body mass index (BMI) of 30.0 to 30.9 in adult 10/14/2020    Closed bimalleolar fracture of right ankle 02/11/2022    DDD (degenerative disc disease), cervical     DDD (degenerative disc disease), lumbar     DDD (degenerative disc disease), thoracic     Difficulty walking     Poor balance    Diverticulosis     Dyslipidemia 12/05/2012    Elevated brain natriuretic peptide (BNP) level 01/06/2017    Eustachian tube disorder, right 02/18/2019    Fall 02/2022    Fatigue 06/02/2015    Female cystocele 06/02/2015    Hiatal hernia     Hiatal hernia with gastroesophageal reflux 01/06/2017    History of blood clots 1980    leg s/p fx    History of DVT (deep vein thrombosis) 06/02/2015    History of herpes simplex infection 07/25/2020    Hyperlipidemia     Hypertension 06/10/2016    Left rib fracture 05/2017    11TH RIB     Migraine headache 06/02/2015    Mitral insufficiency 01/16/2017    MILD    Osteoarthritis 05/20/2014    Osteopenia 12/05/2012    Over weight 06/14/2018    Peripheral edema  01/05/2017    PFO (patent foramen ovale) 01/16/2017    Pulmonary nodule 05/04/2017    DR CHUN FOLLOWS    RHA (rheumatoid arthritis) 12/05/2012    Scleritis 06/02/2015    Scoliosis     Thrombophlebitis 02/19/2022    Acute right lower extremity superficial thrombophlebitis noted in the great saphenous (below knee) and varicosity (below knee).      Tricuspid insufficiency     MILD    Urinary urgency     Varicose veins of other specified sites     Venous insufficiency 09/12/2018       Allergies   Allergen Reactions    Meperidine Unknown (See Comments) and Nausea And Vomiting     Abstracted from centricity.    Other Unknown (See Comments)     Sulfa (sulfadiazine)     Sulfa Antibiotics Unknown - High Severity and Anaphylaxis    Lisinopril Cough       Past Surgical History:   Procedure Laterality Date    ANKLE OPEN REDUCTION INTERNAL FIXATION Right 02/18/2022    Procedure: ANKLE OPEN REDUCTION INTERNAL FIXATION;  Surgeon: DEAN Tobias DPM;  Location: [facility] MAIN OR;  Service: Podiatry;  Laterality: Right;    COLONOSCOPY  07/20/2017    EGD/ COLONSCOPY LARGE HIATAL HERNIA. MILD DIVERTICULOSIS. OTHERWISE NORMAL.    ESOPHAGOSCOPY / EGD  07/28/2023    Dr. Davila    HIP INTERTROCHANTERIC NAILING Left 5/4/2024    Procedure: HIP INTERTROCHANTERIC NAILING;  Surgeon: Jeremy Rodriguez II, MD;  Location: [facility] MAIN OR;  Service: Orthopedics;  Laterality: Left;    INGUINAL HERNIA REPAIR Right     ORIF ANKLE FRACTURE Right 02/18/2022    Dr. Tobias    TOTAL ABDOMINAL HYSTERECTOMY  1985    W/BSO WITH A/P REPAIR 1985 BENIGN REASONS DR. DRAKE       Family History   Problem Relation Age of Onset    Osteoporosis Mother     Stroke Mother 80    Dementia Mother     Heart disease Father     Hypertension Father     Heart disease Sister     Cancer Daughter         BREAST AND MELANOMA    Breast cancer Daughter 52    Cancer Son         MELANOMA    Cancer Grandson         MELANOMA       Social History     Socioeconomic History     Marital status:    Tobacco Use    Smoking status: Never     Passive exposure: Never    Smokeless tobacco: Never    Tobacco comments:     None   Vaping Use    Vaping status: Never Used   Substance and Sexual Activity    Alcohol use: Yes     Alcohol/week: 1.0 standard drink of alcohol     Types: 1 Cans of beer per week    Drug use: Never    Sexual activity: Not Currently           Objective   Physical Exam  Vitals and nursing note reviewed.   Constitutional:       General: She is not in acute distress.     Appearance: She is not ill-appearing, toxic-appearing or diaphoretic.   HENT:      Head: Normocephalic.      Comments: Very small contusion to posterior scalp with very mild tenderness and no laceration     Right Ear: Tympanic membrane, ear canal and external ear normal.      Left Ear: Tympanic membrane, ear canal and external ear normal.      Ears:      Comments: No hemotympanum     Nose: Nose normal. No congestion or rhinorrhea.      Comments: No preseptal hematoma     Mouth/Throat:      Mouth: Mucous membranes are moist.      Pharynx: Oropharynx is clear.   Eyes:      General: No scleral icterus.        Right eye: No discharge.         Left eye: No discharge.      Extraocular Movements: Extraocular movements intact.      Conjunctiva/sclera: Conjunctivae normal.      Pupils: Pupils are equal, round, and reactive to light.   Neck:      Comments: No trismus, pain on axial load, or step-off deformity    No Guerra sign, raccoon eyes, or periorbital erythema or edema.  Cardiovascular:      Rate and Rhythm: Normal rate and regular rhythm.      Pulses: Normal pulses.      Heart sounds: Normal heart sounds. No murmur heard.     No friction rub. No gallop.   Pulmonary:      Effort: Pulmonary effort is normal. No respiratory distress.      Breath sounds: Normal breath sounds. No stridor. No wheezing, rhonchi or rales.   Abdominal:      General: Abdomen is flat. Bowel sounds are normal.      Palpations: Abdomen is  soft.      Tenderness: There is no abdominal tenderness.   Musculoskeletal:         General: No tenderness.      Cervical back: Normal range of motion. No rigidity.      Right hip: Tenderness present. Decreased range of motion. Decreased strength.      Right knee: Normal.      Right lower leg: No edema.      Left lower leg: No edema.      Comments: Good distal pulses and cap refill   Skin:     General: Skin is warm and dry.      Capillary Refill: Capillary refill takes 2 to 3 seconds.      Findings: Bruising present. No erythema or rash.      Comments: Small bruise noted to lateral aspect of right hip   Neurological:      General: No focal deficit present.      Mental Status: She is alert.      Sensory: No sensory deficit.   Psychiatric:         Mood and Affect: Mood normal.         Behavior: Behavior normal.         Procedures           ED Course  ED Course as of 03/12/25 1720   Wed Mar 12, 2025   1612 Patient needed her pain medication called into the pharmacy for skilled rehab.  This was completed as directed. [ME]      ED Course User Index  [ME] Henna Niño PA-C        Labs Reviewed - No data to display  CT Cervical Spine Without Contrast  Result Date: 3/12/2025  Impression: 1. Negative for cervical spine fracture. 2. Multilevel cervical degenerative findings above. Electronically Signed: Alfonso Esteban MD  3/12/2025 10:10 AM EDT  Workstation ID: [id number]    XR Hip With or Without Pelvis 2 - 3 View Right  Result Date: 3/12/2025  Impression: 1.No acute osseous abnormality of the pelvis or right hip. 2.Left hip gamma nail and right femoral intramedullary nail. Electronically Signed: Rafita Banks  3/12/2025 9:51 AM EDT  Workstation ID: [id number]    CT Head Without Contrast  Result Date: 3/12/2025  Impression: 1.No acute intracranial process or calvarial fracture identified. 2.Findings suggestive of mild chronic small vessel ischemic disease. Electronically Signed: Antelmo Moreira MD  3/12/2025 9:36 AM EDT   Workstation ID: [id number]    Medications   traMADol (ULTRAM) tablet 50 mg (50 mg Oral Given 3/12/25 6973)                                                    Medical Decision Making  Patient is an 79-year-old female who presented with the above complaint.  She had the above evaluation and exam.    Imaging independently interpreted by radiology and reviewed by myself.  X-ray of the right hip was negative for acute abnormality.  CT of the head and C-spine: Negative for acute findings.    Patient was given 1 dose of tramadol in the ED.  Patient was evaluated by physical therapy, and discharge recommendations were made.  At reassessment, patient is resting comfortably no acute distress.  Neuroexam is normal.  She is hemodynamically stable and afebrile.  She has good distal pulses and cap refill.      Per physical therapy, patient is unable to independently transfer from bed to wheelchair, and she is unable to return to the assisted living facility where she lives as a result.  From a medical standpoint, there is no evidence of sensory loss or vascular compromise.  There is no evidence of neurologic dysfunction from mild head trauma.  Patient discussed with case management, who arranged for patient to complete inpatient rehabilitation to return to her baseline level of mobility.  Patient family member at bedside are agreeable to plan.    Problems Addressed:  Contusion of scalp, initial encounter: complicated acute illness or injury  Fall, initial encounter: complicated acute illness or injury  Mild closed head injury, initial encounter: complicated acute illness or injury  Right hip pain: complicated acute illness or injury    Amount and/or Complexity of Data Reviewed  Radiology: ordered.    Risk  Prescription drug management.        Final diagnoses:   Right hip pain   Contusion of scalp, initial encounter   Mild closed head injury, initial encounter   Fall, initial encounter       ED Disposition  ED Disposition       ED  Disposition   Discharge    Condition   Stable    Comment   --               Chuck Zhang MD  [address]  [place] [facility]  [phone]    Schedule an appointment as soon as possible for a visit in 3 days           Medication List        New Prescriptions      naloxone 4 MG/0.1ML nasal spray  Commonly known as: NARCAN  Call 911. Don't prime. [place] in 1 nostril for overdose. Repeat in 2-3 minutes in other nostril if no or minimal breathing/responsiveness.            Changed      * traMADol 50 MG tablet  Commonly known as: ULTRAM  Take 1 tablet by mouth Every 6 (Six) Hours As Needed for Moderate Pain or Severe Pain.  What changed: You were already taking a medication with the same name, and this prescription was added. Make sure you understand how and when to take each.     * traMADol 50 MG tablet  Commonly known as: ULTRAM  What changed: Another medication with the same name was added. Make sure you understand how and when to take each.           * This list has 2 medication(s) that are the same as other medications prescribed for you. Read the directions carefully, and ask your doctor or other care provider to review them with you.                   Where to Get Your Medications        These medications were sent to [facility] - [place], [place] - [address] [place] [address] - [phone]  - [phone] [place] [id number]      Phone: [phone]   naloxone 4 MG/0.1ML nasal spray  traMADol 50 MG tablet            Henna Niño PA-C  03/12/25 1428       Henna Niño PA-C  03/12/25 1543       Henna Niño PA-C  03/12/25 1551       Henna Niño PA-C  03/12/25 1722

## 2025-03-12 NOTE — DISCHARGE INSTRUCTIONS
Resume your prescriptions at home.    Follow-up with your PCP in 3 to 5 days if symptoms do not improve.    Return to the ER with new or worsening symptoms.

## 2025-03-12 NOTE — PLAN OF CARE
Goal Outcome Evaluation:  Plan of Care Reviewed With: patient        Progress: no change  Outcome Evaluation: 79-year-old female with past medical history significant for multiple fractures, DVT, hypertension, moderate mitral insufficiency, RA presenting to the ED for evaluation of right hip pain after fall last night. She was at [facility] from early January to March 11th, then was moved to an [facility] yesterday where she helped set up her room wtih help from her daughter while she was sitting in the wheelchair. Per daughter, pt is not able to walk with the walker, but she had  the walker in the room with her and was using it to help transfer herself when she had this fall. Pt's daughter reporting this is an ongoing issue. PLOF at [facility] was (I) with transfers from w/c to commode and (I) with dressing, and had modA for bathing. Today she required [place] for bed mobility d/t hip pain, [place] fir sit<>stand, and mod-maxA for stand pivot transfer to[facility]from [facility]. She also showed impulsivity when sitting on the BSC and tried to stand without assistance. She was unable to weight shift once coming to stand and required maxA for shifting weight off left leg and moving trunk to sit on EOB safely. Pt is below her baseline function at this time is not safe to return to [facility]. Recommend SNF for improved strength and endurance training at [place].    Anticipated Discharge Disposition (PT): skilled nursing facility

## 2025-03-12 NOTE — THERAPY EVALUATION
Patient Name: Yancy Mcdonnell  : 1945    MRN: [id number]                              Today's Date: 3/12/2025       Admit Date: 3/12/2025    Visit Dx:     ICD-10-CM ICD-9-CM   1. Right hip pain  M25.551 719.45   2. Contusion of scalp, initial encounter  S00.03XA 920   3. Mild closed head injury, initial encounter  S09.90XA 959.01   4. Fall, initial encounter  W19.XXXA E888.9     Patient Active Problem List   Diagnosis    Allergic rhinitis    Lung nodule    Dyslipidemia    History of thrombosis    Primary hypertension    Mitral valve insufficiency    Osteoarthritis    Osteopenia of multiple sites    Rheumatoid arthritis    Spinal stenosis of cervical region    Venous insufficiency    Gastro-esophageal reflux disease without esophagitis    History of herpes simplex infection    Overweight with body mass index (BMI) of 28 to 28.9 in adult    Elevated alkaline phosphatase level    Diaphragmatic hernia without obstruction or gangrene    Thrombophlebitis    H/O fall    Deviated septum    Abnormal urinalysis    Long term (current) use of aspirin    Urinary frequency    Stress incontinence of urine    Presence of pessary    Atrophic vaginitis    Difficulty swallowing    Subclinical hypothyroidism    Balance problem    Anemia    Diverticulitis    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 bleeding    Esophageal motility disorder    Pure hypercholesterolemia    Seasonal allergies    Thyroid disease    Back pain    Other diseases of stomach and duodenum    Leg wound, right    Hip fracture     Past Medical History:   Diagnosis Date    Allergic rhinitis 2015    Ankle fracture, right 2022    Body mass index 29.0-29.9, adult 2018    Cervical spinal stenosis 2017    Cholelithiasis     Class 1 obesity due to excess calories with serious comorbidity and body mass index (BMI) of 30.0 to 30.9 in adult 10/14/2020    Closed bimalleolar fracture of right ankle 2022    DDD (degenerative disc  disease), cervical     DDD (degenerative disc disease), lumbar     DDD (degenerative disc disease), thoracic     Difficulty walking     Poor balance    Diverticulosis     Dyslipidemia 12/05/2012    Elevated brain natriuretic peptide (BNP) level 01/06/2017    Eustachian tube disorder, right 02/18/2019    Fall 02/2022    Fatigue 06/02/2015    Female cystocele 06/02/2015    Hiatal hernia     Hiatal hernia with gastroesophageal reflux 01/06/2017    History of blood clots 1980    leg s/p fx    History of DVT (deep vein thrombosis) 06/02/2015    History of herpes simplex infection 07/25/2020    Hyperlipidemia     Hypertension 06/10/2016    Left rib fracture 05/2017    11TH RIB     Migraine headache 06/02/2015    Mitral insufficiency 01/16/2017    MILD    Osteoarthritis 05/20/2014    Osteopenia 12/05/2012    Over weight 06/14/2018    Peripheral edema 01/05/2017    PFO (patent foramen ovale) 01/16/2017    Pulmonary nodule 05/04/2017    DR CHUN FOLLOWS    RHA (rheumatoid arthritis) 12/05/2012    Scleritis 06/02/2015    Scoliosis     Thrombophlebitis 02/19/2022    Acute right lower extremity superficial thrombophlebitis noted in the great saphenous (below knee) and varicosity (below knee).      Tricuspid insufficiency     MILD    Urinary urgency     Varicose veins of other specified sites     Venous insufficiency 09/12/2018     Past Surgical History:   Procedure Laterality Date    ANKLE OPEN REDUCTION INTERNAL FIXATION Right 02/18/2022    Procedure: ANKLE OPEN REDUCTION INTERNAL FIXATION;  Surgeon: DEAN Tobias DPM;  Location: [facility] MAIN OR;  Service: Podiatry;  Laterality: Right;    COLONOSCOPY  07/20/2017    EGD/ COLONSCOPY LARGE HIATAL HERNIA. MILD DIVERTICULOSIS. OTHERWISE NORMAL.    ESOPHAGOSCOPY / EGD  07/28/2023    Dr. Davila    HIP INTERTROCHANTERIC NAILING Left 5/4/2024    Procedure: HIP INTERTROCHANTERIC NAILING;  Surgeon: Jeremy Rodriguez II, MD;  Location: [facility] MAIN OR;  Service: Orthopedics;   Laterality: Left;    INGUINAL HERNIA REPAIR Right     ORIF ANKLE FRACTURE Right 02/18/2022    Dr. Tobias    TOTAL ABDOMINAL HYSTERECTOMY  1985    W/BSO WITH A/P REPAIR 1985 BENIGN REASONS DR. DRAKE      General Information       Row Name 03/12/25 1318          Physical Therapy Time and Intention    Document Type evaluation  -AD     Mode of Treatment physical therapy  -AD       Row Name 03/12/25 1318          General Information    Patient Profile Reviewed yes  -AD     Prior Level of Function independent:;w/c or scooter;bed mobility;transfer  was at [facility] from Early Jan to March 11th, then moved to [facility] yesterday , had fall - was having help wtih bathing at SNF and could transfer self, no walking (I)  -AD     Existing Precautions/Restrictions fall  -AD     Barriers to Rehab previous functional deficit  -AD       Row Name 03/12/25 1318          Living Environment    Current Living Arrangements assisted living facility  -AD     People in Home alone;facility resident  -AD       Row Name 03/12/25 1318          Home Main Entrance    Number of Stairs, Main Entrance none  -AD       Row Name 03/12/25 1318          Cognition    Orientation Status (Cognition) oriented x 4  -AD       Row Name 03/12/25 1318          Safety Issues/Impairments Affecting Functional Mobility    Safety Issues Affecting Function (Mobility) impulsivity;insight into deficits/self-awareness  -AD     Impairments Affecting Function (Mobility) balance;endurance/activity tolerance;pain;strength;range of motion (ROM)  -AD               User Key  (r) = Recorded By, (t) = Taken By, (c) = Cosigned By      Initials Name Provider Type    AD Kaycee Allen PT Physical Therapist                   Mobility       Row Name 03/12/25 1320          Bed Mobility    Bed Mobility bed mobility (all) activities  -AD     All Activities, [place] (Bed Mobility) minimum assist (75% patient effort)  -AD     Assistive Device (Bed Mobility) head of bed  elevated  -AD       Row Name 03/12/25 1320          Bed-Chair Transfer    Bed-Chair [place] (Transfers) minimum assist (75% patient effort);contact guard  -AD     Assistive Device (Bed-Chair Transfers) walker, front-wheeled  -AD       Row Name 03/12/25 1320          Sit-Stand Transfer    Sit-Stand [place] (Transfers) minimum assist (75% patient effort)  -AD     Assistive Device (Sit-Stand Transfers) walker, front-wheeled  -AD       Row Name 03/12/25 1320          Gait/Stairs (Locomotion)    [place] Level (Gait) maximum assist (25% patient effort)  physically shifting left leg and trunk to complete stand pivot transfer 2 ft  -AD     Patient was able to Ambulate yes  -AD     Distance in Feet (Gait) 2  -AD     Deviations/Abnormal Patterns (Gait) base of support, wide;weight shifting decreased  -AD               User Key  (r) = Recorded By, (t) = Taken By, (c) = Cosigned By      Initials Name Provider Type    Kaycee Lamas PT Physical Therapist                   Obj/Interventions       Row Name 03/12/25 1321          Range of Motion Comprehensive    Comment, General Range of Motion 50% dec BLE ROM  -AD       Row Name 03/12/25 1321          Strength Comprehensive (MMT)    Comment, General Manual Muscle Testing (MMT) Assessment hip flex 3+/5, knee flex and ext 4-/5  -AD       Row Name 03/12/25 1321          Balance    Balance Assessment sitting static balance  -AD     Static Sitting Balance independent  -AD     Position, Sitting Balance supported  unsupported sitting balance CGA  -AD               User Key  (r) = Recorded By, (t) = Taken By, (c) = Cosigned By      Initials Name Provider Type    Kaycee Lamas PT Physical Therapist                   Goals/Plan       Row Name 03/12/25 1326          Bed Mobility Goal 1 (PT)    Activity/Assistive Device (Bed Mobility Goal 1, PT) bed mobility activities, all  -AD     [place] Level/Cues Needed (Bed Mobility Goal 1, PT) independent  -AD      Time Frame (Bed Mobility Goal 1, PT) long term goal (LTG)  -AD       Row Name 03/12/25 1326          Transfer Goal 1 (PT)    Activity/Assistive Device (Transfer Goal 1, PT) transfers, all  -AD     [place] Level/Cues Needed (Transfer Goal 1, PT) contact guard required  -AD     Time Frame (Transfer Goal 1, PT) long term goal (LTG)  -AD       Row Name 03/12/25 1326          Gait Training Goal 1 (PT)    Activity/Assistive Device (Gait Training Goal 1, PT) gait (walking locomotion);assistive device use  -AD     [place] Level (Gait Training Goal 1, PT) contact guard required;minimum assist (75% or more patient effort)  -AD     Distance (Gait Training Goal 1, PT) 10  -AD     Time Frame (Gait Training Goal 1, PT) long term goal (LTG)  -AD       Row Name 03/12/25 1326          Therapy Assessment/Plan (PT)    Planned Therapy Interventions (PT) balance training;bed mobility training;gait training;home exercise program;patient/family education;stretching;strengthening;neuromuscular re-education;postural re-education;transfer training  -AD               User Key  (r) = Recorded By, (t) = Taken By, (c) = Cosigned By      Initials Name Provider Type    AD Kaycee Allen, PT Physical Therapist                   Clinical Impression       Row Name 03/12/25 1322          Pain    Pretreatment Pain Rating 8/10  -AD     Posttreatment Pain Rating 8/10  -AD     Pain Location hip  -AD     Pain Side/Orientation left  -AD       Row Name 03/12/25 1322          Plan of Care Review    Plan of Care Reviewed With patient  -AD     Progress no change  -AD     Outcome Evaluation 79-year-old female with past medical history significant for multiple fractures, DVT, hypertension, moderate mitral insufficiency, RA presenting to the ED for evaluation of right hip pain after fall last night. She was at [facility] from early January to March 11th, then was moved to an [facility] yesterday where she helped set up her room [facility] help  from her daughter while she was sitting in the wheelchair. Per daughter, pt is not able to walk with the walker, but she had  the walker in the room with her and was using it to help transfer herself when she had this fall. Pt's daughter reporting this is an ongoing issue. PLOF at [facility] was (I) with transfers from w/c to commode and (I) with dressing, and had modA for bathing. Today she required [place]asta for bed mobility d/t hip pain, [place] fir sit<>stand, and mod-maxA for stand pivot transfer to/from [facility]. She also showed impulsivity when sitting on the BSC and tried to stand without assistance. She was unable to weight shift once coming to stand and required maxA for shifting weight off left leg and moving trunk to sit on EOB safely. Pt is below her baseline function at this time is not safe to return to [facility]. Recommend SNF for improved strength and endurance training at [place].  -AD       Row Name 03/12/25 1322          Therapy Assessment/Plan (PT)    Patient/Family Therapy Goals Statement (PT) go home  -AD     Rehab Potential (PT) good  -AD     Criteria for Skilled Interventions Met (PT) yes;skilled treatment is necessary  -AD     Therapy Frequency (PT) 5 times/wk  -AD               User Key  (r) = Recorded By, (t) = Taken By, (c) = Cosigned By      Initials Name Provider Type    Kaycee Lamas, PT Physical Therapist                   Outcome Measures    No documentation.                                Physical Therapy Education       Title: PT OT SLP Therapies (In Progress)       Topic: Physical Therapy (In Progress)       Point: Mobility training (Done)       Learning Progress Summary            Patient Acceptance, E, VU by AD at 3/12/2025 1327                      Point: Home exercise program (Not Started)       Learner Progress:  Not documented in this visit.              Point: Body mechanics (Not Started)       Learner Progress:  Not documented in this visit.              Point: Precautions (Not Started)        Learner Progress:  Not documented in this visit.                              User Key       Initials Effective Dates Name Provider Type Discipline    AD 06/03/24 -  Kaycee Allen, RAINA Physical Therapist PT                  PT Recommendation and Plan  Planned Therapy Interventions (PT): balance training, bed mobility training, gait training, home exercise program, patient/family education, stretching, strengthening, neuromuscular re-education, postural re-education, transfer training  Progress: no change  Outcome Evaluation: 79-year-old female with past medical history significant for multiple fractures, DVT, hypertension, moderate mitral insufficiency, RA presenting to the ED for evaluation of right hip pain after fall last night. She was at [facility] from early January to March 11th, then was moved to an [facility] yesterday where she helped set up her room wtih help from her daughter while she was sitting in the wheelchair. Per daughter, pt is not able to walk with the walker, but she had  the walker in the room with her and was using it to help transfer herself when she had this fall. Pt's daughter reporting this is an ongoing issue. PLOF at [facility] was (I) with transfers from w/c to commode and (I) with dressing, and had modA for bathing. Today she required Shasta for bed mobility d/t hip pain, [place] fir sit<>stand, and mod-maxA for stand pivot transfer to/from [facility]. She also showed impulsivity when sitting on the BS and tried to stand without assistance. She was unable to weight shift once coming to stand and required maxA for shifting weight off left leg and moving trunk to sit on EOB safely. Pt is below her baseline function at this time is not safe to return to [facility]. Recommend SNF for improved strength and endurance training at [place].     Time Calculation:   PT Evaluation Complexity  History, PT Evaluation Complexity: 1-2 personal factors and/or comorbidities  Examination of Body Systems  (PT Eval Complexity): total of 3 or more elements  Clinical Presentation (PT Evaluation Complexity): evolving  Clinical Decision Making (PT Evaluation Complexity): moderate complexity  Overall Complexity (PT Evaluation Complexity): moderate complexity     PT Charges       Row Name 03/12/25 1317             Time Calculation    Start Time 1030  -AD      Stop Time 1140  -AD      Time Calculation (min) 70 min  -AD      PT Received On 03/12/25  -AD      PT - Next Appointment 03/13/25  -AD      PT Goal Re-Cert Due Date 03/26/25  -AD         Time Calculation- PT    Total Timed Code Minutes- PT 0 minute(s)  -AD                User Key  (r) = Recorded By, (t) = Taken By, (c) = Cosigned By      Initials Name Provider Type    AD Kaycee Allen, PT Physical Therapist                  Therapy Charges for Today       Code Description Service Date Service Provider Modifiers Qty    06814763410 HC-PT EVAL MOD COMPLEXITY 5 3/12/2025 Kaycee Allen, PT  1    [id number] HC PT THERAPEUTIC ACT EA 15 MIN 3/12/2025 Kaycee Allen, PT GP 1               PT Discharge Summary  Anticipated Discharge Disposition (PT): skilled nursing facility    Kaycee Allen PT  3/12/2025

## 2025-03-12 NOTE — SIGNIFICANT NOTE
Case Management/Social Work    Patient Name:  Yancy Mcdonnell  YOB: 1945  MRN: [id number]  Admit Date:  3/12/2025           03/12/25 1548   Post Acute Pre-Cert Documentation   Request Submitted by Facility - Type: Hospital   Post-Acute Authorization Type Submitted: SNF   Date Post Acute Pre-Cert Inititated per Facility 03/12/25   Verification from Payer Yes   Date Post Acute Pre-Cert Completed 03/12/25   Accepting Facility [facility] Discharge Date Requested 03/12/25   Had Accepting Facility at Time of Submission Yes   Response Received from Insurance? Approval   Response Communicated to:    Authorization Number: [id number]   Post Acute Pre-Cert Initiated Comment MARY submitted SNF precert for [facility] via [employer]. Reference number [id number]. SNF precert auto approved. Valid 3/12-3/18. CM made aware.           Electronically signed by:  Sravan Santoyo CMA  03/12/25 15:53 EDT    Sravan Santoyo  Case Management Associate  [address] [address]  P: [phone]  F: [phone]

## 2025-03-12 NOTE — DISCHARGE PLACEMENT REQUEST
"Yancy Mcdonnell (79 y.o. Female)       Date of Birth   1945    Social Security Number       Address   [facility] [address] [place] [address] [place] [facility]    Home Phone   [phone]    MRN   [id number]       Buddhism   Religious    Marital Status                               Admission Date   3/12/2025    Admission Type   Emergency    Admitting Provider       Attending Provider       Department, Room/Bed   [facility] EMERGENCY DEPARTMENT, 06/06       Discharge Date       Discharge Disposition       Discharge Destination                                 Attending Provider: (none)   Allergies: Meperidine, Other, Sulfa Antibiotics, Lisinopril    Isolation: None   Infection: None   Code Status: Prior    Ht: 157.5 cm (62\")   Wt: --    Admission Cmt: None   Principal Problem: None                  Active Insurance as of 3/12/2025       Primary Coverage       Payor Plan Insurance Group Employer/Plan Group    AETNA MEDICARE REPLACEMENT [id number]       Payor Plan Address Payor Plan Phone Number Payor Plan Fax Number Effective Dates    [address] [phone]  1/1/2024 - None Entered    [facility] [id number]         Subscriber Name Subscriber Birth Date Member ID       YANCY MCDONNELL 1945 [id number]                     Emergency Contacts        (Rel.) Home Phone Work Phone Mobile Phone    KIRA MCDONNELL (Spouse) [phone] -- [phone]    JP KING (Daughter) -- -- [phone]              History & Physical    No notes of this type exist for this encounter.          Emergency Department Notes        Henna Niño PA-C at 03/12/25 0903          Subjective   History of Present Illness  Patient is an 79-year-old female with past medical history significant for multiple fractures, DVT, hypertension, moderate mitral insufficiency, RA presenting to the ED for evaluation of right hip pain after fall last night.  Patient was transported via EMS from " the left wrist.  She reports that she hit her head without loss of consciousness.  EMS reports that she normally uses a wheelchair for mobility but that she uses a walker for transfers.  Last night she lost her balance while transferring and fell, hitting the back of her head on the wall.        Review of Systems   Musculoskeletal:  Positive for gait problem.       Past Medical History:   Diagnosis Date    Allergic rhinitis 06/02/2015    Ankle fracture, right 02/2022    Body mass index 29.0-29.9, adult 09/12/2018    Cervical spinal stenosis 08/07/2017    Cholelithiasis     Class 1 obesity due to excess calories with serious comorbidity and body mass index (BMI) of 30.0 to 30.9 in adult 10/14/2020    Closed bimalleolar fracture of right ankle 02/11/2022    DDD (degenerative disc disease), cervical     DDD (degenerative disc disease), lumbar     DDD (degenerative disc disease), thoracic     Difficulty walking     Poor balance    Diverticulosis     Dyslipidemia 12/05/2012    Elevated brain natriuretic peptide (BNP) level 01/06/2017    Eustachian tube disorder, right 02/18/2019    Fall 02/2022    Fatigue 06/02/2015    Female cystocele 06/02/2015    Hiatal hernia     Hiatal hernia with gastroesophageal reflux 01/06/2017    History of blood clots 1980    leg s/p fx    History of DVT (deep vein thrombosis) 06/02/2015    History of herpes simplex infection 07/25/2020    Hyperlipidemia     Hypertension 06/10/2016    Left rib fracture 05/2017    11TH RIB     Migraine headache 06/02/2015    Mitral insufficiency 01/16/2017    MILD    Osteoarthritis 05/20/2014    Osteopenia 12/05/2012    Over weight 06/14/2018    Peripheral edema 01/05/2017    PFO (patent foramen ovale) 01/16/2017    Pulmonary nodule 05/04/2017    DR CHUN FOLLOWS    RHA (rheumatoid arthritis) 12/05/2012    Scleritis 06/02/2015    Scoliosis     Thrombophlebitis 02/19/2022    Acute right lower extremity superficial thrombophlebitis noted in the great saphenous  (below knee) and varicosity (below knee).      Tricuspid insufficiency     MILD    Urinary urgency     Varicose veins of other specified sites     Venous insufficiency 09/12/2018       Allergies   Allergen Reactions    Meperidine Unknown (See Comments) and Nausea And Vomiting     Abstracted from centricity.    Other Unknown (See Comments)     Sulfa (sulfadiazine)     Sulfa Antibiotics Unknown - High Severity and Anaphylaxis    Lisinopril Cough       Past Surgical History:   Procedure Laterality Date    ANKLE OPEN REDUCTION INTERNAL FIXATION Right 02/18/2022    Procedure: ANKLE OPEN REDUCTION INTERNAL FIXATION;  Surgeon: DEAN Tobias DPM;  Location: [facility] MAIN OR;  Service: Podiatry;  Laterality: Right;    COLONOSCOPY  07/20/2017    EGD/ COLONSCOPY LARGE HIATAL HERNIA. MILD DIVERTICULOSIS. OTHERWISE NORMAL.    ESOPHAGOSCOPY / EGD  07/28/2023    Dr. Davila    HIP INTERTROCHANTERIC NAILING Left 5/4/2024    Procedure: HIP INTERTROCHANTERIC NAILING;  Surgeon: Jeremy Rodriguez II, MD;  Location: [facility] MAIN OR;  Service: Orthopedics;  Laterality: Left;    INGUINAL HERNIA REPAIR Right     ORIF ANKLE FRACTURE Right 02/18/2022    Dr. Tobias    TOTAL ABDOMINAL HYSTERECTOMY  1985    W/BSO WITH A/P REPAIR 1985 BENIGN REASONS DR. DRAKE       Family History   Problem Relation Age of Onset    Osteoporosis Mother     Stroke Mother 80    Dementia Mother     Heart disease Father     Hypertension Father     Heart disease Sister     Cancer Daughter         BREAST AND MELANOMA    Breast cancer Daughter 52    Cancer Son         MELANOMA    Cancer Grandson         MELANOMA       Social History     Socioeconomic History    Marital status:    Tobacco Use    Smoking status: Never     Passive exposure: Never    Smokeless tobacco: Never    Tobacco comments:     None   Vaping Use    Vaping status: Never Used   Substance and Sexual Activity    Alcohol use: Yes     Alcohol/week: 1.0 standard drink of alcohol     Types: 1  Cans of beer per week    Drug use: Never    Sexual activity: Not Currently           Objective   Physical Exam  Vitals and nursing note reviewed.   Constitutional:       General: She is not in acute distress.     Appearance: She is not ill-appearing, toxic-appearing or diaphoretic.   HENT:      Head: Normocephalic.      Comments: Very small contusion to posterior scalp with very mild tenderness and no laceration     Right Ear: Tympanic membrane, ear canal and external ear normal.      Left Ear: Tympanic membrane, ear canal and external ear normal.      Ears:      Comments: No hemotympanum     Nose: Nose normal. No congestion or rhinorrhea.      Comments: No preseptal hematoma     Mouth/Throat:      Mouth: Mucous membranes are moist.      Pharynx: Oropharynx is clear.   Eyes:      General: No scleral icterus.        Right eye: No discharge.         Left eye: No discharge.      Extraocular Movements: Extraocular movements intact.      Conjunctiva/sclera: Conjunctivae normal.      Pupils: Pupils are equal, round, and reactive to light.   Neck:      Comments: No trismus, pain on axial load, or step-off deformity    No Guerra sign, raccoon eyes, or periorbital erythema or edema.  Cardiovascular:      Rate and Rhythm: Normal rate and regular rhythm.      Pulses: Normal pulses.      Heart sounds: Normal heart sounds. No murmur heard.     No friction rub. No gallop.   Pulmonary:      Effort: Pulmonary effort is normal. No respiratory distress.      Breath sounds: Normal breath sounds. No stridor. No wheezing, rhonchi or rales.   Abdominal:      General: Abdomen is flat. Bowel sounds are normal.      Palpations: Abdomen is soft.      Tenderness: There is no abdominal tenderness.   Musculoskeletal:         General: No tenderness.      Cervical back: Normal range of motion. No rigidity.      Right hip: Tenderness present. Decreased range of motion. Decreased strength.      Right knee: Normal.      Right lower leg: No edema.       Left lower leg: No edema.      Comments: Good distal pulses and cap refill   Skin:     General: Skin is warm and dry.      Capillary Refill: Capillary refill takes 2 to 3 seconds.      Findings: Bruising present. No erythema or rash.      Comments: Small bruise noted to lateral aspect of right hip   Neurological:      General: No focal deficit present.      Mental Status: She is alert.      Sensory: No sensory deficit.   Psychiatric:         Mood and Affect: Mood normal.         Behavior: Behavior normal.         Procedures          ED Course                                                       Medical Decision Making  Patient is an 79-year-old female who presented with the above complaint.  She had the above evaluation and exam.    Imaging independently interpreted by radiology and reviewed by myself.  X-ray of the right hip was negative for acute abnormality.  CT of the head and C-spine: Negative for acute findings.    Patient was given 1 dose of tramadol in the ED.  Patient was evaluated by physical therapy, and discharge recommendations were made.  At reassessment, patient is resting comfortably no acute distress.  Neuroexam is normal.  She is hemodynamically stable and afebrile.  She has good distal pulses and cap refill.      Per physical therapy, patient is unable to independently transfer from bed to wheelchair, and she is unable to return to the assisted living facility where she lives as a result.  From a medical standpoint, there is no evidence of sensory loss or vascular compromise.  There is no evidence of neurologic dysfunction from mild head trauma.  Patient discussed with case management, who arranged for patient to complete inpatient rehabilitation to return to her baseline level of mobility.  Patient family member at bedside are agreeable to plan.    Amount and/or Complexity of Data Reviewed  Radiology: ordered.    Risk  Prescription drug management.        Final diagnoses:   Right hip pain    Contusion of scalp, initial encounter   Mild closed head injury, initial encounter   Fall, initial encounter       ED Disposition  ED Disposition       ED Disposition   Discharge    Condition   Stable    Comment   --               Chuck Zhang MD  [address] [facility] [place] [facility]  [phone]    Schedule an appointment as soon as possible for a visit in 3 days           Medication List      No changes were made to your prescriptions during this visit.            Henna Niño PA-C  03/12/25 1428       Henna Niño PA-C  03/12/25 1543      Electronically signed by Henna Niño PA-C at 03/12/25 1543       Operative/Procedure Notes (all)    No notes of this type exist for this encounter.       Physician Progress Notes (all)    No notes of this type exist for this encounter.       Medical Student Notes (all)    No notes of this type exist for this encounter.       Consult Notes (all)    No notes of this type exist for this encounter.       Nutrition Notes (all)    No notes exist for this encounter.          Physical Therapy Notes (all)        Kaycee Allen, PT at 03/12/25 1328  Version 1 of 1         Goal Outcome Evaluation:  Plan of Care Reviewed With: patient        Progress: no change  Outcome Evaluation: 79-year-old female with past medical history significant for multiple fractures, DVT, hypertension, moderate mitral insufficiency, RA presenting to the ED for evaluation of right hip pain after fall last night. She was at [facility] from early January to March 11th, then was moved to an [facility] yesterday where she helped set up her room wtih help from her daughter while she was sitting in the wheelchair. Per daughter, pt is not able to walk with the walker, but she had  the walker in the room with her and was using it to help transfer herself when she had this fall. Pt's daughter reporting this is an ongoing issue. PLOF at [facility] was (I) with transfers from w/c  to commode and (I) with dressing, and had modA for bathing. Today she required Shasta for bed mobility d/t hip pain, Shasta fir sit<>stand, and mod-maxA for stand pivot transfer to/from [facility]. She also showed impulsivity when sitting on the BSC and tried to stand without assistance. She was unable to weight shift once coming to stand and required maxA for shifting weight off left leg and moving trunk to sit on EOB safely. Pt is below her baseline function at this time is not safe to return to [facility]. Recommend SNF for improved strength and endurance training at [place].    Anticipated Discharge Disposition (PT): skilled nursing facility                          Electronically signed by Kaycee Allen, PT at 25 1328       Kaycee Allen, PT at 25 1328  Version 1 of 1         Patient Name: Yancy Mcdonnell  : 1945    MRN: [id number]                              Today's Date: 3/12/2025       Admit Date: 3/12/2025    Visit Dx:     ICD-10-CM ICD-9-CM   1. Right hip pain  M25.551 719.45   2. Contusion of scalp, initial encounter  S00.03XA 920   3. Mild closed head injury, initial encounter  S09.90XA 959.01   4. Fall, initial encounter  W19.XXXA E888.9     Patient Active Problem List   Diagnosis    Allergic rhinitis    Lung nodule    Dyslipidemia    History of thrombosis    Primary hypertension    Mitral valve insufficiency    Osteoarthritis    Osteopenia of multiple sites    Rheumatoid arthritis    Spinal stenosis of cervical region    Venous insufficiency    Gastro-esophageal reflux disease without esophagitis    History of herpes simplex infection    Overweight with body mass index (BMI) of 28 to 28.9 in adult    Elevated alkaline phosphatase level    Diaphragmatic hernia without obstruction or gangrene    Thrombophlebitis    H/O fall    Deviated septum    Abnormal urinalysis    Long term (current) use of aspirin    Urinary frequency    Stress incontinence of urine    Presence of pessary    Atrophic vaginitis     Difficulty swallowing    Subclinical hypothyroidism    Balance problem    Anemia    Diverticulitis    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 bleeding    Esophageal motility disorder    Pure hypercholesterolemia    Seasonal allergies    Thyroid disease    Back pain    Other diseases of stomach and duodenum    Leg wound, right    Hip fracture     Past Medical History:   Diagnosis Date    Allergic rhinitis 06/02/2015    Ankle fracture, right 02/2022    Body mass index 29.0-29.9, adult 09/12/2018    Cervical spinal stenosis 08/07/2017    Cholelithiasis     Class 1 obesity due to excess calories with serious comorbidity and body mass index (BMI) of 30.0 to 30.9 in adult 10/14/2020    Closed bimalleolar fracture of right ankle 02/11/2022    DDD (degenerative disc disease), cervical     DDD (degenerative disc disease), lumbar     DDD (degenerative disc disease), thoracic     Difficulty walking     Poor balance    Diverticulosis     Dyslipidemia 12/05/2012    Elevated brain natriuretic peptide (BNP) level 01/06/2017    Eustachian tube disorder, right 02/18/2019    Fall 02/2022    Fatigue 06/02/2015    Female cystocele 06/02/2015    Hiatal hernia     Hiatal hernia with gastroesophageal reflux 01/06/2017    History of blood clots 1980    leg s/p fx    History of DVT (deep vein thrombosis) 06/02/2015    History of herpes simplex infection 07/25/2020    Hyperlipidemia     Hypertension 06/10/2016    Left rib fracture 05/2017    11TH RIB     Migraine headache 06/02/2015    Mitral insufficiency 01/16/2017    MILD    Osteoarthritis 05/20/2014    Osteopenia 12/05/2012    Over weight 06/14/2018    Peripheral edema 01/05/2017    PFO (patent foramen ovale) 01/16/2017    Pulmonary nodule 05/04/2017    DR CHUN FOLLOWS    RHA (rheumatoid arthritis) 12/05/2012    Scleritis 06/02/2015    Scoliosis     Thrombophlebitis 02/19/2022    Acute right lower extremity superficial thrombophlebitis noted in the great saphenous (below  knee) and varicosity (below knee).      Tricuspid insufficiency     MILD    Urinary urgency     Varicose veins of other specified sites     Venous insufficiency 09/12/2018     Past Surgical History:   Procedure Laterality Date    ANKLE OPEN REDUCTION INTERNAL FIXATION Right 02/18/2022    Procedure: ANKLE OPEN REDUCTION INTERNAL FIXATION;  Surgeon: DEAN Tobias DPM;  Location: [facility] MAIN OR;  Service: Podiatry;  Laterality: Right;    COLONOSCOPY  07/20/2017    EGD/ COLONSCOPY LARGE HIATAL HERNIA. MILD DIVERTICULOSIS. OTHERWISE NORMAL.    ESOPHAGOSCOPY / EGD  07/28/2023    Dr. Davila    HIP INTERTROCHANTERIC NAILING Left 5/4/2024    Procedure: HIP INTERTROCHANTERIC NAILING;  Surgeon: Jeremy Rodriguez II, MD;  Location: [facility] MAIN OR;  Service: Orthopedics;  Laterality: Left;    INGUINAL HERNIA REPAIR Right     ORIF ANKLE FRACTURE Right 02/18/2022    Dr. Tobias    TOTAL ABDOMINAL HYSTERECTOMY  1985    W/BSO WITH A/P REPAIR 1985 BENIGN REASONS DR. DRAKE      General Information       Row Name 03/12/25 1318          Physical Therapy Time and Intention    Document Type evaluation  -AD     Mode of Treatment physical therapy  -AD       Row Name 03/12/25 1318          General Information    Patient Profile Reviewed yes  -AD     Prior Level of Function independent:;w/c or scooter;bed mobility;transfer  was at [facility] from Early Jan to March 11th, then moved to [facility] yesterday , had fall - was having help wtih bathing at SNF and could transfer self, no walking (I)  -AD     Existing Precautions/Restrictions fall  -AD     Barriers to Rehab previous functional deficit  -AD       Row Name 03/12/25 1318          Living Environment    Current Living Arrangements assisted living facility  -AD     People in Home alone;facility resident  -AD       Row Name 03/12/25 1318          Home Main Entrance    Number of Stairs, Main Entrance none  -AD       Row Name 03/12/25 1318          Cognition    Orientation  Status (Cognition) oriented x 4  -AD       Row Name 03/12/25 1318          Safety Issues/Impairments Affecting Functional Mobility    Safety Issues Affecting Function (Mobility) impulsivity;insight into deficits/self-awareness  -AD     Impairments Affecting Function (Mobility) balance;endurance/activity tolerance;pain;strength;range of motion (ROM)  -AD               User Key  (r) = Recorded By, (t) = Taken By, (c) = Cosigned By      Initials Name Provider Type    Kaycee Lamas PT Physical Therapist                   Mobility       Row Name 03/12/25 1320          Bed Mobility    Bed Mobility bed mobility (all) activities  -AD     All Activities, Cochran (Bed Mobility) minimum assist (75% patient effort)  -AD     Assistive Device (Bed Mobility) head of bed elevated  -AD       Row Name 03/12/25 1320          Bed-Chair Transfer    Bed-Chair Cochran (Transfers) minimum assist (75% patient effort);contact guard  -AD     Assistive Device (Bed-Chair Transfers) walker, front-wheeled  -AD       Row Name 03/12/25 1320          Sit-Stand Transfer    Sit-Stand Cochran (Transfers) minimum assist (75% patient effort)  -AD     Assistive Device (Sit-Stand Transfers) walker, front-wheeled  -AD       Row Name 03/12/25 1320          Gait/Stairs (Locomotion)    Cochran Level (Gait) maximum assist (25% patient effort)  physically shifting left leg and trunk to complete stand pivot transfer 2 ft  -AD     Patient was able to Ambulate yes  -AD     Distance in Feet (Gait) 2  -AD     Deviations/Abnormal Patterns (Gait) base of support, wide;weight shifting decreased  -AD               User Key  (r) = Recorded By, (t) = Taken By, (c) = Cosigned By      Initials Name Provider Type    Kaycee Lamas PT Physical Therapist                   Obj/Interventions       Row Name 03/12/25 1321          Range of Motion Comprehensive    Comment, General Range of Motion 50% dec BLE ROM  -AD       Row Name 03/12/25 1321           Strength Comprehensive (MMT)    Comment, General Manual Muscle Testing (MMT) Assessment hip flex 3+/5, knee flex and ext 4-/5  -AD       Row Name 03/12/25 1321          Balance    Balance Assessment sitting static balance  -AD     Static Sitting Balance independent  -AD     Position, Sitting Balance supported  unsupported sitting balance CGA  -AD               User Key  (r) = Recorded By, (t) = Taken By, (c) = Cosigned By      Initials Name Provider Type    AD Kaycee Allen, PT Physical Therapist                   Goals/Plan       Row Name 03/12/25 1326          Bed Mobility Goal 1 (PT)    Activity/Assistive Device (Bed Mobility Goal 1, PT) bed mobility activities, all  -AD     Free[place] Level/Cues Needed (Bed Mobility Goal 1, PT) independent  -AD     Time Frame (Bed Mobility Goal 1, PT) long term goal (LTG)  -AD       Row Name 03/12/25 1326          Transfer Goal 1 (PT)    Activity/Assistive Device (Transfer Goal 1, PT) transfers, all  -AD     Freeborn Level/Cues Needed (Transfer Goal 1, PT) contact guard required  -AD     Time Frame (Transfer Goal 1, PT) long term goal (LTG)  -AD       Row Name 03/12/25 1326          Gait Training Goal 1 (PT)    Activity/Assistive Device (Gait Training Goal 1, PT) gait (walking locomotion);assistive device use  -AD     Freeborn Level (Gait Training Goal 1, PT) contact guard required;minimum assist (75% or more patient effort)  -AD     Distance (Gait Training Goal 1, PT) 10  -AD     Time Frame (Gait Training Goal 1, PT) long term goal (LTG)  -AD       Row Name 03/12/25 1326          Therapy Assessment/Plan (PT)    Planned Therapy Interventions (PT) balance training;bed mobility training;gait training;home exercise program;patient/family education;stretching;strengthening;neuromuscular re-education;postural re-education;transfer training  -AD               User Key  (r) = Recorded By, (t) = Taken By, (c) = Cosigned By      Initials Name Provider Type    AD  Kaycee Allen, PT Physical Therapist                   Clinical Impression       Row Name 03/12/25 1322          Pain    Pretreatment Pain Rating 8/10  -AD     Posttreatment Pain Rating 8/10  -AD     Pain Location hip  -AD     Pain Side/Orientation left  -AD       Row Name 03/12/25 1322          Plan of Care Review    Plan of Care Reviewed With patient  -AD     Progress no change  -AD     Outcome Evaluation 79-year-old female with past medical history significant for multiple fractures, DVT, hypertension, moderate mitral insufficiency, RA presenting to the ED for evaluation of right hip pain after fall last night. She was at [facility] from early January to March 11th, then was moved to an [facility] yesterday where she helped set up her room wtih help from her daughter while she was sitting in the wheelchair. Per daughter, pt is not able to walk with the walker, but she had  the walker in the room with her and was using it to help transfer herself when she had this fall. Pt's daughter reporting this is an ongoing issue. PLOF at [facility] was (I) with transfers from w/c to commode and (I) with dressing, and had modA for bathing. Today she required Shasta for bed mobility d/t hip pain, Shasta fir sit<>stand, and mod-maxA for stand pivot transfer to/from [facility]. She also showed impulsivity when sitting on the BSC and tried to stand without assistance. She was unable to weight shift once coming to stand and required maxA for shifting weight off left leg and moving trunk to sit on EOB safely. Pt is below her baseline function at this time is not safe to return to [facility]. Recommend SNF for improved strength and endurance training at [place].  -AD       Row Name 03/12/25 1322          Therapy Assessment/Plan (PT)    Patient/Family Therapy Goals Statement (PT) go home  -AD     Rehab Potential (PT) good  -AD     Criteria for Skilled Interventions Met (PT) yes;skilled treatment is necessary  -AD     Therapy Frequency (PT)  5 times/wk  -AD               User Key  (r) = Recorded By, (t) = Taken By, (c) = Cosigned By      Initials Name Provider Type    AD Kaycee Allen PT Physical Therapist                   Outcome Measures    No documentation.                                Physical Therapy Education       Title: PT OT SLP Therapies (In Progress)       Topic: Physical Therapy (In Progress)       Point: Mobility training (Done)       Learning Progress Summary            Patient Acceptance, E, VU by AD at 3/12/2025 1327                      Point: Home exercise program (Not Started)       Learner Progress:  Not documented in this visit.              Point: Body mechanics (Not Started)       Learner Progress:  Not documented in this visit.              Point: Precautions (Not Started)       Learner Progress:  Not documented in this visit.                              User Key       Initials Effective Dates Name Provider Type Discipline    AD 06/03/24 -  Kaycee Allen PT Physical Therapist PT                  PT Recommendation and Plan  Planned Therapy Interventions (PT): balance training, bed mobility training, gait training, home exercise program, patient/family education, stretching, strengthening, neuromuscular re-education, postural re-education, transfer training  Progress: no change  Outcome Evaluation: 79-year-old female with past medical history significant for multiple fractures, DVT, hypertension, moderate mitral insufficiency, RA presenting to the ED for evaluation of right hip pain after fall last night. She was at [facility] from early January to March 11th, then was moved to an [facility] yesterday where she helped set up her room wtih help from her daughter while she was sitting in the wheelchair. Per daughter, pt is not able to walk with the walker, but she had  the walker in the room with her and was using it to help transfer herself when she had this fall. Pt's daughter reporting this is an ongoing  issue. PLOF at [facility] was (I) with transfers from w/c to commode and (I) with dressing, and had modA for bathing. Today she required Shasta for bed mobility d/t hip pain, Shasta fir sit<>stand, and mod-maxA for stand pivot transfer to/from [facility] [place] [place]. She also showed impulsivity when sitting on the BSC and tried to stand without assistance. She was unable to weight shift once coming to stand and required maxA for shifting weight off left leg and moving trunk to sit on EOB safely. Pt is below her baseline function at this time is not safe to return to [facility]. Recommend SNF for improved strength and endurance training at [place].     Time Calculation:   PT Evaluation Complexity  History, PT Evaluation Complexity: 1-2 personal factors and/or comorbidities  Examination of Body Systems (PT Eval Complexity): total of 3 or more elements  Clinical Presentation (PT Evaluation Complexity): evolving  Clinical Decision Making (PT Evaluation Complexity): moderate complexity  Overall Complexity (PT Evaluation Complexity): moderate complexity     PT Charges       Row Name 03/12/25 1317             Time Calculation    Start Time 1030  -AD      Stop Time 1140  -AD      Time Calculation (min) 70 min  -AD      PT Received On 03/12/25  -AD      PT - Next Appointment 03/13/25  -AD      PT Goal Re-Cert Due Date 03/26/25  -AD         Time Calculation- PT    Total Timed Code Minutes- PT 0 minute(s)  -AD                User Key  (r) = Recorded By, (t) = Taken By, (c) = Cosigned By      Initials Name Provider Type    AD Kaycee Allen, PT Physical Therapist                  Therapy Charges for Today       Code Description Service Date Service Provider Modifiers Qty    [id number] HC-PT EVAL MOD COMPLEXITY 5 3/12/2025 Kaycee Allen, PT  1    [id number] HC PT THERAPEUTIC ACT EA 15 MIN 3/12/2025 Kaycee Allen, PT GP 1               PT Discharge Summary  Anticipated Discharge Disposition (PT): skilled nursing facility    Kaycee Allen  PT  3/12/2025      Electronically signed by Kaycee Allen, PT at 03/12/25 1328       Occupational Therapy Notes (all)    No notes exist for this encounter.       Speech Language Pathology Notes (all)    No notes exist for this encounter.       Respiratory Therapy Notes (all)    No notes exist for this encounter.

## 2025-03-12 NOTE — DISCHARGE PLACEMENT REQUEST
"Yancy Mcdonnell (79 y.o. Female)       Date of Birth   1945    Social Security Number       Address   [address] [place] [address] [address] [place] [address] [place] [facility]    Home Phone   [phone]    MRN   [id number]       Latter-day   Denominational    Marital Status                               Admission Date   3/12/2025    Admission Type   Emergency    Admitting Provider       Attending Provider       Department, Room/Bed   [facility] EMERGENCY DEPARTMENT, 06/06       Discharge Date       Discharge Disposition       Discharge Destination                                 Attending Provider: (none)   Allergies: Meperidine, Other, Sulfa Antibiotics, Lisinopril    Isolation: None   Infection: None   Code Status: Prior    Ht: 157.5 cm (62\")   Wt: --    Admission Cmt: None   Principal Problem: None                  Active Insurance as of 3/12/2025       Primary Coverage       Payor Plan Insurance Group Employer/Plan Group    AETNA MEDICARE REPLACEMENT [place]ETNA MEDIC[place] [id number]       Payor Plan Address Payor Plan Phone Number Payor Plan Fax Number Effective Dates    [address] [phone]  1/1/2024 - None Entered    [facility] [id number]         Subscriber Name Subscriber Birth Date Member ID       YANCY MCDONNELL 1945 [id number]                     Emergency Contacts        (Rel.) Home Phone Work Phone Mobile Phone    KIRA MCDONNELL (Spouse) [phone] -- [phone]    KINGJP (Daughter) -- -- [phone]                "

## 2025-03-12 NOTE — CASE MANAGEMENT/SOCIAL WORK
Discharge Planning Assessment  [facility]     Patient Name: Yancy Mcdonnell  MRN: [id number]  Today's Date: 3/12/2025    Admit Date: 3/12/2025            Discharge Plan     [facility] accepted patient.  Worked with [facility]  and accepted patient.  PASSR approved . Precert sent by Sravan MEYER it auto approved.  Provider, patient, daughter and nurse informed.  Received phone number for report to be called by the ED nurse.  Provider entered diet order and SC sent to provider to place e-script to [facility] pharmacy for Ultram.  Pt's daughter has a 2x2 [employer] and will transport patient to [facility].                 Continued Care and Services - Admitted Since 3/12/2025       Destination       Service Provider Request Status Services Address Phone Fax Patient Preferred    [facility] Pending - Request Sent -- [address] [facility] [place] [address] [phone] --    [facility] Pending - Request Sent -- [address] [facility] [place] [address] [phone] --                Jimena Roger RN    [facility]  [address] [place] [address]  Phone: [phone]  Fax: [phone]

## 2025-04-08 NOTE — PAT
Sx scheduling sheet states pt is not on Blood Thinners. Called Henna at [facility] to verify as Medication list had Eliquis listed. [facility] to get stop date from Dr Zhang or will need to cx procedure per Henna at [facility].

## 2025-04-09 ENCOUNTER — OFFICE VISIT (OUTPATIENT)
Age: 80
End: 2025-04-09
Payer: MEDICARE

## 2025-04-09 VITALS — HEART RATE: 91 BPM | OXYGEN SATURATION: 99 % | BODY MASS INDEX: 29.44 KG/M2 | WEIGHT: 160 LBS | HEIGHT: 62 IN

## 2025-04-09 DIAGNOSIS — L97.311 CHRONIC ULCER OF RIGHT ANKLE LIMITED TO BREAKDOWN OF SKIN: ICD-10-CM

## 2025-04-09 DIAGNOSIS — S82.841S: ICD-10-CM

## 2025-04-09 DIAGNOSIS — T84.84XA PAINFUL ORTHOPAEDIC HARDWARE: ICD-10-CM

## 2025-04-09 DIAGNOSIS — M25.571 ACUTE RIGHT ANKLE PAIN: Primary | ICD-10-CM

## 2025-04-10 NOTE — PROGRESS NOTES
04/09/2025  Foot and Ankle Surgery - Established Patient/Follow-up  Provider: Dr. Carlos Tobias DPM  Location: [facility]    Subjective:  Yancy Mcdonnell is a 79 y.o. female.     Chief Complaint   Patient presents with    Right Ankle - Follow-up, Pain, Wound Check     Non healing ulcer     Follow-Up     PCP: Chuck Zhang MD  Last PCP Visit:   4/9/25       History of Present Illness  The patient presents for evaluation of a non-healing sore on her right ankle.    She has been receiving wound care for this condition, which is attributed to the hardware in her ankle. She has been paying out of pocket for wound care and is seeking to reduce these costs. She has been residing at [facility] since December 2024, following a femur fracture in [place]. She was transferred to [facility], a [facility], last month but experienced a fall within 8 hours of her arrival, necessitating her return to [facility]. She is scheduled for discharge from [facility] tomorrow and will be returning to [facility]. Her mobility is limited, requiring a wheelchair for transportation. She expresses a desire to transition back to using a walker but is currently considered a fall risk due to instability. She was informed that the screw in her ankle had become loose, prompting her visit in February 2024. She is capable of self-care to some extent but requires assistance. Wound care specialists visit her every Wednesday, and nurses have been dressing the wound daily.      Allergies   Allergen Reactions    Meperidine Unknown (See Comments) and Nausea And Vomiting     Abstracted from centricity.    Other Unknown (See Comments)     Sulfa (sulfadiazine)     Sulfa Antibiotics Unknown - High Severity and Anaphylaxis    Lisinopril Cough       Current Outpatient Medications on File Prior to Visit   Medication Sig Dispense Refill    amoxicillin-clavulanate (AUGMENTIN) 875-125 MG per tablet Take 1 tablet by mouth 2 (Two) Times a Day.    "   apixaban (ELIQUIS) 5 MG tablet tablet Take 1 tablet by mouth 2 (Two) Times a Day.      fluticasone (FLONASE) 50 MCG/ACT nasal spray Administer 2 sprays into the nostril(s) as directed by provider Daily.      hydroCHLOROthiazide 25 MG tablet Take 1 tablet by mouth Daily.      levothyroxine (SYNTHROID, LEVOTHROID) 50 MCG tablet TAKE 1 TABLET BY MOUTH DAILY 90 tablet 1    loratadine (CLARITIN) 10 MG tablet TAKE 1 TABLET BY MOUTH DAILY 90 tablet 1    losartan (COZAAR) 100 MG tablet Take 0.5 tablets by mouth Daily.      lysine 500 MG tablet Take 1 tablet by mouth Daily As Needed. Stop now for surgery  Indications: Migraine Headache      Magnesium 250 MG tablet Take 1 tablet by mouth Daily. Indications: supplement      Misc Natural Products (LUTEIN 20 PO) Take 1 tablet by mouth Daily. Indications: supplement      naloxone (NARCAN) 4 MG/0.1ML nasal spray Call 911. Don't prime. [place] in 1 nostril for overdose. Repeat in 2-3 minutes in other nostril if no or minimal breathing/responsiveness. 2 each 0    olmesartan (BENICAR) 20 MG tablet Take 1 tablet by mouth Daily.      omeprazole (priLOSEC) 20 MG capsule Take 1 capsule by mouth Daily.      ondansetron ODT (ZOFRAN-ODT) 4 MG disintegrating tablet Place 1 tablet on the tongue Every 8 (Eight) Hours As Needed for Nausea or Vomiting.      potassium chloride 10 MEQ CR tablet Take 1 tablet by mouth Daily.      traMADol (ULTRAM) 50 MG tablet Take 1 tablet by mouth Every 6 (Six) Hours As Needed for Moderate Pain.      traMADol (ULTRAM) 50 MG tablet Take 1 tablet by mouth Every 6 (Six) Hours As Needed for Moderate Pain or Severe Pain. 20 tablet 0     No current facility-administered medications on file prior to visit.       Objective   Pulse 91   Ht 157.5 cm (62\")   Wt 72.6 kg (160 lb)   SpO2 99%   Breastfeeding No   BMI 29.26 kg/m²     Foot/Ankle Exam  Physical Exam  GENERAL  Orientation:  AAOx3  Affect:  appropriate  Assistance:  wheelchair     VASCULAR      Right Foot " Vascularity   Normal vascular exam    Dorsalis pedis:  2+  Posterior tibial:  2+  Skin temperature:  warm  Edema gradin+  CFT:  < 3 seconds  Pedal hair growth:  Present  Varicosities:  none and spider veins     NEUROLOGIC      Right Foot Neurologic   Light touch sensation: normal  Hot/Cold sensation: normal  Achilles reflex:  2+     MUSCULOSKELETAL      Right Foot Musculoskeletal   Tenderness:  anterior tibiotalar joint line tenderness and medial malleolus tenderness    Arch:  Normal     DERMATOLOGIC       Right Foot Dermatologic   Skin  Right foot skin is intact.      Right foot additional comments: Instability,  range of motion, and muscle strength testing deferred secondary to guarding.  No gross deformity.  No proximal fibular pain.  Neurovascular status is grossly intact to the digits.     2024  Mild discomfort involving the lateral malleolar region. Incision site is well healed. No open wound or signs of infection. No further concerns. Range of motion to the ankle is supple and nonantalgic.    25: Small superficial wound involving the lateral malleolar region at the distal aspect of prior incision.  Minimal maceration.  No drainage or gross signs of concern or infection.  No exposure of underlying hardware.  Skin is thin and atrophic.  Neurovascular status appears to be intact to the right lower extremity.  Range of motion and muscle strength are appropriate      Results  Imaging  X-ray of the ankle shows stable hardware and no concerning findings.    Assessment & Plan   Diagnoses and all orders for this visit:    1. Acute right ankle pain (Primary)    2. 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 Painful orthopaedic hardware  -     XR Ankle 3+ View Right  -     Miscellaneous DME    4. Closed displaced bimalleolar fracture, right, sequela      Assessment & Plan    The sore is likely pressure-induced, exacerbated by the  presence of hardware in the area. The skin overlying the hardware is notably thin, and the bone beneath it is subjected to significant pressure. Despite these factors, there is no evidence of infection or deep extension of the wound. The hardware remains stable, and the ankle fracture appears to be healing appropriately. The only concern is the open wound, which is struggling to heal due to the aforementioned pressure. A conservative approach was recommended, involving the application of a dressing to offload the pressure and maintain wound cleanliness. An offloading boot was suggested for use at night to alleviate pressure. She was advised to exercise caution when applying adhesive dressings due to her thin skin. An order for an offloading boot will be placed. If she experiences severe pain or if her condition changes, she should inform us immediately so that an appropriate plan can be devised.Reviewed proper basic stretching and manual therapy exercises along with appropriate shoes and activity.  Discussed proper use and/or avoidance of OTC anti-inflammatories.  Patient is to call with any additional issues or concerns.  Greater than 20 minutes was spent before, during, and after evaluation for patient care.    The encounter note is created with the use of AI technology.  I do apologize if there are typos and/or confusion within the note.  Please feel free to contact me or my office with any questions or concerns.    Follow-up in 6 weeks             Patient or patient representative verbalized consent for the use of Ambient Listening during the visit with  DEAN Tobias DPM for chart documentation. 4/10/2025  07:00 EDT    DEAN Tobias DPM

## 2025-04-16 NOTE — PROGRESS NOTES
"Chief Complaint  Gait Problem, Memory Loss, and Speech Problem    Subjective            Yancy Mcdonnell presents to [facility] NEUROLOGY for balance and memory  History of Present Illness    Yancy Mcdonnell is an 79-year-old right-handed female seen today as a new patient for balance difficulty, progressive weakness, and changes in her speech.  Patient is accompanied by her daughter Wanda, who per provides supplemental history.  Patient states about 3 years ago she slipped on ice fell and broke her ankle, about 2 years ago she fell while she was camping, she fell in May 2024 and broke her hip.  She has had several falls since causing multiple fractures. Prior to injuries, she reports having more of a stooped posture and shuffling gait.states she often falls backwards.  Patient is currently wheelchair-bound.  She can only transfer with assistance.  She denies having muscle twitching, but does have muscle cramping: occasionally when she yawns it feels as though her jaw is spasming.  Denies having any changes in bowel or bladder.  Patient denies having any numbness or tingling in her limbs.   No tremor.    She states that slowed speech began about a month after she fell and hit her head 2023.  She reports that speech changes are progressively worsening.  She has some trouble swallowing and managing foods within her mouth.  She does admit to having more occultly swallowing due to food feeling stuck.  She recently underwent esophageal dilation and her daughter feels like the swallowing has improved.    Patient states that she more easily laughs and cries when not appropriate.  She is also having increased drooling.  Admits to having shortness of breath with exertion.    Patient states that she saw a neurologist at [facility] who told her she needed to have cervical spine surgery.    Family is concerned about Parkinson's disease.      Maximum   Score Patient's   Score Questions   5 4 \"What is the " "year?Season?Date?Day of the week?Month?\"   5 5 \"Where are we now: State?County?Town/city?Hospital?Floor?\"   3 3 3 Unrelated objects Number of trials:___   5 5 Count backward from 100 by sevens or spell WORLD backwards   3 3 Name 3 things from above   2 2 Identify 2 objects   1 1 Repeat the phrase: No ifs, ands,or buts.   3 1 Take paper in right hand, fold it in half, and put it on the floor.   1 1 Please read this and do what it says. \"Close your eyes\"   1 1 Make up and write a sentence about anything. Noun and verb   1 0 Copy this picture 10 angles must be present.   30 26 Total MMSE              Family History   Problem Relation Age of Onset    Osteoporosis Mother     Stroke Mother 80    Dementia Mother     Heart disease Father     Hypertension Father     Heart disease Sister     Cancer Daughter         BREAST AND MELANOMA    Breast cancer Daughter 52    Cancer Son         MELANOMA    Cancer Grandson         MELANOMA       Past Medical History:   Diagnosis Date    Allergic     Sulfa,Demerol, Lisinopril    Allergic rhinitis 06/02/2015    Anemia     Not at ptesent    Ankle fracture, right 02/2022    Body mass index 29.0-29.9, adult 09/12/2018    Cataract     Cervical spinal stenosis 08/07/2017    Cholelithiasis     Class 1 obesity due to excess calories with serious comorbidity and body mass index (BMI) of 30.0 to 30.9 in adult 10/14/2020    Closed bimalleolar fracture of right ankle 02/11/2022    DDD (degenerative disc disease), cervical     DDD (degenerative disc disease), lumbar     DDD (degenerative disc disease), thoracic     Difficulty walking     Poor balance    Diverticulosis     Dyslipidemia 12/05/2012    Elevated brain natriuretic peptide (BNP) level 01/06/2017    Eustachian tube disorder, right 02/18/2019    Fall 02/2022    Fatigue 06/02/2015    Female cystocele 06/02/2015    Hiatal hernia     Hiatal hernia with gastroesophageal reflux 01/06/2017    History of blood clots 1980    leg s/p fx    History of " DVT (deep vein thrombosis) 06/02/2015    History of herpes simplex infection 07/25/2020    Hyperlipidemia     Hypertension 06/10/2016    Left rib fracture 05/2017    11TH RIB     Migraine headache 06/02/2015    Mitral insufficiency 01/16/2017    MILD    Osteoarthritis 05/20/2014    Osteopenia 12/05/2012    Over weight 06/14/2018    Peripheral edema 01/05/2017    PFO (patent foramen ovale) 01/16/2017    Pulmonary nodule 05/04/2017    DR CHUN FOLLOWS    RHA (rheumatoid arthritis) 12/05/2012    Scleritis 06/02/2015    Scoliosis     Thrombophlebitis 02/19/2022    Acute right lower extremity superficial thrombophlebitis noted in the great saphenous (below knee) and varicosity (below knee).      Tricuspid insufficiency     MILD    Urinary tract infection     Urinary urgency     Varicose veins of other specified sites     Venous insufficiency 09/12/2018       Social History     Socioeconomic History    Marital status:    Tobacco Use    Smoking status: Never     Passive exposure: Never    Smokeless tobacco: Never    Tobacco comments:     None   Vaping Use    Vaping status: Never Used   Substance and Sexual Activity    Alcohol use: Not Currently     Alcohol/week: 1.0 standard drink of alcohol     Types: 1 Cans of beer per week    Drug use: Never    Sexual activity: Defer         Current Outpatient Medications:     hydroCHLOROthiazide 25 MG tablet, Take 1 tablet by mouth Daily., Disp: , Rfl:     levothyroxine (SYNTHROID, LEVOTHROID) 50 MCG tablet, TAKE 1 TABLET BY MOUTH DAILY, Disp: 90 tablet, Rfl: 1    loratadine (CLARITIN) 10 MG tablet, TAKE 1 TABLET BY MOUTH DAILY, Disp: 90 tablet, Rfl: 1    losartan (COZAAR) 100 MG tablet, Take 0.5 tablets by mouth Daily., Disp: , Rfl:     lysine 500 MG tablet, Take 1 tablet by mouth Daily As Needed. Stop now for surgery  Indications: Migraine Headache, Disp: , Rfl:     Magnesium 250 MG tablet, Take 1 tablet by mouth Daily. Indications: supplement, Disp: , Rfl:     Misc Natural  Products (LUTEIN 20 PO), Take 1 tablet by mouth Daily. Indications: supplement, Disp: , Rfl:     omeprazole (priLOSEC) 20 MG capsule, Take 1 capsule by mouth Daily., Disp: , Rfl:     traMADol (ULTRAM) 50 MG tablet, Take 1 tablet by mouth Every 6 (Six) Hours As Needed for Moderate Pain or Severe Pain., Disp: 20 tablet, Rfl: 0    amoxicillin-clavulanate (AUGMENTIN) 875-125 MG per tablet, Take 1 tablet by mouth 2 (Two) Times a Day. (Patient not taking: Reported on 4/18/2025), Disp: , Rfl:     apixaban (ELIQUIS) 5 MG tablet tablet, Take 1 tablet by mouth 2 (Two) Times a Day. (Patient not taking: Reported on 4/18/2025), Disp: , Rfl:     fluticasone (FLONASE) 50 MCG/ACT nasal spray, Administer 2 sprays into the nostril(s) as directed by provider Daily. (Patient not taking: Reported on 4/18/2025), Disp: , Rfl:     naloxone (NARCAN) 4 MG/0.1ML nasal spray, Call 911. Don't prime. [place] in 1 nostril for overdose. Repeat in 2-3 minutes in other nostril if no or minimal breathing/responsiveness. (Patient not taking: Reported on 4/18/2025), Disp: 2 each, Rfl: 0    olmesartan (BENICAR) 20 MG tablet, Take 1 tablet by mouth Daily. (Patient not taking: Reported on 4/18/2025), Disp: , Rfl:     ondansetron ODT (ZOFRAN-ODT) 4 MG disintegrating tablet, Place 1 tablet on the tongue Every 8 (Eight) Hours As Needed for Nausea or Vomiting. (Patient not taking: Reported on 4/18/2025), Disp: , Rfl:     potassium chloride 10 MEQ CR tablet, Take 1 tablet by mouth Daily. (Patient not taking: Reported on 4/18/2025), Disp: , Rfl:     traMADol (ULTRAM) 50 MG tablet, Take 1 tablet by mouth Every 6 (Six) Hours As Needed for Moderate Pain. (Patient not taking: Reported on 4/18/2025), Disp: , Rfl:     Review of Systems   HENT:  Positive for drooling, mouth sores, trouble swallowing and voice change.    Respiratory:  Positive for choking and shortness of breath.    Neurological:  Positive for speech difficulty.   All other systems reviewed and are  "negative.           Objective   Vital Signs:   /66   Pulse 109   Ht 160 cm (63\")   BMI 28.34 kg/m²     Physical Exam  Vitals reviewed.   Constitutional:       Appearance: She is well-developed and overweight.   HENT:      Head: Normocephalic and atraumatic.   Eyes:      General: Lids are normal.      Extraocular Movements: EOM normal     Right eye: Abnormal extraocular motion present.      Left eye: Abnormal extraocular motion present.      Pupils: Pupils are equal, round, and reactive to light.   Neck:      Vascular: No carotid bruit.   Cardiovascular:      Rate and Rhythm: Normal rate.      Heart sounds: No murmur heard.  Pulmonary:      Effort: Pulmonary effort is normal.   Musculoskeletal:      Cervical back: Normal range of motion and neck supple.   Skin:     General: Skin is warm and dry.   Neurological:      Mental Status: She is alert and oriented to person, place, and time.      Cranial Nerves: Cranial nerve deficit present.      Sensory: Sensory deficit present.      Motor: Motor function is intact. Weakness, tremor and abnormal muscle tone present.      Coordination: Finger-Nose-Finger Test normal. Rapid alternating movements normal.      Gait: Gait abnormal.      Deep Tendon Reflexes: Reflexes abnormal.      Reflex Scores:       Tricep reflexes are 2+ on the right side and 3+ on the left side.       Bicep reflexes are 2+ on the right side and 3+ on the left side.       Brachioradialis reflexes are 2+ on the right side and 3+ on the left side.       Patellar reflexes are 2+ on the right side and 2+ on the left side.       Achilles reflexes are 2+ on the right side and 2+ on the left side.     Comments: Did not ambulate, wheelchair-bound, uses assist for transfers   Psychiatric:         Attention and Perception: Attention normal.         Mood and Affect: Mood normal.         Behavior: Behavior normal.         Cognition and Memory: Cognition and memory normal.         Judgment: Judgment normal.     "  Comments: MMSE 26/30; speech slowed rate, spastic        Result Review :     Labs 8/22/2024  Mg 2  B12 195 L      11/22/2024: CBC-WBC 14.4 H, RBC 4.9, hemoglobin 13.8, hematocrit 42.3, MCV 86.3, platelet 407             MRI brain without contrast 2/22/2024  Mild chronic small vessel ischemic changes  Prominent ventricles and sulcal consistent with generalized atrophy.  Chronic changes, no acute intercranial process identified    MRI C-spine 2/22/2024  C2-3 mild to moderate left foraminal narrowing, no spinal canal stenosis  C3-4 severe left and moderate to severe right foraminal stenosis, no spinal canal stenosis  C4-5 moderate bilateral foraminal narrowing, no spinal canal stenosis  C5-6 severe bilateral foraminal stenosis, moderate to severe spinal canal stenosis with increased signal intensity which may reflect chronic compressive myelopathy  C6-7 severe bilateral foraminal stenosis.  Severe spinal canal stenosis with mild cord compression and faint increased cord signal intensity  C 7-T1 moderate right and mild left foraminal narrowing, no spinal canal stenosis    CT HEAD WO CONTRAST   Date of Exam: 3/12/2025 9:15 AM EDT   Indication: fall with head trauma.   Comparison: June 30, 2023   Impression:  1.No acute intracranial process or calvarial fracture identified.  2.Findings suggestive of mild chronic small vessel ischemic disease.     Electronically Signed: Antelmo Moreira MD    3/12/2025 9:36 AM EDT     Neurological Exam  Mental Status  Alert. Oriented to person, place and time. Oriented to person, place, and time. Memory is normal. Recent and remote memory are intact. Unable to copy figure. Follows one-step commands. Fund of knowledge is appropriate for level of education. MMSE score: 26.    Cranial Nerves  CN II: Visual acuity is normal. Visual fields full to confrontation.  CN III, IV, VI: Abnormal extraocular movements: Very reduced vertical gaze up and down. Normal lids and orbits bilaterally. Pupils  "equal round and reactive to light bilaterally.  CN V: Facial sensation is normal.  CN VII: Full and symmetric facial movement.  CN IX, X: Palate elevates symmetrically. Normal gag reflex.  CN XI: Shoulder shrug strength is normal.  CN XII: Tongue midline without atrophy or fasciculations.  Slowed rate of speech unable to quickly say \"la, la, la\".    Motor  Decreased muscle bulk throughout. Hands bilaterally, slight bilateral tongue atrophy laterally. No fasciculations present. Increased muscle tone. Some spasticity felt in both upper extremities. The following abnormal movements were seen: Very slight tremor in the right pinky.   No pronator drift.                                             Right                     Left   Shoulder abduction               4+                          4+  Elbow flexion                         4                          4  Elbow extension                    5-                          5-  Wrist flexion                           5                          4  Wrist extension                      4-                          4-  Finger abduction                    4                          4  Thumb flexion                        4                          5  Hip flexion                              3+                          5-  Knee flexion                           5                          5  Knee extension                      5                          5  Dorsiflexion                            5                          5-                                             Right                     Left   Interossei                              5                          4+    Sensory  Light touch is normal in upper and lower extremities. Temperature abnormality: Vibration abnormality:   Right: Loss of sensation in the ulnar nerve distribution.  Decreased vibration and temperature from the knee distally in the left leg.    Reflexes                                            Right        "               Left  Brachioradialis                    2+                         3+  Biceps                                 2+                         3+  Triceps                                2+                         3+  Patellar                                2+                         2+  Achilles                                2+                         2+  Jaw jerk absent.  Right pathological reflexes: Jessie's absent.  Left pathological reflexes: Jessie's absent.  Corneal reflex bilaterally with light stimulation, unable to suppress.    Coordination   Tremor.Right: Finger-to-nose normal. Rapid alternating movement normal.Left: Finger-to-nose normal. Rapid alternating movement normal.    Gait   Abnormal gait.Casual gait: Unable to rise from chair without using arms.  Unable to ambulate.  Unable to transfer without assistance.  Patient presented today in wheelchair.              Assessment and Plan    Diagnoses and all orders for this visit:    1. Slow rate of speech (Primary)  -     Copper, Serum; Future  -     Heavy Metals, Blood  -     Immunofixation, Serum; Future  -     Protein Elec + Interp, Serum  -     TSH+Free T4  -     Sedimentation Rate  -     Sjogren's Antibody, Anti-SS-A / -SS-B; Future  -     Vitamin B12 & Folate  -     C-reactive Protein; Future  -     ALINE by IFA, Reflex 9-biomarkers profile; Future  -     Vitamin E; Future  -     Methylmalonic Acid, Serum; Future  -     CBC & Differential; Future    2. Pseudobulbar affect  -     Copper, Serum; Future  -     Heavy Metals, Blood  -     Immunofixation, Serum; Future  -     Protein Elec + Interp, Serum  -     TSH+Free T4  -     Sedimentation Rate  -     Sjogren's Antibody, Anti-SS-A / -SS-B; Future  -     Vitamin B12 & Folate  -     C-reactive Protein; Future  -     ALINE by IFA, Reflex 9-biomarkers profile; Future  -     Vitamin E; Future  -     Methylmalonic Acid, Serum; Future  -     CBC & Differential; Future    3. Weakness  -     Copper,  Serum; Future  -     Heavy Metals, Blood  -     Immunofixation, Serum; Future  -     Protein Elec + Interp, Serum  -     TSH+Free T4  -     Sedimentation Rate  -     Sjogren's Antibody, Anti-SS-A / -SS-B; Future  -     Vitamin B12 & Folate  -     C-reactive Protein; Future  -     ALINE by IFA, Reflex 9-biomarkers profile; Future  -     Vitamin E; Future  -     Methylmalonic Acid, Serum; Future  -     CBC & Differential; Future      Yancy Mcdonnell is an 79-year-old female seen today as a new patient for progressive weakness, frequent falls, and speech and cognitive changes.  Symptoms started at least 3 years ago and have progressively worsened.  She has fallen several times causing multiple fractures.  About 1 month after a fall when she hit her head in 2023, the patient started having some changes in speech.  She admits to having difficulty swallowing, increased drooling, and some episodes of pseudobulbar affect (laughing when her  falls).  She has not noticed any muscle twitching, but has had facial cramping with yawning.    On physical exam today the patient has a very slow rate of speech, spastic.  She seems to have slight atrophy of the tongue muscle on both lateral aspects.  She has vertical gaze palsy upward and downward.  Reflexes 3+ in the left upper extremity, no Jessie's, no jaw jerk.  She does have a positive corneal reflex that is unable to be suppressed.  She was not able to ambulate today, but patient has asymmetric weakness in both upper and lower extremities.  No visible fasciculations.  MMSE 26/30.    The patient has severe multilevel central canal stenosis with cord signal abnormality and foraminal stenosis in the cervical spine which can explain her gait abnormalities and brisk DTRs in the upper extremity along with asymmetric upper extremity weakness.  This would not explain her slow rate of speech, pseudobulbar affect, and increased sialorrhea.    She did have a very low B12 level in  August that has not been replaced.  I would like to check labs today including B12, folate, copper, and vitamin E.  After she has labs drawn, I would like for her to start oral B12 1000 mcg daily that she has at home.  If still very low on lab, I will order B12 injections (1000 mcg subcu daily x 1 week, then weekly x 1 month, then monthly thereafter).    Because of the vertical gaze palsy, axial rigidity, and bradykinesia I am concerned about something like atypical Parkinson's or PSP. I explained to the patient that I am also concerned about a neurodegenerative disease that involves the upper motor neurons like ALS or PLS.     I would like to see if I can get images from [facility] of the MRI and cervical spine.    Consider repeating MRI brain and C-spine to compare from 1 year ago    She will follow-up after testing    I spent 90 minutes caring for Yancy on this date of service. This time includes time spent by me in the following activities:preparing for the visit, reviewing tests, obtaining and/or reviewing a separately obtained history, performing a medically appropriate examination and/or evaluation , counseling and educating the patient/family/caregiver, ordering medications, tests, or procedures, and documenting information in the medical record  Follow Up   Return for after testing .  Patient was given instructions and counseling regarding her condition or for health maintenance advice. Please see specific information pulled into the AVS if appropriate.         This document has been electronically signed by FELIPE Mojica on April 18, 2025 13:52 EDT

## 2025-04-17 ENCOUNTER — ON CAMPUS - OUTPATIENT (AMBULATORY)
Dept: URBAN - METROPOLITAN AREA HOSPITAL 85 | Facility: HOSPITAL | Age: 80
End: 2025-04-17
Payer: MEDICARE

## 2025-04-17 ENCOUNTER — ANESTHESIA EVENT (OUTPATIENT)
Dept: GASTROENTEROLOGY | Facility: HOSPITAL | Age: 80
End: 2025-04-17
Payer: MEDICARE

## 2025-04-17 ENCOUNTER — ANESTHESIA (OUTPATIENT)
Dept: GASTROENTEROLOGY | Facility: HOSPITAL | Age: 80
End: 2025-04-17
Payer: MEDICARE

## 2025-04-17 ENCOUNTER — HOSPITAL ENCOUNTER (OUTPATIENT)
Facility: HOSPITAL | Age: 80
Setting detail: HOSPITAL OUTPATIENT SURGERY
Discharge: NURSING, REHAB OR LONG-TERM CARE | End: 2025-04-17
Attending: INTERNAL MEDICINE | Admitting: INTERNAL MEDICINE
Payer: MEDICARE

## 2025-04-17 VITALS
HEIGHT: 63 IN | BODY MASS INDEX: 28.35 KG/M2 | DIASTOLIC BLOOD PRESSURE: 71 MMHG | TEMPERATURE: 97.9 F | RESPIRATION RATE: 16 BRPM | OXYGEN SATURATION: 98 % | SYSTOLIC BLOOD PRESSURE: 157 MMHG | HEART RATE: 78 BPM | WEIGHT: 160 LBS

## 2025-04-17 DIAGNOSIS — K44.9 DIAPHRAGMATIC HERNIA WITHOUT OBSTRUCTION OR GANGRENE: ICD-10-CM

## 2025-04-17 DIAGNOSIS — R13.10 DYSPHAGIA, UNSPECIFIED: ICD-10-CM

## 2025-04-17 DIAGNOSIS — K22.4 DYSKINESIA OF ESOPHAGUS: ICD-10-CM

## 2025-04-17 DIAGNOSIS — K21.9 GASTRO-ESOPHAGEAL REFLUX DISEASE WITHOUT ESOPHAGITIS: ICD-10-CM

## 2025-04-17 PROCEDURE — 43249 ESOPH EGD DILATION <30 MM: CPT | Performed by: INTERNAL MEDICINE

## 2025-04-17 PROCEDURE — C1726 CATH, BAL DIL, NON-VASCULAR: HCPCS | Performed by: INTERNAL MEDICINE

## 2025-04-17 PROCEDURE — 25810000003 SODIUM CHLORIDE 0.9 % SOLUTION: Performed by: NURSE ANESTHETIST, CERTIFIED REGISTERED

## 2025-04-17 PROCEDURE — 25010000002 PROPOFOL 10 MG/ML EMULSION: Performed by: NURSE ANESTHETIST, CERTIFIED REGISTERED

## 2025-04-17 RX ORDER — IPRATROPIUM BROMIDE AND ALBUTEROL SULFATE 2.5; .5 MG/3ML; MG/3ML
3 SOLUTION RESPIRATORY (INHALATION) ONCE AS NEEDED
Status: DISCONTINUED | OUTPATIENT
Start: 2025-04-17 | End: 2025-04-17 | Stop reason: HOSPADM

## 2025-04-17 RX ORDER — HYDRALAZINE HYDROCHLORIDE 20 MG/ML
5 INJECTION INTRAMUSCULAR; INTRAVENOUS
Status: DISCONTINUED | OUTPATIENT
Start: 2025-04-17 | End: 2025-04-17 | Stop reason: HOSPADM

## 2025-04-17 RX ORDER — SODIUM CHLORIDE 9 MG/ML
INJECTION, SOLUTION INTRAVENOUS CONTINUOUS PRN
Status: DISCONTINUED | OUTPATIENT
Start: 2025-04-17 | End: 2025-04-17 | Stop reason: SURG

## 2025-04-17 RX ORDER — ONDANSETRON 2 MG/ML
4 INJECTION INTRAMUSCULAR; INTRAVENOUS ONCE AS NEEDED
Status: DISCONTINUED | OUTPATIENT
Start: 2025-04-17 | End: 2025-04-17 | Stop reason: SDUPTHER

## 2025-04-17 RX ORDER — LABETALOL HYDROCHLORIDE 5 MG/ML
5 INJECTION, SOLUTION INTRAVENOUS
Status: DISCONTINUED | OUTPATIENT
Start: 2025-04-17 | End: 2025-04-17 | Stop reason: HOSPADM

## 2025-04-17 RX ORDER — EPHEDRINE SULFATE 5 MG/ML
5 INJECTION INTRAVENOUS ONCE AS NEEDED
Status: DISCONTINUED | OUTPATIENT
Start: 2025-04-17 | End: 2025-04-17 | Stop reason: HOSPADM

## 2025-04-17 RX ORDER — PROPOFOL 10 MG/ML
VIAL (ML) INTRAVENOUS AS NEEDED
Status: DISCONTINUED | OUTPATIENT
Start: 2025-04-17 | End: 2025-04-17 | Stop reason: SURG

## 2025-04-17 RX ORDER — ONDANSETRON 2 MG/ML
4 INJECTION INTRAMUSCULAR; INTRAVENOUS ONCE AS NEEDED
Status: DISCONTINUED | OUTPATIENT
Start: 2025-04-17 | End: 2025-04-17 | Stop reason: HOSPADM

## 2025-04-17 RX ORDER — DIPHENHYDRAMINE HYDROCHLORIDE 50 MG/ML
12.5 INJECTION, SOLUTION INTRAMUSCULAR; INTRAVENOUS
Status: DISCONTINUED | OUTPATIENT
Start: 2025-04-17 | End: 2025-04-17 | Stop reason: HOSPADM

## 2025-04-17 RX ADMIN — PROPOFOL 30 MG: 10 INJECTION, EMULSION INTRAVENOUS at 10:08

## 2025-04-17 RX ADMIN — PROPOFOL 20 MG: 10 INJECTION, EMULSION INTRAVENOUS at 10:04

## 2025-04-17 RX ADMIN — SODIUM CHLORIDE: 9 INJECTION, SOLUTION INTRAVENOUS at 09:57

## 2025-04-17 RX ADMIN — PROPOFOL 30 MG: 10 INJECTION, EMULSION INTRAVENOUS at 10:05

## 2025-04-17 RX ADMIN — PROPOFOL 20 MG: 10 INJECTION, EMULSION INTRAVENOUS at 10:11

## 2025-04-17 RX ADMIN — PROPOFOL 30 MG: 10 INJECTION, EMULSION INTRAVENOUS at 10:03

## 2025-04-17 RX ADMIN — PROPOFOL 70 MG: 10 INJECTION, EMULSION INTRAVENOUS at 10:01

## 2025-04-17 NOTE — H&P
GI Procedure H&P:    Referring Provider:    Chuck Zhang MD Harrell, Steven, MD    Chief complaint: <principal problem not specified>    Subjective .  Dysphagia    History of present illness:      Yancy Mcdonnell is a 79 y.o. female who presents today for Procedure(s):  ESOPHAGOGASTRODUODENOSCOPY for the indications listed below.     The updated Patient Profile was reviewed prior to the procedure, in conjunction with the Physical Exam, including medical conditions, surgical procedures, medications, allergies, family history and social history.     Pre-operatively, I reviewed the indication(s) for the procedure, the risks of the procedure [including but not limited to: unexpected bleeding possibly requiring hospitalization and/or unplanned repeat procedures, perforation possibly requiring surgical treatment, missed lesions and complications of sedation/MAC (also explained by anesthesia staff)].     I have evaluated the patient for risks associated with the planned anesthesia and the procedure to be performed and find the patient an acceptable candidate for IV sedation.    Multiple opportunities were provided for any questions or concerns, and all questions were answered satisfactorily before any anesthesia was administered. We will proceed with the planned procedure.    Past Medical History:  Past Medical History:   Diagnosis Date    Allergic     Sulfa,Demerol, Lisinopril    Allergic rhinitis 06/02/2015    Anemia     Not at ptesent    Ankle fracture, right 02/2022    Body mass index 29.0-29.9, adult 09/12/2018    Cataract     Cervical spinal stenosis 08/07/2017    Cholelithiasis     Class 1 obesity due to excess calories with serious comorbidity and body mass index (BMI) of 30.0 to 30.9 in adult 10/14/2020    Closed bimalleolar fracture of right ankle 02/11/2022    DDD (degenerative disc disease), cervical     DDD (degenerative disc disease), lumbar     DDD (degenerative disc disease), thoracic     Difficulty  walking     Poor balance    Diverticulosis     Dyslipidemia 12/05/2012    Elevated brain natriuretic peptide (BNP) level 01/06/2017    Eustachian tube disorder, right 02/18/2019    Fall 02/2022    Fatigue 06/02/2015    Female cystocele 06/02/2015    Hiatal hernia     Hiatal hernia with gastroesophageal reflux 01/06/2017    History of blood clots 1980    leg s/p fx    History of DVT (deep vein thrombosis) 06/02/2015    History of herpes simplex infection 07/25/2020    Hyperlipidemia     Hypertension 06/10/2016    Left rib fracture 05/2017    11TH RIB     Migraine headache 06/02/2015    Mitral insufficiency 01/16/2017    MILD    Osteoarthritis 05/20/2014    Osteopenia 12/05/2012    Over weight 06/14/2018    Peripheral edema 01/05/2017    PFO (patent foramen ovale) 01/16/2017    Pulmonary nodule 05/04/2017    DR CHUN FOLLOWS    RHA (rheumatoid arthritis) 12/05/2012    Scleritis 06/02/2015    Scoliosis     Thrombophlebitis 02/19/2022    Acute right lower extremity superficial thrombophlebitis noted in the great saphenous (below knee) and varicosity (below knee).      Tricuspid insufficiency     MILD    Urinary tract infection     Urinary urgency     Varicose veins of other specified sites     Venous insufficiency 09/12/2018       Past Surgical History:  Past Surgical History:   Procedure Laterality Date    ANKLE OPEN REDUCTION INTERNAL FIXATION Right 02/18/2022    Procedure: ANKLE OPEN REDUCTION INTERNAL FIXATION;  Surgeon: DEAN Tobias DPM;  Location: [facility] MAIN OR;  Service: Podiatry;  Laterality: Right;    COLONOSCOPY  07/20/2017    EGD/ COLONSCOPY LARGE HIATAL HERNIA. MILD DIVERTICULOSIS. OTHERWISE NORMAL.    ESOPHAGOSCOPY / EGD  07/28/2023    Dr. Davila    FEMUR FRACTURE SURGERY Right 07/2024    HIP INTERTROCHANTERIC NAILING Left 05/04/2024    Procedure: HIP INTERTROCHANTERIC NAILING;  Surgeon: Jeremy Rodriguez II, MD;  Location: [facility] MAIN OR;  Service: Orthopedics;  Laterality: Left;    INGUINAL  HERNIA REPAIR Right     ORIF ANKLE FRACTURE Right 02/18/2022    Dr. Tobias    TOTAL ABDOMINAL HYSTERECTOMY  1985    W/BSO WITH A/P REPAIR 1985 BENIGN REASONS DR. DRAKE       Social History:  Social History     Tobacco Use    Smoking status: Never     Passive exposure: Never    Smokeless tobacco: Never    Tobacco comments:     None   Vaping Use    Vaping status: Never Used   Substance Use Topics    Alcohol use: Not Currently     Alcohol/week: 1.0 standard drink of alcohol     Types: 1 Cans of beer per week    Drug use: Never       Family History:  Family History   Problem Relation Age of Onset    Osteoporosis Mother     Stroke Mother 80    Dementia Mother     Heart disease Father     Hypertension Father     Heart disease Sister     Cancer Daughter         BREAST AND MELANOMA    Breast cancer Daughter 52    Cancer Son         MELANOMA    Cancer Grandson         MELANOMA       Medications:  Medications Prior to Admission   Medication Sig Dispense Refill Last Dose/Taking    amoxicillin-clavulanate (AUGMENTIN) 875-125 MG per tablet Take 1 tablet by mouth 2 (Two) Times a Day.   Taking    hydroCHLOROthiazide 25 MG tablet Take 1 tablet by mouth Daily.   4/16/2025    levothyroxine (SYNTHROID, LEVOTHROID) 50 MCG tablet TAKE 1 TABLET BY MOUTH DAILY 90 tablet 1 4/17/2025 Morning    loratadine (CLARITIN) 10 MG tablet TAKE 1 TABLET BY MOUTH DAILY 90 tablet 1 Taking    losartan (COZAAR) 100 MG tablet Take 0.5 tablets by mouth Daily.   4/16/2025    Magnesium 250 MG tablet Take 1 tablet by mouth Daily. Indications: supplement   Taking    Misc Natural Products (LUTEIN 20 PO) Take 1 tablet by mouth Daily. Indications: supplement   Taking    omeprazole (priLOSEC) 20 MG capsule Take 1 capsule by mouth Daily.   Taking    potassium chloride 10 MEQ CR tablet Take 1 tablet by mouth Daily.   4/16/2025    apixaban (ELIQUIS) 5 MG tablet tablet Take 1 tablet by mouth 2 (Two) Times a Day.   4/8/2025    fluticasone (FLONASE) 50 MCG/ACT nasal  "spray Administer 2 sprays into the nostril(s) as directed by provider Daily.       lysine 500 MG tablet Take 1 tablet by mouth Daily As Needed. Stop now for surgery  Indications: Migraine Headache       naloxone (NARCAN) 4 MG/0.1ML nasal spray Call 911. Don't prime. [place] in 1 nostril for overdose. Repeat in 2-3 minutes in other nostril if no or minimal breathing/responsiveness. 2 each 0     olmesartan (BENICAR) 20 MG tablet Take 1 tablet by mouth Daily.       ondansetron ODT (ZOFRAN-ODT) 4 MG disintegrating tablet Place 1 tablet on the tongue Every 8 (Eight) Hours As Needed for Nausea or Vomiting.       traMADol (ULTRAM) 50 MG tablet Take 1 tablet by mouth Every 6 (Six) Hours As Needed for Moderate Pain.       traMADol (ULTRAM) 50 MG tablet Take 1 tablet by mouth Every 6 (Six) Hours As Needed for Moderate Pain or Severe Pain. 20 tablet 0        Scheduled Meds:   Continuous Infusions:   PRN Meds:.  atropine    diphenhydrAMINE    ePHEDrine Sulfate (Pressors)    hydrALAZINE    ipratropium-albuterol    labetalol    lidocaine (cardiac)    ondansetron    ALLERGIES:  Meperidine, Other, Sulfa antibiotics, and Lisinopril    ROS:  The following systems were reviewed and negative;   Constitution:  No fevers, chills, no unintentional weight loss  Skin: no rash, no jaundice  Eyes:  No blurry vision, no eye pain  HENT:  No change in hearing or smell  Resp:  No dyspnea or cough  CV:  No chest pain or palpitations  :  No dysuria, hematuria  Musculoskeletal:  No leg cramps or arthralgias  Neuro:  No tremor, no numbness  Psych:  No depression or confsuion    Objective     Vital Signs:   Vitals:    04/08/25 1639 04/17/25 0916   BP:  146/73   BP Location:  Left arm   Patient Position:  Lying   Pulse:  91   Resp:  16   Temp:  97.9 °F (36.6 °C)   TempSrc:  Oral   SpO2:  99%   Weight: 73 kg (161 lb) 72.6 kg (160 lb)   Height: 157.5 cm (62.01\") 160 cm (63\")       Physical Exam:       General Appearance:    Awake and alert, in no acute " distress   Head:    Normocephalic, without obvious abnormality, atraumatic   Throat:   No oral lesions, no thrush, oral mucosa moist   Lungs:     respirations regular, even and unlabored   Skin:   No rash, no jaundice       Results Review:  Lab Results (last 24 hours)       ** No results found for the last 24 hours. **            Imaging Results (Last 24 Hours)       ** No results found for the last 24 hours. **             I reviewed the patient's labs and imaging.    ASSESSMENT AND PLAN:  Dysphagia    Active Problems:    * No active hospital problems. *       Procedure(s):  ESOPHAGOGASTRODUODENOSCOPY      I discussed the patients findings and my recommendations with the patient.    Ruslan Davila MD  04/17/25  10:00 EDT

## 2025-04-17 NOTE — OP NOTE
ESOPHAGOGASTRODUODENOSCOPY Procedure Report    Patient Name:  Yancy Mcdonnell  YOB: 1945    Date of Surgery:  4/17/2025     Pre-Op Diagnosis:  Dysphagia [R13.10]  GERD (gastroesophageal reflux disease) [K21.9]  Hiatal hernia [K44.9]       Post-Op Diagnosis Codes:     * Dysphagia [R13.10]     * GERD (gastroesophageal reflux disease) [K21.9]     * Hiatal hernia [K44.9]  Esophageal dysmotility dilated from 18 to 20 mm with balloon without trauma and the balloon was dragged up through the esophagus  Large hiatal hernia from 30 to 38 cm from the incisors without Austin's erosions    Procedure/CPT® Codes:  AK ESOPHAGOGASTRODUODENOSCOPY TRANSORAL DIAGNOSTIC [30463]    Procedure(s):  ESOPHAGOGASTRODUODENOSCOPY with DILATION (BALLOON 18 UP TO 20MM)    Staff:  Surgeon(s):  Ruslan Davila MD      Anesthesia: Monitored Anesthesia Care    Description of Procedure:  A description of the procedure as well as risks, benefits and alternative methods were explained to the patient who voiced understanding and signed the corresponding consent form. A physical exam was performed and vital signs were monitored throughout the procedure.    An upper GI endoscope was placed into the mouth and proceeded through the esophagus, stomach and second portion of the duodenum without difficulty. The scope was then retroflexed and the fundus was visualized. The procedure was not difficult and there were no immediate complications.    Specimen:        See Below    Estimated blood loss: none    Complications:  None    Findings:  Esophageal dysmotility dilated from 18 to 20 mm with balloon without trauma and the balloon was dragged up through the esophagus  Large hiatal hernia from 30 to 38 cm from the incisors without Austin's erosions    Impression:  Dysphagia due to esophageal dysmotility and large hiatal hernia status post dilation which helped in the past    Recommendations:  Monitor for symptom improvement following  dilation  Continue omeprazole  Okay to resume Stephen Davila MD     Date: 4/17/2025    Time: 10:17 EDT

## 2025-04-17 NOTE — ANESTHESIA PREPROCEDURE EVALUATION
Anesthesia Evaluation     NPO Solid Status: > 8 hours  NPO Liquid Status: > 2 hours           Airway   Mallampati: II  TM distance: >3 FB  Neck ROM: full  No difficulty expected  Dental - normal exam     Pulmonary - normal exam   Cardiovascular - normal exam    (+) hypertension, valvular problems/murmurs, hyperlipidemia      Neuro/Psych  (+) headaches  GI/Hepatic/Renal/Endo    (+) obesity, hiatal hernia, GERD, thyroid problem hypothyroidism    Musculoskeletal     (+) back pain  Abdominal  - normal exam    Bowel sounds: normal.   Substance History      OB/GYN          Other   arthritis,                 Anesthesia Plan    ASA 3     MAC   total IV anesthesia  intravenous induction     Anesthetic plan, risks, benefits, and alternatives have been provided, discussed and informed consent has been obtained with: patient.  Pre-procedure education provided  Plan discussed with CRNA.    CODE STATUS:

## 2025-04-17 NOTE — ANESTHESIA POSTPROCEDURE EVALUATION
Patient: Yancy Mcdonnell    Procedure Summary       Date: 04/17/25 Room / Location: [facility] ENDOSCOPY 4 / [facility] ENDOSCOPY    Anesthesia Start: 0957 Anesthesia Stop: 1012    Procedure: ESOPHAGOGASTRODUODENOSCOPY with DILATION (BALLOON 18 UP TO 20MM) Diagnosis:       Dysphagia      GERD (gastroesophageal reflux disease)      Hiatal hernia      (Dysphagia [R13.10])      (GERD (gastroesophageal reflux disease) [K21.9])      (Hiatal hernia [K44.9])    Surgeons: Ruslan Davila MD Provider: Michael Marcus MD    Anesthesia Type: MAC ASA Status: 3            Anesthesia Type: MAC    Vitals  Vitals Value Taken Time   /76 04/17/25 10:38   Temp     Pulse 75 04/17/25 10:40   Resp     SpO2 99 % 04/17/25 10:40   Vitals shown include unfiled device data.        Post Anesthesia Care and Evaluation    Patient location during evaluation: PACU  Patient participation: complete - patient participated  Level of consciousness: awake  Pain scale: See nurse's notes for pain score.  Pain management: adequate    Airway patency: patent  Anesthetic complications: No anesthetic complications  PONV Status: none  Cardiovascular status: acceptable  Respiratory status: acceptable and spontaneous ventilation  Hydration status: acceptable    Comments: Patient seen and examined postoperatively; vital signs stable; SpO2 greater than or equal to 90%; cardiopulmonary status stable; nausea/vomiting adequately controlled; pain adequately controlled; no apparent anesthesia complications; patient discharged from anesthesia care when discharge criteria were met

## 2025-04-17 NOTE — DISCHARGE INSTRUCTIONS
A responsible adult should stay with you and you should rest quietly for the rest of the day.    Do not drink alcohol, drive, operate any heavy machinery or power tools or make any legal/important decisions for the next 24 hours.    Progress your diet as tolerated.  If you begin to experience severe pain, increased shortness of breath, racing heartbeat or a fever above 101 F, seek immediate medical attention.    Follow up with MD as instructed. Call office for results in 3 to 5 days if needed.     [phone]    Findings:  Esophageal dysmotility dilated from 18 to 20 mm with balloon without trauma and the balloon was dragged up through the esophagus  Large hiatal hernia from 30 to 38 cm from the incisors without Austin's erosions     Impression:  Dysphagia due to esophageal dysmotility and large hiatal hernia status post dilation which helped in the past     Recommendations:  Monitor for symptom improvement following dilation  Continue omeprazole  Okay to resume Eliquis

## 2025-04-18 ENCOUNTER — OFFICE VISIT (OUTPATIENT)
Dept: NEUROLOGY | Facility: CLINIC | Age: 80
End: 2025-04-18
Payer: MEDICARE

## 2025-04-18 ENCOUNTER — LAB (OUTPATIENT)
Dept: LAB | Facility: HOSPITAL | Age: 80
End: 2025-04-18
Payer: MEDICARE

## 2025-04-18 VITALS
BODY MASS INDEX: 28.34 KG/M2 | SYSTOLIC BLOOD PRESSURE: 105 MMHG | DIASTOLIC BLOOD PRESSURE: 66 MMHG | HEART RATE: 109 BPM | HEIGHT: 63 IN

## 2025-04-18 DIAGNOSIS — R47.89 SLOW RATE OF SPEECH: Primary | ICD-10-CM

## 2025-04-18 DIAGNOSIS — R53.1 WEAKNESS: ICD-10-CM

## 2025-04-18 DIAGNOSIS — F48.2 PSEUDOBULBAR AFFECT: ICD-10-CM

## 2025-04-18 DIAGNOSIS — R47.89 SLOW RATE OF SPEECH: ICD-10-CM

## 2025-04-18 LAB
BASOPHILS # BLD AUTO: 0.02 10*3/MM3 (ref 0–0.2)
BASOPHILS NFR BLD AUTO: 0.3 % (ref 0–1.5)
CRP SERPL-MCNC: 1.7 MG/DL (ref 0–0.5)
DEPRECATED RDW RBC AUTO: 37.2 FL (ref 37–54)
EOSINOPHIL # BLD AUTO: 0.08 10*3/MM3 (ref 0–0.4)
EOSINOPHIL NFR BLD AUTO: 1.3 % (ref 0.3–6.2)
ERYTHROCYTE [DISTWIDTH] IN BLOOD BY AUTOMATED COUNT: 12.3 % (ref 12.3–15.4)
ERYTHROCYTE [SEDIMENTATION RATE] IN BLOOD: 44 MM/HR (ref 0–30)
FOLATE SERPL-MCNC: 10.3 NG/ML (ref 4.78–24.2)
HCT VFR BLD AUTO: 41.3 % (ref 34–46.6)
HGB BLD-MCNC: 13.4 G/DL (ref 12–15.9)
IMM GRANULOCYTES # BLD AUTO: 0.04 10*3/MM3 (ref 0–0.05)
IMM GRANULOCYTES NFR BLD AUTO: 0.7 % (ref 0–0.5)
LYMPHOCYTES # BLD AUTO: 0.96 10*3/MM3 (ref 0.7–3.1)
LYMPHOCYTES NFR BLD AUTO: 15.8 % (ref 19.6–45.3)
MCH RBC QN AUTO: 27.3 PG (ref 26.6–33)
MCHC RBC AUTO-ENTMCNC: 32.4 G/DL (ref 31.5–35.7)
MCV RBC AUTO: 84.3 FL (ref 79–97)
MONOCYTES # BLD AUTO: 0.71 10*3/MM3 (ref 0.1–0.9)
MONOCYTES NFR BLD AUTO: 11.7 % (ref 5–12)
NEUTROPHILS NFR BLD AUTO: 4.28 10*3/MM3 (ref 1.7–7)
NEUTROPHILS NFR BLD AUTO: 70.2 % (ref 42.7–76)
NRBC BLD AUTO-RTO: 0 /100 WBC (ref 0–0.2)
PLATELET # BLD AUTO: 410 10*3/MM3 (ref 140–450)
PMV BLD AUTO: 9.9 FL (ref 6–12)
RBC # BLD AUTO: 4.9 10*6/MM3 (ref 3.77–5.28)
T4 FREE SERPL-MCNC: 1.5 NG/DL (ref 0.93–1.7)
TSH SERPL DL<=0.05 MIU/L-ACNC: 2.65 UIU/ML (ref 0.27–4.2)
VIT B12 BLD-MCNC: 794 PG/ML (ref 211–946)
WBC NRBC COR # BLD AUTO: 6.09 10*3/MM3 (ref 3.4–10.8)

## 2025-04-18 PROCEDURE — 86334 IMMUNOFIX E-PHORESIS SERUM: CPT

## 2025-04-18 PROCEDURE — 82784 ASSAY IGA/IGD/IGG/IGM EACH: CPT

## 2025-04-18 PROCEDURE — 84165 PROTEIN E-PHORESIS SERUM: CPT | Performed by: NURSE PRACTITIONER

## 2025-04-18 PROCEDURE — 86235 NUCLEAR ANTIGEN ANTIBODY: CPT

## 2025-04-18 PROCEDURE — 82175 ASSAY OF ARSENIC: CPT | Performed by: NURSE PRACTITIONER

## 2025-04-18 PROCEDURE — 86038 ANTINUCLEAR ANTIBODIES: CPT

## 2025-04-18 PROCEDURE — 85025 COMPLETE CBC W/AUTO DIFF WBC: CPT

## 2025-04-18 PROCEDURE — 82525 ASSAY OF COPPER: CPT

## 2025-04-18 PROCEDURE — 83825 ASSAY OF MERCURY: CPT | Performed by: NURSE PRACTITIONER

## 2025-04-18 PROCEDURE — 84443 ASSAY THYROID STIM HORMONE: CPT | Performed by: NURSE PRACTITIONER

## 2025-04-18 PROCEDURE — 83655 ASSAY OF LEAD: CPT | Performed by: NURSE PRACTITIONER

## 2025-04-18 PROCEDURE — 84439 ASSAY OF FREE THYROXINE: CPT | Performed by: NURSE PRACTITIONER

## 2025-04-18 PROCEDURE — 86140 C-REACTIVE PROTEIN: CPT

## 2025-04-18 PROCEDURE — 82746 ASSAY OF FOLIC ACID SERUM: CPT | Performed by: NURSE PRACTITIONER

## 2025-04-18 PROCEDURE — 84155 ASSAY OF PROTEIN SERUM: CPT | Performed by: NURSE PRACTITIONER

## 2025-04-18 PROCEDURE — 83921 ORGANIC ACID SINGLE QUANT: CPT

## 2025-04-18 PROCEDURE — 36415 COLL VENOUS BLD VENIPUNCTURE: CPT

## 2025-04-18 PROCEDURE — 82607 VITAMIN B-12: CPT | Performed by: NURSE PRACTITIONER

## 2025-04-18 PROCEDURE — 85652 RBC SED RATE AUTOMATED: CPT | Performed by: NURSE PRACTITIONER

## 2025-04-18 PROCEDURE — 84446 ASSAY OF VITAMIN E: CPT

## 2025-04-21 LAB
ALBUMIN SERPL ELPH-MCNC: 3.1 G/DL (ref 2.9–4.4)
ALBUMIN/GLOB SERPL: 1 {RATIO} (ref 0.7–1.7)
ALPHA1 GLOB SERPL ELPH-MCNC: 0.3 G/DL (ref 0–0.4)
ALPHA2 GLOB SERPL ELPH-MCNC: 1 G/DL (ref 0.4–1)
B-GLOBULIN SERPL ELPH-MCNC: 1.1 G/DL (ref 0.7–1.3)
ENA SS-A AB SER-ACNC: <0.2 AI (ref 0–0.9)
ENA SS-B AB SER-ACNC: <0.2 AI (ref 0–0.9)
GAMMA GLOB SERPL ELPH-MCNC: 0.9 G/DL (ref 0.4–1.8)
GLOBULIN SER CALC-MCNC: 3.2 G/DL (ref 2.2–3.9)
LABORATORY COMMENT REPORT: NORMAL
M PROTEIN SERPL ELPH-MCNC: NORMAL G/DL
PROT PATTERN SERPL ELPH-IMP: NORMAL
PROT SERPL-MCNC: 6.3 G/DL (ref 6–8.5)

## 2025-04-22 LAB
ANA SER QL IF: NEGATIVE
IGA SERPL-MCNC: 235 MG/DL (ref 64–422)
IGG SERPL-MCNC: 880 MG/DL (ref 586–1602)
IGM SERPL-MCNC: 75 MG/DL (ref 26–217)
LABORATORY COMMENT REPORT: NORMAL
PROT PATTERN SERPL IFE-IMP: NORMAL

## 2025-04-23 ENCOUNTER — PATIENT ROUNDING (BHMG ONLY) (OUTPATIENT)
Dept: NEUROLOGY | Facility: CLINIC | Age: 80
End: 2025-04-23
Payer: MEDICARE

## 2025-04-23 LAB
COPPER SERPL-MCNC: 147 UG/DL (ref 80–158)
METHYLMALONATE SERPL-SCNC: 180 NMOL/L (ref 0–378)

## 2025-04-24 LAB
A-TOCOPHEROL VIT E SERPL-MCNC: 10.7 MG/L (ref 9–29)
GAMMA TOCOPHEROL SERPL-MCNC: 1.6 MG/L (ref 0.5–4.9)

## 2025-04-25 LAB
ARSENIC BLD-MCNC: 2 UG/L (ref 0–9)
LEAD BLDV-MCNC: 1.5 UG/DL (ref 0–3.4)
MERCURY BLD-MCNC: <1 UG/L (ref 0–14.9)

## 2025-04-29 ENCOUNTER — TELEPHONE (OUTPATIENT)
Dept: NEUROLOGY | Facility: CLINIC | Age: 80
End: 2025-04-29

## 2025-04-29 NOTE — TELEPHONE ENCOUNTER
This wasn't for an MRI (at this point)  Provider wanted to review previous imagining and advise.

## 2025-04-29 NOTE — TELEPHONE ENCOUNTER
Caller: CHRISTINE MCCOY    Relationship to patient: Emergency Contact      Best call back number: [phone]    Provider: BREN FLOYD    Medication PA needed: MRI    Reason for call/Prior Auth: PATIENT'S DAUGHTER WAS WANTING TO CHECK ON SCHEDULING. ADVISED THIS IS LIKELY STILL PENDING AN AUTH. SHE REQUESTED THAT WE CALL HER SINCE HER MOTHER DOES NOT ALWAYS ANSWER ER PHONE, AND SHE WILL BE HER TRANSPORTATION AS WELL. HER PHONE NUMBER [phone]    PLEASE REVIEW AND ADVISE

## 2025-05-01 DIAGNOSIS — G23.1 PSP (PROGRESSIVE SUPRANUCLEAR PALSY): Primary | ICD-10-CM

## 2025-05-01 NOTE — TELEPHONE ENCOUNTER
Talked with the patient's daughter regarding imaging.  I reviewed the MRI brain and cervical spine.  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  Will refer to [facility] for second opinion with their movement disorder specialists.  I contacted Cecilia Pina (manager) to inform her of the referral.

## 2025-05-21 ENCOUNTER — OFFICE VISIT (OUTPATIENT)
Age: 80
End: 2025-05-21
Payer: MEDICARE

## 2025-05-21 VITALS — HEIGHT: 63 IN | HEART RATE: 92 BPM | OXYGEN SATURATION: 96 % | WEIGHT: 160 LBS | BODY MASS INDEX: 28.35 KG/M2

## 2025-05-21 DIAGNOSIS — M25.571 ACUTE RIGHT ANKLE PAIN: Primary | ICD-10-CM

## 2025-05-21 DIAGNOSIS — S82.841S: ICD-10-CM

## 2025-05-21 DIAGNOSIS — L97.311 CHRONIC ULCER OF RIGHT ANKLE LIMITED TO BREAKDOWN OF SKIN: ICD-10-CM

## 2025-05-21 DIAGNOSIS — T84.84XA PAINFUL ORTHOPAEDIC HARDWARE: ICD-10-CM

## 2025-05-21 RX ORDER — COLLAGENASE SANTYL 250 [ARB'U]/G
1 OINTMENT TOPICAL DAILY
Qty: 90 G | Refills: 1 | Status: SHIPPED | OUTPATIENT
Start: 2025-05-21

## 2025-05-22 NOTE — PROGRESS NOTES
05/21/2025  Foot and Ankle Surgery - Established Patient/Follow-up  Provider: Dr. Carlos Tobias DPM  Location: [facility]    Subjective:  Yancy Mcdonnell is a 79 y.o. female.     Chief Complaint   Patient presents with    Right Ankle - Follow-up, Pain, Wound Check     Non healing ulcer     Follow-Up     Chuck Zhang MD  4/9/25       History of Present Illness  The patient presents for evaluation of a wound.    She was last seen 6 weeks ago. She has been experiencing persistent pain in her ankle, which she attributes to pressure from her wheelchair. She has been utilizing a soft boot for the past 3 days but finds it too cumbersome to continue its use. The wound typically exhibits a scab formation. She resides at [facility], a [facility] service [facility], where she spends the majority of her day seated in a wheelchair, as her room lacks a recliner. She only lies down during nap times. She has been receiving weekly wound care, which involves keeping the wound moist and wrapped. She was diagnosed with cellulitis last week.      Allergies   Allergen Reactions    Meperidine Unknown (See Comments) and Nausea And Vomiting     Abstracted from centricity.    Other Unknown (See Comments)     Sulfa (sulfadiazine)     Sulfa Antibiotics Unknown - High Severity and Anaphylaxis    Lisinopril Cough       Current Outpatient Medications on File Prior to Visit   Medication Sig Dispense Refill    apixaban (ELIQUIS) 5 MG tablet tablet Take 1 tablet by mouth 2 (Two) Times a Day.      hydroCHLOROthiazide 25 MG tablet Take 1 tablet by mouth Daily.      levothyroxine (SYNTHROID, LEVOTHROID) 50 MCG tablet TAKE 1 TABLET BY MOUTH DAILY 90 tablet 1    loratadine (CLARITIN) 10 MG tablet TAKE 1 TABLET BY MOUTH DAILY 90 tablet 1    losartan (COZAAR) 100 MG tablet Take 0.5 tablets by mouth Daily.      lysine 500 MG tablet Take 1 tablet by mouth Daily As Needed. Stop now for surgery  Indications: Migraine Headache      Magnesium 250 MG  "tablet Take 1 tablet by mouth Daily. Indications: supplement      Misc Natural Products (LUTEIN 20 PO) Take 1 tablet by mouth Daily. Indications: supplement      omeprazole (priLOSEC) 20 MG capsule Take 1 capsule by mouth Daily.      potassium chloride 10 MEQ CR tablet Take 1 tablet by mouth Daily.      traMADol (ULTRAM) 50 MG tablet Take 1 tablet by mouth Every 6 (Six) Hours As Needed for Moderate Pain or Severe Pain. 20 tablet 0     No current facility-administered medications on file prior to visit.       Objective   Pulse 92   Ht 160 cm (63\")   Wt 72.6 kg (160 lb)   SpO2 96%   BMI 28.34 kg/m²     Foot/Ankle Exam  Physical Exam  GENERAL  Orientation:  AAOx3  Affect:  appropriate  Assistance:  wheelchair     VASCULAR      Right Foot Vascularity   Normal vascular exam    Dorsalis pedis:  2+  Posterior tibial:  2+  Skin temperature:  warm  Edema gradin+  CFT:  < 3 seconds  Pedal hair growth:  Present  Varicosities:  none and spider veins     NEUROLOGIC      Right Foot Neurologic   Light touch sensation: normal  Hot/Cold sensation: normal  Achilles reflex:  2+     MUSCULOSKELETAL      Right Foot Musculoskeletal   Tenderness:  anterior tibiotalar joint line tenderness and medial malleolus tenderness    Arch:  Normal     DERMATOLOGIC       Right Foot Dermatologic   Skin  Right foot skin is intact.      Right foot additional comments: Instability,  range of motion, and muscle strength testing deferred secondary to guarding.  No gross deformity.  No proximal fibular pain.  Neurovascular status is grossly intact to the digits.     2024  Mild discomfort involving the lateral malleolar region. Incision site is well healed. No open wound or signs of infection. No further concerns. Range of motion to the ankle is supple and nonantalgic.     25: Small superficial wound involving the lateral malleolar region at the distal aspect of prior incision.  Minimal maceration.  No drainage or gross signs of concern or " infection.  No exposure of underlying hardware.  Skin is thin and atrophic.  Neurovascular status appears to be intact to the right lower extremity.  Range of motion and muscle strength are appropriate    5/21/25: Wound remains to the lateral aspect of the right ankle with mild crusting.  No exposure of underlying hardware.  No significant periwound erythema, edema, drainage, or other concerning features.  Mild discomfort today.      Results      Assessment & Plan   Diagnoses and all orders for this visit:    1. Acute right ankle pain (Primary)    2. 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 Painful orthopaedic hardware    4. Closed displaced bimalleolar fracture, right, sequela    Other orders  -     collagenase (Santyl) 250 UNIT/GM ointment; Apply 1 Application topically to the appropriate area as directed Daily. Apply once daily to wound bed. ONE RIGHT ANKLE WOUND. 1 BY 1 BY 0.5  Dispense: 90 g; Refill: 1      Assessment & Plan    The wound appears to have increased in size and depth compared to the previous evaluation 6 weeks ago. The hardware does not exhibit any complications, but it may be contributing to the ankle issues. The wound is likely pressure-induced. She has some baseline swelling in her leg. The potential risks associated with an open wound, including infection and exacerbation, were discussed. The possibility of surgical intervention was also considered, but the associated risks were thoroughly explained. A compression dressing will be applied to the wound, which should remain in place for 2 days until the next dressing change. Orders for local wound care and dressings will be provided. The facility will be instructed to perform dressing changes on Mondays, Wednesdays, and Fridays. A medication to debride the necrotic tissue will be prescribed. She is advised to continue wearing the boot.Reviewed proper basic stretching and manual therapy exercises along with appropriate  shoes and activity.  Discussed proper use and/or avoidance of OTC anti-inflammatories.  Patient is to call with any additional issues or concerns.  Greater than 30 minutes was spent before, during, and after evaluation for patient care.    The encounter note is created with the use of AI technology.  I do apologize if there are typos and/or confusion within the note.  Please feel free to contact me or my office with any questions or concerns.    Follow-up  The patient will follow up with the nurse practitioner in 4 to 6 weeks.             Patient or patient representative verbalized consent for the use of Ambient Listening during the visit with  DEAN Tobias DPM for chart documentation. 5/22/2025  07:45 EDT    DEAN Tobias DPM

## 2025-05-27 ENCOUNTER — TELEPHONE (OUTPATIENT)
Age: 80
End: 2025-05-27
Payer: MEDICARE

## 2025-05-27 ENCOUNTER — TELEPHONE (OUTPATIENT)
Dept: NEUROLOGY | Facility: CLINIC | Age: 80
End: 2025-05-27

## 2025-05-27 NOTE — TELEPHONE ENCOUNTER
PATIENTS DAUGHTER CALLED AND STATED THAT SHE WAS TOLD THAT JEANIE WOULD GO TO NURSING FACILITY TO DO WOUND CARE. I LET HER KNOW THAT JEANIE DOES NOT GO TO [facility] F[facility]ILITIES JUST HERE AND WOUND CARE. AFTER READING OFFICE NOTE, DR CUBA SAID THE FACILITY WOULD DO DRESSING CHANGES 3 TIMES A WEEK, STATED THAT SHE DIDN'T THINK FACILITY DID THAT. PLEASE CALL DAUGHTER CHRISTINE BACK AND DISCUSS [phone]

## 2025-05-27 NOTE — TELEPHONE ENCOUNTER
PATIENT DAUGHTER SAYS [facility] CANNOT SEE HER TO VERIFY HER DX UNTIL AFTER HER AUGUST APPOINTMENT.    SHE SAYS THERE IS ANOTHER PLACE   SHE CAN TAKE PATIENT INSTEAD OF [facility] BUT SHE CAN'T REMEMBER WHERE IT IS.    PLEASE ADVISE CHRISTINE    THANK YOU

## 2025-05-28 NOTE — TELEPHONE ENCOUNTER
I spoke with daughter to let them know Vashti cannot come to her facility for wound care. If she needs wound care at her facility she needs to speak with her nurse to set it up

## 2025-06-03 ENCOUNTER — OFFICE (AMBULATORY)
Dept: URBAN - METROPOLITAN AREA CLINIC 64 | Facility: CLINIC | Age: 80
End: 2025-06-03
Payer: MEDICARE

## 2025-06-03 VITALS
SYSTOLIC BLOOD PRESSURE: 99 MMHG | HEIGHT: 63 IN | HEART RATE: 103 BPM | DIASTOLIC BLOOD PRESSURE: 60 MMHG | WEIGHT: 164 LBS

## 2025-06-03 DIAGNOSIS — R13.10 DYSPHAGIA, UNSPECIFIED: ICD-10-CM

## 2025-06-03 PROCEDURE — 99213 OFFICE O/P EST LOW 20 MIN: CPT | Performed by: INTERNAL MEDICINE

## 2025-07-31 ENCOUNTER — TELEPHONE (OUTPATIENT)
Dept: NEUROLOGY | Facility: CLINIC | Age: 80
End: 2025-07-31

## (undated) DEVICE — DRSNG GZ CURAD XEROFORM PETROLTM OVERWRP 1X8IN STRL

## (undated) DEVICE — SOL ISO/ALC RUB 70PCT 4OZ

## (undated) DEVICE — CUFF TOURNI 1BLADDER 1PRT 30IN STRL

## (undated) DEVICE — PK EXTREM 50

## (undated) DEVICE — INTERTAN LAG SCREW DRILL: Brand: TRIGEN

## (undated) DEVICE — IMPLANTABLE DEVICE
Type: IMPLANTABLE DEVICE | Site: ANKLE | Status: NON-FUNCTIONAL
Brand: DARCO
Removed: 2022-02-18

## (undated) DEVICE — IMPLANTABLE DEVICE
Type: IMPLANTABLE DEVICE | Site: ANKLE | Status: NON-FUNCTIONAL
Brand: ORTHOLOC 3DI
Removed: 2022-02-18

## (undated) DEVICE — PK ENDO GI 50

## (undated) DEVICE — GOWN,REINFORCE,POLY,SIRUS,BREATH SLV,XLG: Brand: MEDLINE

## (undated) DEVICE — SLV SCD CALF HEMOFORCE DVT THERP REPROC MD

## (undated) DEVICE — C-ARM: Brand: UNBRANDED

## (undated) DEVICE — UNDERGLV SURG BIOGEL INDICAT PI SZ8 BLU

## (undated) DEVICE — SUT VIC 2/0 CT2 27IN J269H

## (undated) DEVICE — DRAPE,U/ SHT,SPLIT,PLAS,STERIL: Brand: MEDLINE

## (undated) DEVICE — ANTIBACTERIAL UNDYED BRAIDED (POLYGLACTIN 910), SYNTHETIC ABSORBABLE SUTURE: Brand: COATED VICRYL

## (undated) DEVICE — KT SURG TURNOVER 050

## (undated) DEVICE — PENCL HND ROCKRSWTCH HOLSTR EZ CLEAN TP CRD 10FT

## (undated) DEVICE — BANDAGE,GAUZE,BULKEE II,4.5"X4.1YD,STRL: Brand: MEDLINE

## (undated) DEVICE — UNDYED BRAIDED (POLYGLACTIN 910), SYNTHETIC ABSORBABLE SUTURE: Brand: COATED VICRYL

## (undated) DEVICE — UNDERGLV SURG BIOGEL INDICAT PI SZ8.5 BLU

## (undated) DEVICE — PAD UNDERCAST WYTEX 6IN 4YD LF STRL

## (undated) DEVICE — APPL CHLORAPREP HI/LITE TINTED 10.5ML ORNG

## (undated) DEVICE — K-WIRE BLUNT/TROCAR
Type: IMPLANTABLE DEVICE | Site: ANKLE | Status: NON-FUNCTIONAL
Removed: 2022-02-18

## (undated) DEVICE — BNDG ESMARK 4IN 12FT LF STRL BLU

## (undated) DEVICE — BNDG,ELSTC,MATRIX,STRL,6"X5YD,LF,HOOK&LP: Brand: MEDLINE

## (undated) DEVICE — SOL IRRIG NACL 1000ML

## (undated) DEVICE — DRSNG GZ CURAD XEROFORM NONADHR OVERWRAP 5X9IN

## (undated) DEVICE — DRIVER STAR 15 SELF RETAINING: Brand: ORTHOLOC™ 3DI

## (undated) DEVICE — OCCLUSIVE GAUZE STRIP OVERWRAP,3% BISMUTH TRIBROMOPHENATE IN PETROLATUM BLEND: Brand: XEROFORM

## (undated) DEVICE — DRP C/ARMOR

## (undated) DEVICE — SOLUTION,WATER,IRRIGATION,1000ML,STERILE: Brand: MEDLINE

## (undated) DEVICE — DEV INFL BALN BIG60 W/GAUGE 60ML

## (undated) DEVICE — DRILL BIT

## (undated) DEVICE — GUIDE PIN 3.2MM X 343MM: Brand: TRIGEN

## (undated) DEVICE — KT PT POSITION SUPINE HANA/PROFX TABL

## (undated) DEVICE — ESOPHAGEAL BALLOON DILATATION CATHETER: Brand: CRE FIXED WIRE

## (undated) DEVICE — SYR LUERLOK 50ML

## (undated) DEVICE — DRILL BIT: Brand: DART-FIRE

## (undated) DEVICE — GUIDE WIRE, BALL-TIPPED, STERILE

## (undated) DEVICE — APPL CHLORAPREP HI/LITE 26ML ORNG

## (undated) DEVICE — 4.0MM LONG AO PILOT DRILL: Brand: TRIGEN

## (undated) DEVICE — SPNG GZ WOVN 4X4IN 12PLY 10/BX STRL

## (undated) DEVICE — GLV SURG BIOGEL M LTX PF 7 1/2

## (undated) DEVICE — PK GAM NAIL 50

## (undated) DEVICE — SUT ETHLN 3/0 FS1 30IN 669H

## (undated) DEVICE — STAPLER 35 WIDE: Brand: MEDLINE INDUSTRIES, INC.

## (undated) DEVICE — BNDG COBAN S/ADHR WRP 4IN 5YD TN

## (undated) DEVICE — GLV SURG SENSICARE PI ORTHO SZ8.5 LF STRL

## (undated) DEVICE — DRSNG WND BORDR/ADHS NONADHR/GZ LF 4X4IN STRL

## (undated) DEVICE — BITEBLOCK ENDO W/STRAP 60F A/ LF DISP

## (undated) DEVICE — PAD,ABDOMINAL,5"X9",STERILE,LF,1/PK: Brand: MEDLINE INDUSTRIES, INC.

## (undated) DEVICE — INTENDED FOR TISSUE SEPARATION, AND OTHER PROCEDURES THAT REQUIRE A SHARP SURGICAL BLADE TO PUNCTURE OR CUT.: Brand: BARD-PARKER ® CARBON RIB-BACK BLADES

## (undated) DEVICE — STAPLER, SKIN, 35W, A: Brand: MEDLINE INDUSTRIES, INC.